# Patient Record
Sex: FEMALE | Race: WHITE | NOT HISPANIC OR LATINO | Employment: OTHER | URBAN - METROPOLITAN AREA
[De-identification: names, ages, dates, MRNs, and addresses within clinical notes are randomized per-mention and may not be internally consistent; named-entity substitution may affect disease eponyms.]

---

## 2017-02-13 ENCOUNTER — ALLSCRIPTS OFFICE VISIT (OUTPATIENT)
Dept: OTHER | Facility: OTHER | Age: 82
End: 2017-02-13

## 2017-02-17 ENCOUNTER — GENERIC CONVERSION - ENCOUNTER (OUTPATIENT)
Dept: OTHER | Facility: OTHER | Age: 82
End: 2017-02-17

## 2017-02-22 ENCOUNTER — ALLSCRIPTS OFFICE VISIT (OUTPATIENT)
Dept: OTHER | Facility: OTHER | Age: 82
End: 2017-02-22

## 2017-03-13 ENCOUNTER — APPOINTMENT (OUTPATIENT)
Dept: PHYSICAL THERAPY | Facility: CLINIC | Age: 82
End: 2017-03-13
Payer: MEDICARE

## 2017-03-13 PROCEDURE — 97140 MANUAL THERAPY 1/> REGIONS: CPT

## 2017-03-13 PROCEDURE — G8991 OTHER PT/OT GOAL STATUS: HCPCS

## 2017-03-13 PROCEDURE — G8990 OTHER PT/OT CURRENT STATUS: HCPCS

## 2017-03-13 PROCEDURE — 97161 PT EVAL LOW COMPLEX 20 MIN: CPT

## 2017-03-16 ENCOUNTER — APPOINTMENT (OUTPATIENT)
Dept: PHYSICAL THERAPY | Facility: CLINIC | Age: 82
End: 2017-03-16
Payer: MEDICARE

## 2017-03-16 PROCEDURE — 97140 MANUAL THERAPY 1/> REGIONS: CPT

## 2017-03-16 PROCEDURE — 97110 THERAPEUTIC EXERCISES: CPT

## 2017-03-17 ENCOUNTER — APPOINTMENT (OUTPATIENT)
Dept: PHYSICAL THERAPY | Facility: CLINIC | Age: 82
End: 2017-03-17
Payer: MEDICARE

## 2017-03-17 PROCEDURE — 97140 MANUAL THERAPY 1/> REGIONS: CPT

## 2017-03-20 ENCOUNTER — APPOINTMENT (OUTPATIENT)
Dept: PHYSICAL THERAPY | Facility: CLINIC | Age: 82
End: 2017-03-20
Payer: MEDICARE

## 2017-03-21 ENCOUNTER — APPOINTMENT (OUTPATIENT)
Dept: PHYSICAL THERAPY | Facility: CLINIC | Age: 82
End: 2017-03-21
Payer: MEDICARE

## 2017-03-21 PROCEDURE — 97110 THERAPEUTIC EXERCISES: CPT

## 2017-03-21 PROCEDURE — 97140 MANUAL THERAPY 1/> REGIONS: CPT

## 2017-03-22 ENCOUNTER — APPOINTMENT (OUTPATIENT)
Dept: PHYSICAL THERAPY | Facility: CLINIC | Age: 82
End: 2017-03-22
Payer: MEDICARE

## 2017-03-22 PROCEDURE — 97110 THERAPEUTIC EXERCISES: CPT

## 2017-03-22 PROCEDURE — 97140 MANUAL THERAPY 1/> REGIONS: CPT

## 2017-03-24 ENCOUNTER — APPOINTMENT (OUTPATIENT)
Dept: PHYSICAL THERAPY | Facility: CLINIC | Age: 82
End: 2017-03-24
Payer: MEDICARE

## 2017-03-24 PROCEDURE — 97140 MANUAL THERAPY 1/> REGIONS: CPT

## 2017-03-27 ENCOUNTER — APPOINTMENT (OUTPATIENT)
Dept: PHYSICAL THERAPY | Facility: CLINIC | Age: 82
End: 2017-03-27
Payer: MEDICARE

## 2017-03-27 PROCEDURE — 97140 MANUAL THERAPY 1/> REGIONS: CPT

## 2017-03-29 ENCOUNTER — APPOINTMENT (OUTPATIENT)
Dept: PHYSICAL THERAPY | Facility: CLINIC | Age: 82
End: 2017-03-29
Payer: MEDICARE

## 2017-03-29 PROCEDURE — 97110 THERAPEUTIC EXERCISES: CPT

## 2017-03-29 PROCEDURE — 97140 MANUAL THERAPY 1/> REGIONS: CPT

## 2017-03-31 ENCOUNTER — APPOINTMENT (OUTPATIENT)
Dept: LAB | Facility: CLINIC | Age: 82
End: 2017-03-31
Payer: MEDICARE

## 2017-03-31 ENCOUNTER — GENERIC CONVERSION - ENCOUNTER (OUTPATIENT)
Dept: OTHER | Facility: OTHER | Age: 82
End: 2017-03-31

## 2017-03-31 ENCOUNTER — APPOINTMENT (OUTPATIENT)
Dept: PHYSICAL THERAPY | Facility: CLINIC | Age: 82
End: 2017-03-31
Payer: MEDICARE

## 2017-03-31 DIAGNOSIS — E78.5 HYPERLIPIDEMIA: ICD-10-CM

## 2017-03-31 DIAGNOSIS — I10 ESSENTIAL (PRIMARY) HYPERTENSION: ICD-10-CM

## 2017-03-31 DIAGNOSIS — E03.9 HYPOTHYROIDISM: ICD-10-CM

## 2017-03-31 DIAGNOSIS — R73.01 IMPAIRED FASTING GLUCOSE: ICD-10-CM

## 2017-03-31 DIAGNOSIS — F41.8 OTHER SPECIFIED ANXIETY DISORDERS: ICD-10-CM

## 2017-03-31 LAB
ALBUMIN SERPL BCP-MCNC: 3.5 G/DL (ref 3.5–5)
ALP SERPL-CCNC: 90 U/L (ref 46–116)
ALT SERPL W P-5'-P-CCNC: 24 U/L (ref 12–78)
ANION GAP SERPL CALCULATED.3IONS-SCNC: 6 MMOL/L (ref 4–13)
AST SERPL W P-5'-P-CCNC: 19 U/L (ref 5–45)
BILIRUB SERPL-MCNC: 0.42 MG/DL (ref 0.2–1)
BUN SERPL-MCNC: 18 MG/DL (ref 5–25)
CALCIUM ALBUM COR SERPL-MCNC: 10.6 MG/DL (ref 8.3–10.1)
CALCIUM SERPL-MCNC: 10.2 MG/DL (ref 8.3–10.1)
CHLORIDE SERPL-SCNC: 99 MMOL/L (ref 100–108)
CHOLEST SERPL-MCNC: 181 MG/DL (ref 50–200)
CO2 SERPL-SCNC: 31 MMOL/L (ref 21–32)
CREAT SERPL-MCNC: 0.81 MG/DL (ref 0.6–1.3)
EST. AVERAGE GLUCOSE BLD GHB EST-MCNC: 123 MG/DL
GFR SERPL CREATININE-BSD FRML MDRD: >60 ML/MIN/1.73SQ M
GLUCOSE P FAST SERPL-MCNC: 109 MG/DL (ref 65–99)
HBA1C MFR BLD: 5.9 % (ref 4.2–6.3)
HDLC SERPL-MCNC: 47 MG/DL (ref 40–60)
LDLC SERPL CALC-MCNC: 85 MG/DL (ref 0–100)
POTASSIUM SERPL-SCNC: 4.5 MMOL/L (ref 3.5–5.3)
PROT SERPL-MCNC: 7.4 G/DL (ref 6.4–8.2)
SODIUM SERPL-SCNC: 136 MMOL/L (ref 136–145)
T4 FREE SERPL-MCNC: 1 NG/DL (ref 0.76–1.46)
TRIGL SERPL-MCNC: 243 MG/DL
TSH SERPL DL<=0.05 MIU/L-ACNC: 4.41 UIU/ML (ref 0.36–3.74)

## 2017-03-31 PROCEDURE — 80061 LIPID PANEL: CPT

## 2017-03-31 PROCEDURE — 84443 ASSAY THYROID STIM HORMONE: CPT

## 2017-03-31 PROCEDURE — 97140 MANUAL THERAPY 1/> REGIONS: CPT

## 2017-03-31 PROCEDURE — 83036 HEMOGLOBIN GLYCOSYLATED A1C: CPT

## 2017-03-31 PROCEDURE — 80053 COMPREHEN METABOLIC PANEL: CPT

## 2017-03-31 PROCEDURE — 36415 COLL VENOUS BLD VENIPUNCTURE: CPT

## 2017-03-31 PROCEDURE — 84439 ASSAY OF FREE THYROXINE: CPT

## 2017-03-31 PROCEDURE — 97110 THERAPEUTIC EXERCISES: CPT

## 2017-04-03 ENCOUNTER — APPOINTMENT (OUTPATIENT)
Dept: PHYSICAL THERAPY | Facility: CLINIC | Age: 82
End: 2017-04-03
Payer: MEDICARE

## 2017-04-03 PROCEDURE — 97110 THERAPEUTIC EXERCISES: CPT

## 2017-04-03 PROCEDURE — 97140 MANUAL THERAPY 1/> REGIONS: CPT

## 2017-04-05 ENCOUNTER — APPOINTMENT (OUTPATIENT)
Dept: PHYSICAL THERAPY | Facility: CLINIC | Age: 82
End: 2017-04-05
Payer: MEDICARE

## 2017-04-05 PROCEDURE — 97140 MANUAL THERAPY 1/> REGIONS: CPT

## 2017-04-05 PROCEDURE — 97110 THERAPEUTIC EXERCISES: CPT

## 2017-04-07 ENCOUNTER — APPOINTMENT (OUTPATIENT)
Dept: PHYSICAL THERAPY | Facility: CLINIC | Age: 82
End: 2017-04-07
Payer: MEDICARE

## 2017-04-07 PROCEDURE — 97140 MANUAL THERAPY 1/> REGIONS: CPT

## 2017-04-07 PROCEDURE — 97110 THERAPEUTIC EXERCISES: CPT

## 2017-04-07 PROCEDURE — 97150 GROUP THERAPEUTIC PROCEDURES: CPT

## 2017-04-10 ENCOUNTER — APPOINTMENT (OUTPATIENT)
Dept: PHYSICAL THERAPY | Facility: CLINIC | Age: 82
End: 2017-04-10
Payer: MEDICARE

## 2017-04-11 ENCOUNTER — APPOINTMENT (OUTPATIENT)
Dept: PHYSICAL THERAPY | Facility: CLINIC | Age: 82
End: 2017-04-11
Payer: MEDICARE

## 2017-04-11 PROCEDURE — 97140 MANUAL THERAPY 1/> REGIONS: CPT

## 2017-04-11 PROCEDURE — 97110 THERAPEUTIC EXERCISES: CPT

## 2017-04-12 ENCOUNTER — APPOINTMENT (OUTPATIENT)
Dept: PHYSICAL THERAPY | Facility: CLINIC | Age: 82
End: 2017-04-12
Payer: MEDICARE

## 2017-04-12 PROCEDURE — 97140 MANUAL THERAPY 1/> REGIONS: CPT

## 2017-04-12 PROCEDURE — 97110 THERAPEUTIC EXERCISES: CPT

## 2017-04-14 ENCOUNTER — APPOINTMENT (OUTPATIENT)
Dept: PHYSICAL THERAPY | Facility: CLINIC | Age: 82
End: 2017-04-14
Payer: MEDICARE

## 2017-04-17 ENCOUNTER — APPOINTMENT (OUTPATIENT)
Dept: PHYSICAL THERAPY | Facility: CLINIC | Age: 82
End: 2017-04-17
Payer: MEDICARE

## 2017-04-17 PROCEDURE — 97140 MANUAL THERAPY 1/> REGIONS: CPT

## 2017-04-17 PROCEDURE — 97110 THERAPEUTIC EXERCISES: CPT

## 2017-04-17 PROCEDURE — G8991 OTHER PT/OT GOAL STATUS: HCPCS

## 2017-04-17 PROCEDURE — G8990 OTHER PT/OT CURRENT STATUS: HCPCS

## 2017-04-19 ENCOUNTER — APPOINTMENT (OUTPATIENT)
Dept: PHYSICAL THERAPY | Facility: CLINIC | Age: 82
End: 2017-04-19
Payer: MEDICARE

## 2017-04-19 PROCEDURE — 97110 THERAPEUTIC EXERCISES: CPT

## 2017-04-19 PROCEDURE — 97140 MANUAL THERAPY 1/> REGIONS: CPT

## 2017-04-21 ENCOUNTER — APPOINTMENT (OUTPATIENT)
Dept: PHYSICAL THERAPY | Facility: CLINIC | Age: 82
End: 2017-04-21
Payer: MEDICARE

## 2017-04-21 PROCEDURE — 97140 MANUAL THERAPY 1/> REGIONS: CPT

## 2017-04-21 PROCEDURE — 97110 THERAPEUTIC EXERCISES: CPT

## 2017-04-24 ENCOUNTER — APPOINTMENT (OUTPATIENT)
Dept: PHYSICAL THERAPY | Facility: CLINIC | Age: 82
End: 2017-04-24
Payer: MEDICARE

## 2017-04-24 PROCEDURE — 97110 THERAPEUTIC EXERCISES: CPT

## 2017-04-24 PROCEDURE — 97140 MANUAL THERAPY 1/> REGIONS: CPT

## 2017-04-26 ENCOUNTER — APPOINTMENT (OUTPATIENT)
Dept: PHYSICAL THERAPY | Facility: CLINIC | Age: 82
End: 2017-04-26
Payer: MEDICARE

## 2017-04-26 PROCEDURE — 97110 THERAPEUTIC EXERCISES: CPT

## 2017-04-26 PROCEDURE — 97140 MANUAL THERAPY 1/> REGIONS: CPT

## 2017-04-27 ENCOUNTER — ALLSCRIPTS OFFICE VISIT (OUTPATIENT)
Dept: OTHER | Facility: OTHER | Age: 82
End: 2017-04-27

## 2017-04-28 ENCOUNTER — APPOINTMENT (OUTPATIENT)
Dept: PHYSICAL THERAPY | Facility: CLINIC | Age: 82
End: 2017-04-28
Payer: MEDICARE

## 2017-04-28 PROCEDURE — 97140 MANUAL THERAPY 1/> REGIONS: CPT

## 2017-04-28 PROCEDURE — 97110 THERAPEUTIC EXERCISES: CPT

## 2017-05-01 ENCOUNTER — APPOINTMENT (OUTPATIENT)
Dept: PHYSICAL THERAPY | Facility: CLINIC | Age: 82
End: 2017-05-01
Payer: MEDICARE

## 2017-05-01 PROCEDURE — 97140 MANUAL THERAPY 1/> REGIONS: CPT

## 2017-05-01 PROCEDURE — 97110 THERAPEUTIC EXERCISES: CPT

## 2017-05-02 ENCOUNTER — GENERIC CONVERSION - ENCOUNTER (OUTPATIENT)
Dept: OTHER | Facility: OTHER | Age: 82
End: 2017-05-02

## 2017-05-03 ENCOUNTER — APPOINTMENT (OUTPATIENT)
Dept: PHYSICAL THERAPY | Facility: CLINIC | Age: 82
End: 2017-05-03
Payer: MEDICARE

## 2017-05-03 PROCEDURE — 97110 THERAPEUTIC EXERCISES: CPT

## 2017-05-03 PROCEDURE — 97140 MANUAL THERAPY 1/> REGIONS: CPT

## 2017-05-05 ENCOUNTER — APPOINTMENT (OUTPATIENT)
Dept: PHYSICAL THERAPY | Facility: CLINIC | Age: 82
End: 2017-05-05
Payer: MEDICARE

## 2017-05-05 PROCEDURE — 97110 THERAPEUTIC EXERCISES: CPT

## 2017-05-05 PROCEDURE — 97140 MANUAL THERAPY 1/> REGIONS: CPT

## 2017-05-08 ENCOUNTER — APPOINTMENT (OUTPATIENT)
Dept: PHYSICAL THERAPY | Facility: CLINIC | Age: 82
End: 2017-05-08
Payer: MEDICARE

## 2017-05-08 PROCEDURE — 97110 THERAPEUTIC EXERCISES: CPT

## 2017-05-08 PROCEDURE — 97140 MANUAL THERAPY 1/> REGIONS: CPT

## 2017-05-10 ENCOUNTER — APPOINTMENT (OUTPATIENT)
Dept: PHYSICAL THERAPY | Facility: CLINIC | Age: 82
End: 2017-05-10
Payer: MEDICARE

## 2017-05-10 ENCOUNTER — GENERIC CONVERSION - ENCOUNTER (OUTPATIENT)
Dept: OTHER | Facility: OTHER | Age: 82
End: 2017-05-10

## 2017-05-10 PROCEDURE — 97110 THERAPEUTIC EXERCISES: CPT

## 2017-05-10 PROCEDURE — 97140 MANUAL THERAPY 1/> REGIONS: CPT

## 2017-05-12 ENCOUNTER — APPOINTMENT (OUTPATIENT)
Dept: PHYSICAL THERAPY | Facility: CLINIC | Age: 82
End: 2017-05-12
Payer: MEDICARE

## 2017-05-12 PROCEDURE — 97110 THERAPEUTIC EXERCISES: CPT

## 2017-05-12 PROCEDURE — 97140 MANUAL THERAPY 1/> REGIONS: CPT

## 2017-05-15 ENCOUNTER — APPOINTMENT (OUTPATIENT)
Dept: PHYSICAL THERAPY | Facility: CLINIC | Age: 82
End: 2017-05-15
Payer: MEDICARE

## 2017-05-15 PROCEDURE — 97110 THERAPEUTIC EXERCISES: CPT

## 2017-05-15 PROCEDURE — 97140 MANUAL THERAPY 1/> REGIONS: CPT

## 2017-05-17 ENCOUNTER — APPOINTMENT (OUTPATIENT)
Dept: PHYSICAL THERAPY | Facility: CLINIC | Age: 82
End: 2017-05-17
Payer: MEDICARE

## 2017-05-17 PROCEDURE — G8991 OTHER PT/OT GOAL STATUS: HCPCS

## 2017-05-17 PROCEDURE — 97140 MANUAL THERAPY 1/> REGIONS: CPT

## 2017-05-17 PROCEDURE — 97110 THERAPEUTIC EXERCISES: CPT

## 2017-05-17 PROCEDURE — G8990 OTHER PT/OT CURRENT STATUS: HCPCS

## 2017-05-19 ENCOUNTER — APPOINTMENT (OUTPATIENT)
Dept: PHYSICAL THERAPY | Facility: CLINIC | Age: 82
End: 2017-05-19
Payer: MEDICARE

## 2017-05-19 PROCEDURE — 97110 THERAPEUTIC EXERCISES: CPT

## 2017-05-19 PROCEDURE — 97140 MANUAL THERAPY 1/> REGIONS: CPT

## 2017-05-22 ENCOUNTER — APPOINTMENT (OUTPATIENT)
Dept: PHYSICAL THERAPY | Facility: CLINIC | Age: 82
End: 2017-05-22
Payer: MEDICARE

## 2017-05-22 PROCEDURE — 97110 THERAPEUTIC EXERCISES: CPT

## 2017-05-22 PROCEDURE — 97140 MANUAL THERAPY 1/> REGIONS: CPT

## 2017-05-24 ENCOUNTER — APPOINTMENT (OUTPATIENT)
Dept: PHYSICAL THERAPY | Facility: CLINIC | Age: 82
End: 2017-05-24
Payer: MEDICARE

## 2017-05-24 PROCEDURE — 97140 MANUAL THERAPY 1/> REGIONS: CPT

## 2017-05-24 PROCEDURE — 97110 THERAPEUTIC EXERCISES: CPT

## 2017-05-26 ENCOUNTER — APPOINTMENT (OUTPATIENT)
Dept: PHYSICAL THERAPY | Facility: CLINIC | Age: 82
End: 2017-05-26
Payer: MEDICARE

## 2017-05-26 PROCEDURE — 97140 MANUAL THERAPY 1/> REGIONS: CPT

## 2017-05-26 PROCEDURE — 97110 THERAPEUTIC EXERCISES: CPT

## 2017-05-31 ENCOUNTER — APPOINTMENT (OUTPATIENT)
Dept: PHYSICAL THERAPY | Facility: CLINIC | Age: 82
End: 2017-05-31
Payer: MEDICARE

## 2017-05-31 PROCEDURE — 97140 MANUAL THERAPY 1/> REGIONS: CPT

## 2017-05-31 PROCEDURE — 97110 THERAPEUTIC EXERCISES: CPT

## 2017-06-02 ENCOUNTER — APPOINTMENT (OUTPATIENT)
Dept: PHYSICAL THERAPY | Facility: CLINIC | Age: 82
End: 2017-06-02
Payer: MEDICARE

## 2017-06-02 PROCEDURE — 97140 MANUAL THERAPY 1/> REGIONS: CPT

## 2017-06-02 PROCEDURE — 97110 THERAPEUTIC EXERCISES: CPT

## 2017-06-05 ENCOUNTER — APPOINTMENT (OUTPATIENT)
Dept: PHYSICAL THERAPY | Facility: CLINIC | Age: 82
End: 2017-06-05
Payer: MEDICARE

## 2017-06-05 PROCEDURE — 97110 THERAPEUTIC EXERCISES: CPT

## 2017-06-05 PROCEDURE — 97140 MANUAL THERAPY 1/> REGIONS: CPT

## 2017-06-07 ENCOUNTER — APPOINTMENT (OUTPATIENT)
Dept: PHYSICAL THERAPY | Facility: CLINIC | Age: 82
End: 2017-06-07
Payer: MEDICARE

## 2017-06-07 PROCEDURE — 97140 MANUAL THERAPY 1/> REGIONS: CPT

## 2017-06-07 PROCEDURE — 97110 THERAPEUTIC EXERCISES: CPT

## 2017-06-09 ENCOUNTER — APPOINTMENT (OUTPATIENT)
Dept: PHYSICAL THERAPY | Facility: CLINIC | Age: 82
End: 2017-06-09
Payer: MEDICARE

## 2017-06-09 PROCEDURE — 97140 MANUAL THERAPY 1/> REGIONS: CPT

## 2017-06-09 PROCEDURE — 97110 THERAPEUTIC EXERCISES: CPT

## 2017-06-12 ENCOUNTER — APPOINTMENT (OUTPATIENT)
Dept: PHYSICAL THERAPY | Facility: CLINIC | Age: 82
End: 2017-06-12
Payer: MEDICARE

## 2017-06-12 PROCEDURE — 97140 MANUAL THERAPY 1/> REGIONS: CPT

## 2017-06-12 PROCEDURE — 97110 THERAPEUTIC EXERCISES: CPT

## 2017-06-14 ENCOUNTER — APPOINTMENT (OUTPATIENT)
Dept: PHYSICAL THERAPY | Facility: CLINIC | Age: 82
End: 2017-06-14
Payer: MEDICARE

## 2017-06-14 PROCEDURE — 97140 MANUAL THERAPY 1/> REGIONS: CPT

## 2017-06-14 PROCEDURE — G8991 OTHER PT/OT GOAL STATUS: HCPCS

## 2017-06-14 PROCEDURE — 97110 THERAPEUTIC EXERCISES: CPT

## 2017-06-14 PROCEDURE — G8990 OTHER PT/OT CURRENT STATUS: HCPCS

## 2017-06-16 ENCOUNTER — APPOINTMENT (OUTPATIENT)
Dept: PHYSICAL THERAPY | Facility: CLINIC | Age: 82
End: 2017-06-16
Payer: MEDICARE

## 2017-06-19 ENCOUNTER — APPOINTMENT (OUTPATIENT)
Dept: PHYSICAL THERAPY | Facility: CLINIC | Age: 82
End: 2017-06-19
Payer: MEDICARE

## 2017-06-19 PROCEDURE — 97140 MANUAL THERAPY 1/> REGIONS: CPT

## 2017-06-19 PROCEDURE — 97110 THERAPEUTIC EXERCISES: CPT

## 2017-06-21 ENCOUNTER — APPOINTMENT (OUTPATIENT)
Dept: PHYSICAL THERAPY | Facility: CLINIC | Age: 82
End: 2017-06-21
Payer: MEDICARE

## 2017-06-21 PROCEDURE — 97140 MANUAL THERAPY 1/> REGIONS: CPT

## 2017-06-21 PROCEDURE — 97110 THERAPEUTIC EXERCISES: CPT

## 2017-06-23 ENCOUNTER — APPOINTMENT (OUTPATIENT)
Dept: PHYSICAL THERAPY | Facility: CLINIC | Age: 82
End: 2017-06-23
Payer: MEDICARE

## 2017-06-26 ENCOUNTER — APPOINTMENT (OUTPATIENT)
Dept: PHYSICAL THERAPY | Facility: CLINIC | Age: 82
End: 2017-06-26
Payer: MEDICARE

## 2017-06-26 PROCEDURE — 97140 MANUAL THERAPY 1/> REGIONS: CPT

## 2017-06-26 PROCEDURE — 97110 THERAPEUTIC EXERCISES: CPT

## 2017-06-28 ENCOUNTER — APPOINTMENT (OUTPATIENT)
Dept: PHYSICAL THERAPY | Facility: CLINIC | Age: 82
End: 2017-06-28
Payer: MEDICARE

## 2017-06-28 PROCEDURE — 97140 MANUAL THERAPY 1/> REGIONS: CPT

## 2017-06-28 PROCEDURE — 97110 THERAPEUTIC EXERCISES: CPT

## 2017-06-30 ENCOUNTER — APPOINTMENT (OUTPATIENT)
Dept: PHYSICAL THERAPY | Facility: CLINIC | Age: 82
End: 2017-06-30
Payer: MEDICARE

## 2017-07-03 ENCOUNTER — APPOINTMENT (OUTPATIENT)
Dept: PHYSICAL THERAPY | Facility: CLINIC | Age: 82
End: 2017-07-03
Payer: MEDICARE

## 2017-07-03 PROCEDURE — 97110 THERAPEUTIC EXERCISES: CPT

## 2017-07-03 PROCEDURE — 97140 MANUAL THERAPY 1/> REGIONS: CPT

## 2017-07-05 ENCOUNTER — APPOINTMENT (OUTPATIENT)
Dept: PHYSICAL THERAPY | Facility: CLINIC | Age: 82
End: 2017-07-05
Payer: MEDICARE

## 2017-07-05 PROCEDURE — 97110 THERAPEUTIC EXERCISES: CPT

## 2017-07-05 PROCEDURE — 97140 MANUAL THERAPY 1/> REGIONS: CPT

## 2017-09-12 ENCOUNTER — GENERIC CONVERSION - ENCOUNTER (OUTPATIENT)
Dept: OTHER | Facility: OTHER | Age: 82
End: 2017-09-12

## 2017-10-17 DIAGNOSIS — E83.52 HYPERCALCEMIA: ICD-10-CM

## 2017-10-17 DIAGNOSIS — R73.01 IMPAIRED FASTING GLUCOSE: ICD-10-CM

## 2017-10-17 DIAGNOSIS — M85.80 OTHER SPECIFIED DISORDERS OF BONE DENSITY AND STRUCTURE, UNSPECIFIED SITE (CODE): ICD-10-CM

## 2017-10-17 DIAGNOSIS — I10 ESSENTIAL (PRIMARY) HYPERTENSION: ICD-10-CM

## 2017-10-17 DIAGNOSIS — Z12.31 ENCOUNTER FOR SCREENING MAMMOGRAM FOR MALIGNANT NEOPLASM OF BREAST: ICD-10-CM

## 2017-10-17 DIAGNOSIS — E78.5 HYPERLIPIDEMIA: ICD-10-CM

## 2017-10-17 DIAGNOSIS — F41.8 OTHER SPECIFIED ANXIETY DISORDERS: ICD-10-CM

## 2017-10-17 DIAGNOSIS — E03.9 HYPOTHYROIDISM: ICD-10-CM

## 2017-10-25 ENCOUNTER — APPOINTMENT (OUTPATIENT)
Dept: LAB | Facility: CLINIC | Age: 82
End: 2017-10-25
Payer: MEDICARE

## 2017-10-25 ENCOUNTER — GENERIC CONVERSION - ENCOUNTER (OUTPATIENT)
Dept: OTHER | Facility: OTHER | Age: 82
End: 2017-10-25

## 2017-10-25 DIAGNOSIS — F41.8 OTHER SPECIFIED ANXIETY DISORDERS: ICD-10-CM

## 2017-10-25 DIAGNOSIS — I10 ESSENTIAL (PRIMARY) HYPERTENSION: ICD-10-CM

## 2017-10-25 DIAGNOSIS — E83.52 HYPERCALCEMIA: ICD-10-CM

## 2017-10-25 DIAGNOSIS — R73.01 IMPAIRED FASTING GLUCOSE: ICD-10-CM

## 2017-10-25 DIAGNOSIS — E78.5 HYPERLIPIDEMIA: ICD-10-CM

## 2017-10-25 DIAGNOSIS — E03.9 HYPOTHYROIDISM: ICD-10-CM

## 2017-10-25 LAB
ALBUMIN SERPL BCP-MCNC: 3.5 G/DL (ref 3.5–5)
ALP SERPL-CCNC: 69 U/L (ref 46–116)
ALT SERPL W P-5'-P-CCNC: 25 U/L (ref 12–78)
ANION GAP SERPL CALCULATED.3IONS-SCNC: 5 MMOL/L (ref 4–13)
AST SERPL W P-5'-P-CCNC: 24 U/L (ref 5–45)
BASOPHILS # BLD AUTO: 0.02 THOUSANDS/ΜL (ref 0–0.1)
BASOPHILS NFR BLD AUTO: 0 % (ref 0–1)
BILIRUB SERPL-MCNC: 0.43 MG/DL (ref 0.2–1)
BUN SERPL-MCNC: 15 MG/DL (ref 5–25)
CALCIUM SERPL-MCNC: 9.9 MG/DL (ref 8.3–10.1)
CHLORIDE SERPL-SCNC: 98 MMOL/L (ref 100–108)
CHOLEST SERPL-MCNC: 196 MG/DL (ref 50–200)
CO2 SERPL-SCNC: 30 MMOL/L (ref 21–32)
CREAT SERPL-MCNC: 0.73 MG/DL (ref 0.6–1.3)
EOSINOPHIL # BLD AUTO: 0.02 THOUSAND/ΜL (ref 0–0.61)
EOSINOPHIL NFR BLD AUTO: 0 % (ref 0–6)
ERYTHROCYTE [DISTWIDTH] IN BLOOD BY AUTOMATED COUNT: 14.5 % (ref 11.6–15.1)
EST. AVERAGE GLUCOSE BLD GHB EST-MCNC: 123 MG/DL
GFR SERPL CREATININE-BSD FRML MDRD: 76 ML/MIN/1.73SQ M
GLUCOSE P FAST SERPL-MCNC: 97 MG/DL (ref 65–99)
HBA1C MFR BLD: 5.9 % (ref 4.2–6.3)
HCT VFR BLD AUTO: 41.1 % (ref 34.8–46.1)
HDLC SERPL-MCNC: 51 MG/DL (ref 40–60)
HGB BLD-MCNC: 13.3 G/DL (ref 11.5–15.4)
LDLC SERPL CALC-MCNC: 103 MG/DL (ref 0–100)
LYMPHOCYTES # BLD AUTO: 2.19 THOUSANDS/ΜL (ref 0.6–4.47)
LYMPHOCYTES NFR BLD AUTO: 32 % (ref 14–44)
MCH RBC QN AUTO: 27.8 PG (ref 26.8–34.3)
MCHC RBC AUTO-ENTMCNC: 32.4 G/DL (ref 31.4–37.4)
MCV RBC AUTO: 86 FL (ref 82–98)
MONOCYTES # BLD AUTO: 0.72 THOUSAND/ΜL (ref 0.17–1.22)
MONOCYTES NFR BLD AUTO: 11 % (ref 4–12)
NEUTROPHILS # BLD AUTO: 3.89 THOUSANDS/ΜL (ref 1.85–7.62)
NEUTS SEG NFR BLD AUTO: 57 % (ref 43–75)
NRBC BLD AUTO-RTO: 0 /100 WBCS
PLATELET # BLD AUTO: 247 THOUSANDS/UL (ref 149–390)
PMV BLD AUTO: 10.1 FL (ref 8.9–12.7)
POTASSIUM SERPL-SCNC: 4.1 MMOL/L (ref 3.5–5.3)
PROT SERPL-MCNC: 7.2 G/DL (ref 6.4–8.2)
RBC # BLD AUTO: 4.79 MILLION/UL (ref 3.81–5.12)
SODIUM SERPL-SCNC: 133 MMOL/L (ref 136–145)
T4 FREE SERPL-MCNC: 0.85 NG/DL (ref 0.76–1.46)
TRIGL SERPL-MCNC: 212 MG/DL
TSH SERPL DL<=0.05 MIU/L-ACNC: 5.57 UIU/ML (ref 0.36–3.74)
WBC # BLD AUTO: 6.85 THOUSAND/UL (ref 4.31–10.16)

## 2017-10-25 PROCEDURE — 83036 HEMOGLOBIN GLYCOSYLATED A1C: CPT

## 2017-10-25 PROCEDURE — 80061 LIPID PANEL: CPT

## 2017-10-25 PROCEDURE — 80053 COMPREHEN METABOLIC PANEL: CPT

## 2017-10-25 PROCEDURE — 84439 ASSAY OF FREE THYROXINE: CPT

## 2017-10-25 PROCEDURE — 36415 COLL VENOUS BLD VENIPUNCTURE: CPT

## 2017-10-25 PROCEDURE — 85025 COMPLETE CBC W/AUTO DIFF WBC: CPT

## 2017-10-25 PROCEDURE — 84443 ASSAY THYROID STIM HORMONE: CPT

## 2017-11-02 ENCOUNTER — ALLSCRIPTS OFFICE VISIT (OUTPATIENT)
Dept: OTHER | Facility: OTHER | Age: 82
End: 2017-11-02

## 2017-11-03 NOTE — PROGRESS NOTES
Assessment  1  Medicare annual wellness visit, subsequent (V70 0) (Z00 00)   2  Hypertension (401 9) (I10)   3  Hyperlipidemia (272 4) (E78 5)   4  Hypothyroidism (244 9) (E03 9)   5  Impaired fasting glucose (790 21) (R73 01)   · Normalized   6  Depression with anxiety (300 4) (F41 8)   7  Osteopenia (733 90) (M85 80)   8  Encounter for screening mammogram for malignant neoplasm of breast (V76 12)   (Z12 31)   9  Need for immunization against influenza (V04 81) (Z23)    Plan  Depression with anxiety, Hyperlipidemia, Hypertension, Hypothyroidism, Impaired fasting  glucose, Osteopenia    · (1) COMPREHENSIVE METABOLIC PANEL; Status:Active; Requested for:22Apr2018;    · (1) HEMOGLOBIN A1C; Status:Active; Requested for:22Apr2018;    · (1) LIPID PANEL FASTING W DIRECT LDL REFLEX; Status:Active; Requested  for:22Apr2018;    · (1) TSH WITH FT4 REFLEX; Status:Active; Requested for:22Apr2018;   Encounter for screening mammogram for malignant neoplasm of breast    · * MAMMO SCREENING BILATERAL W CAD; Status:Active; Requested for:02Nov2017;   Hyperlipidemia    · Simvastatin 20 MG Oral Tablet; take 1 tablet by mouth once daily  Hypertension    · Furosemide 20 MG Oral Tablet   · Atenolol 100 MG Oral Tablet; TAKE ONE TABLET 2 TIMES DAILY   · Furosemide 40 MG Oral Tablet; take 1 tablet by mouth once daily   · Lisinopril 40 MG Oral Tablet; take 1 tablet by mouth once daily  Hypothyroidism    · Levothyroxine Sodium 25 MCG Oral Tablet; take 1 tablet by mouth once daily  Medicare annual wellness visit, subsequent    · *VB - Fall Risk Assessment  (Dx Z13 89 Screen for Neurologic Disorder);  Status:Complete;   Done: 98UHH0010 12:04PM  Need for immunization against influenza    · Fluzone High-Dose 0 5 ML Intramuscular Suspension Prefilled Syringe;  INJECT 0 5  ML Intramuscular;  To Be Done: [id number]   · Fluzone High-Dose 0 5 ML Intramuscular Suspension Prefilled Syringe  Osteopenia    · * DXA BONE DENSITY SPINE HIP AND PELVIS; Status:Canceled - Scheduling;   Urinary incontinence    · Imipramine HCl - 25 MG Oral Tablet  Unlinked    · Famotidine 40 MG Oral Tablet   · Vitamin D3 1000 UNIT Oral Tablet    Discussion/Summary    Reviewed lab in 10/[id number] 57, Free T4 0 85 ok196/212/51/103 low fat diet5 9 low carb diet  current meds  elevated in office today  Pt denies symptoms  DASH diet  Check BP at home 2/week  If BP always >150/90, call office  Pt understood  specialist as scheduled  11/2016 negative  Give script today  11/2016 osteopenia, on vitD  Walking everyday  I advised pt do not take extra calcium bc hx of hypercalcemia  flu shot today  pneumovax at pharmacy in 2013 per pt  Got prevnar 13 2017  zostavax in 2015  colonoscopy in 3/2016  No more colonoscopy per pt  in 6 months  Possible side effects of new medications were reviewed with the patient/guardian today  The treatment plan was reviewed with the patient/guardian  The patient/guardian understands and agrees with the treatment plan      Chief Complaint  Follow up      History of Present Illness  Pt is here by herself  elevated today  Pt denies symptoms  Pt states her relatives had diagnosed of Marfan's syndrome and she is worried about it  Did not check BP at home recently  Pt is on atenolol, lisinopril and lasix  She uses lasix 20mg daily now  urology for urinary incontinence  On tofranil which works  uses simvastatin 20mg qhs  Denies SE  levothyroxine 25mcg daily  Feels fine  hgA1C 5 9 stable  OTC pepcid and TUMs  xanax very rarely  Mood is OK  opthalmology Dr Toro Scale specialist Q 6months  Stable  oncologist Dr [facility] year for hx endometrial cancer s/p hysterectomy  Stable  podiatry Q 10 weeks  by herself  Does all ADL's  Still drive  Has 3 daughters and 1 daughter lives close to her  recent falls  Review of Systems    Constitutional: No fever, no chills, feels well, no tiredness, no recent weight gain or weight loss     ENT: no complaints of earache, no loss of hearing, no nose bleeds, no nasal discharge, no sore throat, no hoarseness  Cardiovascular: No complaints of slow heart rate, no fast heart rate, no chest pain, no palpitations, no leg claudication, no lower extremity edema  Respiratory: No complaints of shortness of breath, no wheezing, no cough, no SOB on exertion, no orthopnea, no PND  Gastrointestinal: No complaints of abdominal pain, no constipation, no nausea or vomiting, no diarrhea, no bloody stools  Musculoskeletal: No complaints of arthralgias, no myalgias, no joint swelling or stiffness, no limb pain or swelling  Preventive Quality 65 and Older: The patient is currently asymptomatic Symptoms Include: no recent fall       Active Problems  1  Abnormal breast exam (611 79) (N64 59)   2  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)   3  Constipation, unspecified constipation type (564 00) (K59 00)   4  Depression with anxiety (300 4) (F41 8)   5  Diverticulosis (562 10) (K57 90)   6  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)   7  Encounter for screening mammogram for malignant neoplasm of breast (V76 12)   (Z12 31)   8  Endometrial cancer (182 0) (C54 1)   9  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)   10  Esophagitis, reflux (530 11) (K21 0)   11  H/O malignant neoplasm of endometrium (V10 42) (Z85 42)   12  Hypercalcemia (275 42) (E83 52)   13  Hyperlipidemia (272 4) (E78 5)   14  Hypertension (401 9) (I10)   15  Hypothyroidism (244 9) (E03 9)   16  Impaired fasting glucose (790 21) (R73 01)   17  Insomnia (780 52) (G47 00)   18  Medicare annual wellness visit, initial (V70 0) (Z00 00)   19  Need for immunization against influenza (V04 81) (Z23)   20  Need for pneumococcal vaccine (V03 82) (Z23)   21  Need for vaccination with 13-polyvalent pneumococcal conjugate vaccine (V03 82) (Z23)   22  Osteopenia (733 90) (M85 80)   23  Postmenopausal atrophic vaginitis (627 3) (N95 2)   24   Preop examination (V72 84) (Z01 818)   25  Urinary incontinence (788 30) (R32)   26  Vertigo (780 4) (R42)    Past Medical History  1  History of Abdominal pain, RUQ (789 01) (R10 11)   2  History of Acute Chest Wall Trauma (959 11)   3  History of Acute Right Rotator Cuff Sprain (Capsule) (840 4)   4  History of Anxiety (300 00) (F41 9)   5  History of Encounter for screening mammogram for malignant neoplasm of breast   (V76 12) (Z12 31)   6  History of Endometrial hyperplasia (621 30) (N85 00)   7  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)   8  History of Functional diarrhea (564 5) (K59 1)   9  History of anemia (V12 3) (Z86 2)   10  History of chest pain (V13 89) (Z87 898)   11  History of edema (V13 89) (Z87 898)   12  History of folliculitis (M64 6) (D84 2)   13  History of hypercholesterolemia (V12 29) (Z86 39)   14  History of hyperlipidemia (V12 29) (Z86 39)   15  History of migraine (V12 49) (Z86 69)   16  History of osteoporosis (V13 59) (Z87 39)   17  History of stomatitis (V12 79) (Z87 19)   18  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)   19  History of Pain in joint of right shoulder region (719 41) (M25 511)   20  History of Pain in joint of right shoulder region (719 41) (M25 511)   21  History of Pure hypercholesterolemia (272 0) (E78 00)   22  History of Skin lesion (709 9) (L98 9)   23  History of Strain Of The Right Buttock Gluteus Medius (843 8)   24  History of Tendinitis of right rotator cuff (726 10) (M75 81)   25  History of Thyroid cyst (246 2) (E04 1)   26  History of UTI (urinary tract infection), bacterial (599 0,041 9) (N39 0,A49 9)   27  History of Visit for pre-operative examination (V72 84) (G21 773)    Surgical History  1  History of Colonoscopy (Fiberoptic)   2  History of Diagnostic Cystoscopy   3  History of Hysterectomy Robotic-Assisted   4  History of Knee Replacement   5  History of Knee Surgery   6  History of Salpingo-oophorectomy Bilateral   7   History of Shoulder Arthroplasty Total Shoulder Replacement    Family History  Brother    1  Family history of Lung Cancer (V16 1)   2  Family history of Prolapsing Mitral Valve Leaflet Syndrome   3  Family history of Prostate Cancer (V16 42)    Social History   · Denied: History of Alcohol   · Never A Smoker    Current Meds   1  Acetaminophen  MG Oral Tablet Extended Release; Therapy: (Recorded:06Oct2016) to Recorded   2  Align CAPS Recorded   3  Atenolol 100 MG Oral Tablet; TAKE ONE TABLET 2 TIMES DAILY; Therapy: 61KMD2921 to (Evaluate:22Apr2018)  Requested for: 24Oct2017; Last   Rx:24Oct2017 Ordered   4  Centrum Silver Oral Tablet; Therapy: (Recorded:06Oct2016) to Recorded   5  Famotidine 40 MG Oral Tablet Recorded   6  Furosemide 20 MG Oral Tablet; TAKE 1 TABLET DAILY; Therapy: 83HMK5106 to [phone])  Requested for: 27Apr2017; Last   Rx:27Apr2017 Ordered   7  Furosemide 40 MG Oral Tablet; take 1 tablet by mouth once daily; Therapy: 97KJP5294 to Isaiah Gallardo)  Requested for: 26Oct2017; Last   Rx:26Oct2017 Ordered   8  Imipramine HCl - 25 MG Oral Tablet; Therapy: ([id number]) to Recorded   9  Levothyroxine Sodium 25 MCG Oral Tablet; take 1 tablet by mouth once daily; Therapy: 47DFT2130 to (Evaluate:17Mar2018)  Requested for: 18Sep2017; Last   Rx:20Sbt6284 Ordered   10  Lisinopril 40 MG Oral Tablet; take 1 tablet by mouth once daily; Therapy: 15GHY4645 to (Evaluate:22Apr2018)  Requested for: 96NEW6365; Last    Rx:24Oct2017 Ordered   11  MiraLax POWD; USE AS DIRECTED Recorded   12  Simvastatin 20 MG Oral Tablet; take 1 tablet by mouth once daily; Therapy: 79EKH3100 to (Tian Neal)  Requested for: 9717 9481; Last    Rx:27Apr2017 Ordered   13  Simvastatin 80 MG Oral Tablet; TAKE 1/4 TABLET BY MOUTH DAILY; Therapy: 95Lyb8689 to (Aure Gonzalez)  Requested for: 28CFN7306; Last    Rx:05Jun2017 Ordered   14  Vitamin D3 1000 UNIT Oral Tablet;     Therapy: (Recorded:02Nov2017) to Recorded    Allergies  1  Amoxicillin TABS   2  Biaxin TABS   3  Norvasc TABS   4  Relafen TABS    Vitals  Vital Signs    Recorded: 44SWD6578 10:18AM Recorded: 66PWP7586 09:39AM   Temperature  97 8 F, Tympanic   Heart Rate  68, L Radial   Respiration  16   Systolic 061 209, LUE   Diastolic 90 92, LUE   Height  5 ft 3 in   Weight  137 lb 3 2 oz   BMI Calculated  24 3   BSA Calculated  1 65   Pain Scale  0     Physical Exam    Constitutional   General appearance: No acute distress, well appearing and well nourished  Pulmonary   Respiratory effort: No increased work of breathing or signs of respiratory distress  Auscultation of lungs: Clear to auscultation  Cardiovascular   Auscultation of heart: Normal rate and rhythm, normal S1 and S2, without murmurs  Examination of extremities for edema and/or varicosities: Normal     Carotid pulses: Normal     Abdomen   Abdomen: Non-tender, no masses  Liver and spleen: No hepatomegaly or splenomegaly  Lymphatic   Palpation of lymph nodes in neck: No lymphadenopathy  Musculoskeletal   Gait and station: Normal          Future Appointments    Date/Time Provider Specialty Site   05/03/2018 09:40 AM Fransisca Hargrove MD Family Medicine [address] [place] [address]   02/13/2018 02:30 PM Yamilet Ferrell [facility] Gynecological On[facility]     Signatures   Electronically signed by :  Patrica Agarwal MD; Nov 2 2017 12:05PM EST                       (Author)

## 2017-11-09 ENCOUNTER — GENERIC CONVERSION - ENCOUNTER (OUTPATIENT)
Dept: OTHER | Facility: OTHER | Age: 82
End: 2017-11-09

## 2017-12-26 ENCOUNTER — ALLSCRIPTS OFFICE VISIT (OUTPATIENT)
Dept: OTHER | Facility: OTHER | Age: 82
End: 2017-12-26

## 2017-12-27 NOTE — PROGRESS NOTES
Assessment   1  Bronchitis (490) (J40)   2  Hypertension (401 9) (I10)    Plan   Bronchitis    · Azithromycin 250 MG Oral Tablet; TAKE 2 TABLETS ON DAY 1 THEN TAKE 1    TABLET A DAY FOR 4 DAYS  Hyperlipidemia    · Simvastatin 20 MG Oral Tablet; take 1 tablet by mouth once daily  Hypertension    · Atenolol 100 MG Oral Tablet; TAKE ONE TABLET 2 TIMES DAILY   · Furosemide 40 MG Oral Tablet; take 1 tablet by mouth once daily   · Lisinopril 40 MG Oral Tablet; take 1 tablet by mouth once daily  Hypothyroidism    · Levothyroxine Sodium 25 MCG Oral Tablet; take 1 tablet by mouth once daily  Unlinked    · Famotidine 40 MG Oral Tablet    Discussion/Summary      A lot of fluids and rest   zpack  SE educated pt   continue OTC cough meds  other meds  office if symptoms no improving or worse  Possible side effects of new medications were reviewed with the patient/guardian today  The treatment plan was reviewed with the patient/guardian  The patient/guardian understands and agrees with the treatment plan      Chief Complaint   Patient presents with a productive cough and chest congestion  History of Present Illness   HPI: Pt is here with daughter  cough for 1 week  Cough with phlegm, no wheezing or SOB  cold 3 weeks ago with fever  Felt better but now got cough  OTC mucinex DM which helped some  fever, Chest pain, n/v/abd pain   smoking  hx of asthma or COPD  sick contact  flu shot this season  elevated in last office visit  Pt states she checks her BP at home regularly, always good 120/80  She is on lisinopril 40mg QD  Review of Systems        Constitutional: No fever, no chills, feels well, no tiredness, no recent weight gain or loss  ENT: as noted in HPI  Respiratory: as noted in HPI  Gastrointestinal: no complaints of abdominal pain, no constipation, no nausea or diarrhea, no vomiting, no bloody stools        Musculoskeletal: no complaints of arthralgia, no myalgia, no joint swelling or stiffness, no limb pain or swelling  Active Problems   1  Abnormal breast exam (611 79) (N64 59)   2  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)   3  Constipation, unspecified constipation type (564 00) (K59 00)   4  Depression with anxiety (300 4) (F41 8)   5  Diverticulosis (562 10) (K57 90)   6  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)   7  Encounter for screening mammogram for malignant neoplasm of breast (V76 12)     (Z12 31)   8  Endometrial cancer (182 0) (C54 1)   9  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)   10  Esophagitis, reflux (530 11) (K21 0)   11  H/O malignant neoplasm of endometrium (V10 42) (Z85 42)   12  Hypercalcemia (275 42) (E83 52)   13  Hyperlipidemia (272 4) (E78 5)   14  Hypertension (401 9) (I10)   15  Hypothyroidism (244 9) (E03 9)   16  Impaired fasting glucose (790 21) (R73 01)   17  Insomnia (780 52) (G47 00)   18  Medicare annual wellness visit, initial (V70 0) (Z00 00)   19  Medicare annual wellness visit, subsequent (V70 0) (Z00 00)   20  Need for immunization against influenza (V04 81) (Z23)   21  Need for pneumococcal vaccine (V03 82) (Z23)   22  Need for vaccination with 13-polyvalent pneumococcal conjugate vaccine (V03 82) (Z23)   23  Osteopenia (733 90) (M85 80)   24  Postmenopausal atrophic vaginitis (627 3) (N95 2)   25  Preop examination (V72 84) (Z01 818)   26  Urinary incontinence (788 30) (R32)   27  Vertigo (780 4) (R42)    Past Medical History   1  History of Abdominal pain, RUQ (789 01) (R10 11)   2  History of Acute Chest Wall Trauma (959 11)   3  History of Acute Right Rotator Cuff Sprain (Capsule) (840 4)   4  History of Anxiety (300 00) (F41 9)   5  History of Encounter for screening mammogram for malignant neoplasm of breast     (V76 12) (Z12 31)   6  History of Endometrial hyperplasia (621 30) (N85 00)   7  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)   8  History of Functional diarrhea (564 5) (K59 1)   9  History of anemia (V12 3) (Z86 2)   10  History of chest pain (V13 89) (Z87 898)   11  History of edema (V13 89) (Z87 898)   12  History of folliculitis (N47 0) (R20 4)   13  History of hypercholesterolemia (V12 29) (Z86 39)   14  History of hyperlipidemia (V12 29) (Z86 39)   15  History of migraine (V12 49) (Z86 69)   16  History of osteoporosis (V13 59) (Z87 39)   17  History of stomatitis (V12 79) (Z87 19)   18  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)   19  History of Pain in joint of right shoulder region (719 41) (M25 511)   20  History of Pain in joint of right shoulder region (719 41) (M25 511)   21  History of Pure hypercholesterolemia (272 0) (E78 00)   22  History of Skin lesion (709 9) (L98 9)   23  History of Strain Of The Right Buttock Gluteus Medius (843 8)   24  History of Tendinitis of right rotator cuff (726 10) (M75 81)   25  History of Thyroid cyst (246 2) (E04 1)   26  History of UTI (urinary tract infection), bacterial (599 0,041 9) (N39 0,A49 9)   27  History of Visit for pre-operative examination (V72 84) (X83 665)    Family History   Brother    1  Family history of Lung Cancer (V16 1)   2  Family history of Prolapsing Mitral Valve Leaflet Syndrome   3  Family history of Prostate Cancer (V16 42)    Social History    · Denied: History of Alcohol   · Never A Smoker    Surgical History   1  History of Colonoscopy (Fiberoptic)   2  History of Diagnostic Cystoscopy   3  History of Hysterectomy Robotic-Assisted   4  History of Knee Replacement   5  History of Knee Surgery   6  History of Salpingo-oophorectomy Bilateral   7  History of Shoulder Arthroplasty Total Shoulder Replacement    Current Meds    1  Acetaminophen  MG Oral Tablet Extended Release; Therapy: (Recorded:87Nia5953) to Recorded   2  Align CAPS Recorded   3  Atenolol 100 MG Oral Tablet; TAKE ONE TABLET 2 TIMES DAILY;      Therapy: 58GSR1390 to (Evaluate:22Apr2018)  Requested for: 68THL5400; Last Rx: 08OIG0931 Ordered   4  Centrum Silver Oral Tablet; Therapy: (Recorded:06Oct2016) to Recorded   5  Famotidine 40 MG Oral Tablet Recorded   6  Furosemide 40 MG Oral Tablet; take 1 tablet by mouth once daily; Therapy: 96HAU8539 to ([phone])  Requested for: 86GVJ2122; Last     Rx:26Oct2017 Ordered   7  Imipramine HCl - 25 MG Oral Tablet; Therapy: ([id number]) to Recorded   8  Levothyroxine Sodium 25 MCG Oral Tablet; take 1 tablet by mouth once daily; Therapy: 42CFX7292 to (Evaluate:17Mar2018)  Requested for: 34KYU3935; Last     Rx:77Agt2924 Ordered   9  Lisinopril 40 MG Oral Tablet; take 1 tablet by mouth once daily; Therapy: 18SGR1290 to (Evaluate:22Apr2018)  Requested for: 53VWV8964; Last     ND:03OFM2112 Ordered   10  MiraLax POWD; USE AS DIRECTED Recorded   11  Simvastatin 20 MG Oral Tablet; take 1 tablet by mouth once daily; Therapy: 45RZF9790 to (Ephriam Kvng)  Requested for: 91XYI9303; Last      Rx:02Nov2017 Ordered   12  Simvastatin 80 MG Oral Tablet; TAKE 1/4 OF A TABLET BY MOUTH ONCE DAILY; Therapy: 71Wzq2486 to (Evaluate:45Vaa5000)  Requested for: 33HTL5898; Last      Rx:87Keo0323 Ordered   13  Vitamin D3 1000 UNIT Oral Tablet; Therapy: (Recorded:02Nov2017) to Recorded    Allergies   1  Amoxicillin TABS   2  Biaxin TABS   3  Norvasc TABS   4  Relafen TABS    Vitals    Recorded: 67BPZ9218 02:28PM   Temperature 97 4 F, Tympanic   Heart Rate 63, L Radial   Pulse Quality Normal, L Radial   Respiration Quality Normal   Respiration 16   Systolic 901, LUE, Sitting   Diastolic 80, LUE, Sitting   Height 5 ft 3 in   Weight 135 lb 4 oz   BMI Calculated 23 96   BSA Calculated 1 64   O2 Saturation 97   Pain Scale 0     Physical Exam        Constitutional      General appearance: No acute distress, well appearing and well nourished  Eyes      Conjunctiva and lids: No swelling, erythema or discharge         Pupils and irises: Equal, round and reactive to light        Ears, Nose, Mouth, and Throat      Otoscopic examination: Tympanic membranes translucent with normal light reflex  Canals patent without erythema  Nasal mucosa, septum, and turbinates: Abnormal  -- swollen  Oropharynx: Normal with no erythema, edema, exudate or lesions  Pulmonary      Respiratory effort: No increased work of breathing or signs of respiratory distress  Auscultation of lungs: Clear to auscultation  Cardiovascular      Auscultation of heart: Normal rate and rhythm, normal S1 and S2, without murmurs  Examination of extremities for edema and/or varicosities: Normal        Carotid pulses: Normal        Abdomen      Abdomen: Non-tender, no masses  Liver and spleen: No hepatomegaly or splenomegaly  Lymphatic      Palpation of lymph nodes in neck: No lymphadenopathy  Musculoskeletal      Gait and station: Normal           Future Appointments      Date/Time Provider Specialty Site   05/03/2018 09:40 AM Patrick Cowden, MD Family [address]   02/13/2018 02:30 PM [facility] Gynecological On[facility]     Signatures    Electronically signed by :  Ceferino Shaw MD; Dec 26 2017  2:52PM EST                       (Author)

## 2018-01-09 NOTE — MISCELLANEOUS
History of Present Illness  TCM Communication [facility]: The patient is being contacted for follow-up after hospitalization  She was hospitalized at [facility]  The dates of hospitalization: 2 days, date of admission: 3/10/2016, date of discharge: 3/12/2016  Diagnosis: Rectal bleeding / RLQ abdominal pain  She was discharged to home  Medications reviewed and updated today  She did not schedule a follow up appointment  She refused a follow up appointment due to states will follow up in next office visit on 4/5/2016  Counseling was provided to the patient  Topics counseled included importance of compliance with treatment  Communication performed and completed by A  [facility] LPN      Active Problems   1  Abdominal pain, RUQ (789 01) (R10 11)  2  Abnormal breast exam (611 79) (N64 59)  3  Depression with anxiety (300 4) (F41 8)  4  Encounter for screening mammogram for malignant neoplasm of breast (V76 12)   (Z12 31)  5  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)  6  Folliculitis (785 3) (H18 1)  7  H/O malignant neoplasm of endometrium (V10 42) (Z85 42)  8  Hyperlipidemia (272 4) (E78 5)  9  Hypertension (401 9) (I10)  10  Hypothyroidism (244 9) (E03 9)  11  Impaired fasting glucose (790 21) (R73 01)  12  Insomnia (780 52) (G47 00)  13  Need for vaccination with 13-polyvalent pneumococcal conjugate vaccine (V03 82) (Z23)  14  Osteoarthritis (715 90) (M19 90)  15  Osteoporosis (733 00) (M81 0)  16  Postmenopausal atrophic vaginitis (627 3) (N95 2)  17  Urinary incontinence (788 30) (R32)  18  Vertigo (780 4) (R42)    Past Medical History   1  History of Acute Chest Wall Trauma (959 11)  2  History of Acute Right Rotator Cuff Sprain (Capsule) (840 4)  3  History of Anxiety (300 00) (F41 9)  4  History of Diverticulosis (562 10) (K57 90)  5  History of Encounter for screening mammogram for malignant neoplasm of breast   (V76 12) (Z12 31)  6   History of Endometrial hyperplasia (621 30) (N85 00)  7  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)  8  History of Functional diarrhea (564 5) (K59 1)  9  History of anemia (V12 3) (Z86 2)  10  History of chest pain (V13 89) (Z87 898)  11  History of edema (V13 89) (Z87 898)  12  History of hypercholesterolemia (V12 29) (Z86 39)  13  History of hyperlipidemia (V12 29) (Z86 39)  14  History of migraine (V12 49) (Z86 69)  15  History of stomatitis (V12 79) (Z87 19)  16  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)  17  History of Pain in joint of right shoulder region (719 41) (M25 511)  18  History of Pain in joint of right shoulder region (719 41) (M25 511)  19  History of Pure hypercholesterolemia (272 0) (E78 0)  20  History of Skin lesion (709 9) (L98 9)  21  History of Strain Of The Right Buttock Gluteus Medius (843 8)  22  History of Tendinitis of right rotator cuff (726 10) (M75 81)  23  History of Thyroid cyst (246 2) (E04 1)  24  History of Visit for pre-operative examination (V72 84) (G52 282)    Surgical History   1  History of Colonoscopy (Fiberoptic)  2  History of Diagnostic Cystoscopy  3  History of Hysterectomy Robotic-Assisted  4  History of Knee Replacement  5  History of Knee Surgery  6  History of Salpingo-oophorectomy Bilateral  7  History of Shoulder Arthroplasty Total Shoulder Replacement    Family History   1  Family history of Lung Cancer (V16 1)  2  Family history of Prolapsing Mitral Valve Leaflet Syndrome  3  Family history of Prostate Cancer (V16 42)    Social History    · Denied: History of Alcohol   · Never A Smoker    Current Meds  1  ALPRAZolam 0 25 MG Oral Tablet; TAKE 1 TABLET Daily PRN anxiety; Therapy: 80FHZ3814 to (Evaluate:16Hlt4575)  Requested for: 58HYU4403; Last   Rx:11Mar2015 Ordered  2  Atenolol 100 MG Oral Tablet; TAKE 1 TABLET 2 TIMES A DAY; Therapy: 42RVI3011 to (Evaluate:09Pro6840)  Requested for: 90WHX0549; Last   Rx:17Nov2015 Ordered  3  Centrum Silver Oral Tablet;    Therapy: (Recorded:02Mar2016) to Recorded  4  EQL Fish Oil CAPS; Therapy: (Recorded:02Mar2016) to Recorded  5  Furosemide 40 MG Oral Tablet; take 1 tablet by mouth once daily; Therapy: 04SZU3696 to (ConnectToHome)  Requested for: 87VSN9817; Last   Rx:05Nov2015; Status: ACTIVE - Renewal Denied Ordered  6  Imipramine HCl - 10 MG Oral Tablet; TAKE 1 TABLET AT BEDTIME; Therapy: 52RHX9887 to (Evaluate:11Oct2014); Last Rx:75Xkk2387 Ordered  7  Levothyroxine Sodium 25 MCG Oral Tablet; take 1 tablet by mouth once daily; Therapy: 74PNQ2107 to (ConnectToHome)  Requested for: 03OZA9800; Last   Rx:97Pwk4522 Ordered  8  Lisinopril 40 MG Oral Tablet; take 1 tablet by mouth once daily; Therapy: 02XAJ8574 to (ConnectToHome)  Requested for: 88CWX8031; Last   Rx:05Nov2015 Ordered  9  Meclizine HCl - 12 5 MG Oral Tablet; as needed for Vertigo; Therapy: (Recorded:02Mar2016) to Recorded  10  Mupirocin 2 % External Ointment; APPLY A SMALL AMOUNT 3 TIMES A DAY AS    DIRECTED; Therapy: 73ZKP1740 to (Evaluate:12Mar2016)  Requested for: 70JXQ7521; Last    Rx:02Mar2016 Ordered  11  Simvastatin 80 MG Oral Tablet; take 1/4 tablet by mouth once daily; Therapy: 98YUE3423 to (Evaluate:03Kym5630)  Requested for: 83MZT5563; Last    Rx:20Jan2015 Ordered  12  Sulfamethoxazole-Trimethoprim 800-160 MG Oral Tablet; take 1 tablet every twelve    hours; Therapy: 09ENT4556 to (ConnectToHome)  Requested for: 02YMR9025; Last    Rx:02Mar2016 Ordered  13  Vitamin D3 1000 UNIT Oral Tablet; Therapy: (Recorded:02Mar2016) to Recorded    Allergies   1  Amoxicillin TABS  2  Biaxin TABS  3   Norvasc TABS  4  Relafen TABS    Future Appointments    Date/Time Provider Specialty Site   04/05/2016 08:40 AM Annamarie Jacques   06/15/2016 11:40 AM Elliot Bocanegra MD Gastroenterology Adult [address]   02/13/2017 10:00 AM Eder Sawant, [facility] Gynecological Oncology [facility]CER [facility]ARE GYN ONCOLOGY     Signatures   Electronically signed by : ADINA Garcia ; Mar 15 2016  8:54PM EST                       (Author)    Electronically signed by : ADINA Garcia ; Mar 15 2016  8:54PM EST                       (Author)

## 2018-01-10 NOTE — RESULT NOTES
Message  Received mammogram result done on 11/7/2016  Normal       Signatures   Electronically signed by :  Rosalia Landaverde MD; Nov 9 2016  8:16AM EST                       (Author)

## 2018-01-10 NOTE — PROGRESS NOTES
Assessment    1  Medicare annual wellness visit, subsequent (V70 0) (Z00 00)   2  Hypertension (401 9) (I10)   3  Hyperlipidemia (272 4) (E78 5)   4  Hypothyroidism (244 9) (E03 9)   5  Impaired fasting glucose (790 21) (R73 01)   · Normalized   6  Depression with anxiety (300 4) (F41 8)   7  Osteopenia (733 90) (M85 80)   8  Encounter for screening mammogram for malignant neoplasm of breast (V76 12)   (Z12 31)   9  Need for immunization against influenza (V04 81) (Z23)    Plan  Depression with anxiety, Hyperlipidemia, Hypertension, Hypothyroidism, Impaired  fasting glucose, Osteopenia    · (1) COMPREHENSIVE METABOLIC PANEL; Status:Active; Requested for:22Apr2018;    · (1) HEMOGLOBIN A1C; Status:Active; Requested for:22Apr2018;    · (1) LIPID PANEL FASTING W DIRECT LDL REFLEX; Status:Active; Requested  for:22Apr2018;    · (1) TSH WITH FT4 REFLEX; Status:Active; Requested for:22Apr2018;   Encounter for screening mammogram for malignant neoplasm of breast    · * MAMMO SCREENING BILATERAL W CAD; Status:Active; Requested for:02Nov2017;   Hyperlipidemia    · Simvastatin 20 MG Oral Tablet; take 1 tablet by mouth once daily  Hypertension    · Furosemide 20 MG Oral Tablet   · Atenolol 100 MG Oral Tablet; TAKE ONE TABLET 2 TIMES DAILY   · Furosemide 40 MG Oral Tablet; take 1 tablet by mouth once daily   · Lisinopril 40 MG Oral Tablet; take 1 tablet by mouth once daily  Hypothyroidism    · Levothyroxine Sodium 25 MCG Oral Tablet; take 1 tablet by mouth once daily  Medicare annual wellness visit, subsequent    · *VB - Fall Risk Assessment  (Dx Z13 89 Screen for Neurologic Disorder);  Status:Complete;   Done: 57FNI4117 12:04PM  Need for immunization against influenza    · Fluzone High-Dose 0 5 ML Intramuscular Suspension Prefilled Syringe;  INJECT 0 5  ML Intramuscular;  To Be Done: 62YPV8409   · Fluzone High-Dose 0 5 ML Intramuscular Suspension Prefilled Syringe  Osteopenia    · * DXA BONE DENSITY SPINE HIP AND PELVIS; Status:Canceled - Scheduling;   Urinary incontinence    · Imipramine HCl - 25 MG Oral Tablet  Unlinked    · Famotidine 40 MG Oral Tablet   · Vitamin D3 1000 UNIT Oral Tablet    Discussion/Summary    MMSE 30/30 today  Will bring living will to next OV  Impression: Subsequent Annual Wellness Visit  Cardiovascular screening and counseling: the risks and benefits of screening were discussed and screening is current  Diabetes screening and counseling: the risks and benefits of screening were discussed and screening is current  Immunizations: the risks and benefits of influenza vaccination were discussed with the patient, influenza vaccination is recommended annually, the risks and benefits of pneumococcal vaccination were discussed with the patient and the lifetime pneumococcal vaccine has been completed  Advance Directive Planning: not complete  Patient Discussion: plan discussed with the patient  Chief Complaint  Annual wellness visit  History of Present Illness  Welcome to Medicare and Wellness Visits: The patient is being seen for the subsequent annual wellness visit  Medicare Screening and Risk Factors   Hospitalizations: she has been previously hospitalizied and Total shoulder replacement  Once per lifetime medicare screening tests: AAA screening US has not yet been done  Medicare Screening Tests Risk Questions   Abdominal aortic aneurysm risk assessment: none indicated  Osteoporosis risk assessment: , female gender and over 48years of age  Drug and Alcohol Use: The patient has never smoked cigarettes  The patient reports occasional alcohol use  She has never used illicit drugs     Diet and Physical Activity: Current diet includes well balanced meals, low fat food choices, low salt food choices, 3 servings of fruit per day, 3 servings of vegetables per day, 2 servings of meat per day, 2 servings of whole grains per day, 3 servings of dairy products per day, 0 cups of coffee per day, 3 cups of tea per day, 0 cans of regular soda per day and 0 cans of diet soda per day  She exercises infrequently and exercises 1 times per week  Exercise: Yoga 90 minutes per week  Mood Disorder and Cognitive Impairment Screening: PHQ-9 Depression Scale   Over the past 2 weeks, how often have you been bothered by the following problems? 3 ) Trouble falling asleep or sleeping too much? Several days  How difficult have these problems made it for you to do your work, take care of things at home, or get along with people? Somewhat difficult  Functional Ability/Level of Safety: She does not use a hearing aid  Advance Directives: Advance directives: living will, durable power of  for health care directives and advance directives  Co-Managers and Medical Equipment/Suppliers: See Patient Care Team      Patient Care Team    Care Team Member Role Specialty Office Number   Amy Nash MD  Urology [phone]   [facility] Specialist Physician Assistant [phone]   Hao Gerber MD Specialist Gastroenterology Adult [phone]     Review of Systems    Constitutional: negative  ENT: negative  Cardiovascular: negative  Respiratory: negative  Gastrointestinal: negative  Musculoskeletal: negative  Active Problems    1  Abnormal breast exam (611 79) (N64 59)   2  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)   3  Constipation, unspecified constipation type (564 00) (K59 00)   4  Depression with anxiety (300 4) (F41 8)   5  Diverticulosis (562 10) (K57 90)   6  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)   7  Encounter for screening mammogram for malignant neoplasm of breast (V76 12)   (Z12 31)   8  Endometrial cancer (182 0) (C54 1)   9  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)   10  Esophagitis, reflux (530 11) (K21 0)   11  H/O malignant neoplasm of endometrium (V10 42) (Z85 42)   12  Hypercalcemia (275 42) (E83 52)   13   Hyperlipidemia (272 4) (E78 5)   14  Hypertension (401 9) (I10)   15  Hypothyroidism (244 9) (E03 9)   16  Impaired fasting glucose (790 21) (R73 01)   17  Insomnia (780 52) (G47 00)   18  Medicare annual wellness visit, initial (V70 0) (Z00 00)   19  Need for immunization against influenza (V04 81) (Z23)   20  Need for pneumococcal vaccine (V03 82) (Z23)   21  Need for vaccination with 13-polyvalent pneumococcal conjugate vaccine (V03 82) (Z23)   22  Osteopenia (733 90) (M85 80)   23  Postmenopausal atrophic vaginitis (627 3) (N95 2)   24  Preop examination (V72 84) (Z01 818)   25  Urinary incontinence (788 30) (R32)   26   Vertigo (780 4) (R42)    Past Medical History    · History of Abdominal pain, RUQ (789 01) (R10 11)   · History of Acute Chest Wall Trauma (959 11)   · History of Acute Right Rotator Cuff Sprain (Capsule) (840 4)   · History of Anxiety (300 00) (F41 9)   · History of Encounter for screening mammogram for malignant neoplasm of breast  (V76 12) (Z12 31)   · History of Endometrial hyperplasia (621 30) (N85 00)   · 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)   · History of Functional diarrhea (564 5) (K59 1)   · History of anemia (V12 3) (Z86 2)   · History of chest pain (V13 89) (G84 981)   · History of edema (V13 89) (L14 897)   · History of folliculitis (W17 4) (D08 5)   · History of hypercholesterolemia (V12 29) (Z86 39)   · History of hyperlipidemia (V12 29) (Z86 39)   · History of migraine (V12 49) (Z86 69)   · History of osteoporosis (V13 59) (Z87 39)   · History of stomatitis (V12 79) (Z87 19)   · 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)   · History of Pain in joint of right shoulder region (719 41) (M25 511)   · History of Pain in joint of right shoulder region (719 41) (M25 511)   · History of Pure hypercholesterolemia (272 0) (E78 00)   · History of Skin lesion (709 9) (L98 9)   · History of Strain Of The Right Buttock Gluteus Medius (843 8)   · History of Tendinitis of right rotator cuff (726 10) (M75 81)   · History of Thyroid cyst (246 2) (E04 1)   · History of UTI (urinary tract infection), bacterial (599 0,041 9) (N39 0,A49  9)   · History of Visit for pre-operative examination (V72 84) (I08 664)    Surgical History    · History of Colonoscopy (Fiberoptic)   · History of Diagnostic Cystoscopy   · History of Hysterectomy Robotic-Assisted   · History of Knee Replacement   · History of Knee Surgery   · History of Salpingo-oophorectomy Bilateral   · History of Shoulder Arthroplasty Total Shoulder Replacement    Family History  Brother    · Family history of Lung Cancer (V16 1)   · Family history of Prolapsing Mitral Valve Leaflet Syndrome   · Family history of Prostate Cancer (V16 42)    Social History    · Denied: History of Alcohol   · Never A Smoker    Current Meds   1  Acetaminophen  MG Oral Tablet Extended Release; Therapy: (Recorded:98Abd2896) to Recorded   2  Align CAPS Recorded   3  Atenolol 100 MG Oral Tablet; TAKE ONE TABLET 2 TIMES DAILY; Therapy: 14DWA2550 to (Evaluate:22Apr2018)  Requested for: 24Oct2017; Last   Rx:88Kdn6275 Ordered   4  Centrum Silver Oral Tablet; Therapy: (Recorded:28Lwt7742) to Recorded   5  Famotidine 40 MG Oral Tablet Recorded   6  Furosemide 20 MG Oral Tablet; TAKE 1 TABLET DAILY; Therapy: 46THH9591 to [phone]  Requested for: 27Apr2017; Last   Rx:10Fwo2415 Ordered   7  Furosemide 40 MG Oral Tablet; take 1 tablet by mouth once daily; Therapy: 29IZH5264 to 0731 40 44 23)  Requested for: 26Oct2017; Last   Rx:72Vmj4572 Ordered   8  Imipramine HCl - 25 MG Oral Tablet; Therapy: (691 52 410) to Recorded   9  Levothyroxine Sodium 25 MCG Oral Tablet; take 1 tablet by mouth once daily; Therapy: 28QJP0285 to (Evaluate:17Mar2018)  Requested for: 84Qhj0812; Last   Rx:56Rbh3701 Ordered   10  Lisinopril 40 MG Oral Tablet; take 1 tablet by mouth once daily;     Therapy: 30EBL2937 to (Evaluate:22Apr2018)  Requested for: 40DCL5522; Last    OW:80TDU6415 Ordered   11  MiraLax POWD; USE AS DIRECTED Recorded   12  Simvastatin 20 MG Oral Tablet; take 1 tablet by mouth once daily; Therapy: 21HTL8677 to (Ru Antony)  Requested for: 9717 9434; Last    Rx:2017 Ordered   13  Simvastatin 80 MG Oral Tablet; TAKE 1/4 TABLET BY MOUTH DAILY; Therapy: 80Xcg2114 to (Allyson Castro)  Requested for: 59JUD5001; Last    Rx:2017 Ordered   14  Vitamin D3 1000 UNIT Oral Tablet; Therapy: (Recorded:2017) to Recorded    Allergies    1  Amoxicillin TABS   2  Biaxin TABS   3  Norvasc TABS   4  Relafen TABS    Immunizations   ** Printed in Appendix #1 below  Vitals  Signs    Systolic: 950  Diastolic: 90   Temperature: 97 8 F, Tympanic  Heart Rate: 68, L Radial  Respiration: 16  Systolic: 563, LUE  Diastolic: 92, LUE  Height: 5 ft 3 in  Weight: 137 lb 3 2 oz  BMI Calculated: 24 3  BSA Calculated: 1 65  Pain Scale: 0    Results/Data  *VB - Fall Risk Assessment  (Dx Z13 89 Screen for Neurologic Disorder) 35OPC9574 12:04PM Kayla Cantu     Test Name Result Flag Reference   Falls Risk      No falls in the past year       Future Appointments    Date/Time Provider Specialty Site   2018 09:40 AM Kayla Cantu MD Family Medicine [address] [place] [address]   2018 02:30 PM Neetu Jade [facility] Gynecological Oncology CANCER CARE GYN ONCOLOGY     Signatures   Electronically signed by :  Hung Segundo MD; 2017 12:07PM EST                       (Author)    Appendix #1     Patient: Candelario Crowe ; : 1934; MRN: [id number]      1 2 3 4 5    Influenza  03-Nov-2011 03-Oct-2012 18-Sep-2014 18-Sep-2015 06-Oct-2016    PCV  2017        Zoster  2015

## 2018-01-11 NOTE — RESULT NOTES
Message   WBC normal   hemoglobin normal   platelet normal   C8E 5 7    Pt should follow low carb diet  total cholesterol 202 slightly elevated   triglyceride 212 elevated   HDL 48    ok   Pt should follow low fat diet  TSH 4 92   free T4 0 94 OK   electrolytes showed sodium 135 slightly decrease, will monitor   kidney function normal   liver enzymes normal        Verified Results  (1) CBC/PLT/DIFF 15Apr2016 09:42AM [facility]     Test Name Result Flag Reference   WBC COUNT 6 91 Thousand/uL  4 31-10 16   RBC COUNT 4 65 Million/uL  3 81-5 12   HEMOGLOBIN 13 3 g/dL  11 5-15 4   HEMATOCRIT 40 8 %  34 8-46  1   MCV 88 fL  82-98   MCH 28 6 pg  26 8-34 3   MCHC 32 6 g/dL  31 4-37 4   RDW 14 7 %  11 6-15 1   MPV 10 4 fL  8 9-12 7   PLATELET COUNT 045 Thousands/uL  149-390   nRBC AUTOMATED 0 /100 WBCs     NEUTROPHILS RELATIVE PERCENT 56 %  43-75   LYMPHOCYTES RELATIVE PERCENT 34 %  14-44   MONOCYTES RELATIVE PERCENT 9 %  4-12   EOSINOPHILS RELATIVE PERCENT 0 %  0-6   BASOPHILS RELATIVE PERCENT 1 %  0-1   NEUTROPHILS ABSOLUTE COUNT 3 85 Thousands/µL  1 85-7 62   LYMPHOCYTES ABSOLUTE COUNT 2 35 Thousands/µL  0 60-4 47   MONOCYTES ABSOLUTE COUNT 0 63 Thousand/µL  0 17-1 22   EOSINOPHILS ABSOLUTE COUNT 0 02 Thousand/µL  0 00-0 61   BASOPHILS ABSOLUTE COUNT 0 05 Thousands/µL  0 00-0 10     (1) HEMOGLOBIN A1C 15Apr2016 09:42AM [facility]   5 7-6 4% impaired fasting glucose  >=6 5% diagnosis of diabetes    Falsely low levels are seen in conditions linked to short RBC life span-  hemolytic anemia, and splenomegaly  Falsely elevated levels are seen in situations where there is an increased production of RBC- receipt of erythropoietin or blood transfusions  Adopted from ADA-Clinical Practice Recommendations     Test Name Result Flag Reference   HEMOGLOBIN A1C 5 7 % H 4 0-5 6   EST  AVG   GLUCOSE 117 mg/dl       (1) LIPID PANEL FASTING W DIRECT LDL REFLEX 15Apr2016 09:42AM [facility]   Triglyceride:         Normal <150 mg/dl       Borderline High    150-199 mg/dl       High               200-499 mg/dl       Very High          >499 mg/dl  Cholesterol:         Desirable        <200 mg/dl      Borderline High  200-239 mg/dl      High             >239 mg/dl  HDL Cholesterol:        High    >59 mg/dL      Low     <41 mg/dL  LDL Cholesterol:        Optimal          <100 mg/dl         Near Optimal     100-129 mg/dl        Above Optimal          Borderline High   130-159 mg/dl          High              160-189 mg/dl          Very High        >189 mg/dl  LDL CALCULATED:    This screening LDL is a calculated result  It does not have the accuracy of the Direct Measured LDL in the monitoring of patients with hyperlipidemia and/or statin therapy  Direct Measure LDL (TCK235) must be ordered separately in these patients  Test Name Result Flag Reference   CHOLESTEROL 202 mg/dL H    E511   LDL CHOLESTEROL CALCULATED 112 mg/dL H 0-100   TRIGLYCERIDES 212 mg/dL H <=150   E511   HDL,DIRECT 48 mg/dL  40-60   E550     (1) TSH WITH FT4 REFLEX 57Xje2863 09:42AM [facility]     Test Name Result Flag Reference   TSH 4 920 uIU/mL H 0 358-3 740   E511   T4,FREE 0 94 ng/dL  0 76-1 46     (1) COMPREHENSIVE METABOLIC PANEL 58OHI6019 93:[facility] Kidney Disease Education Program recommendations are as follows:  GFR calculation is accurate only with a steady state creatinine  Chronic Kidney disease less than 60 ml/min/1 73 sq  meters  Kidney failure less than 15 ml/min/1 73 sq  meters  Test Name Result Flag Reference   GLUCOSE,RANDM 94 mg/dL     If the patient is fasting, the ADA then defines impaired fasting glucose as > 100 mg/dL and diabetes as > or equal to 123 mg/dL     SODIUM 135 mmol/L L 136-145   E512   POTASSIUM 4 6 mmol/L  3 5-5 3   CHLORIDE 99 mmol/L L 100-108   E512   CARBON DIOXIDE 30 mmol/L  21-32   ANION GAP (CALC) 6 mmol/L  4-13   BLOOD UREA NITROGEN 17 mg/dL  5-25   CREATININE 0 78 mg/dL 0  60-1 30   Standardized to IDMS reference method   CALCIUM 9 8 mg/dL  8 3-10 1   BILI, TOTAL 0 48 mg/dL  0 20-1 00   ALK PHOSPHATAS 63 U/L     ALT (SGPT) 24 U/L  12-78   AST(SGOT) 28 U/L  5-45   ALBUMIN 3 7 g/dL  3 5-5 0   TOTAL PROTEIN 7 1 g/dL  6 4-8 2   eGFR Non-African American      >60 0 ml/min/1 73sq m

## 2018-01-12 VITALS
HEIGHT: 64 IN | DIASTOLIC BLOOD PRESSURE: 78 MMHG | RESPIRATION RATE: 16 BRPM | BODY MASS INDEX: 23.52 KG/M2 | HEART RATE: 76 BPM | TEMPERATURE: 97.6 F | SYSTOLIC BLOOD PRESSURE: 120 MMHG | WEIGHT: 137.8 LBS

## 2018-01-12 NOTE — RESULT NOTES
Message   Please let the patient know her repeat UA was normal        Thank you       Verified Results  (1) URINALYSIS w URINE C/S REFLEX (will reflex a microscopy if leukocytes, occult blood, or nitrites are not within normal limits) [id number] 10:49AM [facility]     Test Name Result Flag Reference   COLOR Yellow     CLARITY Clear     PH UA 6 0  4 5-8 0   LEUKOCYTE ESTERASE UA Negative  Negative   NITRITE UA Negative  Negative   PROTEIN UA Trace mg/dl A Negative   GLUCOSE UA Negative mg/dl  Negative   KETONES UA Negative mg/dl  Negative   UROBILINOGEN UA 0 2 E U /dl  0 2, 1 0 E U /dl   BILIRUBIN UA Negative  Negative   BLOOD UA Trace A Negative   SPECIFIC GRAVITY UA 1 015  1 003-1 030     (1) URINALYSIS w URINE C/S REFLEX (will reflex a microscopy if leukocytes, occult blood, or nitrites are not within normal limits) 91EVK5228 10:49AM [facility]     Test Name Result Flag Reference   BACTERIA Occasional /hpf  None Seen, Occasional   EPITHELIAL CELLS None Seen /hpf  None Seen, Occasional   RBC UA None Seen /hpf  None Seen   WBC UA None Seen /hpf  None Seen     (1) URINALYSIS w URINE C/S REFLEX (will reflex a microscopy if leukocytes, occult blood, or nitrites are not within normal limits) [id number] 10:49AM [facility]     Test Name Result Flag Reference   CLINICAL REPORT (Report)     Test:        Urine culture  Specimen Type:   Urine  Specimen Date:   7/15/2016 10:49 AM  Result Date:    7/16/2016 2:50 PM  Result Status:   Final result  Resulting Lab:   [address]            Tel: [phone]      CULTURE                                       ------------------                                   No Growth <1000 cfu/mL

## 2018-01-13 VITALS
BODY MASS INDEX: 23.92 KG/M2 | DIASTOLIC BLOOD PRESSURE: 90 MMHG | TEMPERATURE: 97.6 F | RESPIRATION RATE: 14 BRPM | SYSTOLIC BLOOD PRESSURE: 158 MMHG | HEIGHT: 64 IN | HEART RATE: 65 BPM | WEIGHT: 140.13 LBS

## 2018-01-13 NOTE — PROGRESS NOTES
History of Present Illness  Care Coordination Encounter Information:   Type of Encounter: Telephonic   Contact: Initial Contact   Last Office Visit: 3/2/16   Spoke to Patient   Outreached patient to see how she is doing post hospitalization on 3/12/16 for gastrointestinal bleed  She said she is doing good  Was having some nausea so she stopped her Protonix 40mg twice a day    She restarted her medication today when she started to have heartburn again  She wants to keep taking it once a day right now and see how she feels  No other medication changes  She was not discharged on any special diet  He has a follow up 4/18/16 with gastroenterology  Her primary care follow up is 4/5/16  Active Problems    1  Abdominal pain, RUQ (789 01) (R10 11)   2  Abnormal breast exam (611 79) (N64 59)   3  Depression with anxiety (300 4) (F41 8)   4  Encounter for screening mammogram for malignant neoplasm of breast (V76 12)   (Z12 31)   5  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)   6  Folliculitis (829 2) (S34 1)   7  H/O malignant neoplasm of endometrium (V10 42) (Z85 42)   8  Hyperlipidemia (272 4) (E78 5)   9  Hypertension (401 9) (I10)   10  Hypothyroidism (244 9) (E03 9)   11  Impaired fasting glucose (790 21) (R73 01)   12  Insomnia (780 52) (G47 00)   13  Need for vaccination with 13-polyvalent pneumococcal conjugate vaccine (V03 82) (Z23)   14  Osteoarthritis (715 90) (M19 90)   15  Osteoporosis (733 00) (M81 0)   16  Postmenopausal atrophic vaginitis (627 3) (N95 2)   17  Urinary incontinence (788 30) (R32)   18  Vertigo (780 4) (R42)    Past Medical History    1  History of Acute Chest Wall Trauma (959 11)   2  History of Acute Right Rotator Cuff Sprain (Capsule) (840 4)   3  History of Anxiety (300 00) (F41 9)   4  History of Diverticulosis (562 10) (K57 90)   5  History of Encounter for screening mammogram for malignant neoplasm of breast   (V76 12) (Z12 31)   6   History of Endometrial hyperplasia (621 30) (N85 00)   7  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)   8  History of Functional diarrhea (564 5) (K59 1)   9  History of anemia (V12 3) (Z86 2)   10  History of chest pain (V13 89) (Z87 898)   11  History of edema (V13 89) (Z87 898)   12  History of hypercholesterolemia (V12 29) (Z86 39)   13  History of hyperlipidemia (V12 29) (Z86 39)   14  History of migraine (V12 49) (Z86 69)   15  History of stomatitis (V12 79) (Z87 19)   16  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)   17  History of Pain in joint of right shoulder region (719 41) (M25 511)   18  History of Pain in joint of right shoulder region (719 41) (M25 511)   19  History of Pure hypercholesterolemia (272 0) (E78 0)   20  History of Skin lesion (709 9) (L98 9)   21  History of Strain Of The Right Buttock Gluteus Medius (843 8)   22  History of Tendinitis of right rotator cuff (726 10) (M75 81)   23  History of Thyroid cyst (246 2) (E04 1)   24  History of Visit for pre-operative examination (V72 84) (L57 009)    Surgical History    1  History of Colonoscopy (Fiberoptic)   2  History of Diagnostic Cystoscopy   3  History of Hysterectomy Robotic-Assisted   4  History of Knee Replacement   5  History of Knee Surgery   6  History of Salpingo-oophorectomy Bilateral   7  History of Shoulder Arthroplasty Total Shoulder Replacement    Family History    1  Family history of Lung Cancer (V16 1)   2  Family history of Prolapsing Mitral Valve Leaflet Syndrome   3  Family history of Prostate Cancer (V16 42)    Social History    · Denied: History of Alcohol   · Never A Smoker    Current Meds    1  Mupirocin 2 % External Ointment; APPLY A SMALL AMOUNT 3 TIMES A DAY AS   DIRECTED; Therapy: 97XVK1451 to (Evaluate:12Mar2016)  Requested for: 73AEN4441; Last   Rx:02Mar2016 Ordered   2  Sulfamethoxazole-Trimethoprim 800-160 MG Oral Tablet; take 1 tablet every twelve   hours;    Therapy: 15CZG1812 to (Mattie Shore) Requested for: 51INS3705; Last   Rx:02Mar2016 Ordered    3  Simvastatin 80 MG Oral Tablet; take 1/4 tablet by mouth once daily; Therapy: 46LJD6037 to (Evaluate:44Beu9308)  Requested for: 13QWB3783; Last   Rx:20Jan2015 Ordered    4  Atenolol 100 MG Oral Tablet; TAKE 1 TABLET 2 TIMES A DAY; Therapy: 01VOL8920 to (Evaluate:58Zob7447)  Requested for: 49JPZ5336; Last   Rx:17Nov2015 Ordered   5  Lisinopril 40 MG Oral Tablet; take 1 tablet by mouth once daily; Therapy: 62VZP4892 to (Kinsey Like)  Requested for: 79XLB8489; Last   Rx:05Nov2015 Ordered    6  Levothyroxine Sodium 25 MCG Oral Tablet; take 1 tablet by mouth once daily; Therapy: 44HSS1049 to (Rica Boone)  Requested for: 09WFX6162; Last   Rx:24Mar2016 Ordered    7  ALPRAZolam 0 25 MG Oral Tablet; TAKE 1 TABLET Daily PRN anxiety; Therapy: 12URP7446 to (Evaluate:00Cez2008)  Requested for: 97MUP0944; Last   Rx:11Mar2015 Ordered    8  Furosemide 40 MG Oral Tablet; take 1 tablet by mouth once daily; Therapy: 75MJH4281 to (Kinsey Like)  Requested for: 31VPG0718; Last   Rx:05Nov2015; Status: ACTIVE - Renewal Denied Ordered    9  Imipramine HCl - 10 MG Oral Tablet (Tofranil); TAKE 1 TABLET AT BEDTIME; Therapy: 67SJB3035 to (Evaluate:11Oct2014); Last Rx:72Tyn5457 Ordered    10  Meclizine HCl - 12 5 MG Oral Tablet; as needed for Vertigo; Therapy: (Recorded:02Mar2016) to Recorded    11  Centrum Silver Oral Tablet; Therapy: (Recorded:02Mar2016) to Recorded   12  EQL Fish Oil CAPS; Therapy: (Recorded:02Mar2016) to Recorded   13  Vitamin D3 1000 UNIT Oral Tablet; Therapy: (Recorded:02Mar2016) to Recorded    Allergies    1  Amoxicillin TABS   2  Biaxin TABS   3  Norvasc TABS   4  Relafen TABS    End of Encounter Meds    1  Mupirocin 2 % External Ointment; APPLY A SMALL AMOUNT 3 TIMES A DAY AS   DIRECTED; Therapy: 64MWO5274 to (Evaluate:12Mar2016)  Requested for: 93PYW1568; Last   Rx:02Mar2016 Ordered   2  Sulfamethoxazole-Trimethoprim 800-160 MG Oral Tablet; take 1 tablet every twelve   hours; Therapy: 55AJW9011 to ([facility])  Requested for: 58SDL0466; Last   Rx:02Mar2016 Ordered    3  Simvastatin 80 MG Oral Tablet; take 1/4 tablet by mouth once daily; Therapy: 83JOM1044 to (Evaluate:14Uqf6324)  Requested for: 54QKM3887; Last   Rx:20Jan2015 Ordered    4  Atenolol 100 MG Oral Tablet; TAKE 1 TABLET 2 TIMES A DAY; Therapy: 62OWC8517 to (Evaluate:81Zmq4031)  Requested for: 50BTI3701; Last   Rx:17Nov2015 Ordered   5  Lisinopril 40 MG Oral Tablet; take 1 tablet by mouth once daily; Therapy: 06PCP0062 to (Echo Cabral)  Requested for: 51WJM3607; Last   Rx:05Nov2015 Ordered    6  Levothyroxine Sodium 25 MCG Oral Tablet; take 1 tablet by mouth once daily; Therapy: 77ZVK1121 to (Consuelo Jeff)  Requested for: 44VVS2207; Last   Rx:24Mar2016 Ordered    7  ALPRAZolam 0 25 MG Oral Tablet; TAKE 1 TABLET Daily PRN anxiety; Therapy: 55WDB3554 to (Evaluate:03Ava1628)  Requested for: 26GZN1238; Last   Rx:11Mar2015 Ordered    8  Furosemide 40 MG Oral Tablet; take 1 tablet by mouth once daily; Therapy: 68HVQ3205 to (Echo Cabral)  Requested for: 97EQJ8695; Last   Rx:05Nov2015; Status: ACTIVE - Renewal Denied Ordered    9  Imipramine HCl - 10 MG Oral Tablet (Tofranil); TAKE 1 TABLET AT BEDTIME; Therapy: 68JAN1089 to (Evaluate:11Oct2014); Last Rx:11Sep2014 Ordered    10  Meclizine HCl - 12 5 MG Oral Tablet; as needed for Vertigo; Therapy: (Recorded:02Mar2016) to Recorded    11  Centrum Silver Oral Tablet; Therapy: (Recorded:02Mar2016) to Recorded   12  EQL Fish Oil CAPS; Therapy: (Recorded:02Mar2016) to Recorded   13  Vitamin D3 1000 UNIT Oral Tablet;     Therapy: (Recorded:02Mar2016) to Recorded    Future Appointments    Date/Time Provider Specialty Site   04/05/2016 08:40 AM Patrick Cowden, [address]   04/18/2016 03:00 PM Annamarie Villa, [facility] Gastroenterology Adult [facility] GASTROENTEROLOGY SPECIAL   02/13/2017 10:00 AM Ariane Nathan, [address] [facility] ONCOLOGY     Patient Care Team    Care Team Member Role Specialty Office Number   Matthew Durant MD  Urology [phone]     Signatures   Electronically signed by : Nancy Jacobs RN; Mar 28 2016  2:24PM EST                       (Author)

## 2018-01-13 NOTE — RESULT NOTES
Message  Dexa result done on 11/7/2016 showed   Osteopenia which means low bone density  Recommend calcium 1200mg daily, vitD 1000IU daily and walk everyday  Signatures   Electronically signed by :  Dia Cruz MD; Nov 9 2016  8:19AM EST                       (Author)

## 2018-01-13 NOTE — RESULT NOTES
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  Verified Results  * US ABDOMEN COMPLETE [id number] 08:51AM Ada Downs Order Number: [id number]     Test Name Result Flag Reference   US ABDOMEN COMPLETE (Report)     ABDOMEN ULTRASOUND, COMPLETE     INDICATION: Right-sided pain radiating to the back for the last few weeks  COMPARISON: CT scan 10/28/2013  TECHNIQUE:  Real-time ultrasound of the abdomen was performed with a curvilinear transducer with both volumetric sweeps and still imaging techniques  FINDINGS:     PANCREAS: Visualized portions of the pancreas are within normal limits  AORTA AND IVC: Visualized portions are normal for patient age  LIVER:   Size: Within normal range  The liver measures 16 cm in the midclavicular line  Contour: Surface contour is smooth  Parenchyma: Echogenicity and echotexture are within normal limits  Multiple scattered small simple appearing cysts again identified, measuring up to 1 1 x 1 0 x 1 0 cm in the right hepatic lobe  The main portal vein is patent and hepatopetal       BILIARY:   The gallbladder is normal in caliber  No wall thickening or pericholecystic fluid  There are tiny foci of ring down artifact from the gallbladder wall which, suggesting adenomyomatosis  No stones or sludge identified  Sonographic Keenan Gold sign is negative  No intrahepatic biliary dilatation  CBD measures 3 mm  No choledocholithiasis  KIDNEY:    Right kidney measures 9 6 cm  Tiny simple appearing cyst in the lower pole measures 0 6 x 0 6 x 1 0 cm  Left kidney measures 9 6 cm  There is nonshadowing hyperechoic focus in the upper pole measuring 1 7 x 1 2 x 0 9 cm suggestive of a small angiomyolipoma  SPLEEN:    Measures 10 cm  Tiny cyst measuring 0 6 x 0 6 x 0 5 cm noted  ASCITES: None  IMPRESSION:     1  Multiple small hepatic cysts  2  No evidence cholelithiasis or cholecystitis  Tiny foci of ringdown artifact in the gallbladder wall, suggesting adenomyomatosis  3  Small left renal angiomyolipoma         Workstation performed: [id number]     Signed by:   Lilliana Leyva MD   3/7/16

## 2018-01-13 NOTE — RESULT NOTES
Message   DW pt on 10/6/2016 OV  Verified Results  (1) CBC/PLT/DIFF 07Nkz2607 09:45AM Reina Walker   TW Order Number: [id number]     Test Name Result Flag Reference   WBC COUNT 6 23 Thousand/uL  4 31-10 16   RBC COUNT 4 43 Million/uL  3 81-5 12   HEMOGLOBIN 12 4 g/dL  11 5-15 4   HEMATOCRIT 38 4 %  34 8-46  1   MCV 87 fL  82-98   MCH 28 0 pg  26 8-34 3   MCHC 32 3 g/dL  31 4-37 4   RDW 14 4 %  11 6-15 1   MPV 10 1 fL  8 9-12 7   PLATELET COUNT 680 Thousands/uL  149-390   nRBC AUTOMATED 0 /100 WBCs     NEUTROPHILS RELATIVE PERCENT 53 %  43-75   LYMPHOCYTES RELATIVE PERCENT 37 %  14-44   MONOCYTES RELATIVE PERCENT 9 %  4-12   EOSINOPHILS RELATIVE PERCENT 1 %  0-6   BASOPHILS RELATIVE PERCENT 0 %  0-1   NEUTROPHILS ABSOLUTE COUNT 3 27 Thousands/?L  1 85-7 62   LYMPHOCYTES ABSOLUTE COUNT 2 33 Thousands/?L  0 60-4 47   MONOCYTES ABSOLUTE COUNT 0 57 Thousand/?L  0 17-1 22   EOSINOPHILS ABSOLUTE COUNT 0 03 Thousand/?L  0 00-0 61   BASOPHILS ABSOLUTE COUNT 0 02 Thousands/?L  0 00-0 10   - Patient Instructions: This bloodwork is non-fasting  Please drink two glasses of water morning of bloodwork  - Patient Instructions: This bloodwork is non-fasting  Please drink two glasses of water morning of bloodwork  (1) COMPREHENSIVE METABOLIC PANEL 89CRR1739 31:32ZL Reina Walker    Order Number: [id number]     Test Name Result Flag Reference   GLUCOSE,RANDM 91 mg/dL     If the patient is fasting, the ADA then defines impaired fasting glucose as > 100 mg/dL and diabetes as > or equal to 123 mg/dL     SODIUM 134 mmol/L L 136-145   POTASSIUM 4 0 mmol/L  3 5-5 3   CHLORIDE 98 mmol/L L 100-108   CARBON DIOXIDE 27 mmol/L  21-32   ANION GAP (CALC) 9 mmol/L  4-13   BLOOD UREA NITROGEN 17 mg/dL  5-25   CREATININE 0 77 mg/dL  0 60-1 30   Standardized to IDMS reference method   CALCIUM 9 3 mg/dL  8 3-10 1   BILI, TOTAL 0 43 mg/dL  0 20-1 00   ALK PHOSPHATAS 63 U/L     ALT (SGPT) 26 U/L  12-78   AST(SGOT) 20 U/L  5-45   ALBUMIN 3 5 g/dL  3 5-5 0   TOTAL PROTEIN 7 1 g/dL  6 4-8 2   eGFR Non-African American      >60 0 ml/min/1 73sq m   - Patient Instructions: This is a fasting blood test  Water, black tea or black coffee only after 9:00pm the night before test Drink 2 glasses of water the morning of test   National Kidney Disease Education Program recommendations are as follows:  GFR calculation is accurate only with a steady state creatinine  Chronic Kidney disease less than 60 ml/min/1 73 sq  meters  Kidney failure less than 15 ml/min/1 73 sq  meters  (1) HEMOGLOBIN A1C 29Sep2016 09:45AM Pauline Backers   TW Order Number: [id number]     Test Name Result Flag Reference   HEMOGLOBIN A1C 5 7 %  4 2-6 3   EST  AVG  GLUCOSE 117 mg/dl       (1) LIPID PANEL FASTING W DIRECT LDL REFLEX 29Sep2016 09:45AM Pauline Backers   TW Order Number: [id number]     Test Name Result Flag Reference   CHOLESTEROL 181 mg/dL     LDL CHOLESTEROL CALCULATED 90 mg/dL  0-100   - Patient Instructions: This is a fasting blood test  Water, black tea or black coffee only after 9:00pm the night before test   Drink 2 glasses of water the morning of test     - Patient Instructions:  This is a fasting blood test  Water, black tea or black coffee only after 9:00pm the night before test Drink 2 glasses of water the morning of test   Triglyceride:         Normal              <150 mg/dl       Borderline High    150-199 mg/dl       High               200-499 mg/dl       Very High          >499 mg/dl  Cholesterol:         Desirable        <200 mg/dl      Borderline High  200-239 mg/dl      High             >239 mg/dl  HDL Cholesterol:        High    >59 mg/dL      Low     <41 mg/dL  LDL Cholesterol:        Optimal          <100 mg/dl        Near Optimal     100-129 mg/dl        Above Optimal          Borderline High   130-159 mg/dl          High              160-189 mg/dl          Very High        >189 mg/dl  LDL CALCULATED:    This screening LDL is a calculated result  It does not have the accuracy of the Direct Measured LDL in the monitoring of patients with hyperlipidemia and/or statin therapy  Direct Measure LDL (LCD817) must be ordered separately in these patients  TRIGLYCERIDES 214 mg/dL H <=150   Specimen collection should occur prior to N-Acetylcysteine or Metamizole administration due to the potential for falsely depressed results  HDL,DIRECT 48 mg/dL  40-60   Specimen collection should occur prior to Metamizole administration due to the potential for falsely depressed results  (1) TSH WITH FT4 REFLEX [id number] 09:45AM Ivis Light   TW Order Number: [id number]     Test Name Result Flag Reference   TSH 4 260 uIU/mL H 0 358-3 740   - Patient Instructions: This is a fasting blood test  Water, black tea or black coffee only after 9:00pm the night before test Drink 2 glasses of water the morning of test   Patients undergoing fluorescein dye angiography may retain small amounts of fluorescein in the body for 48-72 hours post procedure  Samples containing fluorescein can produce falsely depressed TSH values  If the patient had this procedure,a specimen should be resubmitted post fluorescein clearance  The recommended reference ranges for TSH during pregnancy are as follows:  First trimester 0 1 to 2 5 uIU/mL  Second trimester  0 2 to 3 0 uIU/mL  Third trimester 0 3 to 3 0 uIU/m   T4,FREE 0 98 ng/dL  0 76-1 46   - Patient Instructions:  This is a fasting blood test  Water, black tea or black coffee only after 9:00pm the night before test Drink 2 glasses of water the morning of test      (1) VITAMIN D 25-HYDROXY 55Pln3636 09:45AM Ivis Light   TW Order Number: [id number]     Test Name Result Flag Reference   VIT D 25-HYDROX 33 8 ng/mL  30 0-100 0   This assay is a certified procedure of the CDC Vitamin D Standardization Certification Program (VDSCP)     Deficiency <20ng/ml   Insufficiency 20-30ng/ml Sufficient  ng/ml     *Patients undergoing fluorescein dye angiography may retain small amounts of fluorescein in the body for 48-72 hours post procedure  Samples containing fluorescein can produce falsely elevated Vitamin D values  If the patient had this procedure, a specimen should be resubmitted post fluorescein clearance  This assay is a certified procedure of the CDC Vitamin D Standardization Certification Program (VDSCP)     Deficiency <20ng/ml   Insufficiency 20-30ng/ml   Sufficient  ng/ml     *Patients undergoing fluorescein dye angiography may retain small amounts of fluorescein in the body for 48-72 hours post procedure  Samples containing fluorescein can produce falsely elevated Vitamin D values  If the patient had this procedure, a specimen should be resubmitted post fluorescein clearance

## 2018-01-14 VITALS
SYSTOLIC BLOOD PRESSURE: 150 MMHG | HEIGHT: 63 IN | BODY MASS INDEX: 24.31 KG/M2 | WEIGHT: 137.2 LBS | HEART RATE: 68 BPM | RESPIRATION RATE: 16 BRPM | DIASTOLIC BLOOD PRESSURE: 90 MMHG | TEMPERATURE: 97.8 F

## 2018-01-14 VITALS
TEMPERATURE: 97.7 F | RESPIRATION RATE: 16 BRPM | HEART RATE: 68 BPM | WEIGHT: 138.13 LBS | SYSTOLIC BLOOD PRESSURE: 132 MMHG | HEIGHT: 63 IN | DIASTOLIC BLOOD PRESSURE: 80 MMHG | BODY MASS INDEX: 24.47 KG/M2

## 2018-01-15 NOTE — RESULT NOTES
Message   DW pt on 4/6/2017 OV  Verified Results  (1) COMPREHENSIVE METABOLIC PANEL [id number] 53:34HV Lia Hamper   TW Order Number: [id number]     Test Name Result Flag Reference   SODIUM 136 mmol/L  136-145   POTASSIUM 4 5 mmol/L  3 5-5 3   CHLORIDE 99 mmol/L L 100-108   CARBON DIOXIDE 31 mmol/L  21-32   ANION GAP (CALC) 6 mmol/L  4-13   BLOOD UREA NITROGEN 18 mg/dL  5-25   CREATININE 0 81 mg/dL  0 60-1 30   Standardized to IDMS reference method   CALCIUM 10 2 mg/dL H 8 3-10 1   BILI, TOTAL 0 42 mg/dL  0 20-1 00   ALK PHOSPHATAS 90 U/L     ALT (SGPT) 24 U/L  12-78   AST(SGOT) 19 U/L  5-45   ALBUMIN 3 5 g/dL  3 5-5 0   TOTAL PROTEIN 7 4 g/dL  6 4-8 2   eGFR Non-African American      >60 0 ml/min/1 73sq m   - Patient Instructions: This is a fasting blood test  Water, black tea or black coffee only after 9:00pm the night before test Drink 2 glasses of water the morning of test   National Kidney Disease Education Program recommendations are as follows:  GFR calculation is accurate only with a steady state creatinine  Chronic Kidney disease less than 60 ml/min/1 73 sq  meters  Kidney failure less than 15 ml/min/1 73 sq  meters  CORRECTED CALCIUM 10 6 mg/dL H 8 3-10 1   GLUCOSE FASTING 109 mg/dL H 65-99     (1) HEMOGLOBIN A1C [id number] 09:09AM Lia Allocab   TW Order Number: [id number]     Test Name Result Flag Reference   HEMOGLOBIN A1C 5 9 %  4 2-6 3   EST  AVG  GLUCOSE 123 mg/dl       (1) LIPID PANEL FASTING W DIRECT LDL REFLEX [id number] 09:09AM Lia Hamper   TW Order Number: [id number]     Test Name Result Flag Reference   CHOLESTEROL 181 mg/dL     LDL CHOLESTEROL CALCULATED 85 mg/dL  0-100   - Patient Instructions: This is a fasting blood test  Water, black tea or black coffee only after 9:00pm the night before test   Drink 2 glasses of water the morning of test     - Patient Instructions:  This is a fasting blood test  Water, black tea or black coffee only after 9:00pm the night before test Drink 2 glasses of water the morning of test   Triglyceride:         Normal              <150 mg/dl       Borderline High    150-199 mg/dl       High               200-499 mg/dl       Very High          >499 mg/dl  Cholesterol:         Desirable        <200 mg/dl      Borderline High  200-239 mg/dl      High             >239 mg/dl  HDL Cholesterol:        High    >59 mg/dL      Low     <41 mg/dL  LDL Cholesterol:        Optimal          <100 mg/dl        Near Optimal     100-129 mg/dl        Above Optimal          Borderline High   130-159 mg/dl          High              160-189 mg/dl          Very High        >189 mg/dl  LDL CALCULATED:    This screening LDL is a calculated result  It does not have the accuracy of the Direct Measured LDL in the monitoring of patients with hyperlipidemia and/or statin therapy  Direct Measure LDL (AMQ808) must be ordered separately in these patients  TRIGLYCERIDES 243 mg/dL H <=150   Specimen collection should occur prior to N-Acetylcysteine or Metamizole administration due to the potential for falsely depressed results  HDL,DIRECT 47 mg/dL  40-60   Specimen collection should occur prior to Metamizole administration due to the potential for falsely depressed results  (1) TSH WITH FT4 REFLEX [id number] 09:09AM [facility] Order Number: [id number]     Test Name Result Flag Reference   TSH 4 410 uIU/mL H 0 358-3 740   - Patient Instructions: This is a fasting blood test  Water, black tea or black coffee only after 9:00pm the night before test Drink 2 glasses of water the morning of test   Patients undergoing fluorescein dye angiography may retain small amounts of fluorescein in the body for 48-72 hours post procedure  Samples containing fluorescein can produce falsely depressed TSH values  If the patient had this procedure,a specimen should be resubmitted post fluorescein clearance            The recommended reference ranges for TSH during pregnancy are as follows:  First trimester 0 1 to 2 5 uIU/mL  Second trimester  0 2 to 3 0 uIU/mL  Third trimester 0 3 to 3 0 uIU/m   T4,FREE 1 00 ng/dL  0 76-1 46   - Patient Instructions:  This is a fasting blood test  Water, black tea or black coffee only after 9:00pm the night before test Drink 2 glasses of water the morning of test

## 2018-01-16 NOTE — RESULT NOTES
Verified Results  (1) CBC/PLT/DIFF 25Oct2017 09:26AM Reina Walker   TW Order Number: [id number]     Test Name Result Flag Reference   WBC COUNT 6 85 Thousand/uL  4 31-10 16   RBC COUNT 4 79 Million/uL  3 81-5 12   HEMOGLOBIN 13 3 g/dL  11 5-15 4   HEMATOCRIT 41 1 %  34 8-46  1   MCV 86 fL  82-98   MCH 27 8 pg  26 8-34 3   MCHC 32 4 g/dL  31 4-37 4   RDW 14 5 %  11 6-15 1   MPV 10 1 fL  8 9-12 7   PLATELET COUNT 163 Thousands/uL  149-390   nRBC AUTOMATED 0 /100 WBCs     NEUTROPHILS RELATIVE PERCENT 57 %  43-75   LYMPHOCYTES RELATIVE PERCENT 32 %  14-44   MONOCYTES RELATIVE PERCENT 11 %  4-12   EOSINOPHILS RELATIVE PERCENT 0 %  0-6   BASOPHILS RELATIVE PERCENT 0 %  0-1   NEUTROPHILS ABSOLUTE COUNT 3 89 Thousands/? ??L  1 85-7 62   LYMPHOCYTES ABSOLUTE COUNT 2 19 Thousands/? ??L  0 60-4 47   MONOCYTES ABSOLUTE COUNT 0 72 Thousand/? ??L  0 17-1 22   EOSINOPHILS ABSOLUTE COUNT 0 02 Thousand/? ??L  0 00-0 61   BASOPHILS ABSOLUTE COUNT 0 02 Thousands/? ??L  0 00-0 10     (1) COMPREHENSIVE METABOLIC PANEL [id number] 89:38XT Reina Walker    Order Number: [id number]     Test Name Result Flag Reference   SODIUM 133 mmol/L L 136-145   POTASSIUM 4 1 mmol/L  3 5-5 3   CHLORIDE 98 mmol/L L 100-108   CARBON DIOXIDE 30 mmol/L  21-32   ANION GAP (CALC) 5 mmol/L  4-13   BLOOD UREA NITROGEN 15 mg/dL  5-25   CREATININE 0 73 mg/dL  0 60-1 30   Standardized to IDMS reference method   CALCIUM 9 9 mg/dL  8 3-10 1   BILI, TOTAL 0 43 mg/dL  0 20-1 00   ALK PHOSPHATAS 69 U/L     ALT (SGPT) 25 U/L  12-78   Specimen collection should occur prior to Sulfasalazine and/or Sulfapyridine administration due to the potential for falsely depressed results  AST(SGOT) 24 U/L  5-45   Specimen collection should occur prior to Sulfasalazine administration due to the potential for falsely depressed results     ALBUMIN 3 5 g/dL  3 5-5 0   TOTAL PROTEIN 7 2 g/dL  6 4-8 2   eGFR 76 ml/min/1 73sq m     [employer] recommendations are as follows:  GFR calculation is accurate only with a steady state creatinine  Chronic Kidney disease less than 60 ml/min/1 73 sq  meters  Kidney failure less than 15 ml/min/1 73 sq  meters  GLUCOSE FASTING 97 mg/dL  65-99   Specimen collection should occur prior to Sulfasalazine administration due to the potential for falsely depressed results  Specimen collection should occur prior to Sulfapyridine administration due to the potential for falsely elevated results  (1) HEMOGLOBIN A1C 25Oct2017 09:26AM [employer] Order Number: [id number]     Test Name Result Flag Reference   HEMOGLOBIN A1C 5 9 %  4 2-6 3   EST  AVG  GLUCOSE 123 mg/dl       (1) LIPID PANEL FASTING W DIRECT LDL REFLEX 25Oct2017 09:26AM [employer] Order Number: [id number]     Test Name Result Flag Reference   CHOLESTEROL 196 mg/dL     LDL CHOLESTEROL CALCULATED 103 mg/dL H 0-100   Triglyceride:        Normal <150 mg/dl   Borderline High 150-199 mg/dl   High 200-499 mg/dl   Very High >499 mg/dl      Cholesterol:       Desirable <200 mg/dl    Borderline High 200-239 mg/dl    High >239 mg/dl      HDL Cholesterol:       High>59 mg/dL    Low <41 mg/dL      HDL Cholesterol:       High>59 mg/dL    Low <41 mg/dL      This screening LDL is a calculated result  It does not have the accuracy of the Direct Measured LDL in the monitoring of patients with hyperlipidemia and/or statin therapy  Direct Measure LDL (XTT282) must be ordered separately in these patients  TRIGLYCERIDES 212 mg/dL H <=150   Specimen collection should occur prior to N-Acetylcysteine or Metamizole administration due to the potential for falsely depressed results  HDL,DIRECT 51 mg/dL  40-60   Specimen collection should occur prior to Metamizole administration due to the potential for falsley depressed results       (1) TSH WITH FT4 REFLEX 25Oct2017 09:26AM [employer] Order Number: [id number]     Test Name Result Flag Reference   TSH 5 570 uIU/mL H 0 358-3 740   Patients undergoing fluorescein dye angiography may retain small amounts of fluorescein in the body for 48-72 hours post procedure  Samples containing fluorescein can produce falsely depressed TSH values  If the patient had this procedure,a specimen should be resubmitted post fluorescein clearance  The recommended reference ranges for TSH during pregnancy are as follows:  First trimester 0 1 to 2 5 uIU/mL  Second trimester  0 2 to 3 0 uIU/mL  Third trimester 0 3 to 3 0 uIU/m   T4,FREE 0 85 ng/dL  0 76-1 46   Specimen collection should occur prior to Sulfasalazine administration due to the potential for falsely elevated results

## 2018-01-16 NOTE — RESULT NOTES
Verified Results  (1) URINE CULTURE [id number] 01:14PM Dixon Silva Order Number: [id number]     Test Name Result Flag Reference   CLINICAL REPORT (Report)     Test:        Urine culture  Specimen Type:   Urine  Specimen Date:   7/1/2016 1:14 PM  Result Date:    7/3/2016 11:24 AM  Result Status:   Final result  Resulting Lab:   [place] [id number]            Tel: [phone]      CULTURE                                       ------------------                                   >100,000 cfu/ml Escherichia coli      SUSCEPTIBILITY                                   ------------------                                                       Escherichia coli  METHOD                 MUSA  -------------------------------------  -------------------------  AMPICILLIN ($$)             <=8 00 ug/ml Susceptible  AZTREONAM ($$$)             <=8 ug/ml   Susceptible  CEFAZOLIN ($)              <=8 00 ug/ml Susceptible  CIPROFLOXACIN ($)            <=1 00 ug/ml Susceptible  GENTAMICIN ($$)             <=4 ug/ml   Susceptible  LEVOFLOXACIN ($)            <=2 00 ug/ml Susceptible  NITROFURANTOIN             <=32 ug/ml  Susceptible  PIPERACILLIN + TAZOBACTAM ($$$)     <=16 ug/ml  Susceptible  TETRACYCLINE              <=4 ug/ml   Susceptible  TOBRAMYCIN ($)             <=4 ug/ml   Susceptible  TRIMETHOPRIM + SULFAMETHOXAZOLE ($$$)  <=2/38 ug/ml Susceptible

## 2018-01-23 VITALS
OXYGEN SATURATION: 97 % | RESPIRATION RATE: 16 BRPM | DIASTOLIC BLOOD PRESSURE: 80 MMHG | HEART RATE: 63 BPM | HEIGHT: 63 IN | WEIGHT: 135.25 LBS | BODY MASS INDEX: 23.96 KG/M2 | SYSTOLIC BLOOD PRESSURE: 124 MMHG | TEMPERATURE: 97.4 F

## 2018-02-12 PROBLEM — C54.1 ENDOMETRIAL CANCER (HCC): Status: ACTIVE | Noted: 2018-02-12

## 2018-02-19 ENCOUNTER — OFFICE VISIT (OUTPATIENT)
Dept: GYNECOLOGIC ONCOLOGY | Facility: CLINIC | Age: 83
End: 2018-02-19
Payer: MEDICARE

## 2018-02-19 VITALS
HEART RATE: 68 BPM | DIASTOLIC BLOOD PRESSURE: 70 MMHG | HEIGHT: 63 IN | TEMPERATURE: 98.1 F | BODY MASS INDEX: 23.99 KG/M2 | SYSTOLIC BLOOD PRESSURE: 130 MMHG | WEIGHT: 135.38 LBS | RESPIRATION RATE: 16 BRPM

## 2018-02-19 DIAGNOSIS — C54.1 ENDOMETRIAL CANCER (HCC): Primary | ICD-10-CM

## 2018-02-19 PROCEDURE — 99214 OFFICE O/P EST MOD 30 MIN: CPT | Performed by: PHYSICIAN ASSISTANT

## 2018-02-19 RX ORDER — FAMOTIDINE 10 MG
10 TABLET ORAL
COMMUNITY
End: 2020-09-01 | Stop reason: SDUPTHER

## 2018-02-19 NOTE — PROGRESS NOTES
Assessment/Plan:    Problem List Items Addressed This Visit        Genitourinary    Endometrial cancer ([facility] ) - Primary     Clinically without evidence of disease recurrence  Return to the office in 1 year for continued surveillance  CHIEF COMPLAINT:   Endometrial cancer surveillance    Problem:  Endometrial cancer ([facility] )    Staging form: Corpus Uteri - Carcinoma, AJCC 8th Edition    - Clinical: FIGO Stage IA - Signed by Can Larsen PA-C on 2/12/2018      Previous therapy:     Endometrial cancer ([facility] )    3/22/2011 Initial Diagnosis     Endometrial cancer ([facility] )         3/22/2011 Surgery     Robotic-assisted total laparoscopic hysterectomy and bilateral salpingo-oophorectomy as well as cystoscopy   A  43% myometrial invasion, no lymphovascular space invasion  Patient ID: Rich Mascorro is a 80 y o  female  has no new complaints today  No vaginal bleeding, abdominal/pelvic pain  Normal bowel and bladder function  No interval change in medical history since last visit  Quality of life is good  The following portions of the patient's history were reviewed and updated as appropriate: allergies, current medications, past medical history and problem list     Review of Systems   Constitutional: Negative  HENT: Negative  Eyes: Negative  Respiratory: Negative  Cardiovascular: Negative  Gastrointestinal: Negative  Genitourinary: Negative  Musculoskeletal: Negative  Skin: Negative  Neurological: Negative  Psychiatric/Behavioral: Negative  Current Outpatient Prescriptions   Medication Sig Dispense Refill    acetaminophen (TYLENOL) 650 mg CR tablet Take 650 mg by mouth as needed for mild pain   atenolol (TENORMIN) 100 mg tablet Take 100 mg by mouth 2 (two) times a day   famotidine (PEPCID) 10 mg tablet Take 10 mg by mouth daily      furosemide (LASIX) 40 mg tablet Take 40 mg by mouth daily        imipramine (TOFRANIL) 25 mg tablet Take 25 mg by mouth daily   levothyroxine 25 mcg tablet Take 25 mcg by mouth daily   lisinopril (ZESTRIL) 40 mg tablet Take 40 mg by mouth daily   Probiotic Product (PROBIOTIC ADVANCED PO) Take 1 capsule by mouth daily      simvastatin (ZOCOR) 20 mg tablet Take 20 mg by mouth daily at bedtime   ALPRAZolam (XANAX) 0 25 mg tablet Take 0 25 mg by mouth as needed for anxiety  No current facility-administered medications for this visit  Objective:    Blood pressure 130/70, pulse 68, temperature 98 1 °F (36 7 °C), temperature source Oral, resp  rate 16, height 5' 3" (1 6 m), weight 61 4 kg (135 lb 6 oz)  Physical Exam   Constitutional: She is oriented to person, place, and time  She appears well-developed and well-nourished  HENT:   Head: Normocephalic and atraumatic  Neck: Normal range of motion  Neck supple  No thyromegaly present  Pulmonary/Chest: Effort normal    Abdominal: Soft  She exhibits no distension and no mass  There is no rebound  Genitourinary:   Genitourinary Comments: The external female genitalia is normal  The bartholin's, uretheral and skenes glands are normal  The urethral meatus is normal, with minor prolapse present  Speculum exam reveals a grossly normal vagina  No masses, lesions or bleeding  Manual exam notes a surgical absent cervix, uterus and adnexal structures  No masses or fullness  Musculoskeletal: Normal range of motion  She exhibits no edema  Lymphadenopathy:     She has no cervical adenopathy  Neurological: She is alert and oriented to person, place, and time  Skin: Skin is warm and dry  No rash noted  Psychiatric: She has a normal mood and affect  Her behavior is normal    Vitals reviewed

## 2018-02-19 NOTE — ASSESSMENT & PLAN NOTE
Clinically without evidence of disease recurrence  Return to the office in 1 year for continued surveillance

## 2018-03-19 DIAGNOSIS — E03.9 HYPOTHYROIDISM, UNSPECIFIED TYPE: Primary | ICD-10-CM

## 2018-03-20 RX ORDER — LEVOTHYROXINE SODIUM 0.03 MG/1
TABLET ORAL
Qty: 90 TABLET | Refills: 3 | Status: SHIPPED | OUTPATIENT
Start: 2018-03-20 | End: 2018-11-06 | Stop reason: SDUPTHER

## 2018-04-22 DIAGNOSIS — E78.5 HYPERLIPIDEMIA: ICD-10-CM

## 2018-04-22 DIAGNOSIS — E03.9 HYPOTHYROIDISM: ICD-10-CM

## 2018-04-22 DIAGNOSIS — R73.01 IMPAIRED FASTING GLUCOSE: ICD-10-CM

## 2018-04-22 DIAGNOSIS — I10 ESSENTIAL (PRIMARY) HYPERTENSION: ICD-10-CM

## 2018-04-22 DIAGNOSIS — M85.80 OTHER SPECIFIED DISORDERS OF BONE DENSITY AND STRUCTURE, UNSPECIFIED SITE (CODE): ICD-10-CM

## 2018-04-22 DIAGNOSIS — F41.8 OTHER SPECIFIED ANXIETY DISORDERS: ICD-10-CM

## 2018-04-26 ENCOUNTER — TRANSCRIBE ORDERS (OUTPATIENT)
Dept: LAB | Facility: CLINIC | Age: 83
End: 2018-04-26

## 2018-04-26 ENCOUNTER — APPOINTMENT (OUTPATIENT)
Dept: LAB | Facility: CLINIC | Age: 83
End: 2018-04-26
Payer: MEDICARE

## 2018-04-26 DIAGNOSIS — R73.01 IMPAIRED FASTING GLUCOSE: ICD-10-CM

## 2018-04-26 DIAGNOSIS — I10 ESSENTIAL (PRIMARY) HYPERTENSION: ICD-10-CM

## 2018-04-26 DIAGNOSIS — E03.9 HYPOTHYROIDISM: ICD-10-CM

## 2018-04-26 DIAGNOSIS — M85.80 OTHER SPECIFIED DISORDERS OF BONE DENSITY AND STRUCTURE, UNSPECIFIED SITE (CODE): ICD-10-CM

## 2018-04-26 DIAGNOSIS — E78.5 HYPERLIPIDEMIA: ICD-10-CM

## 2018-04-26 DIAGNOSIS — F41.8 OTHER SPECIFIED ANXIETY DISORDERS: ICD-10-CM

## 2018-04-26 LAB
ALBUMIN SERPL BCP-MCNC: 3.4 G/DL (ref 3.5–5)
ALP SERPL-CCNC: 59 U/L (ref 46–116)
ALT SERPL W P-5'-P-CCNC: 23 U/L (ref 12–78)
ANION GAP SERPL CALCULATED.3IONS-SCNC: 3 MMOL/L (ref 4–13)
AST SERPL W P-5'-P-CCNC: 20 U/L (ref 5–45)
BILIRUB SERPL-MCNC: 0.36 MG/DL (ref 0.2–1)
BUN SERPL-MCNC: 14 MG/DL (ref 5–25)
CALCIUM SERPL-MCNC: 9.6 MG/DL (ref 8.3–10.1)
CHLORIDE SERPL-SCNC: 101 MMOL/L (ref 100–108)
CHOLEST SERPL-MCNC: 177 MG/DL (ref 50–200)
CO2 SERPL-SCNC: 31 MMOL/L (ref 21–32)
CREAT SERPL-MCNC: 0.72 MG/DL (ref 0.6–1.3)
EST. AVERAGE GLUCOSE BLD GHB EST-MCNC: 114 MG/DL
GFR SERPL CREATININE-BSD FRML MDRD: 78 ML/MIN/1.73SQ M
GLUCOSE P FAST SERPL-MCNC: 94 MG/DL (ref 65–99)
HBA1C MFR BLD: 5.6 % (ref 4.2–6.3)
HDLC SERPL-MCNC: 48 MG/DL (ref 40–60)
LDLC SERPL CALC-MCNC: 91 MG/DL (ref 0–100)
POTASSIUM SERPL-SCNC: 4.3 MMOL/L (ref 3.5–5.3)
PROT SERPL-MCNC: 6.8 G/DL (ref 6.4–8.2)
SODIUM SERPL-SCNC: 135 MMOL/L (ref 136–145)
T4 FREE SERPL-MCNC: 0.86 NG/DL (ref 0.76–1.46)
TRIGL SERPL-MCNC: 190 MG/DL
TSH SERPL DL<=0.05 MIU/L-ACNC: 4.06 UIU/ML (ref 0.36–3.74)

## 2018-04-26 PROCEDURE — 36415 COLL VENOUS BLD VENIPUNCTURE: CPT

## 2018-04-26 PROCEDURE — 80053 COMPREHEN METABOLIC PANEL: CPT

## 2018-04-26 PROCEDURE — 80061 LIPID PANEL: CPT

## 2018-04-26 PROCEDURE — 84443 ASSAY THYROID STIM HORMONE: CPT

## 2018-04-26 PROCEDURE — 83036 HEMOGLOBIN GLYCOSYLATED A1C: CPT

## 2018-04-26 PROCEDURE — 84439 ASSAY OF FREE THYROXINE: CPT

## 2018-04-27 DIAGNOSIS — I10 ESSENTIAL HYPERTENSION: Primary | ICD-10-CM

## 2018-04-27 RX ORDER — LISINOPRIL 40 MG/1
TABLET ORAL
Qty: 90 TABLET | Refills: 3 | Status: SHIPPED | OUTPATIENT
Start: 2018-04-27 | End: 2018-11-06 | Stop reason: SDUPTHER

## 2018-04-27 RX ORDER — FUROSEMIDE 40 MG/1
TABLET ORAL
Qty: 90 TABLET | Refills: 3 | Status: SHIPPED | OUTPATIENT
Start: 2018-04-27 | End: 2018-11-06 | Stop reason: ALTCHOICE

## 2018-04-27 RX ORDER — ATENOLOL 100 MG/1
TABLET ORAL
Qty: 180 TABLET | Refills: 3 | Status: SHIPPED | OUTPATIENT
Start: 2018-04-27 | End: 2018-11-06 | Stop reason: SDUPTHER

## 2018-05-02 RX ORDER — POLYETHYLENE GLYCOL 3350 17 G/17G
POWDER, FOR SOLUTION ORAL AS NEEDED
COMMUNITY

## 2018-05-02 RX ORDER — METHOCARBAMOL 500 MG/1
500 TABLET, FILM COATED ORAL EVERY 8 HOURS
COMMUNITY
Start: 2017-03-09 | End: 2018-05-03 | Stop reason: CLARIF

## 2018-05-02 RX ORDER — OXYCODONE HYDROCHLORIDE 5 MG/1
5-10 TABLET ORAL EVERY 4 HOURS
COMMUNITY
Start: 2017-03-09 | End: 2018-05-03 | Stop reason: CLARIF

## 2018-05-02 RX ORDER — MELATONIN: COMMUNITY

## 2018-05-02 RX ORDER — ALUMINUM ZIRCONIUM OCTACHLOROHYDREX GLY 16 G/100G
GEL TOPICAL
COMMUNITY

## 2018-05-03 ENCOUNTER — OFFICE VISIT (OUTPATIENT)
Dept: FAMILY MEDICINE CLINIC | Facility: CLINIC | Age: 83
End: 2018-05-03
Payer: MEDICARE

## 2018-05-03 VITALS
RESPIRATION RATE: 16 BRPM | TEMPERATURE: 97.2 F | SYSTOLIC BLOOD PRESSURE: 124 MMHG | HEART RATE: 76 BPM | BODY MASS INDEX: 24.34 KG/M2 | WEIGHT: 137.4 LBS | DIASTOLIC BLOOD PRESSURE: 82 MMHG

## 2018-05-03 DIAGNOSIS — F41.8 DEPRESSION WITH ANXIETY: ICD-10-CM

## 2018-05-03 DIAGNOSIS — I10 ESSENTIAL HYPERTENSION: Primary | ICD-10-CM

## 2018-05-03 DIAGNOSIS — R73.01 IMPAIRED FASTING GLUCOSE: ICD-10-CM

## 2018-05-03 DIAGNOSIS — M85.80 OSTEOPENIA, UNSPECIFIED LOCATION: ICD-10-CM

## 2018-05-03 DIAGNOSIS — K59.00 CONSTIPATION, UNSPECIFIED CONSTIPATION TYPE: ICD-10-CM

## 2018-05-03 DIAGNOSIS — E78.5 HYPERLIPIDEMIA, UNSPECIFIED HYPERLIPIDEMIA TYPE: ICD-10-CM

## 2018-05-03 DIAGNOSIS — E03.9 HYPOTHYROIDISM, UNSPECIFIED TYPE: ICD-10-CM

## 2018-05-03 PROBLEM — M19.90 ARTHRITIS: Status: ACTIVE | Noted: 2018-05-03

## 2018-05-03 PROCEDURE — 99214 OFFICE O/P EST MOD 30 MIN: CPT | Performed by: FAMILY MEDICINE

## 2018-05-03 RX ORDER — ALPRAZOLAM 0.25 MG/1
0.25 TABLET ORAL AS NEEDED
Qty: 30 TABLET | Refills: 0 | Status: SHIPPED | OUTPATIENT
Start: 2018-05-03 | End: 2020-02-27

## 2018-05-03 RX ORDER — DOCUSATE SODIUM 100 MG/1
100 CAPSULE, LIQUID FILLED ORAL 2 TIMES DAILY
Qty: 10 CAPSULE | Refills: 0
Start: 2018-05-03 | End: 2022-06-28

## 2018-05-03 NOTE — PROGRESS NOTES
Chief Complaint   Patient presents with    Follow-up     6 month follow up for Depression with anxiety, Hyperlipidemia, Hypertension, Hypothyroidism, Impaired fasting glucose, and Osteopenia  Health Maintenance   Topic Date Due    DTaP,Tdap,and Td Vaccines (1 - Tdap) 07/05/1955    Urinary Incontinence Screening  07/05/1999    PNEUMOCOCCAL POLYSACCHARIDE VACCINE AGE 72 AND OVER  07/05/1999    GLAUCOMA SCREENING 67+ YR  07/05/2001    INFLUENZA VACCINE  09/01/2018    TSH LEVEL  10/26/2018    HEMOGLOBIN A1C  10/26/2018    Fall Risk  11/02/2018    Annual [facility] Visit (AWV)  11/02/2018    DXA SCAN  11/07/2018    Depression Screening PHQ-9  05/03/2019    COLONOSCOPY  03/11/2026     Assessment/Plan:    Reviewed lab in 4/2018  TSH 4 06, Free T4 normal  CMP ok except Na 135 slightly low  hgA1C 5 6 normal  Lipid 177/190/48/91, slightly elevated triglyceride  Stop lasix  Continue current other meds  FU specialist as scheduled  Mammogram 11/2017 negative  Dexa 11/2016 normal, on vitD  Walking everyday  I advised pt do not take extra calcium bc hx of hypercalcemia  Got pneumovax at pharmacy in 2013 per pt  Got prevnar 13 2017  Got zostavax in 2015  Had colonoscopy in 3/2016  No more colonoscopy per pt  RTO in 6 months  Diagnoses and all orders for this visit:    Essential hypertension  Comments:  Controlled ok  Continue atenolol, lisinopril  Orders:  -     CBC; Future  -     Comprehensive metabolic panel; Future    Hypothyroidism, unspecified type  Comments:  continue levothyroxine 25 mcg daily  Orders:  -     TSH, 3rd generation; Future    Impaired fasting glucose  Comments:  Low carb diet  Orders:  -     HEMOGLOBIN A1C W/ EAG ESTIMATION; Future    Hyperlipidemia, unspecified hyperlipidemia type  Comments:  Low fat diet  Continue simvastatin  Orders:  -     Lipid panel; Future    Depression with anxiety  Comments:  Mood is stable  Give xanax  SE educated pt  use as needed  Orders:  -     ALPRAZolam (XANAX) 0 25 mg tablet; Take 1 tablet (0 25 mg total) by mouth as needed for anxiety for up to 30 days  -     TSH, 3rd generation; Future    Constipation, unspecified constipation type  Comments:  continue colace and miralax as needed  Orders:  -     docusate sodium (COLACE) 100 mg capsule; Take 1 capsule (100 mg total) by mouth 2 (two) times a day  -     TSH, 3rd generation; Future    Osteopenia, unspecified location  Comments:  2016 Dexa normal  Fall precautions  Orders:  -     Vitamin D 25 hydroxy; Future          Subjective:      Patient ID: William Cherry is a 80 y o  female  HPI    Pt is here by herself  HTN---BP at home 140-150/80-90  Pt is on atenolol 100mg bid, lisinopril 40mg QD and lasix 20mg QD  Pt states she urinates frequently after lasix  FU urology for urinary incontinence  On tofranil 25mg QD which works  Hyperlipidemia---Pt uses simvastatin 20mg qhs  Denies SE  Hypothyroidism---on levothyroxine 25mcg daily  Feels fine  IFG---4/2018 hgA1C 5 6 stable    GERD---Use OTC pepcid and TUMs which helped some  AnxietyDepression---Mood is ok  She uses xanax very rarely, need refill  Constipation---She uses colace daily, miralax maybe 1/week which helped  Osteopenia---11/2016 Dexa showed normal  on vitD calcium and walk daily  FU opthalmology Dr Kiki Markham specialist Q 6months  Stable  FU oncologist Dr Cruz Lunch year for hx endometrial cancer s/p hysterectomy  Stable  FU podiatry Q 10 weeks  Arthritis---Knees, neck and shoulders for years  Exercise regularly  Took tylenol which helped  Live by herself  Does all ADL's  Still drive  Has 3 daughters and 1 daughter lives close to her  No recent falls           The following portions of the patient's history were reviewed and updated as appropriate: allergies, current medications, past family history, past medical history, past social history, past surgical history and problem list     Review of Systems   Constitutional: Negative for appetite change, chills and fever  HENT: Negative for congestion, ear pain, sinus pain and sore throat  Eyes: Negative for discharge and itching  Respiratory: Negative for apnea, cough, chest tightness, shortness of breath and wheezing  Cardiovascular: Negative for chest pain, palpitations and leg swelling  Gastrointestinal: Negative for abdominal pain, anal bleeding, constipation, diarrhea, nausea and vomiting  Endocrine: Negative for cold intolerance, heat intolerance and polyuria  Genitourinary: Negative for difficulty urinating and dysuria  Musculoskeletal: Negative for arthralgias, back pain and myalgias  Skin: Negative for rash  Neurological: Negative for dizziness and headaches  Psychiatric/Behavioral: Negative for agitation  Objective:      Vitals:    05/03/18 1025   BP: 124/82   Pulse: 76   Resp: 16   Temp: (!) 97 2 °F (36 2 °C)              Physical Exam   Constitutional: She appears well-developed  No distress  Eyes: Conjunctivae are normal  Pupils are equal, round, and reactive to light  Right eye exhibits no discharge  Left eye exhibits no discharge  Neck: Normal range of motion  No thyromegaly present  Cardiovascular: Normal rate, regular rhythm and normal heart sounds  Exam reveals no gallop and no friction rub  No murmur heard  Pulmonary/Chest: Effort normal and breath sounds normal  No respiratory distress  She has no wheezes  She has no rales  She exhibits no tenderness  Abdominal: Soft  Bowel sounds are normal    Musculoskeletal: Normal range of motion  Lymphadenopathy:     She has no cervical adenopathy  Neurological: She is alert  Psychiatric: She has a normal mood and affect

## 2018-05-22 DIAGNOSIS — E78.5 HYPERLIPIDEMIA, UNSPECIFIED HYPERLIPIDEMIA TYPE: Primary | ICD-10-CM

## 2018-05-22 RX ORDER — SIMVASTATIN 20 MG
TABLET ORAL
Qty: 90 TABLET | Refills: 3 | Status: SHIPPED | OUTPATIENT
Start: 2018-05-22 | End: 2018-11-06 | Stop reason: SDUPTHER

## 2018-11-05 ENCOUNTER — TRANSCRIBE ORDERS (OUTPATIENT)
Dept: ADMINISTRATIVE | Facility: HOSPITAL | Age: 83
End: 2018-11-05

## 2018-11-05 ENCOUNTER — APPOINTMENT (OUTPATIENT)
Dept: LAB | Facility: CLINIC | Age: 83
End: 2018-11-05
Payer: MEDICARE

## 2018-11-05 DIAGNOSIS — E03.9 HYPOTHYROIDISM, UNSPECIFIED TYPE: ICD-10-CM

## 2018-11-05 DIAGNOSIS — F41.8 DEPRESSION WITH ANXIETY: ICD-10-CM

## 2018-11-05 DIAGNOSIS — R73.01 IMPAIRED FASTING GLUCOSE: ICD-10-CM

## 2018-11-05 DIAGNOSIS — K59.00 CONSTIPATION, UNSPECIFIED CONSTIPATION TYPE: ICD-10-CM

## 2018-11-05 DIAGNOSIS — E78.5 HYPERLIPIDEMIA, UNSPECIFIED HYPERLIPIDEMIA TYPE: ICD-10-CM

## 2018-11-05 DIAGNOSIS — I10 ESSENTIAL HYPERTENSION: ICD-10-CM

## 2018-11-05 DIAGNOSIS — M85.80 OSTEOPENIA, UNSPECIFIED LOCATION: ICD-10-CM

## 2018-11-05 LAB
25(OH)D3 SERPL-MCNC: 30.5 NG/ML (ref 30–100)
ALBUMIN SERPL BCP-MCNC: 3.6 G/DL (ref 3.5–5)
ALP SERPL-CCNC: 60 U/L (ref 46–116)
ALT SERPL W P-5'-P-CCNC: 26 U/L (ref 12–78)
ANION GAP SERPL CALCULATED.3IONS-SCNC: 3 MMOL/L (ref 4–13)
AST SERPL W P-5'-P-CCNC: 21 U/L (ref 5–45)
BILIRUB SERPL-MCNC: 0.49 MG/DL (ref 0.2–1)
BUN SERPL-MCNC: 15 MG/DL (ref 5–25)
CALCIUM SERPL-MCNC: 10 MG/DL (ref 8.3–10.1)
CHLORIDE SERPL-SCNC: 98 MMOL/L (ref 100–108)
CHOLEST SERPL-MCNC: 178 MG/DL (ref 50–200)
CO2 SERPL-SCNC: 31 MMOL/L (ref 21–32)
CREAT SERPL-MCNC: 0.74 MG/DL (ref 0.6–1.3)
ERYTHROCYTE [DISTWIDTH] IN BLOOD BY AUTOMATED COUNT: 14.3 % (ref 11.6–15.1)
EST. AVERAGE GLUCOSE BLD GHB EST-MCNC: 120 MG/DL
GFR SERPL CREATININE-BSD FRML MDRD: 75 ML/MIN/1.73SQ M
GLUCOSE P FAST SERPL-MCNC: 93 MG/DL (ref 65–99)
HBA1C MFR BLD: 5.8 % (ref 4.2–6.3)
HCT VFR BLD AUTO: 44 % (ref 34.8–46.1)
HDLC SERPL-MCNC: 50 MG/DL (ref 40–60)
HGB BLD-MCNC: 14 G/DL (ref 11.5–15.4)
LDLC SERPL CALC-MCNC: 95 MG/DL (ref 0–100)
MCH RBC QN AUTO: 27.9 PG (ref 26.8–34.3)
MCHC RBC AUTO-ENTMCNC: 31.8 G/DL (ref 31.4–37.4)
MCV RBC AUTO: 88 FL (ref 82–98)
NONHDLC SERPL-MCNC: 128 MG/DL
PLATELET # BLD AUTO: 276 THOUSANDS/UL (ref 149–390)
PMV BLD AUTO: 10.1 FL (ref 8.9–12.7)
POTASSIUM SERPL-SCNC: 4.6 MMOL/L (ref 3.5–5.3)
PROT SERPL-MCNC: 7.4 G/DL (ref 6.4–8.2)
RBC # BLD AUTO: 5.01 MILLION/UL (ref 3.81–5.12)
SODIUM SERPL-SCNC: 132 MMOL/L (ref 136–145)
TRIGL SERPL-MCNC: 164 MG/DL
TSH SERPL DL<=0.05 MIU/L-ACNC: 5.68 UIU/ML (ref 0.36–3.74)
WBC # BLD AUTO: 7.27 THOUSAND/UL (ref 4.31–10.16)

## 2018-11-05 PROCEDURE — 80053 COMPREHEN METABOLIC PANEL: CPT

## 2018-11-05 PROCEDURE — 36415 COLL VENOUS BLD VENIPUNCTURE: CPT

## 2018-11-05 PROCEDURE — 80061 LIPID PANEL: CPT

## 2018-11-05 PROCEDURE — 83036 HEMOGLOBIN GLYCOSYLATED A1C: CPT

## 2018-11-05 PROCEDURE — 85027 COMPLETE CBC AUTOMATED: CPT

## 2018-11-05 PROCEDURE — 82306 VITAMIN D 25 HYDROXY: CPT

## 2018-11-05 PROCEDURE — 84443 ASSAY THYROID STIM HORMONE: CPT

## 2018-11-05 RX ORDER — OXYCODONE HYDROCHLORIDE 5 MG/1
5-10 TABLET ORAL EVERY 4 HOURS PRN
COMMUNITY
Start: 2017-03-09 | End: 2018-11-06 | Stop reason: ALTCHOICE

## 2018-11-06 ENCOUNTER — OFFICE VISIT (OUTPATIENT)
Dept: FAMILY MEDICINE CLINIC | Facility: CLINIC | Age: 83
End: 2018-11-06
Payer: MEDICARE

## 2018-11-06 ENCOUNTER — TELEPHONE (OUTPATIENT)
Dept: FAMILY MEDICINE CLINIC | Facility: CLINIC | Age: 83
End: 2018-11-06

## 2018-11-06 VITALS
BODY MASS INDEX: 24.24 KG/M2 | WEIGHT: 136.8 LBS | DIASTOLIC BLOOD PRESSURE: 74 MMHG | HEART RATE: 68 BPM | HEIGHT: 63 IN | RESPIRATION RATE: 16 BRPM | TEMPERATURE: 98.3 F | SYSTOLIC BLOOD PRESSURE: 142 MMHG

## 2018-11-06 DIAGNOSIS — R73.01 IMPAIRED FASTING GLUCOSE: ICD-10-CM

## 2018-11-06 DIAGNOSIS — L72.9 SCALP CYST: ICD-10-CM

## 2018-11-06 DIAGNOSIS — Z12.39 SCREENING FOR BREAST CANCER: ICD-10-CM

## 2018-11-06 DIAGNOSIS — Z00.00 MEDICARE ANNUAL WELLNESS VISIT, SUBSEQUENT: ICD-10-CM

## 2018-11-06 DIAGNOSIS — Z13.820 SCREENING FOR OSTEOPOROSIS: ICD-10-CM

## 2018-11-06 DIAGNOSIS — I10 ESSENTIAL HYPERTENSION: Primary | ICD-10-CM

## 2018-11-06 DIAGNOSIS — E78.5 HYPERLIPIDEMIA, UNSPECIFIED HYPERLIPIDEMIA TYPE: ICD-10-CM

## 2018-11-06 DIAGNOSIS — E03.9 HYPOTHYROIDISM, UNSPECIFIED TYPE: ICD-10-CM

## 2018-11-06 PROCEDURE — 99214 OFFICE O/P EST MOD 30 MIN: CPT | Performed by: FAMILY MEDICINE

## 2018-11-06 PROCEDURE — G0439 PPPS, SUBSEQ VISIT: HCPCS | Performed by: FAMILY MEDICINE

## 2018-11-06 RX ORDER — ATENOLOL 100 MG/1
100 TABLET ORAL 2 TIMES DAILY
Qty: 180 TABLET | Refills: 1 | Status: SHIPPED | OUTPATIENT
Start: 2018-11-06 | End: 2019-01-29 | Stop reason: CLARIF

## 2018-11-06 RX ORDER — SIMVASTATIN 20 MG
20 TABLET ORAL
Qty: 90 TABLET | Refills: 1 | Status: SHIPPED | OUTPATIENT
Start: 2018-11-06 | End: 2019-05-15 | Stop reason: SDUPTHER

## 2018-11-06 RX ORDER — LISINOPRIL 40 MG/1
40 TABLET ORAL DAILY
Qty: 90 TABLET | Refills: 1 | Status: SHIPPED | OUTPATIENT
Start: 2018-11-06 | End: 2019-04-24 | Stop reason: SDUPTHER

## 2018-11-06 RX ORDER — BRIMONIDINE TARTRATE 0.1 %
DROPS OPHTHALMIC (EYE)
Refills: 0 | COMMUNITY
Start: 2018-10-25

## 2018-11-06 RX ORDER — MUPIROCIN CALCIUM 20 MG/G
CREAM TOPICAL 3 TIMES DAILY
Qty: 15 G | Refills: 0 | Status: SHIPPED | OUTPATIENT
Start: 2018-11-06 | End: 2019-01-29 | Stop reason: ALTCHOICE

## 2018-11-06 RX ORDER — LEVOTHYROXINE SODIUM 0.03 MG/1
25 TABLET ORAL DAILY
Qty: 90 TABLET | Refills: 1 | Status: SHIPPED | OUTPATIENT
Start: 2018-11-06 | End: 2020-04-23

## 2018-11-06 NOTE — PROGRESS NOTES
Chief Complaint   Patient presents with   [facility] [place] Wellness Visit     Annual wellness visit   Follow-up     6 month follow up  Assessment and Plan:    Problem List Items Addressed This Visit        Endocrine    Hypothyroidism    Relevant Medications    atenolol (TENORMIN) 100 mg tablet    levothyroxine 25 mcg tablet    Other Relevant Orders    TSH, 3rd generation with Free T4 reflex    Impaired fasting glucose    Relevant Orders    Hemoglobin A1C       Cardiovascular and Mediastinum    Hypertension - Primary    Relevant Medications    atenolol (TENORMIN) 100 mg tablet    lisinopril (ZESTRIL) 40 mg tablet    Other Relevant Orders    Comprehensive metabolic panel       Other    Hyperlipidemia    Relevant Medications    simvastatin (ZOCOR) 20 mg tablet    Other Relevant Orders    Lipid panel      Other Visit Diagnoses     Scalp cyst        Relevant Medications    mupirocin (BACTROBAN) 2 % cream    Medicare annual wellness visit, subsequent        Screening for breast cancer        Relevant Orders    Mammo screening bilateral w cad    Screening for osteoporosis        Relevant Orders    DXA bone density spine hip and pelvis        MMSE 30/30 today  Health Maintenance Due   Topic Date Due    DTaP,Tdap,and Td Vaccines (1 - Tdap) 07/05/1955    Urinary Incontinence Screening  07/05/1999    Pneumococcal PPSV23/PCV13 65+ Years / High and Highest Risk (2 of 2 - PPSV23) 06/22/2017    Medicare Annual Wellness Visit (AWV)  11/02/2018    DXA SCAN  11/07/2018         HPI:  Ashok Wright is a 80 y o  female here for her Subsequent Wellness Visit      Patient Active Problem List   Diagnosis    Hypertension    Hypothyroidism    Endometrial cancer ([address] )    Abnormal breast exam    Arthritis    Constipation    Depression with anxiety    Diverticulosis    Duodenal diverticulum    Esophagitis, reflux    Hyperlipidemia    Impaired fasting glucose    Insomnia    Osteopenia    Atrophic vaginitis    Urinary incontinence    Vertigo     Past Medical History:   Diagnosis Date    Anemia     post-op, 07/07/09    Anxiety     last assessed: 01/15/14    C  difficile diarrhea     Chest wall trauma     Acute, last assessed: 10/24/12    Depression     Disease of thyroid gland     hypothyroid    Diverticula of colon     Endometrial cancer [facility]     Endometrial hyperplasia     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[facility] )     stage IA, Grade I endometriod adenocarcinoma with 43% myometrial invasion and no LVSI, last assessed: 78/30/85    Folliculitis     last assessed: 03/02/16    Hypercholesterolemia     Hyperlipidemia     Hypertension     Incontinence in female     Migraine     Nontoxic single thyroid nodule     last assessed: 02/16/13    Osteoporosis     last assessed: 02/22/17    Pure hypercholesterolemia     Rotator cuff (capsule) sprain     Acute, right    Skin lesion     last assessed: 04/26/13    Stomatitis     Strain of right buttock     gluteus medius    Tendinitis of right rotator cuff     last assessed: 08/12/12    Thyroid cyst     last assessed: 08/15/13     Past Surgical History:   Procedure Laterality Date    APPENDECTOMY      COLONOSCOPY      fiberoptic, 1998, 2001, 2006    CYSTOSCOPY      Diagnostic    EGD AND COLONOSCOPY N/A 3/11/2016    Procedure: EGD ;  Surgeon: Christina Moe MD;  Location: [facility];   Service:     HYSTERECTOMY  03/22/2011    Robotic-assisted, total laparoscopic approch    JOINT REPLACEMENT      KNEE SURGERY      OOPHORECTOMY Bilateral 03/22/2011    Salpingo    REPLACEMENT TOTAL KNEE      TONSILECTOMY AND ADNOIDECTOMY      TOTAL SHOULDER ARTHROPLASTY Right      Family History   Problem Relation Age of Onset    Cancer Father     Lung cancer Brother     Prostate cancer Brother     Other Brother         Prolapsing mitral valve leaflet syndrome     History   Smoking Status    Never Smoker   Smokeless Tobacco    Never Used     History   Alcohol Use No History   Drug Use No       Current Outpatient Prescriptions   Medication Sig Dispense Refill    acetaminophen (TYLENOL) 650 mg CR tablet Take 650 mg by mouth as needed for mild pain   ALPHAGAN P 0 1 % instill 1 drop into both eyes twice a day  0    ALPRAZolam (XANAX) 0 25 mg tablet Take 1 tablet (0 25 mg total) by mouth as needed for anxiety for up to 30 days 30 tablet 0    atenolol (TENORMIN) 100 mg tablet Take 1 tablet (100 mg total) by mouth 2 (two) times a day for 90 days 180 tablet 1    bifidobacterium infantis (ALIGN) capsule Take by mouth      cholecalciferol (VITAMIN D3) 1,000 units tablet Take by mouth      docusate sodium (COLACE) 100 mg capsule Take 1 capsule (100 mg total) by mouth 2 (two) times a day 10 capsule 0    famotidine (PEPCID) 10 mg tablet Take 10 mg by mouth daily      imipramine (TOFRANIL) 25 mg tablet Take 25 mg by mouth daily   levothyroxine 25 mcg tablet Take 1 tablet (25 mcg total) by mouth daily 90 tablet 1    lisinopril (ZESTRIL) 40 mg tablet Take 1 tablet (40 mg total) by mouth daily 90 tablet 1    Multiple Vitamins-Minerals (CENTRUM SILVER PO) Take by mouth      polyethylene glycol (MIRALAX) powder Take by mouth Twice daily      simvastatin (ZOCOR) 20 mg tablet Take 1 tablet (20 mg total) by mouth daily at bedtime 90 tablet 1    aspirin 81 MG tablet Take 81 mg by mouth      Calcium Carb-Cholecalciferol (CALCIUM CARBONATE-VITAMIN D3 PO) Take 1 tablet by mouth      mupirocin (BACTROBAN) 2 % cream Apply topically 3 (three) times a day 15 g 0    Probiotic Product (PROBIOTIC ADVANCED PO) Take 1 capsule by mouth daily       No current facility-administered medications for this visit        Allergies   Allergen Reactions    Amlodipine Itching     Reaction Date: 19Jul2011;     Amoxil [Amoxicillin] Hives    Biaxin [Clarithromycin] GI Intolerance     Reaction Date: 19Jul2011;   Pt gets nauseated    Esomeprazole GI Intolerance    Lansoprazole GI Intolerance    Norvasc [Amlodipine Besylate]     Pantoprazole GI Intolerance     Can tolerate IV- Not oral    Relafen [Nabumetone] GI Intolerance and Other (See Comments)     Immunization History   Administered Date(s) Administered    H1N1, All Formulations 02/08/2010    Influenza 10/03/2018    Influenza Split High Dose Preservative Free IM 09/18/2014, 09/18/2015, 10/06/2016, 11/02/2017    Influenza TIV (IM) 11/03/2011, 10/03/2012    Pneumococcal Conjugate 13-Valent 04/27/2017    Zoster 06/17/2015       Patient Care Team:  Mendoza Schaffer MD as PCP - OZZIE Irby MD    Medicare Screening Tests and Risk Assessments:  Mary Saha is here for her Subsequent Wellness visit  Last Medicare Wellness visit information reviewed, patient interviewed, no change since last AWV  Health Risk Assessment:  Patient rates overall health as good  Patient feels that their physical health rating is Same  Eyesight was rated as Slightly worse  Hearing was rated as Slightly worse  Patient feels that their emotional and mental health rating is Same  Pain experienced by patient in the last 7 days has been Some  Patient's pain rating has been 3/10  Emotional/Mental Health:  Patient has been feeling nervous/anxious  PHQ-9 Depression Screening:    Frequency of the following problems over the past two weeks:      1  Little interest or pleasure in doing things: 0 - not at all      2  Feeling down, depressed, or hopeless: 0 - not at all  PHQ-2 Score: 0          Broken Bones/Falls: Fall Risk Assessment:    In the past year, patient has experienced: No history of falling in past year          Bladder/Bowel:  Patient has leaked urine accidently in the last six months  Patient reports no loss of bowel control  Immunizations:  Patient has had a flu vaccination within the last year  Patient has received a pneumonia shot  Patient has received a shingles shot  Patient has not received tetanus/diphtheria shot       Home Safety:  Patient does not have trouble with stairs inside or outside of their home  Patient currently reports that there are working smoke alarms, no working carbon monoxide detectors  Preventative Screenings:   Breast cancer screening performed, colon cancer screen completed, cholesterol screen completed, glaucoma eye exam completed,     Nutrition:  Current diet: Regular, No Added Salt and Low Saturated Fat with servings of the following:    Medications:  Patient is currently taking over-the-counter supplements  Patient is able to manage medications  Lifestyle Choices:  Patient reports no tobacco use  Patient has not smoked or used tobacco in the past   Patient reports alcohol use  Alcohol use per week: 9-10 per year  Patient drives a vehicle  Patient wears seat belt  Current level of exercise of physical activity described by patient as: Normal daily activity, yoga/strength 1x/week, garden in summer  Activities of Daily Living:  Can get out of bed by his or her self, able to dress self, able to make own meals, able to do own shopping, able to bathe self, can do own laundry/housekeeping, can manage own money, pay bills and track expenses    Previous Hospitalizations:  No hospitalization or ED visit in past 12 months        Advanced Directives:  Patient has decided on a power of   Patient has spoken to designated power of   Patient has completed advanced directive          Preventative Screening/Counseling:      Cardiovascular:      General: Risks and Benefits Discussed and Screening Current          Diabetes:      General: Risks and Benefits Discussed and Screening Current          Colorectal Cancer:      General: Screening Not Indicated          Breast Cancer:      General: Risks and Benefits Discussed          Osteoporosis:      General: Risks and Benefits Discussed      Due for studies: DXA Axial          Advanced Directives:   Patient has living will for healthcare, patient has an advanced directive  End of life assessment reviewed with patient  Provider agrees with end of life decisions        Immunizations:      Influenza: Risks & Benefits Discussed and Influenza UTD This Year      Pneumococcal: Risks & Benefits Discussed and Lifetime Vaccine Completed

## 2018-11-06 NOTE — TELEPHONE ENCOUNTER
Patient stated on her after visit summary she is to take Lisinopril 40 mg at night  Wanted to know if this is correct   Can be reached at [phone]

## 2018-11-06 NOTE — PROGRESS NOTES
Chief Complaint   Patient presents with   [facility] [place] Wellness Visit     Annual wellness visit   Follow-up     6 month follow up  Health Maintenance   Topic Date Due    DTaP,Tdap,and Td Vaccines (1 - Tdap) 07/05/1955    Urinary Incontinence Screening  07/05/1999    Pneumococcal PPSV23/PCV13 65+ Years / High and Highest Risk (2 of 2 - PPSV23) 06/22/2017    Medicare Annual Wellness Visit (AWV)  11/02/2018    DXA SCAN  11/07/2018    Fall Risk  05/03/2019    Depression Screening PHQ  05/03/2019    HEMOGLOBIN A1C  05/05/2019    CRC Screening: Colonoscopy  03/11/2026    INFLUENZA VACCINE  Completed     Assessment/Plan:  Reviewed lab in 11/2018  VitD 30 5 ok  CBC ok  CMP ok, na 132 low  HgA1C 5 8 stable  Lipid 178/164/50/95 ok  TSH 5 68 slightly elevated    HTN---Not controlled in the morning  Try lisinopril 40mg qhs  Continue atenolol 100mg bid  DASH diet  Check BP at home 2/day and call office with BP log in 1 week  Hyperlipidemia---controlled  Low fat diet  Continue simvastatin  Hypothyroidism---stable  Continue levothyroxine  IFG---stable  Low carb diet  Scalp cyst---give antibiotics cream      Got flu shot this season  Mammogram 11/2017 negative  Give new script today  Dexa 11/2016 normal, on vitD  Walking everyday  I advised pt do not take extra calcium bc hx of hypercalcemia  Give script today  Got pneumovax at pharmacy in 2013 per pt  Got prevnar 13 2017  Had colonoscopy in 3/2016  No more colonoscopy per pt  RTO in 6 months  POA---daughter Iveth Baptiste, Shuas Maximiliano Brian  Living will---DNR per pt now  Diagnoses and all orders for this visit:    Essential hypertension  -     atenolol (TENORMIN) 100 mg tablet; Take 1 tablet (100 mg total) by mouth 2 (two) times a day for 90 days  -     lisinopril (ZESTRIL) 40 mg tablet; Take 1 tablet (40 mg total) by mouth daily  -     Comprehensive metabolic panel;  Future    Hyperlipidemia, unspecified hyperlipidemia type  - simvastatin (ZOCOR) 20 mg tablet; Take 1 tablet (20 mg total) by mouth daily at bedtime  -     Lipid panel; Future    Hypothyroidism, unspecified type  -     levothyroxine 25 mcg tablet; Take 1 tablet (25 mcg total) by mouth daily  -     TSH, 3rd generation with Free T4 reflex; Future    Impaired fasting glucose  -     Hemoglobin A1C; Future    Scalp cyst  -     mupirocin (BACTROBAN) 2 % cream; Apply topically 3 (three) times a day    Medicare annual wellness visit, subsequent    Screening for breast cancer  -     Mammo screening bilateral w cad; Future    Screening for osteoporosis  -     DXA bone density spine hip and pelvis; Future    Other orders  -     ALPHAGAN P 0 1 %; instill 1 drop into both eyes twice a day          Subjective:      Patient ID: Carlyle Blackwood is a 80 y o  female  HPI    Pt is here by herself  HTN---BP at home 150-170/80-90 in the morning, 120-130/80 in the afternnon  Pt is on atenolol 100mg bid, lisinopril 40mg QD in the morning  Denies headache, vision change, Sob or CP  Hyperlipidemia---Pt uses simvastatin 20mg qhs  Denies SE  Hypothyroidism---on levothyroxine 25mcg daily  She cannot tolerate it empty stomach, so she takes it 2 hours after lunch  Feels fine  IFG---11/2018 hgA1C 5 8 stable    C/o lump on scalp for 4 month  Only tender if touches it  Denies fever, SOB, CP, n/v/abd pain       FU urology for urinary incontinence  On tofranil 25mg QD which works        GERD---Use OTC pepcid and TUMs which helped some  AnxietyDepression---Mood is ok  She uses xanax very rarely       Constipation---She uses colace 100mg bid, miralax maybe 1/week which helped  Osteopenia---11/2016 Dexa showed normal  on vitD calcium and walk daily       FU opthalmology Dr Kartik Zabala specialist Q 6months  She is on eye drops  FU oncologist Dr Mary Terrell year for hx endometrial cancer s/p hysterectomy  Stable  FU podiatry Q 10 weeks      Arthritis---Knees, neck and shoulders for years  Exercise regularly  Took tylenol which helped       Live by herself  Does all ADL's  Still drive  Has 3 daughters and 1 daughter lives close to her  No recent falls           The following portions of the patient's history were reviewed and updated as appropriate: allergies, current medications, past family history, past medical history, past social history, past surgical history and problem list     Review of Systems   Constitutional: Negative for appetite change, chills and fever  HENT: Negative for congestion, ear pain, sinus pain and sore throat  Eyes: Negative for discharge and itching  Respiratory: Negative for apnea, cough, chest tightness, shortness of breath and wheezing  Cardiovascular: Negative for chest pain, palpitations and leg swelling  Gastrointestinal: Negative for abdominal pain, anal bleeding, constipation, diarrhea, nausea and vomiting  Endocrine: Negative for cold intolerance, heat intolerance and polyuria  Genitourinary: Negative for difficulty urinating and dysuria  Musculoskeletal: Negative for arthralgias, back pain and myalgias  Skin: Negative for rash  Neurological: Negative for dizziness and headaches  Psychiatric/Behavioral: Negative for agitation  Objective:      /74 (BP Location: Left arm, Patient Position: Sitting, Cuff Size: Standard)   Pulse 68   Temp 98 3 °F (36 8 °C) (Tympanic)   Resp 16   Ht 5' 2 5" (1 588 m)   Wt 62 1 kg (136 lb 12 8 oz)   BMI 24 62 kg/m²          Physical Exam   Constitutional: She appears well-developed  No distress  HENT:   Head: Normocephalic  Right Ear: External ear normal    Left Ear: External ear normal    Nose: Nose normal    Mouth/Throat: Oropharynx is clear and moist    Eyes: Pupils are equal, round, and reactive to light  Conjunctivae are normal  Right eye exhibits no discharge  Left eye exhibits no discharge  Neck: Normal range of motion  No thyromegaly present     Cardiovascular: Normal rate, regular rhythm and normal heart sounds  Exam reveals no gallop and no friction rub  No murmur heard  Pulmonary/Chest: Effort normal and breath sounds normal  No respiratory distress  She has no wheezes  She has no rales  She exhibits no tenderness  Abdominal: Soft  Bowel sounds are normal    Musculoskeletal: Normal range of motion  Lymphadenopathy:     She has no cervical adenopathy  Neurological: She is alert  Psychiatric: She has a normal mood and affect

## 2018-11-07 NOTE — TELEPHONE ENCOUNTER
Spoke with patient states she reviewed after care summary on medication list stated medication change was for Zocor/Simvastation and not for Lisinopril just wanted to call as an FYI since Lisinopril was the one changed

## 2018-11-15 ENCOUNTER — TELEPHONE (OUTPATIENT)
Dept: FAMILY MEDICINE CLINIC | Facility: CLINIC | Age: 83
End: 2018-11-15

## 2018-11-15 DIAGNOSIS — I10 ESSENTIAL HYPERTENSION: Primary | ICD-10-CM

## 2018-11-15 RX ORDER — HYDROCHLOROTHIAZIDE 12.5 MG/1
12.5 TABLET ORAL DAILY
Qty: 30 TABLET | Refills: 5 | Status: SHIPPED | OUTPATIENT
Start: 2018-11-15 | End: 2018-11-26 | Stop reason: SDUPTHER

## 2018-11-15 NOTE — TELEPHONE ENCOUNTER
Wanted you to know her BP #'s as you requested    have placed svetlana with #'s on   your desk 11-15-18 @ 12:18pm

## 2018-11-15 NOTE — TELEPHONE ENCOUNTER
Got BP log for last week  BP range 132-181/, usually >150/90  I called pt  Pt states she feels ok, denies headache, Sob, CP etc    I will send HCTZ 12 5mg QD to her pharmacy  Advised pt to call BP log in 1 week  Call office with any concerns  Pt needs to be seen in 1 month  Pt agreed

## 2018-11-26 ENCOUNTER — TELEPHONE (OUTPATIENT)
Dept: FAMILY MEDICINE CLINIC | Facility: CLINIC | Age: 83
End: 2018-11-26

## 2018-11-26 DIAGNOSIS — I10 ESSENTIAL HYPERTENSION: ICD-10-CM

## 2018-11-26 RX ORDER — HYDROCHLOROTHIAZIDE 25 MG/1
25 TABLET ORAL DAILY
Qty: 30 TABLET | Refills: 0 | Status: SHIPPED | OUTPATIENT
Start: 2018-11-26 | End: 2018-12-12 | Stop reason: SINTOL

## 2018-11-26 NOTE — TELEPHONE ENCOUNTER
Dr Tru Parsons: These are her readings before and after taking Hydrodiuril 12 5 mg tablets    Nov 17 8:30 am 164/104 10 pm 157/84  Nov 18 8:30 am 175/102 10:30 pm 169/101  Nov 19 8:30 am 166/93 10:15 pm 142/83  Nov 20 10 am 133/83 10:30 pm 133/78  Nov 21 8 am 170/102 10:30 154/92  Nov 22 10 am 165/103 no evening time  Nov 23 10 am 166/99 10 pm 134/76  Nov 24 9 am 162/98 10 pm 141/80  Nov 25 9 am 183/105 10 pm 148/82  Nov 26 9 am 168/95    Nov 13 9am 74/107 9:30 pm 133/85  Nov 14 9 am 132/74 9:30 pm 138/79  Nov 15 8:30 am 165/95 10 pm 172/95   Nov 16 9 am 179/96 10 pm 144/82    These are her readings before and after taking Generic Hydrodiuril 12 5 mg tablets  The numbers after Nov 17 is when she started her medicine  Wanted to let you know her readings

## 2018-11-27 NOTE — TELEPHONE ENCOUNTER
I called pt back  Pt states she feels fine  Denies headache, vision change, SOB or chest pain  Pt states she feels itching, but does not know if it is from HCTZ  Denies rash  I ask pt to take HCTZ 25mg QD now  Continue atenolol 100mg bid and lisinopril 40mg qhs  RTO as scheduled with BP machine  Go to ER if severe headache, Sob or chest pain  Pt understood

## 2018-12-06 ENCOUNTER — OFFICE VISIT (OUTPATIENT)
Dept: FAMILY MEDICINE CLINIC | Facility: CLINIC | Age: 83
End: 2018-12-06
Payer: MEDICARE

## 2018-12-06 VITALS
BODY MASS INDEX: 24.2 KG/M2 | SYSTOLIC BLOOD PRESSURE: 168 MMHG | HEIGHT: 63 IN | WEIGHT: 136.6 LBS | RESPIRATION RATE: 16 BRPM | TEMPERATURE: 97.4 F | HEART RATE: 68 BPM | DIASTOLIC BLOOD PRESSURE: 94 MMHG

## 2018-12-06 DIAGNOSIS — I10 ESSENTIAL HYPERTENSION: Primary | ICD-10-CM

## 2018-12-06 DIAGNOSIS — F41.8 DEPRESSION WITH ANXIETY: ICD-10-CM

## 2018-12-06 PROCEDURE — 93000 ELECTROCARDIOGRAM COMPLETE: CPT | Performed by: FAMILY MEDICINE

## 2018-12-06 PROCEDURE — 99214 OFFICE O/P EST MOD 30 MIN: CPT | Performed by: FAMILY MEDICINE

## 2018-12-06 NOTE — PROGRESS NOTES
Chief Complaint   Patient presents with    Follow-up     Follow up for BP check  Health Maintenance   Topic Date Due    DTaP,Tdap,and Td Vaccines (1 - Tdap) 07/05/1955    Pneumococcal PPSV23/PCV13 65+ Years / High and Highest Risk (2 of 2 - PPSV23) 06/22/2017    DXA SCAN  11/07/2018    HEMOGLOBIN A1C  05/05/2019    Fall Risk  11/06/2019    Depression Screening PHQ  11/06/2019    Urinary Incontinence Screening  11/06/2019    Medicare Annual Wellness Visit (AWV)  11/06/2019    CRC Screening: Colonoscopy  03/11/2026    INFLUENZA VACCINE  Completed     Assessment/Plan:  HTN---EKG showed sinus rhythm, no acute ST-T change  BP not controlled  Better if takes xanax  May related to anxiety  DASH diet  Take atenolol 100mg bid, HCTZ 25mg am, lisinopril 20mg am and lisinopril 20mg qhs  Check BP at home 2/day and call office with BP log  Depression/anxiety----Pt is on imipramine 25mg QD  She uses xanax rarely  Refused therapist now  Refused new meds now  Consider SSRI if depression/anxiety no improving  Diagnoses and all orders for this visit:    Essential hypertension  -     POCT ECG  -     Basic metabolic panel; Future    Depression with anxiety          Subjective:      Patient ID: Evaristo Cameron is a 80 y o  female  HPI    Pt is here by herself  HTN---BP at home 150-170/ in the morning, 120-130/80 in the afternnon  Pt is on atenolol 100mg bid, HCTZ 25mg QD and lisinopril 40mg qhs  Denies headache, vision change, Sob or CP  Depression/anxiety---Pt states a lot of stress in her life recently  Son-in-law is an alcoholic and was in rehab  Something was wrong with her house, needs to get repaired  Perico slides 700 pints recently etc  She worries about things  Denies suicidal ideation  She is on imipramine 25mg QD for urine incontinence per urology  She uses xanax very rarely  She tried xanax on Saturday and her BP was 110/70           The following portions of the patient's history were reviewed and updated as appropriate: allergies, current medications, past family history, past medical history, past social history, past surgical history and problem list     Review of Systems   Constitutional: Negative for appetite change, chills and fever  HENT: Negative for congestion, ear pain, sinus pain and sore throat  Eyes: Negative for discharge and itching  Respiratory: Negative for apnea, cough, chest tightness, shortness of breath and wheezing  Cardiovascular: Negative for chest pain, palpitations and leg swelling  Gastrointestinal: Negative for abdominal pain, anal bleeding, constipation, diarrhea, nausea and vomiting  Endocrine: Negative for cold intolerance, heat intolerance and polyuria  Genitourinary: Negative for difficulty urinating and dysuria  Musculoskeletal: Negative for arthralgias, back pain and myalgias  Skin: Negative for rash  Neurological: Negative for dizziness and headaches  Psychiatric/Behavioral: Negative for agitation  Objective:      /94 (BP Location: Left arm, Patient Position: Sitting, Cuff Size: Standard)   Pulse 68   Temp (!) 97 4 °F (36 3 °C) (Tympanic)   Resp 16   Ht 5' 2 5" (1 588 m)   Wt 62 kg (136 lb 9 6 oz)   BMI 24 59 kg/m²          Physical Exam   Constitutional: She appears well-developed  No distress  HENT:   Head: Normocephalic  Right Ear: External ear normal    Left Ear: External ear normal    Nose: Nose normal    Mouth/Throat: Oropharynx is clear and moist    Eyes: Pupils are equal, round, and reactive to light  Conjunctivae are normal  Right eye exhibits no discharge  Left eye exhibits no discharge  Neck: Normal range of motion  No thyromegaly present  Cardiovascular: Normal rate, regular rhythm and normal heart sounds  Exam reveals no gallop and no friction rub  No murmur heard  Pulmonary/Chest: Effort normal and breath sounds normal  No respiratory distress  She has no wheezes   She has no rales  She exhibits no tenderness  Abdominal: Soft  Bowel sounds are normal    Musculoskeletal: Normal range of motion  Lymphadenopathy:     She has no cervical adenopathy  Neurological: She is alert  Psychiatric: She has a normal mood and affect

## 2018-12-11 ENCOUNTER — APPOINTMENT (OUTPATIENT)
Dept: LAB | Facility: CLINIC | Age: 83
End: 2018-12-11
Payer: MEDICARE

## 2018-12-11 DIAGNOSIS — I10 ESSENTIAL HYPERTENSION: ICD-10-CM

## 2018-12-11 LAB
ALBUMIN SERPL BCP-MCNC: 3.7 G/DL (ref 3.5–5)
ANION GAP SERPL CALCULATED.3IONS-SCNC: 6 MMOL/L (ref 4–13)
BUN SERPL-MCNC: 17 MG/DL (ref 5–25)
CALCIUM ALBUM COR SERPL-MCNC: 10.7 MG/DL (ref 8.3–10.1)
CALCIUM SERPL-MCNC: 10.5 MG/DL (ref 8.3–10.1)
CALCIUM SERPL-MCNC: 10.5 MG/DL (ref 8.3–10.1)
CHLORIDE SERPL-SCNC: 90 MMOL/L (ref 100–108)
CO2 SERPL-SCNC: 30 MMOL/L (ref 21–32)
CREAT SERPL-MCNC: 0.75 MG/DL (ref 0.6–1.3)
GFR SERPL CREATININE-BSD FRML MDRD: 73 ML/MIN/1.73SQ M
GLUCOSE SERPL-MCNC: 96 MG/DL (ref 65–140)
POTASSIUM SERPL-SCNC: 3.9 MMOL/L (ref 3.5–5.3)
SODIUM SERPL-SCNC: 126 MMOL/L (ref 136–145)

## 2018-12-11 PROCEDURE — 82040 ASSAY OF SERUM ALBUMIN: CPT

## 2018-12-11 PROCEDURE — 80048 BASIC METABOLIC PNL TOTAL CA: CPT

## 2018-12-11 PROCEDURE — 36415 COLL VENOUS BLD VENIPUNCTURE: CPT

## 2018-12-12 ENCOUNTER — TELEPHONE (OUTPATIENT)
Dept: FAMILY MEDICINE CLINIC | Facility: CLINIC | Age: 83
End: 2018-12-12

## 2018-12-12 DIAGNOSIS — I10 HYPERTENSION, UNCONTROLLED: Primary | ICD-10-CM

## 2018-12-12 RX ORDER — HYDRALAZINE HYDROCHLORIDE 10 MG/1
10 TABLET, FILM COATED ORAL 3 TIMES DAILY
Qty: 90 TABLET | Refills: 0 | Status: SHIPPED | OUTPATIENT
Start: 2018-12-12 | End: 2018-12-24 | Stop reason: SDUPTHER

## 2018-12-12 NOTE — TELEPHONE ENCOUNTER
I called pt  Recent lab showed Na 126 low, calcium 10 7 elevated, BP still not controlled  I ask pt to stop HCTZ because it may cause hyponatremia and hypercalcemia  I give pt hydralazine 10mg tid  Refer to cardiology  Pt agreed

## 2018-12-22 ENCOUNTER — TELEPHONE (OUTPATIENT)
Dept: FAMILY MEDICINE CLINIC | Facility: CLINIC | Age: 83
End: 2018-12-22

## 2018-12-22 DIAGNOSIS — I10 HYPERTENSION, UNCONTROLLED: ICD-10-CM

## 2018-12-22 NOTE — TELEPHONE ENCOUNTER
Ethel's BP #'s                                                  AM                                    PM     12/10/18                   162/94                                  146/77     12/11/18                   153/96     12/12/18                   152/91                                  123/73     12/13/18                   178/99                                  139/92     12/14/18                   164/99                                  119/70     12/15/18                   169/96                                  169/95     12/16/18                   161/96                                  178/104     12/17/18                   166/103                                127/76     12/18/18                   172/101                                162/99     12/19/18                   178/100                                     12/20/18                   160/107                                159/92     12/21/18                   161/95                                  171/99     12/22/18                   150/88                   Took Xanax  12/13, 12/14, 12/15  AM            Took Xanax   12/12                       PM

## 2018-12-24 RX ORDER — HYDRALAZINE HYDROCHLORIDE 25 MG/1
25 TABLET, FILM COATED ORAL 3 TIMES DAILY
Qty: 90 TABLET | Refills: 0 | Status: SHIPPED | OUTPATIENT
Start: 2018-12-24 | End: 2019-01-29 | Stop reason: SDUPTHER

## 2018-12-24 NOTE — TELEPHONE ENCOUNTER
I called pt back  BP not controlled  Pt denies headache, vision change, Sob or chest pain  I will increase hydralazine to 25mg tid  Pt states she still have a lot of 10mg, will do 20mg tid to finish her pills and then use 25mg tid  I agreed

## 2019-01-28 NOTE — PROGRESS NOTES
Cardiology Consultation     [facility]  [id number]  1934  HEART & VASCULAR [place]  [address] [facility] [place] [address] [facility] [address]      1  Benign essential hypertension  carvedilol (COREG) 25 mg tablet   2  Hypertension, uncontrolled  Ambulatory referral to Cardiology    hydrALAZINE (APRESOLINE) 50 mg tablet   3  Dyslipidemia         Discussion/Summary:  Ms Padilla Ayers is a pleasant 71-year-old female who presents to the office today for the evaluation of hypertension  Despite her multi-drug regimen her blood pressure remains uncontrolled  At this point I have elected to change her atenolol to carvedilol 25 mg twice daily  I will also increase her hydralazine to 50 mg three times daily  She will remain on lisinopril as prescribed  I have asked that she continue to monitor her blood pressure on a regular basis  She will report those readings to the office in a few weeks for re-evaluation  Her most recent lipids were reviewed  They are acceptable on current therapy with the exception of a slightly high triglyceride level for which therapeutic lifestyle modifications were recommended  Otherwise she is asymptomatic and no testing is advised  I will see her back in the office in a few months for re-evaluation of her blood pressure  History of Present Illness:  Ms Padilla Ayers is a pleasant 71-year-old female who presents to the office today for the evaluation of hypertension  She states she was diagnosed with high blood pressure in her 50s  Initially she was placed on atenolol  The dose of this was gradually increased  More recently she has been attempted on numerous drugs  In the past amlodipine was added which gave her hives  In November hydrochlorothiazide was added to her medication regimen which caused hyponatremia  Therefore this was discontinued  Then in December hydralazine 10 mg three times daily was added    The dose was recently increased to 25 mg t i d  She also takes lisinopril twice daily  She monitors her blood pressure at home daily  She utilizes an arm cuff which has been checked for accuracy in the past   She notes consistently elevated blood pressure readings  Otherwise she leads a sedentary lifestyle as she does not participate in any formal activity  However she ascend steps on a regular basis  She can do so without any chest pain or shortness of breath  She denies any signs or symptoms of congestive heart failure including increasing lower extremity edema, paroxysmal nocturnal dyspnea, orthopnea, acute weight gain or increasing abdominal girth  She denies syncope or presyncope  She denies palpitations or symptoms of claudication  She denies any signs or symptoms of ANANTH      Patient Active Problem List   Diagnosis    Benign essential hypertension    Hypothyroidism    Endometrial cancer ([facility] )    Abnormal breast exam    Arthritis    Constipation    Depression with anxiety    Diverticulosis    Duodenal diverticulum    Esophagitis, reflux    Dyslipidemia    Impaired fasting glucose    Insomnia    Osteopenia    Atrophic vaginitis    Urinary incontinence    Vertigo     Past Medical History:   Diagnosis Date    Anemia     post-op, 07/07/09    Anxiety     last assessed: 01/15/14    C  difficile diarrhea     Chest wall trauma     Acute, last assessed: 10/24/12    Depression     Disease of thyroid gland     hypothyroid    Diverticula of colon     [facility] cancer ([facility] )     Endometrial hyperplasia     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HCC)     stage IA, Grade I endometriod adenocarcinoma with 43% myometrial invasion and no LVSI, last assessed: 09/51/71    Folliculitis     last assessed: 03/02/16    Hypercholesterolemia     Hyperlipidemia     Hypertension     Incontinence in female     Migraine     Nontoxic single thyroid nodule     last assessed: 02/16/13    Osteoporosis last assessed: 02/22/17    Pure hypercholesterolemia     Rotator cuff (capsule) sprain     Acute, right    Skin lesion     last assessed: 04/26/13    Stomatitis     Strain of right buttock     gluteus medius    Tendinitis of right rotator cuff     last assessed: 08/12/12    Thyroid cyst     last assessed: 08/15/13     Social History     Social History    Marital status:      Spouse name: N/A    Number of children: N/A    Years of education: N/A     Occupational History    Not on file  Social History Main Topics    Smoking status: Never Smoker    Smokeless tobacco: Never Used    Alcohol use No    Drug use: No    Sexual activity: Not on file     Other Topics Concern    Not on file     Social History Narrative    No narrative on file      Family History   Problem Relation Age of Onset    Cancer Father     Lung cancer Brother     Prostate cancer Brother     Other Brother         Prolapsing mitral valve leaflet syndrome     Past Surgical History:   Procedure Laterality Date    APPENDECTOMY      COLONOSCOPY      fiberoptic, 1998, 2001, 2006    CYSTOSCOPY      Diagnostic    EGD AND COLONOSCOPY N/A 3/11/2016    Procedure: EGD ;  Surgeon: Kristopher Hercules MD;  Location: [facility];   Service:     HYSTERECTOMY  03/22/2011    Robotic-assisted, total laparoscopic approch    JOINT REPLACEMENT      KNEE SURGERY      OOPHORECTOMY Bilateral 03/22/2011    Salpingo    REPLACEMENT TOTAL KNEE      TONSILECTOMY AND ADNOIDECTOMY      TOTAL SHOULDER ARTHROPLASTY Right        Current Outpatient Prescriptions:     acetaminophen (TYLENOL) 650 mg CR tablet, Take 650 mg by mouth as needed for mild pain , Disp: , Rfl:     ALPHAGAN P 0 1 %, instill 1 drop into both eyes twice a day, Disp: , Rfl: 0    ALPRAZolam (XANAX) 0 25 mg tablet, Take 1 tablet (0 25 mg total) by mouth as needed for anxiety for up to 30 days, Disp: 30 tablet, Rfl: 0    bifidobacterium infantis (ALIGN) capsule, Take by mouth, Disp: , Rfl:     cholecalciferol (VITAMIN D3) 1,000 units tablet, Take by mouth, Disp: , Rfl:     docusate sodium (COLACE) 100 mg capsule, Take 1 capsule (100 mg total) by mouth 2 (two) times a day, Disp: 10 capsule, Rfl: 0    famotidine (PEPCID) 10 mg tablet, Take 10 mg by mouth daily, Disp: , Rfl:     hydrALAZINE (APRESOLINE) 50 mg tablet, Take 1 tablet (50 mg total) by mouth 3 (three) times a day for 30 days, Disp: 90 tablet, Rfl: 11    imipramine (TOFRANIL) 25 mg tablet, Take 25 mg by mouth daily  , Disp: , Rfl:     levothyroxine 25 mcg tablet, Take 1 tablet (25 mcg total) by mouth daily, Disp: 90 tablet, Rfl: 1    lisinopril (ZESTRIL) 40 mg tablet, Take 1 tablet (40 mg total) by mouth daily, Disp: 90 tablet, Rfl: 1    Multiple Vitamins-Minerals (CENTRUM SILVER PO), Take by mouth, Disp: , Rfl:     polyethylene glycol (MIRALAX) powder, Take by mouth as needed  , Disp: , Rfl:     Probiotic Product (PROBIOTIC ADVANCED PO), Take 1 capsule by mouth daily, Disp: , Rfl:     simvastatin (ZOCOR) 20 mg tablet, Take 1 tablet (20 mg total) by mouth daily at bedtime, Disp: 90 tablet, Rfl: 1    carvedilol (COREG) 25 mg tablet, Take 1 tablet (25 mg total) by mouth 2 (two) times a day with meals, Disp: 60 tablet, Rfl: 11  Allergies   Allergen Reactions    Amlodipine Itching     Reaction Date: 19Jul2011;     Amoxil [Amoxicillin] Hives    Biaxin [Clarithromycin] GI Intolerance     Reaction Date: 19Jul2011;   Pt gets nauseated    Esomeprazole GI Intolerance    Hydrochlorothiazide      Hyponatremia      Lansoprazole GI Intolerance    Norvasc [Amlodipine Besylate]     Pantoprazole GI Intolerance     Can tolerate IV- Not oral    Relafen [Nabumetone] GI Intolerance and Other (See Comments)         Review of Systems:  Review of Systems   Respiratory: Negative for chest tightness and shortness of breath  Cardiovascular: Negative for chest pain, palpitations and leg swelling     All other systems reviewed and are negative          Vitals:    01/29/19 0929   BP: (!) 180/88   BP Location: Right arm   Patient Position: Sitting   Cuff Size: Standard   Pulse: 64   Weight: 62 6 kg (138 lb)   Height: 5' 2 4" (1 585 m)     Vitals:    01/29/19 0929   Weight: 62 6 kg (138 lb)     Height: 5' 2 4" (158 5 cm)     Physical Exam:  General appearance:  Appears stated age, alert, well appearing and in no distress  HEENT:  PERRLA, EOMI, no scleral icterus, no conjunctival pallor  NECK:  Supple, No elevated JVP, no thyromegaly, no carotid bruits  HEART:  Regular rate and rhythm, normal S1/S2, no S3/S4, no murmur or rub  LUNGS:  Clear to auscultation bilaterally  ABDOMEN:  Soft, non-tender, positive bowel sounds, no rebound or guarding, no organomegaly   EXTREMITIES:  No edema  VASCULAR:  Normal pedal pulses   SKIN: No lesions or rashes on exposed skin  NEURO:  CN II-XII intact, no focal deficits

## 2019-01-29 ENCOUNTER — OFFICE VISIT (OUTPATIENT)
Dept: CARDIOLOGY CLINIC | Facility: CLINIC | Age: 84
End: 2019-01-29
Payer: MEDICARE

## 2019-01-29 VITALS
SYSTOLIC BLOOD PRESSURE: 180 MMHG | HEART RATE: 64 BPM | DIASTOLIC BLOOD PRESSURE: 88 MMHG | WEIGHT: 138 LBS | HEIGHT: 62 IN | BODY MASS INDEX: 25.4 KG/M2

## 2019-01-29 DIAGNOSIS — I10 BENIGN ESSENTIAL HYPERTENSION: Primary | ICD-10-CM

## 2019-01-29 DIAGNOSIS — I10 HYPERTENSION, UNCONTROLLED: ICD-10-CM

## 2019-01-29 DIAGNOSIS — E78.5 DYSLIPIDEMIA: ICD-10-CM

## 2019-01-29 PROCEDURE — 99204 OFFICE O/P NEW MOD 45 MIN: CPT | Performed by: INTERNAL MEDICINE

## 2019-01-29 RX ORDER — CARVEDILOL 25 MG/1
25 TABLET ORAL 2 TIMES DAILY WITH MEALS
Qty: 60 TABLET | Refills: 11 | Status: SHIPPED | OUTPATIENT
Start: 2019-01-29 | End: 2019-03-04 | Stop reason: SDUPTHER

## 2019-01-29 RX ORDER — HYDRALAZINE HYDROCHLORIDE 50 MG/1
50 TABLET, FILM COATED ORAL 3 TIMES DAILY
Qty: 90 TABLET | Refills: 11 | Status: SHIPPED | OUTPATIENT
Start: 2019-01-29 | End: 2019-04-29 | Stop reason: ALTCHOICE

## 2019-01-30 ENCOUNTER — TELEPHONE (OUTPATIENT)
Dept: CARDIOLOGY CLINIC | Facility: CLINIC | Age: 84
End: 2019-01-30

## 2019-01-30 NOTE — TELEPHONE ENCOUNTER
Daughter Devika Parekh called reporting her mother is experiencing orthostatis  Majo Lole called Terrybolivar Iglesia stating she is feeling dizzy/lightheaded after taking am medications:  Lying-110/70  Sitting-100/? Standing-90/? Difficult to hear  HR 60's  Terrybolivar Iglesia thinking change in medications yesterday was too much?

## 2019-02-22 ENCOUNTER — OFFICE VISIT (OUTPATIENT)
Dept: GYNECOLOGIC ONCOLOGY | Facility: CLINIC | Age: 84
End: 2019-02-22
Payer: MEDICARE

## 2019-02-22 VITALS
RESPIRATION RATE: 16 BRPM | BODY MASS INDEX: 24.88 KG/M2 | SYSTOLIC BLOOD PRESSURE: 136 MMHG | HEIGHT: 62 IN | WEIGHT: 135.2 LBS | HEART RATE: 77 BPM | DIASTOLIC BLOOD PRESSURE: 78 MMHG

## 2019-02-22 DIAGNOSIS — Z85.42 ENCOUNTER FOR FOLLOW-UP SURVEILLANCE OF ENDOMETRIAL CANCER: Primary | ICD-10-CM

## 2019-02-22 DIAGNOSIS — Z08 ENCOUNTER FOR FOLLOW-UP SURVEILLANCE OF ENDOMETRIAL CANCER: Primary | ICD-10-CM

## 2019-02-22 DIAGNOSIS — Z85.42 HISTORY OF ENDOMETRIAL CANCER: ICD-10-CM

## 2019-02-22 PROCEDURE — 99212 OFFICE O/P EST SF 10 MIN: CPT | Performed by: PHYSICIAN ASSISTANT

## 2019-02-22 NOTE — PROGRESS NOTES
Assessment/Plan:    Problem List Items Addressed This Visit        Genitourinary    Encounter for follow-up surveillance of endometrial cancer - Primary       Other    History of endometrial cancer     Stage IA, grade 1 s/p surgical resection in March 2011  She is clinically without evidence of disease recurrence  Return to the office in 1 year for continued surveillance  CHIEF COMPLAINT:   Endometrial cancer surveillance    Problem:  Cancer Staging  Endometrial cancer [facility])  Staging form: Corpus Uteri - Carcinoma, AJCC 8th Edition  - Clinical: FIGO Stage IA - Signed by Renuka Felipe PA-C on 2/12/2018        Previous therapy:     Endometrial [facility] ([facility] )    3/22/2011 Initial Diagnosis     Endometrial cancer ([facility] )         3/22/2011 Surgery     Robotic-assisted total laparoscopic hysterectomy and bilateral salpingo-oophorectomy as well as cystoscopy   A  43% myometrial invasion, no lymphovascular space invasion  Patient ID: Pantera Cosby is a 80 y o  female  who has no new complaints today  No vaginal bleeding, abdominal/pelvic pain  Normal bowel and bladder function  No interval change in medical history since last visit  Quality of life is good  The following portions of the patient's history were reviewed and updated as appropriate: allergies, current medications, past medical history and problem list     Review of Systems   Constitutional: Negative  HENT: Negative  Eyes: Negative  Respiratory: Negative  Cardiovascular: Negative  Gastrointestinal: Negative  Genitourinary: Negative  Musculoskeletal: Negative  Skin: Negative  Neurological: Negative  Psychiatric/Behavioral: Negative  Current Outpatient Medications   Medication Sig Dispense Refill    acetaminophen (TYLENOL) 650 mg CR tablet Take 650 mg by mouth as needed for mild pain        ALPHAGAN P 0 1 % instill 1 drop into both eyes twice a day  0    bifidobacterium infantis (ALIGN) capsule Take by mouth      carvedilol (COREG) 25 mg tablet Take 1 tablet (25 mg total) by mouth 2 (two) times a day with meals (Patient taking differently: Take 25 mg by mouth 2 (two) times a day with meals ) 60 tablet 11    cholecalciferol (VITAMIN D3) 1,000 units tablet Take by mouth      docusate sodium (COLACE) 100 mg capsule Take 1 capsule (100 mg total) by mouth 2 (two) times a day 10 capsule 0    famotidine (PEPCID) 10 mg tablet Take 10 mg by mouth daily      imipramine (TOFRANIL) 25 mg tablet Take 25 mg by mouth daily   levothyroxine 25 mcg tablet Take 1 tablet (25 mcg total) by mouth daily 90 tablet 1    lisinopril (ZESTRIL) 40 mg tablet Take 1 tablet (40 mg total) by mouth daily 90 tablet 1    Multiple Vitamins-Minerals (CENTRUM SILVER PO) Take by mouth      polyethylene glycol (MIRALAX) powder Take by mouth as needed        Probiotic Product (PROBIOTIC ADVANCED PO) Take 1 capsule by mouth daily      simvastatin (ZOCOR) 20 mg tablet Take 1 tablet (20 mg total) by mouth daily at bedtime 90 tablet 1    ALPRAZolam (XANAX) 0 25 mg tablet Take 1 tablet (0 25 mg total) by mouth as needed for anxiety for up to 30 days 30 tablet 0    hydrALAZINE (APRESOLINE) 50 mg tablet Take 1 tablet (50 mg total) by mouth 3 (three) times a day for 30 days (Patient not taking: Reported on 2/22/2019) 90 tablet 11     No current facility-administered medications for this visit  Objective:    Blood pressure 136/78, pulse 77, resp  rate 16, height 5' 2 4" (1 585 m), weight 61 3 kg (135 lb 3 2 oz)  Body mass index is 24 41 kg/m²  Body surface area is 1 63 meters squared  Physical Exam   Constitutional: She is oriented to person, place, and time  She appears well-developed and well-nourished  HENT:   Head: Normocephalic and atraumatic  Neck: Normal range of motion  Neck supple  No thyromegaly present  Pulmonary/Chest: Effort normal    Abdominal: Soft  She exhibits no distension and no mass   There is no rebound  Genitourinary:   Genitourinary Comments: The external female genitalia is normal  The bartholin's, uretheral and skenes glands are normal  The urethral meatus is normal  There is minor prolapse present with increased vascularity  Anus without fissure or lesion  Speculum exam reveals a grossly normal vagina  No masses, lesions, discharge or bleeding  No significant cystocele or rectocele noted  Bimanual exam notes a surgical absent cervix, uterus and adnexal structures  No masses or fullness  Bladder is without fullness, mass or tenderness  Musculoskeletal: Normal range of motion  She exhibits no edema  Lymphadenopathy:     She has no cervical adenopathy  Neurological: She is alert and oriented to person, place, and time  Skin: Skin is warm and dry  No rash noted  Psychiatric: She has a normal mood and affect  Her behavior is normal    Vitals reviewed

## 2019-02-22 NOTE — ASSESSMENT & PLAN NOTE
Stage IA, grade 1 s/p surgical resection in March 2011  She is clinically without evidence of disease recurrence  Return to the office in 1 year for continued surveillance

## 2019-03-04 DIAGNOSIS — I10 BENIGN ESSENTIAL HYPERTENSION: ICD-10-CM

## 2019-03-04 RX ORDER — CARVEDILOL 12.5 MG/1
12.5 TABLET ORAL 2 TIMES DAILY WITH MEALS
Qty: 60 TABLET | Refills: 11 | Status: SHIPPED | OUTPATIENT
Start: 2019-03-04 | End: 2020-02-23

## 2019-03-27 DIAGNOSIS — E03.9 HYPOTHYROIDISM, UNSPECIFIED TYPE: ICD-10-CM

## 2019-03-27 RX ORDER — LEVOTHYROXINE SODIUM 0.03 MG/1
TABLET ORAL
Qty: 90 TABLET | Refills: 3 | Status: SHIPPED | OUTPATIENT
Start: 2019-03-27 | End: 2019-04-30 | Stop reason: SDUPTHER

## 2019-04-24 DIAGNOSIS — I10 ESSENTIAL HYPERTENSION: ICD-10-CM

## 2019-04-24 RX ORDER — LISINOPRIL 40 MG/1
40 TABLET ORAL DAILY
Qty: 90 TABLET | Refills: 1 | Status: SHIPPED | OUTPATIENT
Start: 2019-04-24 | End: 2019-04-29 | Stop reason: SDUPTHER

## 2019-04-29 ENCOUNTER — OFFICE VISIT (OUTPATIENT)
Dept: FAMILY MEDICINE CLINIC | Facility: CLINIC | Age: 84
End: 2019-04-29
Payer: MEDICARE

## 2019-04-29 VITALS
BODY MASS INDEX: 23.88 KG/M2 | TEMPERATURE: 99.2 F | RESPIRATION RATE: 16 BRPM | HEIGHT: 63 IN | DIASTOLIC BLOOD PRESSURE: 78 MMHG | OXYGEN SATURATION: 98 % | WEIGHT: 134.8 LBS | HEART RATE: 88 BPM | SYSTOLIC BLOOD PRESSURE: 140 MMHG

## 2019-04-29 DIAGNOSIS — R42 VERTIGO: Primary | ICD-10-CM

## 2019-04-29 DIAGNOSIS — I10 ESSENTIAL HYPERTENSION: ICD-10-CM

## 2019-04-29 PROBLEM — C54.1 ENDOMETRIAL CANCER (HCC): Status: ACTIVE | Noted: 2019-04-29

## 2019-04-29 PROCEDURE — 99214 OFFICE O/P EST MOD 30 MIN: CPT | Performed by: FAMILY MEDICINE

## 2019-04-29 RX ORDER — MECLIZINE HYDROCHLORIDE 25 MG/1
25 TABLET ORAL EVERY 8 HOURS PRN
Qty: 30 TABLET | Refills: 0 | Status: SHIPPED | OUTPATIENT
Start: 2019-04-29 | End: 2020-02-27

## 2019-04-29 RX ORDER — LISINOPRIL 20 MG/1
20 TABLET ORAL DAILY
Qty: 90 TABLET | Refills: 0
Start: 2019-04-29 | End: 2019-08-02 | Stop reason: SDUPTHER

## 2019-04-30 ENCOUNTER — OFFICE VISIT (OUTPATIENT)
Dept: CARDIOLOGY CLINIC | Facility: CLINIC | Age: 84
End: 2019-04-30
Payer: MEDICARE

## 2019-04-30 VITALS
HEART RATE: 89 BPM | WEIGHT: 134 LBS | DIASTOLIC BLOOD PRESSURE: 80 MMHG | BODY MASS INDEX: 24.66 KG/M2 | HEIGHT: 62 IN | SYSTOLIC BLOOD PRESSURE: 140 MMHG

## 2019-04-30 DIAGNOSIS — E78.5 DYSLIPIDEMIA: ICD-10-CM

## 2019-04-30 DIAGNOSIS — I10 BENIGN ESSENTIAL HYPERTENSION: Primary | ICD-10-CM

## 2019-04-30 PROCEDURE — 99214 OFFICE O/P EST MOD 30 MIN: CPT | Performed by: INTERNAL MEDICINE

## 2019-05-13 ENCOUNTER — APPOINTMENT (OUTPATIENT)
Dept: LAB | Facility: CLINIC | Age: 84
End: 2019-05-13
Payer: MEDICARE

## 2019-05-13 DIAGNOSIS — E03.9 HYPOTHYROIDISM, UNSPECIFIED TYPE: ICD-10-CM

## 2019-05-13 DIAGNOSIS — I10 ESSENTIAL HYPERTENSION: ICD-10-CM

## 2019-05-13 DIAGNOSIS — E78.5 HYPERLIPIDEMIA, UNSPECIFIED HYPERLIPIDEMIA TYPE: ICD-10-CM

## 2019-05-13 DIAGNOSIS — R73.01 IMPAIRED FASTING GLUCOSE: ICD-10-CM

## 2019-05-13 LAB
ALBUMIN SERPL BCP-MCNC: 3.4 G/DL (ref 3.5–5)
ALP SERPL-CCNC: 55 U/L (ref 46–116)
ALT SERPL W P-5'-P-CCNC: 25 U/L (ref 12–78)
ANION GAP SERPL CALCULATED.3IONS-SCNC: 3 MMOL/L (ref 4–13)
AST SERPL W P-5'-P-CCNC: 18 U/L (ref 5–45)
BILIRUB SERPL-MCNC: 0.37 MG/DL (ref 0.2–1)
BUN SERPL-MCNC: 14 MG/DL (ref 5–25)
CALCIUM SERPL-MCNC: 9.3 MG/DL (ref 8.3–10.1)
CHLORIDE SERPL-SCNC: 100 MMOL/L (ref 100–108)
CHOLEST SERPL-MCNC: 154 MG/DL (ref 50–200)
CO2 SERPL-SCNC: 29 MMOL/L (ref 21–32)
CREAT SERPL-MCNC: 0.76 MG/DL (ref 0.6–1.3)
EST. AVERAGE GLUCOSE BLD GHB EST-MCNC: 117 MG/DL
GFR SERPL CREATININE-BSD FRML MDRD: 72 ML/MIN/1.73SQ M
GLUCOSE SERPL-MCNC: 97 MG/DL (ref 65–140)
HBA1C MFR BLD: 5.7 % (ref 4.2–6.3)
HDLC SERPL-MCNC: 54 MG/DL (ref 40–60)
LDLC SERPL CALC-MCNC: 80 MG/DL (ref 0–100)
NONHDLC SERPL-MCNC: 100 MG/DL
POTASSIUM SERPL-SCNC: 4.2 MMOL/L (ref 3.5–5.3)
PROT SERPL-MCNC: 6.7 G/DL (ref 6.4–8.2)
SODIUM SERPL-SCNC: 132 MMOL/L (ref 136–145)
TRIGL SERPL-MCNC: 100 MG/DL
TSH SERPL DL<=0.05 MIU/L-ACNC: 3.64 UIU/ML (ref 0.36–3.74)

## 2019-05-13 PROCEDURE — 83036 HEMOGLOBIN GLYCOSYLATED A1C: CPT

## 2019-05-13 PROCEDURE — 36415 COLL VENOUS BLD VENIPUNCTURE: CPT

## 2019-05-13 PROCEDURE — 80061 LIPID PANEL: CPT

## 2019-05-13 PROCEDURE — 80053 COMPREHEN METABOLIC PANEL: CPT

## 2019-05-13 PROCEDURE — 84443 ASSAY THYROID STIM HORMONE: CPT

## 2019-05-15 DIAGNOSIS — E78.5 HYPERLIPIDEMIA, UNSPECIFIED HYPERLIPIDEMIA TYPE: ICD-10-CM

## 2019-05-15 RX ORDER — SIMVASTATIN 20 MG
TABLET ORAL
Qty: 90 TABLET | Refills: 3 | Status: SHIPPED | OUTPATIENT
Start: 2019-05-15 | End: 2020-03-11

## 2019-05-16 ENCOUNTER — OFFICE VISIT (OUTPATIENT)
Dept: FAMILY MEDICINE CLINIC | Facility: CLINIC | Age: 84
End: 2019-05-16
Payer: MEDICARE

## 2019-05-16 VITALS
WEIGHT: 135.2 LBS | TEMPERATURE: 97.9 F | BODY MASS INDEX: 24.88 KG/M2 | HEART RATE: 82 BPM | DIASTOLIC BLOOD PRESSURE: 84 MMHG | RESPIRATION RATE: 16 BRPM | HEIGHT: 62 IN | OXYGEN SATURATION: 95 % | SYSTOLIC BLOOD PRESSURE: 128 MMHG

## 2019-05-16 DIAGNOSIS — R73.01 IMPAIRED FASTING GLUCOSE: ICD-10-CM

## 2019-05-16 DIAGNOSIS — E78.5 DYSLIPIDEMIA: ICD-10-CM

## 2019-05-16 DIAGNOSIS — I10 BENIGN ESSENTIAL HYPERTENSION: Primary | ICD-10-CM

## 2019-05-16 DIAGNOSIS — E03.9 HYPOTHYROIDISM, UNSPECIFIED TYPE: ICD-10-CM

## 2019-05-16 PROCEDURE — 99214 OFFICE O/P EST MOD 30 MIN: CPT | Performed by: FAMILY MEDICINE

## 2019-06-25 DIAGNOSIS — Z13.820 SCREENING FOR OSTEOPOROSIS: ICD-10-CM

## 2019-06-26 DIAGNOSIS — M81.0 OSTEOPOROSIS, UNSPECIFIED OSTEOPOROSIS TYPE, UNSPECIFIED PATHOLOGICAL FRACTURE PRESENCE: Primary | ICD-10-CM

## 2019-06-26 RX ORDER — ALENDRONATE SODIUM 70 MG/1
70 TABLET ORAL
Qty: 4 TABLET | Refills: 5 | Status: SHIPPED | OUTPATIENT
Start: 2019-06-26 | End: 2019-12-05 | Stop reason: SINTOL

## 2019-08-02 DIAGNOSIS — I10 ESSENTIAL HYPERTENSION: ICD-10-CM

## 2019-08-02 RX ORDER — LISINOPRIL 20 MG/1
20 TABLET ORAL DAILY
Qty: 90 TABLET | Refills: 3 | Status: SHIPPED | OUTPATIENT
Start: 2019-08-02 | End: 2020-04-29

## 2019-11-01 ENCOUNTER — TELEPHONE (OUTPATIENT)
Dept: FAMILY MEDICINE CLINIC | Facility: CLINIC | Age: 84
End: 2019-11-01

## 2019-11-01 DIAGNOSIS — Z12.39 SCREENING FOR BREAST CANCER: Primary | ICD-10-CM

## 2019-11-04 ENCOUNTER — EVALUATION (OUTPATIENT)
Dept: PHYSICAL THERAPY | Facility: CLINIC | Age: 84
End: 2019-11-04
Payer: MEDICARE

## 2019-11-04 DIAGNOSIS — R26.89 BALANCE DISORDER: Primary | ICD-10-CM

## 2019-11-04 PROCEDURE — 97161 PT EVAL LOW COMPLEX 20 MIN: CPT | Performed by: PHYSICAL THERAPIST

## 2019-11-04 NOTE — PROGRESS NOTES
PT Evaluation     Today's date: 2019  Patient name: María Elena Anderson  : 1934  MRN: [id number]  Referring provider: Mayra Huynh DPM  Dx: No diagnosis found  Assessment  Assessment details: Pt is a 80 y o  female who presents to outpatient PT with pain, decreased rom, decreased strength, poor balance and decreased functional mobility  She will benefit from skilled PT to address these deficits in order to achieve her goals and maximize her functional mobility  Goals  Short Term Goals: Independent performance of initial hep  Decrease pain 2 points on VAS      Long Term Goals: Independent performance of comprehensive hep  Performance of IADL's is returned to max level of function  Performance in recreational activities is improved to max level of function  Reduce risk of falls    Plan  Planned therapy interventions: abdominal trunk stabilization, IADL retraining, ADL retraining, balance, stretching, therapeutic activities, therapeutic exercise, transfer training, home exercise program and gait training  Frequency: 2x week  Duration in weeks: 6        Subjective Evaluation    History of Present Illness  Mechanism of injury: Pt reports that for the past 6 months she feels "less steady when walking"  Reports that she feels most unstable on an uneven surface  Reports that she is using a SPC when she knows she will have to walk on uneven surfaces  Denies any fall in the past year but that there have been "close calls"  Denies numbness or tingling  Denies dizziness or lightheadedness  Reports that she attends a yoga class on a regular basis  History of R TKA      Patient Goals  Patient goals for therapy: increased motion, improved balance, increased strength, independence with ADLs/IADLs and return to sport/leisure activities          Objective   Ambulates with inconsistent gait pattern  LE strength in 4+/5 t/o    Balance:  FT EO: 30 sec  FT EC: 10 sec  Tandem stance: unable  SLS: unable  FT EO foam: 24 seconds  FT EC on foam 12 sec  TU sec             Precautions: fall risk      Manual                                                                                   Exercise Diary              bike             Balance beam             biodex catch             FT airex reaching             Sit to stand             sls             Sidestepping balance beam             Hr/tr                                                                                                                                                                             Modalities

## 2019-11-07 ENCOUNTER — OFFICE VISIT (OUTPATIENT)
Dept: PHYSICAL THERAPY | Facility: CLINIC | Age: 84
End: 2019-11-07
Payer: MEDICARE

## 2019-11-07 DIAGNOSIS — R26.89 BALANCE DISORDER: Primary | ICD-10-CM

## 2019-11-07 PROCEDURE — 97112 NEUROMUSCULAR REEDUCATION: CPT

## 2019-11-07 NOTE — PROGRESS NOTES
Daily Note     Today's date: 2019  Patient name: Caroline Aquino  : 1934  MRN: [id number]  Referring provider: Jessica Burks DPM  Dx:   Encounter Diagnosis     ICD-10-CM    1  Balance disorder R26 89                   Subjective: Pt reports she is compliant with HEP, reps as hortencia  Objective: See treatment diary below      Assessment: Initiated exercise progrm as below  Noted knee pain end reps sit to stand  Performed SLS with FTT and performed cone reach ex using B hands  Pt reports she has been performing HR/TR 30 reps at home  Will monitor  Patient would benefit from continued PT      Plan: Continue per plan of care        Precautions: fall risk      Manual                                                                                  Exercise Diary             bike  7'           Balance beam  tandem  2laps           biodex catch             FT airex reaching  Cone   x20           Sit to stand  x10           sls  30"x2  ea           Sidestepping balance beam  2laps           Hr/tr  x30ea                                                                                                                                                                           Modalities

## 2019-11-11 ENCOUNTER — OFFICE VISIT (OUTPATIENT)
Dept: PHYSICAL THERAPY | Facility: CLINIC | Age: 84
End: 2019-11-11
Payer: MEDICARE

## 2019-11-11 DIAGNOSIS — R26.89 BALANCE DISORDER: Primary | ICD-10-CM

## 2019-11-11 PROCEDURE — 97112 NEUROMUSCULAR REEDUCATION: CPT

## 2019-11-11 PROCEDURE — 97110 THERAPEUTIC EXERCISES: CPT

## 2019-11-11 NOTE — PROGRESS NOTES
Daily Note     Today's date: 2019  Patient name: Yudelka Bernabe  : 1934  MRN: [id number]  Referring provider: Melody Abrams DPM  Dx:   Encounter Diagnosis     ICD-10-CM    1  Balance disorder R26 89                 Subjective: "Since I started this, my knees are not happy "  Patient reports bilateral knees are sore from performing squats  Patient reports compliance with the HEP  Objective: See treatment diary below  Assessment: Single leg balance is most challenging  Patient is able to perform sidestepping on the foam balance beam briefly without upper extremity support  Plan: Continue treatment as per PT plan of care       Precautions: fall risk    Manual                                                                                 Exercise Diary            bike  7' 7'          Balance beam  tandem  2laps 6 laps          biodex catch             FT airex reaching  Cone   x20 cone  1x20          Sit to stand  x10 feet on airex  2x10          sls  30"x2  ea 30"x2 ea          Sidestepping balance beam  2 laps 6 laps          Hr/tr  x30ea 1x30 ea          stairs   2 flights          bosu step ups   1x10                                                                                                                                                Modalities

## 2019-11-14 ENCOUNTER — OFFICE VISIT (OUTPATIENT)
Dept: PHYSICAL THERAPY | Facility: CLINIC | Age: 84
End: 2019-11-14
Payer: MEDICARE

## 2019-11-14 DIAGNOSIS — R26.89 BALANCE DISORDER: Primary | ICD-10-CM

## 2019-11-14 PROCEDURE — 97112 NEUROMUSCULAR REEDUCATION: CPT | Performed by: PHYSICAL THERAPIST

## 2019-11-14 PROCEDURE — 97110 THERAPEUTIC EXERCISES: CPT | Performed by: PHYSICAL THERAPIST

## 2019-11-14 NOTE — PROGRESS NOTES
Daily Note     Today's date: 2019  Patient name: Delia Goins  : 1934  MRN: [id number]  Referring provider: Sherry Reyes DPM  Dx:   Encounter Diagnosis     ICD-10-CM    1  Balance disorder R26 89                 Subjective: Pt reports that she is feeling "pretty good today", min knee pain  Reports compliance with her balance therex at home but reports frustration that "it's not getting easier"  Discussed the concept that the balance exercises should continue to be difficult for her in order to improve her balance      Objective: See treatment diary below  Assessment: Improved tolerance to balance therex today  But continues to require railing assistance with most therex  Plan: Continue treatment as per PT plan of care       Precautions: fall risk    Manual                                                                                Exercise Diary           bike  7' 7' 8'         Balance beam  tandem  2laps 6 laps 6 laps         biodex catch             FT airex reaching  Cone   x20 cone  1x20 30         Sit to stand  x10 feet on airex  2x10 20         sls  30"x2  ea 30"x2 ea 30"x2         Sidestepping balance beam  2 laps 6 laps 8 laps         Hr/tr  x30ea 1x30 ea 30         stairs   2 flights 2 flights         bosu step ups   1x10 15ea                                                                                                                                               Modalities

## 2019-11-18 ENCOUNTER — OFFICE VISIT (OUTPATIENT)
Dept: PHYSICAL THERAPY | Facility: CLINIC | Age: 84
End: 2019-11-18
Payer: MEDICARE

## 2019-11-18 DIAGNOSIS — R26.89 BALANCE DISORDER: Primary | ICD-10-CM

## 2019-11-18 PROCEDURE — 97112 NEUROMUSCULAR REEDUCATION: CPT | Performed by: PHYSICAL THERAPY ASSISTANT

## 2019-11-18 PROCEDURE — 97110 THERAPEUTIC EXERCISES: CPT | Performed by: PHYSICAL THERAPY ASSISTANT

## 2019-11-21 ENCOUNTER — OFFICE VISIT (OUTPATIENT)
Dept: PHYSICAL THERAPY | Facility: CLINIC | Age: 84
End: 2019-11-21
Payer: MEDICARE

## 2019-11-21 DIAGNOSIS — R26.89 BALANCE DISORDER: Primary | ICD-10-CM

## 2019-11-21 PROCEDURE — 97112 NEUROMUSCULAR REEDUCATION: CPT

## 2019-11-21 PROCEDURE — 97110 THERAPEUTIC EXERCISES: CPT

## 2019-11-21 NOTE — PROGRESS NOTES
Daily Note     Today's date: 2019  Patient name: Louise Mcfarlane  : 1934  MRN: [id number]  Referring provider: Leo Jovel DPM  Dx:   Encounter Diagnosis     ICD-10-CM    1  Balance disorder R26 89                 Subjective: Patient reports she is taking new medication for hypertension - 2 hours after taking it, reports she feels more tired - "Everything is more of an effort for about an hour "    Objective: See treatment diary below    Assessment: Good balance noted with addition of forward and lateral hurdles  Improved single leg balance noted on the left lower extremity  Plan: Continue treatment as per PT plan of care       Precautions: fall risk    Manual                                                                              Exercise Diary         bike  7' 7' 8' 8' 8'       Balance beam  tandem  2laps 6 laps 6 laps 6 laps 6 laps       biodex catch             FT airex reaching  Cone   x20 cone  1x20 30 FTEC foam  30"x2 NP       Sit to stand  x10 feet on airex  2x10 20 20x  10x feet on airex feet on airex  2x10       sls  30"x2  ea 30"x2 ea 30"x2 30"x2 30"x2       Sidestepping balance beam  2 laps 6 laps 8 laps 8 laps 8 laps       Hr/tr  x30ea 1x30 ea 30 x30 1x30 ea       stairs   2 flights 2 flights 2 flights 2 flights       bosu step ups   1x10 15ea x15 ea 20 ea       hurdles f/l      4 laps                                                                                                                                Modalities

## 2019-11-25 ENCOUNTER — APPOINTMENT (OUTPATIENT)
Dept: LAB | Facility: CLINIC | Age: 84
End: 2019-11-25
Payer: MEDICARE

## 2019-11-25 DIAGNOSIS — I10 BENIGN ESSENTIAL HYPERTENSION: ICD-10-CM

## 2019-11-25 DIAGNOSIS — E78.5 DYSLIPIDEMIA: ICD-10-CM

## 2019-11-25 DIAGNOSIS — E03.9 HYPOTHYROIDISM, UNSPECIFIED TYPE: ICD-10-CM

## 2019-11-25 DIAGNOSIS — R73.01 IMPAIRED FASTING GLUCOSE: ICD-10-CM

## 2019-11-25 LAB
ALBUMIN SERPL BCP-MCNC: 4.1 G/DL (ref 3.5–5)
ALP SERPL-CCNC: 63 U/L (ref 46–116)
ALT SERPL W P-5'-P-CCNC: 22 U/L (ref 12–78)
ANION GAP SERPL CALCULATED.3IONS-SCNC: 7 MMOL/L (ref 4–13)
AST SERPL W P-5'-P-CCNC: 17 U/L (ref 5–45)
BILIRUB SERPL-MCNC: 0.41 MG/DL (ref 0.2–1)
BUN SERPL-MCNC: 12 MG/DL (ref 5–25)
CALCIUM SERPL-MCNC: 10 MG/DL (ref 8.3–10.1)
CHLORIDE SERPL-SCNC: 103 MMOL/L (ref 100–108)
CHOLEST SERPL-MCNC: 178 MG/DL (ref 50–200)
CO2 SERPL-SCNC: 27 MMOL/L (ref 21–32)
CREAT SERPL-MCNC: 0.74 MG/DL (ref 0.6–1.3)
ERYTHROCYTE [DISTWIDTH] IN BLOOD BY AUTOMATED COUNT: 14.6 % (ref 11.6–15.1)
EST. AVERAGE GLUCOSE BLD GHB EST-MCNC: 120 MG/DL
GFR SERPL CREATININE-BSD FRML MDRD: 74 ML/MIN/1.73SQ M
GLUCOSE P FAST SERPL-MCNC: 93 MG/DL (ref 65–99)
HBA1C MFR BLD: 5.8 % (ref 4.2–6.3)
HCT VFR BLD AUTO: 42.6 % (ref 34.8–46.1)
HDLC SERPL-MCNC: 51 MG/DL
HGB BLD-MCNC: 13.4 G/DL (ref 11.5–15.4)
LDLC SERPL CALC-MCNC: 100 MG/DL (ref 0–100)
MCH RBC QN AUTO: 28.1 PG (ref 26.8–34.3)
MCHC RBC AUTO-ENTMCNC: 31.5 G/DL (ref 31.4–37.4)
MCV RBC AUTO: 89 FL (ref 82–98)
NONHDLC SERPL-MCNC: 127 MG/DL
PLATELET # BLD AUTO: 272 THOUSANDS/UL (ref 149–390)
PMV BLD AUTO: 10.4 FL (ref 8.9–12.7)
POTASSIUM SERPL-SCNC: 4.3 MMOL/L (ref 3.5–5.3)
PROT SERPL-MCNC: 7 G/DL (ref 6.4–8.2)
RBC # BLD AUTO: 4.77 MILLION/UL (ref 3.81–5.12)
SODIUM SERPL-SCNC: 137 MMOL/L (ref 136–145)
T4 FREE SERPL-MCNC: 0.99 NG/DL (ref 0.76–1.46)
TRIGL SERPL-MCNC: 135 MG/DL
TSH SERPL DL<=0.05 MIU/L-ACNC: 4.42 UIU/ML (ref 0.36–3.74)
WBC # BLD AUTO: 7.24 THOUSAND/UL (ref 4.31–10.16)

## 2019-11-25 PROCEDURE — 84439 ASSAY OF FREE THYROXINE: CPT

## 2019-11-25 PROCEDURE — 83036 HEMOGLOBIN GLYCOSYLATED A1C: CPT

## 2019-11-25 PROCEDURE — 80053 COMPREHEN METABOLIC PANEL: CPT

## 2019-11-25 PROCEDURE — 84443 ASSAY THYROID STIM HORMONE: CPT

## 2019-11-25 PROCEDURE — 85027 COMPLETE CBC AUTOMATED: CPT

## 2019-11-25 PROCEDURE — 36415 COLL VENOUS BLD VENIPUNCTURE: CPT

## 2019-11-25 PROCEDURE — 80061 LIPID PANEL: CPT

## 2019-11-26 ENCOUNTER — OFFICE VISIT (OUTPATIENT)
Dept: PHYSICAL THERAPY | Facility: CLINIC | Age: 84
End: 2019-11-26
Payer: MEDICARE

## 2019-11-26 DIAGNOSIS — R26.89 BALANCE DISORDER: Primary | ICD-10-CM

## 2019-11-26 PROCEDURE — 97110 THERAPEUTIC EXERCISES: CPT

## 2019-11-26 PROCEDURE — 97112 NEUROMUSCULAR REEDUCATION: CPT

## 2019-11-26 NOTE — PROGRESS NOTES
Daily Note     Today's date: 2019  Patient name: Elio Mcdowell  : 1934  MRN: [id number]  Referring provider: Maria Del Carmen Mccarty DPM  Dx:   Encounter Diagnosis     ICD-10-CM    1  Balance disorder R26 89                 Subjective: PT states she feels tired today but other than that not doing to bad  Objective: See treatment diary below    Assessment: Pt required frequent rest breaks today as she was tired  Pt was able to do deonte drills with reciprocal gait pattern with stand by assistance for balance checks  Pt demonstrates good sit to stand form as she requires less assistance each week  Pt would continue to benefit form PT  Plan: Continue treatment as per PT plan of care       Precautions: fall risk    Manual                                                                              Exercise Diary        bike  7' 7' 8' 8' 8' 8'       Balance beam  tandem  2laps 6 laps 6 laps 6 laps 6 laps 6 laps       biodex catch             FT airex reaching  Cone   x20 cone  1x20 30 FTEC foam  30"x2 NP D/c      Sit to stand  x10 feet on airex  2x10 20 20x  10x feet on airex feet on airex  2x10 feet on airex  2x10      sls  30"x2  ea 30"x2 ea 30"x2 30"x2 30"x2 30" x 2       Sidestepping balance beam  2 laps 6 laps 8 laps 8 laps 8 laps 8 laps       Hr/tr  x30ea 1x30 ea 30 x30 1x30 ea 30x ea       stairs   2 flights 2 flights 2 flights 2 flights 2 flights       bosu step ups   1x10 15ea x15 ea 20 ea 20 x 3"       hurdles f/l      4 laps 2 laps f/w  2 laps reciprical                                                                                                                                Modalities

## 2019-11-29 ENCOUNTER — OFFICE VISIT (OUTPATIENT)
Dept: PHYSICAL THERAPY | Facility: CLINIC | Age: 84
End: 2019-11-29
Payer: MEDICARE

## 2019-11-29 DIAGNOSIS — R26.89 BALANCE DISORDER: Primary | ICD-10-CM

## 2019-11-29 PROCEDURE — 97110 THERAPEUTIC EXERCISES: CPT

## 2019-11-29 PROCEDURE — 97112 NEUROMUSCULAR REEDUCATION: CPT

## 2019-11-29 NOTE — PROGRESS NOTES
Daily Note     Today's date: 2019  Patient name: Reji Davis  : 1934  MRN: [id number]  Referring provider: Phuong Verma DPM  Dx:   Encounter Diagnosis     ICD-10-CM    1  Balance disorder R26 89                 Subjective: Pt reports she is feeling tired  Pt notes overall improvement, "somedays are better then others "  Objective: See treatment diary below    Assessment: Performed exercise program w/o complaint, although pt required rest breaks today  LOB x 1 during reciprocal gait pattern over hurdles  Will monitor  Plan: Continue treatment as per PT plan of care       Precautions: fall risk    Manual                                                                             Exercise Diary       bike  7' 7' 8' 8' 8' 8'  8'     Balance beam  tandem  2laps 6 laps 6 laps 6 laps 6 laps 6 laps  6 laps     biodex catch             FT airex reaching  Cone   x20 cone  1x20 30 FTEC foam  30"x2 NP D/c ----     Sit to stand  x10 feet on airex  2x10 20 20x  10x feet on airex feet on airex  2x10 feet on airex  2x10 Feet on  airex  2x10     sls  30"x2  ea 30"x2 ea 30"x2 30"x2 30"x2 30" x 2  30"x2  ea     Sidestepping balance beam  2 laps 6 laps 8 laps 8 laps 8 laps 8 laps  8 laps     Hr/tr  x30ea 1x30 ea 30 x30 1x30 ea 30x ea  30x ea     stairs   2 flights 2 flights 2 flights 2 flights 2 flights  2  flights     bosu step ups   1x10 15ea x15 ea 20 ea 20 x 3"  20 x 3"     hurdles f/l      4 laps 2 laps f/w  2 laps reciprical                                                                                                                                Modalities

## 2019-12-02 ENCOUNTER — OFFICE VISIT (OUTPATIENT)
Dept: PHYSICAL THERAPY | Facility: CLINIC | Age: 84
End: 2019-12-02
Payer: MEDICARE

## 2019-12-02 DIAGNOSIS — R26.89 BALANCE DISORDER: Primary | ICD-10-CM

## 2019-12-02 PROCEDURE — 97530 THERAPEUTIC ACTIVITIES: CPT

## 2019-12-02 PROCEDURE — 97112 NEUROMUSCULAR REEDUCATION: CPT

## 2019-12-02 PROCEDURE — 97110 THERAPEUTIC EXERCISES: CPT

## 2019-12-02 NOTE — PROGRESS NOTES
Daily Note     Today's date: 2019  Patient name: Caroline Aquino  : 1934  MRN: [id number]  Referring provider: Jessica Burks DPM  Dx:   Encounter Diagnosis     ICD-10-CM    1  Balance disorder R26 89                 Subjective: Patient complains her right knee is feeling slightly more sore today "probably because of the weather "    Objective: See treatment diary below  Assessment: Single leg balance is slowly improving  Additional squats on the leg press machine are tolerated well without complaints  Plan: Continue treatment as per PT plan of care       Precautions: fall risk    Manual                                                                           Exercise Diary      bike  7' 7' 8' 8' 8' 8'  8' 8'    Balance beam  tandem  2laps 6 laps 6 laps 6 laps 6 laps 6 laps  6 laps 8 laps    biodex catch             FT airex reaching  Cone   x20 cone  1x20 30 FTEC foam  30"x2 NP D/c ----     Sit to stand  x10 feet on airex  2x10 20 20x  10x feet on airex feet on airex  2x10 feet on airex  2x10 Feet on  airex  2x10 feet on airex  2x10    sls  30"x2  ea 30"x2 ea 30"x2 30"x2 30"x2 30" x 2  30"x2  ea 30"x3 ea    Sidestepping balance beam  2 laps 6 laps 8 laps 8 laps 8 laps 8 laps  8 laps 8 laps    Hr/tr  x30ea 1x30 ea 30 x30 1x30 ea 30x ea  30x ea 30 ea    stairs   2 flights 2 flights 2 flights 2 flights 2 flights  2  flights NP    bosu step ups   1x10 15ea x15 ea 20 ea 20 x 3"  20 x 3" 15 ea    hurdles f/l      4 laps 2 laps f/w  2 laps reciprical   4 laps  forwardsideways    Squats leg press         20#  1x15                                                                                                                Modalities

## 2019-12-04 ENCOUNTER — OFFICE VISIT (OUTPATIENT)
Dept: PHYSICAL THERAPY | Facility: CLINIC | Age: 84
End: 2019-12-04
Payer: MEDICARE

## 2019-12-04 DIAGNOSIS — R26.89 BALANCE DISORDER: Primary | ICD-10-CM

## 2019-12-04 PROCEDURE — 97112 NEUROMUSCULAR REEDUCATION: CPT

## 2019-12-04 PROCEDURE — 97110 THERAPEUTIC EXERCISES: CPT

## 2019-12-04 PROCEDURE — 97530 THERAPEUTIC ACTIVITIES: CPT

## 2019-12-04 NOTE — PROGRESS NOTES
Daily Note     Today's date: 2019  Patient name: Jean Pierre Morris  : 1934  MRN: [id number]  Referring provider: Stefany Demarco DPM  Dx:   Encounter Diagnosis     ICD-10-CM    1  Balance disorder R26 89                 Subjective: Patient notes that she is doing well today with no new complaints  Objective: See treatment diary below  Assessment: Pt continues to show improvements with balance training today, especially SLS  She required min Ax1 at times to maintain balance throughout today's session  Overall pt was fatigued to end but overall feeling well  Plan: Continue treatment as per PT plan of care       Precautions: fall risk    Manual                                                                          Exercise Diary     bike  7' 7' 8' 8' 8' 8'  8' 8' 8'   Balance beam  tandem  2laps 6 laps 6 laps 6 laps 6 laps 6 laps  6 laps 8 laps 8 laps   biodex catch             FT airex reaching  Cone   x20 cone  1x20 30 FTEC foam  30"x2 NP D/c ----     Sit to stand  x10 feet on airex  2x10 20 20x  10x feet on airex feet on airex  2x10 feet on airex  2x10 Feet on  airex  2x10 feet on airex  2x10 Feet on airex 2x10   sls  30"x2  ea 30"x2 ea 30"x2 30"x2 30"x2 30" x 2  30"x2  ea 30"x3 ea 30"x3 ea   Sidestepping balance beam  2 laps 6 laps 8 laps 8 laps 8 laps 8 laps  8 laps 8 laps 8 laps   Hr/tr  x30ea 1x30 ea 30 x30 1x30 ea 30x ea  30x ea 30 ea 30 ea   stairs   2 flights 2 flights 2 flights 2 flights 2 flights  2  flights NP NP   bosu step ups   1x10 15ea x15 ea 20 ea 20 x 3"  20 x 3" 15 ea 15 ea   hurdles f/l      4 laps 2 laps f/w  2 laps reciprical   4 laps  forwardsideways 4 laps fwd/ side   Squats leg press         20#  1x15 20# 1x15                                                                                                               Modalities

## 2019-12-05 ENCOUNTER — OFFICE VISIT (OUTPATIENT)
Dept: FAMILY MEDICINE CLINIC | Facility: CLINIC | Age: 84
End: 2019-12-05
Payer: MEDICARE

## 2019-12-05 ENCOUNTER — TELEPHONE (OUTPATIENT)
Dept: FAMILY MEDICINE CLINIC | Facility: CLINIC | Age: 84
End: 2019-12-05

## 2019-12-05 VITALS
RESPIRATION RATE: 12 BRPM | SYSTOLIC BLOOD PRESSURE: 145 MMHG | TEMPERATURE: 97.8 F | HEART RATE: 84 BPM | HEIGHT: 63 IN | WEIGHT: 136.2 LBS | OXYGEN SATURATION: 98 % | DIASTOLIC BLOOD PRESSURE: 88 MMHG | BODY MASS INDEX: 24.13 KG/M2

## 2019-12-05 DIAGNOSIS — R32 URINARY INCONTINENCE, UNSPECIFIED TYPE: ICD-10-CM

## 2019-12-05 DIAGNOSIS — E03.9 HYPOTHYROIDISM, UNSPECIFIED TYPE: ICD-10-CM

## 2019-12-05 DIAGNOSIS — I10 BENIGN ESSENTIAL HYPERTENSION: Primary | ICD-10-CM

## 2019-12-05 DIAGNOSIS — Z00.00 MEDICARE ANNUAL WELLNESS VISIT, SUBSEQUENT: ICD-10-CM

## 2019-12-05 DIAGNOSIS — M81.0 AGE-RELATED OSTEOPOROSIS WITHOUT CURRENT PATHOLOGICAL FRACTURE: ICD-10-CM

## 2019-12-05 DIAGNOSIS — F41.8 DEPRESSION WITH ANXIETY: ICD-10-CM

## 2019-12-05 DIAGNOSIS — R73.01 IMPAIRED FASTING GLUCOSE: ICD-10-CM

## 2019-12-05 DIAGNOSIS — E78.5 DYSLIPIDEMIA: ICD-10-CM

## 2019-12-05 PROBLEM — Z23 NEED FOR IMMUNIZATION AGAINST INFLUENZA: Status: ACTIVE | Noted: 2019-12-05

## 2019-12-05 PROBLEM — Z12.31 ENCOUNTER FOR SCREENING MAMMOGRAM FOR MALIGNANT NEOPLASM OF BREAST: Status: ACTIVE | Noted: 2019-12-05

## 2019-12-05 PROBLEM — M19.012 ARTHRITIS OF LEFT SHOULDER REGION: Status: ACTIVE | Noted: 2019-12-05

## 2019-12-05 PROCEDURE — G0439 PPPS, SUBSEQ VISIT: HCPCS | Performed by: FAMILY MEDICINE

## 2019-12-05 PROCEDURE — 99214 OFFICE O/P EST MOD 30 MIN: CPT | Performed by: FAMILY MEDICINE

## 2019-12-05 RX ORDER — SODIUM FLUORIDE 1.1 G/100G
GEL, DENTIFRICE ORAL
Refills: 0 | COMMUNITY
Start: 2019-09-16

## 2019-12-05 RX ORDER — MIRABEGRON 25 MG/1
TABLET, FILM COATED, EXTENDED RELEASE ORAL
Refills: 0 | COMMUNITY
Start: 2019-11-14 | End: 2019-12-05 | Stop reason: ALTCHOICE

## 2019-12-05 NOTE — ASSESSMENT & PLAN NOTE
Pt states myrbetriq did not help her symptoms and it made her BP elevated  Advised pt to stop it and follow urology

## 2019-12-05 NOTE — PROGRESS NOTES
Chief Complaint   Patient presents with   [facility] Wellness Visit     Subsequent   Follow-up     6 months and review labs  Health Maintenance   Topic Date Due    DTaP,Tdap,and Td Vaccines (1 - Tdap) 07/05/1945    Pneumococcal Vaccine: 65+ Years (2 of 2 - PPSV23) 04/27/2018    Urinary Incontinence Screening  11/06/2019    Medicare Annual Wellness Visit (AWV)  11/06/2019    PT PLAN OF CARE  12/04/2019    Fall Risk  12/05/2020    BMI: Adult  12/05/2020    DXA SCAN  06/24/2021    CRC Screening: Colonoscopy  03/11/2026    Influenza Vaccine  Completed    Pneumococcal Vaccine: Pediatrics (0 to 5 Years) and At-Risk Patients (6 to 59 Years)  Aged Out    HIB Vaccine  Aged Out    Hepatitis B Vaccine  Aged Out    IPV Vaccine  Aged Out    Hepatitis A Vaccine  Aged Out    Meningococcal ACWY Vaccine  Aged Out    HPV Vaccine  Aged Out      Assessment and Plan:     Problem List Items Addressed This Visit        Endocrine    Hypothyroidism     11/2019 TSH 4 42, free T4 0 99 ok  Continue levothyroxine 25mcg daily  Relevant Orders    TSH, 3rd generation with Free T4 reflex    Impaired fasting glucose     11/2019 hgA1C 5 8 slightly elevated  Low carb diet  Relevant Orders    Hemoglobin A1C With EAG       Cardiovascular and Mediastinum    Benign essential hypertension - Primary     BP elevated recently at home  DASH diet  Continue carvedilol 12 5mg bid, lisinopril 20mg QD  Relevant Orders    Comprehensive metabolic panel       Musculoskeletal and Integument    Age-related osteoporosis without current pathological fracture       Other    Depression with anxiety     Grandson passed away in 8/2019 from MVA  Feels sad, especially holidays  Use xanax as needed  Dyslipidemia     11/2019 Lipid 178/135/51/100 ok  Low fat diet  Continue simvastatin 20mg qhs            Relevant Orders    Lipid panel    Urinary incontinence     Pt states myrbetriq did not help her symptoms and it made her BP elevated  Advised pt to stop it and follow urology  Relevant Orders    Ambulatory referral to Urology      Other Visit Diagnoses     Medicare annual wellness visit, subsequent               Preventive health issues were discussed with patient, and age appropriate screening tests were ordered as noted in patient's After Visit Summary  Personalized health advice and appropriate referrals for health education or preventive services given if needed, as noted in patient's After Visit Summary       History of Present Illness:     Patient presents for Medicare Annual Wellness visit    Patient Care Team:  Edenilson Dacosta MD as PCP - OZZIE Pretty MD Benito Slocumb, MD as Endoscopist  Jasmyne Maravilla MD (Optometry)  Laura Tee DPM (Podiatry)  Jessica Ko DO (Cardiology)  Jennifer Johnson MD (Gastroenterology)     Problem List:     Patient Active Problem List   Diagnosis    Benign essential hypertension    Hypothyroidism    History of endometrial cancer    Abnormal breast exam    Arthritis    Constipation    Depression with anxiety    Diverticulosis    Duodenal diverticulum    Esophagitis, reflux    Dyslipidemia    Impaired fasting glucose    Insomnia    Age-related osteoporosis without current pathological fracture    Atrophic vaginitis    Urinary incontinence    Vertigo    Encounter for follow-up surveillance of endometrial cancer    Endometrial cancer ([facility] [address] )    Arthritis of left shoulder region    Encounter for screening mammogram for malignant neoplasm of breast    Hyperlipidemia    Need for immunization against influenza      Past Medical and Surgical History:     Past Medical History:   Diagnosis Date    Anemia     post-op, 07/07/09    Anxiety     last assessed: 01/15/14    C  difficile diarrhea     Chest wall trauma     Acute, last assessed: 10/24/12    Depression     Disease of thyroid gland     hypothyroid    Diverticula of colon     Endometrial cancer [facility])     Endometrial hyperplasia     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[facility] [address] )     stage IA, Grade I endometriod adenocarcinoma with 43% myometrial invasion and no LVSI, last assessed: 81/55/83    Folliculitis     last assessed: 03/02/16    Hypercholesterolemia     Hyperlipidemia     Hypertension     Incontinence in female     Migraine     Nontoxic single thyroid nodule     last assessed: 02/16/13    Osteoporosis     last assessed: 02/22/17    Pure hypercholesterolemia     Rotator cuff (capsule) sprain     Acute, right    Skin lesion     last assessed: 04/26/13    Stomatitis     Strain of right buttock     gluteus medius    Tendinitis of right rotator cuff     last assessed: 08/12/12    Thyroid cyst     last assessed: 08/15/13     Past Surgical History:   Procedure Laterality Date    APPENDECTOMY      COLONOSCOPY      fiberoptic, 1998, 2001, 2006    CYSTOSCOPY      Diagnostic    EGD AND COLONOSCOPY N/A 3/11/2016    Procedure: EGD ;  Surgeon: Rancho Sow MD;  Location: [facility]; Service:     HYSTERECTOMY  03/22/2011    Robotic-assisted, total laparoscopic approch    JOINT REPLACEMENT      KNEE SURGERY      OOPHORECTOMY Bilateral 03/22/2011    Salpingo    REPLACEMENT TOTAL KNEE      TONSILECTOMY AND ADNOIDECTOMY      TOTAL SHOULDER ARTHROPLASTY Right       Family History:     Family History   Problem Relation Age of Onset    Cancer Father     Lung cancer Brother     Prostate cancer Brother     Other Brother         Prolapsing mitral valve leaflet syndrome      Social History:     Social History     Socioeconomic History    Marital status:       Spouse name: None    Number of children: None    Years of education: None    Highest education level: None   Occupational History    None   Social Needs    Financial resource strain: None    Food insecurity:     Worry: None     Inability: None    Transportation needs:     Medical: None     Non-medical: None   Tobacco Use  Smoking status: Never Smoker    Smokeless tobacco: Never Used   Substance and Sexual Activity    Alcohol use: No    Drug use: No    Sexual activity: None   Lifestyle    Physical activity:     Days per week: None     Minutes per session: None    Stress: None   Relationships    Social connections:     Talks on phone: None     Gets together: None     Attends [employer] service: None     Active member of club or organization: None     Attends meetings of clubs or organizations: None     Relationship status: None    Intimate partner violence:     Fear of current or ex partner: None     Emotionally abused: None     Physically abused: None     Forced sexual activity: None   Other Topics Concern    None   Social History Narrative    None       Medications and Allergies:     Current Outpatient Medications   Medication Sig Dispense Refill    acetaminophen (TYLENOL) 650 mg CR tablet Take 650 mg by mouth as needed for mild pain        ALPHAGAN P 0 1 % instill 1 drop into both eyes twice a day  0    ALPRAZolam (XANAX) 0 25 mg tablet Take 1 tablet (0 25 mg total) by mouth as needed for anxiety for up to 30 days 30 tablet 0    bifidobacterium infantis (ALIGN) capsule Take by mouth      carvedilol (COREG) 12 5 mg tablet Take 1 tablet (12 5 mg total) by mouth 2 (two) times a day with meals 60 tablet 11    cholecalciferol (VITAMIN D3) 1,000 units tablet Take by mouth      docusate sodium (COLACE) 100 mg capsule Take 1 capsule (100 mg total) by mouth 2 (two) times a day 10 capsule 0    famotidine (PEPCID) 10 mg tablet Take 10 mg by mouth daily at bedtime as needed       levothyroxine 25 mcg tablet Take 1 tablet (25 mcg total) by mouth daily 90 tablet 1    lisinopril (ZESTRIL) 20 mg tablet Take 1 tablet (20 mg total) by mouth daily 90 tablet 3    Multiple Vitamins-Minerals (CENTRUM SILVER PO) Take by mouth      polyethylene glycol (MIRALAX) powder Take by mouth as needed        simvastatin (ZOCOR) 20 mg tablet take 1 tablet by mouth once daily 90 tablet 3    DENTA 5000 PLUS 1 1 % CREA BRUSH UP TO TWICE A DAY  0    meclizine (ANTIVERT) 25 mg tablet Take 1 tablet (25 mg total) by mouth every 8 (eight) hours as needed for dizziness (Patient not taking: Reported on 5/16/2019) 30 tablet 0    Probiotic Product (PROBIOTIC ADVANCED PO) Take 1 capsule by mouth daily       No current facility-administered medications for this visit  Allergies   Allergen Reactions    Amlodipine Itching     Reaction Date: 19Jul2011;     Amoxil [Amoxicillin] Hives    Biaxin [Clarithromycin] GI Intolerance     Reaction Date: 19Jul2011;   Pt gets nauseated    Esomeprazole GI Intolerance    Fosamax [Alendronate] GI Intolerance    Hydrochlorothiazide      Hyponatremia      Lansoprazole GI Intolerance    Norvasc [Amlodipine Besylate]     Pantoprazole GI Intolerance     Can tolerate IV- Not oral    Relafen [Nabumetone] GI Intolerance and Other (See Comments)      Immunizations:     Immunization History   Administered Date(s) Administered    H1N1, All Formulations 02/08/2010    INFLUENZA 10/03/2018    Influenza Split High Dose Preservative Free IM 09/18/2014, 09/18/2015, 10/06/2016, 11/02/2017    Influenza TIV (IM) 11/03/2011, 10/03/2012    Pneumococcal Conjugate 13-Valent 04/27/2017    Zoster 06/17/2015    influenza, trivalent, adjuvanted 10/11/2019      Health Maintenance:         Topic Date Due    DXA SCAN  06/24/2021    CRC Screening: Colonoscopy  03/11/2026         Topic Date Due    DTaP,Tdap,and Td Vaccines (1 - Tdap) 07/05/1945    Pneumococcal Vaccine: 65+ Years (2 of 2 - PPSV23) 04/27/2018      Medicare Health Risk Assessment:     /88 (BP Location: Left arm, Patient Position: Sitting, Cuff Size: Adult)   Pulse 84   Temp 97 8 °F (36 6 °C) (Tympanic)   Resp 12   Ht 5' 2 5" (1 588 m)   Wt 61 8 kg (136 lb 3 2 oz)   SpO2 98%   BMI 24 51 kg/m²      Aspen Morrison is here for her Subsequent Wellness visit       Health Risk Assessment:   Patient rates overall health as good  Patient feels that their physical health rating is slightly better  Eyesight was rated as slightly worse  Hearing was rated as slightly worse  Patient feels that their emotional and mental health rating is same  Pain experienced in the last 7 days has been some  Patient's pain rating has been 3/10  Depression Screening:   PHQ-2 Score: 2      Fall Risk Screening: In the past year, patient has experienced: no history of falling in past year      Urinary Incontinence Screening:   Patient has leaked urine accidently in the last six months  Home Safety:  Patient does not have trouble with stairs inside or outside of their home  Patient has working smoke alarms and has no working carbon monoxide detector  Home safety hazards include: none  Nutrition:   Current diet is Low Cholesterol, Low Saturated Fat and No Added Salt  Medications:   Patient is currently taking over-the-counter supplements  OTC medications include: see medication list  Patient is able to manage medications  Activities of Daily Living (ADLs)/Instrumental Activities of Daily Living (IADLs):   Walk and transfer into and out of bed and chair?: Yes  Dress and groom yourself?: Yes    Bathe or shower yourself?: Yes    Feed yourself? Yes  Do your laundry/housekeeping?: Yes  Manage your money, pay your bills and track your expenses?: Yes  Make your own meals?: Yes    Do your own shopping?: Yes    Previous Hospitalizations:   Any hospitalizations or ED visits within the last 12 months?: No      Advance Care Planning:   Living will: Yes    Durable POA for healthcare:  Yes    Advanced directive: Yes    End of Life Decisions reviewed with patient: Yes    Provider agrees with end of life decisions: Yes      PREVENTIVE SCREENINGS      Cardiovascular Screening:    General: History Lipid Disorder and Screening Current      Diabetes Screening:     General: Screening Current      Colorectal Cancer Screening:     General: Screening Not Indicated      Breast Cancer Screening:     General: Screening Not Indicated      Cervical Cancer Screening:    General: Screening Not Indicated      Osteoporosis Screening:    General: History Osteoporosis and Screening Current      Dodie Glez MD

## 2019-12-05 NOTE — PROGRESS NOTES
Assessment/Plan:    Benign essential hypertension  BP elevated recently at home  DASH diet  Continue carvedilol 12 5mg bid, lisinopril 20mg QD  Depression with anxiety  Grandson passed away in 8/2019 from MVA  Feels sad, especially holidays  Use xanax as needed  Dyslipidemia  11/2019 Lipid 178/135/51/100 ok  Low fat diet  Continue simvastatin 20mg qhs  Hypothyroidism  11/2019 TSH 4 42, free T4 0 99 ok  Continue levothyroxine 25mcg daily  Impaired fasting glucose  11/2019 hgA1C 5 8 slightly elevated  Low carb diet  Urinary incontinence  Pt states myrbetriq did not help her symptoms and it made her BP elevated  Advised pt to stop it and follow urology  Reviewed lab in 11/2019  TSH and free T4 ok  Lipid ok  hgA1C 5 8 slightly elevated  CBC ok  CMP ok     Got flu shot yearly     Got pneumovax at pharmacy in 2013 per pt  Got prevnar 13 2017    Will check insurance for coverage of Tdap and shingrix  Mammogram 11/2018 negative    Dexa 7/2019 showed osteoporosis  Pt tried fosamax but cannot tolerated it  Will contact insurance for coverage of prolia  SE educated pt  Had colonoscopy in 3/2016  No more colonoscopy per pt  RTO in 6 months       POA---[facility] Priscilla Torrez[facility]  Living will---DNR per pt now               Diagnoses and all orders for this visit:    Benign essential hypertension  -     Comprehensive metabolic panel; Future    Urinary incontinence, unspecified type  -     Ambulatory referral to Urology; Future    Age-related osteoporosis without current pathological fracture    Hypothyroidism, unspecified type  -     TSH, 3rd generation with Free T4 reflex; Future    Impaired fasting glucose  -     Hemoglobin A1C With EAG; Future    Dyslipidemia  -     Lipid panel;  Future    Depression with anxiety    Medicare annual wellness visit, subsequent    Other orders  -     Discontinue: MYRBETRIQ 25 MG TB24  -     DENTA 5000 PLUS 1 1 % CREA; BRUSH UP TO TWICE A DAY          Subjective:      Patient ID: Yazmin Solis is a 80 y o  female  HPI    Pt is here by herself  HTN---BP at home 140-160/80-90  Higher than before  Pt states a lot of stress recently  Grandson passed away in 8/2019 from MVA  It is hard for her especially holidays  Pt is on lisinopril 20mg qhs and carvedilol 12 5mg bid  Denies headache, vision change, Sob or CP      Urinary incontinence---FU   [facility]  Tried imipramine but did not work  She is on mybretriq 25mg qhs now  Pt states mybretriq increased her BP usually  Osteoporosis----She tried fosamax but she cannot tolerate/GI upset  Interested in prolia  She will talk to her daughter also       Hyperlipidemia---Pt uses simvastatin 20mg qhs  Sometimes muscle pain       Hypothyroidism---on levothyroxine 25mcg daily  She cannot tolerate it empty stomach, so she takes it 2 hours after lunch  Feels fine       IFG---Follows low carb diet        GERD---Use OTC pepcid and TUMs which helped some       AnxietyDepression---Stress in life  She uses xanax very rarely       FU opthalmology Dr Gideon Ingram specialist Zo Snow is on eye drops    FU oncologist Dr Deidre Moody year for hx endometrial cancer s/p hysterectomy  Stable  FU podiatry Q 10 weeks       Live by herself with 1 cat  Does all ADL's  Still drive  Has 3 daughters and 1 daughter lives close to her  No recent falls, but sometimes feels unsteady  Follows PT now  Denies depression  The following portions of the patient's history were reviewed and updated as appropriate: allergies, current medications, past family history, past medical history, past social history, past surgical history and problem list     Review of Systems   Constitutional: Negative for appetite change, chills and fever  HENT: Negative for congestion, ear pain, sinus pain and sore throat  Eyes: Negative for discharge and itching     Respiratory: Negative for apnea, cough, chest tightness, shortness of breath and wheezing  Cardiovascular: Negative for chest pain, palpitations and leg swelling  Gastrointestinal: Negative for abdominal pain, anal bleeding, constipation, diarrhea, nausea and vomiting  Endocrine: Negative for cold intolerance, heat intolerance and polyuria  Genitourinary: Negative for difficulty urinating and dysuria  Musculoskeletal: Negative for arthralgias, back pain and myalgias  Skin: Negative for rash  Neurological: Negative for dizziness and headaches  Psychiatric/Behavioral: Negative for agitation  Objective:      /88 (BP Location: Left arm, Patient Position: Sitting, Cuff Size: Adult)   Pulse 84   Temp 97 8 °F (36 6 °C) (Tympanic)   Resp 12   Ht 5' 2 5" (1 588 m)   Wt 61 8 kg (136 lb 3 2 oz)   SpO2 98%   BMI 24 51 kg/m²          Physical Exam   Constitutional: She appears well-developed  No distress  HENT:   Head: Normocephalic  Right Ear: External ear normal    Left Ear: External ear normal    Nose: Nose normal    Mouth/Throat: Oropharynx is clear and moist    Eyes: Pupils are equal, round, and reactive to light  Conjunctivae are normal  Right eye exhibits no discharge  Left eye exhibits no discharge  Neck: Normal range of motion  No thyromegaly present  Cardiovascular: Normal rate, regular rhythm and normal heart sounds  Exam reveals no gallop and no friction rub  No murmur heard  Pulmonary/Chest: Effort normal and breath sounds normal  No respiratory distress  She has no wheezes  She has no rales  She exhibits no tenderness  Abdominal: Soft  Bowel sounds are normal  She exhibits no distension and no mass  There is no tenderness  There is no rebound and no guarding  Musculoskeletal: Normal range of motion  She exhibits no edema, tenderness or deformity  Lymphadenopathy:     She has no cervical adenopathy  Neurological: She is alert  Psychiatric: She has a normal mood and affect

## 2019-12-05 NOTE — PATIENT INSTRUCTIONS
Medicare Preventive Visit Patient Instructions  Thank you for completing your Welcome to Medicare Visit or Medicare Annual Wellness Visit today  Your next wellness visit will be due in one year (12/5/2020)  The screening/preventive services that you may require over the next 5-10 years are detailed below  Some tests may not apply to you based off risk factors and/or age  Screening tests ordered at today's visit but not completed yet may show as past due  Also, please note that scanned in results may not display below  Preventive Screenings:  Service Recommendations Previous Testing/Comments   Colorectal Cancer Screening  * Colonoscopy    * Fecal Occult Blood Test (FOBT)/Fecal Immunochemical Test (FIT)  * Fecal DNA/Cologuard Test  * Flexible Sigmoidoscopy Age: 54-65 years old   Colonoscopy: every 10 years (may be performed more frequently if at higher risk)  OR  FOBT/FIT: every 1 year  OR  Cologuard: every 3 years  OR  Sigmoidoscopy: every 5 years  Screening may be recommended earlier than age 48 if at higher risk for colorectal cancer  Also, an individualized decision between you and your healthcare provider will decide whether screening between the ages of 74-80 would be appropriate  Colonoscopy: 03/11/2016  FOBT/FIT: Not on file  Cologuard: Not on file  Sigmoidoscopy: Not on file    Screening Not Indicated     Breast Cancer Screening Age: 36 years old  Frequency: every 1-2 years  Not required if history of left and right mastectomy Mammogram: 11/09/2018    Screening Current   Cervical Cancer Screening Between the ages of 21-29, pap smear recommended once every 3 years  Between the ages of 33-67, can perform pap smear with HPV co-testing every 5 years     Recommendations may differ for women with a history of total hysterectomy, cervical cancer, or abnormal pap smears in past  Pap Smear: Not on file    Screening Not Indicated   Hepatitis C Screening Once for adults born between 1945 and 1965  More frequently in patients at high risk for Hepatitis C Hep C Antibody: Not on file       Diabetes Screening 1-2 times per year if you're at risk for diabetes or have pre-diabetes Fasting glucose: 93 mg/dL   A1C: 5 8 %    Screening Current   Cholesterol Screening Once every 5 years if you don't have a lipid disorder  May order more often based on risk factors  Lipid panel: 11/25/2019    Screening Not Indicated  History Lipid Disorder     Other Preventive Screenings Covered by Medicare:  1  Abdominal Aortic Aneurysm (AAA) Screening: covered once if your at risk  You're considered to be at risk if you have a family history of AAA  2  Lung Cancer Screening: covers low dose CT scan once per year if you meet all of the following conditions: (1) Age 50-69; (2) No signs or symptoms of lung cancer; (3) Current smoker or have quit smoking within the last 15 years; (4) You have a tobacco smoking history of at least 30 pack years (packs per day multiplied by number of years you smoked); (5) You get a written order from a healthcare provider  3  Glaucoma Screening: covered annually if you're considered high risk: (1) You have diabetes OR (2) Family history of glaucoma OR (3)  aged 48 and older OR (3)  American aged 72 and older  3  Osteoporosis Screening: covered every 2 years if you meet one of the following conditions: (1) You're estrogen deficient and at risk for osteoporosis based off medical history and other findings; (2) Have a vertebral abnormality; (3) On glucocorticoid therapy for more than 3 months; (4) Have primary hyperparathyroidism; (5) On osteoporosis medications and need to assess response to drug therapy  · Last bone density test (DXA Scan): 06/24/2019   5  HIV Screening: covered annually if you're between the age of 15-65  Also covered annually if you are younger than 13 and older than 72 with risk factors for HIV infection   For pregnant patients, it is covered up to 3 times per pregnancy  Immunizations:  Immunization Recommendations   Influenza Vaccine Annual influenza vaccination during flu season is recommended for all persons aged >= 6 months who do not have contraindications   Pneumococcal Vaccine (Prevnar and Pneumovax)  * Prevnar = PCV13  * Pneumovax = PPSV23   Adults 25-60 years old: 1-3 doses may be recommended based on certain risk factors  Adults 72 years old: Prevnar (PCV13) vaccine recommended followed by Pneumovax (PPSV23) vaccine  If already received PPSV23 since turning 65, then PCV13 recommended at least one year after PPSV23 dose  Hepatitis B Vaccine 3 dose series if at intermediate or high risk (ex: diabetes, end stage renal disease, liver disease)   Tetanus (Td) Vaccine - COST NOT COVERED BY MEDICARE PART B Following completion of primary series, a booster dose should be given every 10 years to maintain immunity against tetanus  Td may also be given as tetanus wound prophylaxis  Tdap Vaccine - COST NOT COVERED BY MEDICARE PART B Recommended at least once for all adults  For pregnant patients, recommended with each pregnancy  Shingles Vaccine (Shingrix) - COST NOT COVERED BY MEDICARE PART B  2 shot series recommended in those aged 48 and above     Health Maintenance Due:      Topic Date Due    DXA SCAN  06/24/2021    CRC Screening: Colonoscopy  03/11/2026     Immunizations Due:      Topic Date Due    DTaP,Tdap,and Td Vaccines (1 - Tdap) 07/05/1945    Pneumococcal Vaccine: 65+ Years (2 of 2 - PPSV23) 04/27/2018     Advance Directives   What are advance directives? Advance directives are legal documents that state your wishes and plans for medical care  These plans are made ahead of time in case you lose your ability to make decisions for yourself  Advance directives can apply to any medical decision, such as the treatments you want, and if you want to donate organs  What are the types of advance directives?   There are many types of advance directives, and each state has rules about how to use them  You may choose a combination of any of the following:  · Living will: This is a written record of the treatment you want  You can also choose which treatments you do not want, which to limit, and which to stop at a certain time  This includes surgery, medicine, IV fluid, and tube feedings  · Durable power of  for healthcare [facility]): This is a written record that states who you want to make healthcare choices for you when you are unable to make them for yourself  This person, called a proxy, is usually a family member or a friend  You may choose more than 1 proxy  · Do not resuscitate (DNR) order:  A DNR order is used in case your heart stops beating or you stop breathing  It is a request not to have certain forms of treatment, such as CPR  A DNR order may be included in other types of advance directives  · Medical directive: This covers the care that you want if you are in a coma, near death, or unable to make decisions for yourself  You can list the treatments you want for each condition  Treatment may include pain medicine, surgery, blood transfusions, dialysis, IV or tube feedings, and a ventilator (breathing machine)  · Values history: This document has questions about your views, beliefs, and how you feel and think about life  This information can help others choose the care that you would choose  Why are advance directives important? An advance directive helps you control your care  Although spoken wishes may be used, it is better to have your wishes written down  Spoken wishes can be misunderstood, or not followed  Treatments may be given even if you do not want them  An advance directive may make it easier for your family to make difficult choices about your care  Urinary Incontinence   Urinary incontinence (UI)  is when you lose control of your bladder  UI develops because your bladder cannot store or empty urine properly   The 3 most common types of UI are stress incontinence, urge incontinence, or both  Medicines:   · May be given to help strengthen your bladder control  Report any side effects of medication to your healthcare provider  Do pelvic muscle exercises often:  Your pelvic muscles help you stop urinating  Squeeze these muscles tight for 5 seconds, then relax for 5 seconds  Gradually work up to squeezing for 10 seconds  Do 3 sets of 15 repetitions a day, or as directed  This will help strengthen your pelvic muscles and improve bladder control  Train your bladder:  Go to the bathroom at set times, such as every 2 hours, even if you do not feel the urge to go  You can also try to hold your urine when you feel the urge to go  For example, hold your urine for 5 minutes when you feel the urge to go  As that becomes easier, hold your urine for 10 minutes  Self-care:   · Keep a UI record  Write down how often you leak urine and how much you leak  Make a note of what you were doing when you leaked urine  · Drink liquids as directed  You may need to limit the amount of liquid you drink to help control your urine leakage  Do not drink any liquid right before you go to bed  Limit or do not have drinks that contain caffeine or alcohol  · Prevent constipation  Eat a variety of high-fiber foods  Good examples are high-fiber cereals, beans, vegetables, and whole-grain breads  Walking is the best way to trigger your intestines to have a bowel movement  · Exercise regularly and maintain a healthy weight  Weight loss and exercise will decrease pressure on your bladder and help you control your leakage  · Use a catheter as directed  to help empty your bladder  A catheter is a tiny, plastic tube that is put into your bladder to drain your urine  · Go to behavior therapy as directed  Behavior therapy may be used to help you learn to control your urge to urinate         © Copyright [employer] 2018 Information is for End User's use only and may not be sold, redistributed or otherwise used for commercial purposes   All illustrations and images included in CareNotes® are the copyrighted property of A D A M , Inc  or [facility] Isi Vitale

## 2019-12-09 NOTE — TELEPHONE ENCOUNTER
Patient looked up Prolia, saw all the side effects and concerned with certain ones since she already has some diagnosis that are listed  Patient wants to think about it again before starting Prolia, will also contact dentist office for opinion

## 2019-12-10 ENCOUNTER — OFFICE VISIT (OUTPATIENT)
Dept: PHYSICAL THERAPY | Facility: CLINIC | Age: 84
End: 2019-12-10
Payer: MEDICARE

## 2019-12-10 DIAGNOSIS — R26.89 BALANCE DISORDER: Primary | ICD-10-CM

## 2019-12-10 PROCEDURE — 97110 THERAPEUTIC EXERCISES: CPT

## 2019-12-10 PROCEDURE — 97530 THERAPEUTIC ACTIVITIES: CPT

## 2019-12-10 PROCEDURE — 97112 NEUROMUSCULAR REEDUCATION: CPT

## 2019-12-10 NOTE — PROGRESS NOTES
Daily Note     Today's date: 12/10/2019  Patient name: Elio Mcdowell  : 1934  MRN: [id number]  Referring provider: Maria Del Carmen Mccarty DPM  Dx:   Encounter Diagnosis     ICD-10-CM    1  Balance disorder R26 89                 Subjective: Patient complains the right knee feels "crunchy" - patient states, "The weather does not help "  Patient reports compliance with the HEP  Objective: See treatment diary below  Assessment: Single leg balance remains challenging however this is slowly improving - especially on the left  Plan: Re-evaluate patient next visit for possible DC to HEP       Precautions: fall risk    Manual  12/10     11/21 11/26 11/29 12/2 12/4                                                                        Exercise Diary  12/10     11/21 11/26 11/29 12/2 12/4   bike 8'     8' 8'  8' 8' 8'   Balance beam  tandem 8 laps     6 laps 6 laps  6 laps 8 laps 8 laps   biodex catch             FT airex reaching      NP D/c ----     Sit to stand feet on airex  2x10     feet on airex  2x10 feet on airex  2x10 Feet on  airex  2x10 feet on airex  2x10 Feet on airex 2x10   sls 30"x3 ea     30"x2 30" x 2  30"x2  ea 30"x3 ea 30"x3 ea   Sidestepping balance beam 8 laps     8 laps 8 laps  8 laps 8 laps 8 laps   Hr/tr 30 ea     1x30 ea 30x ea  30x ea 30 ea 30 ea   stairs 1 flight     2 flights 2 flights  2  flights NP NP   bosu step ups 20 ea     20 ea 20 x 3"  20 x 3" 15 ea 15 ea   hurdles f/l 4 laps  frwrd/  lateral     4 laps 2 laps f/w  2 laps reciprical   4 laps  forwardsideways 4 laps fwd/ side   Squats leg press 20#  2x10        20#  1x15 20# 1x15                                                                                                               Modalities

## 2019-12-12 ENCOUNTER — EVALUATION (OUTPATIENT)
Dept: PHYSICAL THERAPY | Facility: CLINIC | Age: 84
End: 2019-12-12
Payer: MEDICARE

## 2019-12-12 DIAGNOSIS — R26.89 BALANCE DISORDER: Primary | ICD-10-CM

## 2019-12-12 PROCEDURE — 97530 THERAPEUTIC ACTIVITIES: CPT | Performed by: PHYSICAL THERAPIST

## 2019-12-12 PROCEDURE — 97110 THERAPEUTIC EXERCISES: CPT | Performed by: PHYSICAL THERAPIST

## 2019-12-12 NOTE — PROGRESS NOTES
PT Discharge    Today's date: 2019  Patient name: Elizabeth Boss  : 1934  MRN: [id number]  Referring provider: Michaeline Ahumada, DPM  Dx:   Encounter Diagnosis     ICD-10-CM    1  Balance disorder R26 89                   Assessment  Assessment details: Pt has achieved a majority of her goals set at the start of therapy, is independent with her hep, and will be Dc'd at this time  Thank you for this referral           Goals  Short Term Goals: Independent performance of initial hep-met    Long Term Goals: Independent performance of comprehensive hep-met  Performance of IADL's is returned to max level of function-met  Performance in recreational activities is improved to max level of function-met  Reduce risk of falls-met    Plan  Plan details: DC to hep  Planned therapy interventions: abdominal trunk stabilization, IADL retraining, ADL retraining, balance, stretching, therapeutic activities, therapeutic exercise, transfer training, home exercise program and gait training        Subjective Evaluation    History of Present Illness  Mechanism of injury: Pt reports that she is feeling "a little better" since starting PT   Reports partial compliance with her hep and is considering joining the in-clinic fitness program     Patient Goals  Patient goals for therapy: increased motion, improved balance, increased strength, independence with ADLs/IADLs and return to sport/leisure activities          Objective   Ambulates with inconsistent gait pattern  LE strength in 4+/5 t/o    Balance:  FT EO: 30 sec  FT EC: 10 sec  Tandem stance: unable  SLS: unable  FT EO foam: 24 seconds  FT EC on foam 12 sec  TU sec             Precautions: fall risk    Precautions: fall risk    Manual  12/10     11/21 11/26 11/29 12/2 12/4                                                                        Exercise Diary     bike 8'     8' 8'  8' 8' 8'   Balance beam  tandem 8 laps     6 laps 6 laps 6 laps 8 laps 8 laps   biodex catch             FT airex reaching      NP D/c ----     Sit to stand feet on airex  2x10     feet on airex  2x10 feet on airex  2x10 Feet on  airex  2x10 feet on airex  2x10 Feet on airex 2x10   sls 30"x3 ea     30"x2 30" x 2  30"x2  ea 30"x3 ea 30"x3 ea   Sidestepping balance beam 8 laps     8 laps 8 laps  8 laps 8 laps 8 laps   Hr/tr 30 ea     1x30 ea 30x ea  30x ea 30 ea 30 ea   stairs 1 flight     2 flights 2 flights  2  flights NP NP   bosu step ups 20 ea     20 ea 20 x 3"  20 x 3" 15 ea 15 ea   hurdles f/l 4 laps  frwrd/  lateral     4 laps 2 laps f/w  2 laps reciprical   4 laps  forwardsideways 4 laps fwd/ side   Squats leg press 20#  2x10        20#  1x15 20# 1x15                                                                                                               Modalities

## 2019-12-16 ENCOUNTER — CLINICAL SUPPORT (OUTPATIENT)
Dept: FAMILY MEDICINE CLINIC | Facility: CLINIC | Age: 84
End: 2019-12-16
Payer: MEDICARE

## 2019-12-16 DIAGNOSIS — M81.0 OSTEOPOROSIS, UNSPECIFIED OSTEOPOROSIS TYPE, UNSPECIFIED PATHOLOGICAL FRACTURE PRESENCE: Primary | ICD-10-CM

## 2019-12-16 PROCEDURE — 96372 THER/PROPH/DIAG INJ SC/IM: CPT

## 2019-12-30 ENCOUNTER — HOSPITAL ENCOUNTER (OUTPATIENT)
Dept: RADIOLOGY | Facility: HOSPITAL | Age: 84
Discharge: HOME | End: 2019-12-30
Payer: MEDICARE

## 2019-12-30 VITALS — WEIGHT: 130 LBS | HEIGHT: 63 IN | BODY MASS INDEX: 23.04 KG/M2

## 2019-12-30 DIAGNOSIS — Z12.39 SCREENING FOR BREAST CANCER: ICD-10-CM

## 2019-12-30 PROCEDURE — 77067 SCR MAMMO BI INCL CAD: CPT

## 2020-01-13 ENCOUNTER — OFFICE VISIT (OUTPATIENT)
Dept: CARDIOLOGY CLINIC | Facility: CLINIC | Age: 85
End: 2020-01-13
Payer: MEDICARE

## 2020-01-13 VITALS
DIASTOLIC BLOOD PRESSURE: 80 MMHG | HEART RATE: 77 BPM | BODY MASS INDEX: 25.21 KG/M2 | SYSTOLIC BLOOD PRESSURE: 146 MMHG | HEIGHT: 62 IN | WEIGHT: 137 LBS

## 2020-01-13 DIAGNOSIS — I10 BENIGN ESSENTIAL HYPERTENSION: Primary | ICD-10-CM

## 2020-01-13 DIAGNOSIS — E78.5 DYSLIPIDEMIA: ICD-10-CM

## 2020-01-13 PROCEDURE — 99214 OFFICE O/P EST MOD 30 MIN: CPT | Performed by: INTERNAL MEDICINE

## 2020-01-13 PROCEDURE — 93000 ELECTROCARDIOGRAM COMPLETE: CPT | Performed by: INTERNAL MEDICINE

## 2020-01-13 NOTE — PROGRESS NOTES
Cardiology Follow-up    David Dunham  [id number]  1934  HEART & VASCULAR [facility] [address] [address]      1  Benign essential hypertension  POCT ECG   2  Dyslipidemia         Discussion/Summary:  Ms Becky Wilkinson is a pleasant 22-year-old female who presents to the office today for routine follow-up  Her blood pressure is elevated in the office today  We discussed intensification of her antihypertensive medication regimen although she thinks the Myrbetriq is a contributor  Therefore she will contact her urologist to see if there is another drug that can be substituted  For now no changes were made to her medication regimen  She will notify the office after the Myrbetriq is discontinued of persistently elevated readings  Her most recent lipids were reviewed  They are acceptable on current therapy  Her triglycerides have improved since her last few assessments  Otherwise she is asymptomatic and no testing is advised  I will see her back in the office in six months for re-evaluation of her blood pressure  History of Present Illness:  Ms Becky Wilkinson is a pleasant 22-year-old female who presents to the office today for the re-evaluation of hypertension  Since her last visit with me she was placed on Myrbetriq due to urologic issues by her urologist   She feels that this has raised her blood pressure  Otherwise she leads a sedentary lifestyle but does participate in senior yoga once a week  She also ascends steps on a regular basis  She can do so without any chest pain or shortness of breath  She denies any signs or symptoms of congestive heart failure including increasing lower extremity edema, paroxysmal nocturnal dyspnea, orthopnea, acute weight gain or increasing abdominal girth  She denies syncope or presyncope  She denies palpitations or symptoms of claudication        Patient Active Problem List   Diagnosis    Benign essential hypertension    Hypothyroidism    History of endometrial cancer    Abnormal breast exam    Arthritis    Constipation    Depression with anxiety    Diverticulosis    Duodenal diverticulum    Esophagitis, reflux    Dyslipidemia    Impaired fasting glucose    Insomnia    Age-related osteoporosis without current pathological fracture    Atrophic vaginitis    Urinary incontinence    Vertigo    Encounter for follow-up surveillance of endometrial cancer    [facility]    Arthritis of left shoulder region    Encounter for screening mammogram for malignant neoplasm of breast    Hyperlipidemia    Need for immunization against influenza     Past Medical History:   Diagnosis Date    Anemia     post-op, 07/07/09    Anxiety     last assessed: 01/15/14    C  difficile diarrhea     Chest wall trauma     Acute, last assessed: 10/24/12    Depression     Disease of thyroid gland     hypothyroid    Diverticula of colon     [facility]     Endometrial hyperplasia     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[facility] )     stage IA, Grade I endometriod adenocarcinoma with 43% myometrial invasion and no LVSI, last assessed: 04/92/14    Folliculitis     last assessed: 03/02/16    Hypercholesterolemia     Hyperlipidemia     Hypertension     Incontinence in female     Migraine     Nontoxic single thyroid nodule     last assessed: 02/16/13    Osteoporosis     last assessed: 02/22/17    Pure hypercholesterolemia     Rotator cuff (capsule) sprain     Acute, right    Skin lesion     last assessed: 04/26/13    Stomatitis     Strain of right buttock     gluteus medius    Tendinitis of right rotator cuff     last assessed: 08/12/12    Thyroid cyst     last assessed: 08/15/13     Social History     Socioeconomic History    Marital status:       Spouse name: Not on file    Number of children: Not on file    Years of education: Not on file  Highest education level: Not on file   Occupational History    Not on file   Social Needs    Financial resource strain: Not on file    Food insecurity:     Worry: Not on file     Inability: Not on file    Transportation needs:     Medical: Not on file     Non-medical: Not on file   Tobacco Use    Smoking status: Never Smoker    Smokeless tobacco: Never Used   Substance and Sexual Activity    Alcohol use: No    Drug use: No    Sexual activity: Not on file   Lifestyle    Physical activity:     Days per week: Not on file     Minutes per session: Not on file    Stress: Not on file   Relationships    Social connections:     Talks on phone: Not on file     Gets together: Not on file     Attends Shinto service: Not on file     Active member of club or organization: Not on file     Attends meetings of clubs or organizations: Not on file     Relationship status: Not on file    Intimate partner violence:     Fear of current or ex partner: Not on file     Emotionally abused: Not on file     Physically abused: Not on file     Forced sexual activity: Not on file   Other Topics Concern    Not on file   Social History Narrative    Not on file      Family History   Problem Relation Age of Onset    Cancer Father     Other Brother         Prolapsing mitral valve leaflet syndrome    No Known Problems Mother     No Known Problems Daughter     No Known Problems Maternal Grandmother     No Known Problems Maternal Grandfather     Breast cancer Paternal Grandmother 79    No Known Problems Paternal Grandfather     No Known Problems Daughter     No Known Problems Daughter     Testicular cancer Brother     Lung cancer Brother     Prostate cancer Brother     No Known Problems Maternal Aunt     No Known Problems Paternal Aunt     No Known Problems Paternal Aunt      Past Surgical History:   Procedure Laterality Date    APPENDECTOMY      BREAST BIOPSY Right     BREAST BIOPSY Left     BREAST CYST ASPIRATION      COLONOSCOPY      fiberoptic, 1998, 2001, 2006    CYSTOSCOPY      Diagnostic    EGD AND COLONOSCOPY N/A 3/11/2016    Procedure: EGD ;  Surgeon: Anish Mcfarland MD;  Location: [facility];   Service:     HYSTERECTOMY  03/22/2011    Robotic-assisted, total laparoscopic approch    JOINT REPLACEMENT      KNEE SURGERY      OOPHORECTOMY Bilateral 03/22/2011    Salpingo    REPLACEMENT TOTAL KNEE      TONSILECTOMY AND ADNOIDECTOMY      TOTAL SHOULDER ARTHROPLASTY Right        Current Outpatient Medications:     ALPHAGAN P 0 1 %, instill 1 drop into both eyes twice a day, Disp: , Rfl: 0    ALPRAZolam (XANAX) 0 25 mg tablet, Take 1 tablet (0 25 mg total) by mouth as needed for anxiety for up to 30 days, Disp: 30 tablet, Rfl: 0    bifidobacterium infantis (ALIGN) capsule, Take by mouth, Disp: , Rfl:     carvedilol (COREG) 12 5 mg tablet, Take 1 tablet (12 5 mg total) by mouth 2 (two) times a day with meals, Disp: 60 tablet, Rfl: 11    cholecalciferol (VITAMIN D3) 1,000 units tablet, Take by mouth, Disp: , Rfl:     DENTA 5000 PLUS 1 1 % CREA, BRUSH UP TO TWICE A DAY, Disp: , Rfl: 0    docusate sodium (COLACE) 100 mg capsule, Take 1 capsule (100 mg total) by mouth 2 (two) times a day, Disp: 10 capsule, Rfl: 0    famotidine (PEPCID) 10 mg tablet, Take 10 mg by mouth daily at bedtime as needed , Disp: , Rfl:     levothyroxine 25 mcg tablet, Take 1 tablet (25 mcg total) by mouth daily, Disp: 90 tablet, Rfl: 1    lisinopril (ZESTRIL) 20 mg tablet, Take 1 tablet (20 mg total) by mouth daily, Disp: 90 tablet, Rfl: 3    Mirabegron (MYRBETRIQ PO), Take by mouth, Disp: , Rfl:     Multiple Vitamins-Minerals (CENTRUM SILVER PO), Take by mouth, Disp: , Rfl:     Probiotic Product (PROBIOTIC ADVANCED PO), Take 1 capsule by mouth daily, Disp: , Rfl:     simvastatin (ZOCOR) 20 mg tablet, take 1 tablet by mouth once daily, Disp: 90 tablet, Rfl: 3    acetaminophen (TYLENOL) 650 mg CR tablet, Take 650 mg by mouth as needed for mild pain , Disp: , Rfl:     meclizine (ANTIVERT) 25 mg tablet, Take 1 tablet (25 mg total) by mouth every 8 (eight) hours as needed for dizziness (Patient not taking: Reported on 5/16/2019), Disp: 30 tablet, Rfl: 0    polyethylene glycol (MIRALAX) powder, Take by mouth as needed  , Disp: , Rfl:     Current Facility-Administered Medications:     denosumab (PROLIA) subcutaneous injection 60 mg, 60 mg, Subcutaneous, Q6 Months, Robert Perez MD, 60 mg at 12/16/19 1613  Allergies   Allergen Reactions    Amlodipine Itching     Reaction Date: 19Jul2011;     Amoxil [Amoxicillin] Hives    Biaxin [Clarithromycin] GI Intolerance     Reaction Date: 19Jul2011;   Pt gets nauseated    Esomeprazole GI Intolerance    Fosamax [Alendronate] GI Intolerance    Hydrochlorothiazide      Hyponatremia      Lansoprazole GI Intolerance    Norvasc [Amlodipine Besylate]     Pantoprazole GI Intolerance     Can tolerate IV- Not oral    Relafen [Nabumetone] GI Intolerance and Other (See Comments)         Review of Systems:  Review of Systems   Respiratory: Negative for apnea, cough, chest tightness, shortness of breath and stridor  Genitourinary: Positive for frequency  All other systems reviewed and are negative          Vitals:    01/13/20 0946   BP: 146/80   BP Location: Right arm   Patient Position: Sitting   Cuff Size: Large   Pulse: 77   Weight: 62 1 kg (137 lb)   Height: 5' 2" (1 575 m)     Vitals:    01/13/20 0946   Weight: 62 1 kg (137 lb)     Physical Exam:  General:  Alert and cooperative, appears stated age  HEENT:  PERRLA, EOMI, no scleral icterus, no conjunctival pallor  Neck:  No lymphadenopathy, no thyromegaly, no carotid bruits, no elevated JVP  Heart:  Regular rate and rhythm, normal S1/S2, no S3/S4, no murmur  Lungs:  Clear to auscultation bilaterally   Abdomen:  Soft, non-tender, positive bowel sounds, no rebound or guarding,   no organomegaly   Extremities:  No clubbing, cyanosis or edema   Vascular:  2+ pedal pulses  Skin:  No rashes or lesions on exposed skin  Neurologic:  Cranial nerves II-XII grossly intact without focal deficits

## 2020-02-21 DIAGNOSIS — Z00.00 HEALTHCARE MAINTENANCE: Primary | ICD-10-CM

## 2020-02-21 NOTE — PROGRESS NOTES
I talked with pt today  Ophthalmology suggest ASA 81mg because of BRVO OS, HTN retinopathy OD  Pt states she already started and no problem so far  I advised pt to take ASA with food

## 2020-02-23 DIAGNOSIS — I10 BENIGN ESSENTIAL HYPERTENSION: ICD-10-CM

## 2020-02-23 RX ORDER — CARVEDILOL 12.5 MG/1
TABLET ORAL
Qty: 60 TABLET | Refills: 11 | Status: SHIPPED | OUTPATIENT
Start: 2020-02-23 | End: 2020-05-22 | Stop reason: SDUPTHER

## 2020-02-27 ENCOUNTER — OFFICE VISIT (OUTPATIENT)
Dept: FAMILY MEDICINE CLINIC | Facility: CLINIC | Age: 85
End: 2020-02-27
Payer: MEDICARE

## 2020-02-27 VITALS
HEART RATE: 88 BPM | WEIGHT: 138 LBS | RESPIRATION RATE: 16 BRPM | HEIGHT: 62 IN | DIASTOLIC BLOOD PRESSURE: 80 MMHG | SYSTOLIC BLOOD PRESSURE: 140 MMHG | BODY MASS INDEX: 25.4 KG/M2 | TEMPERATURE: 98.5 F

## 2020-02-27 DIAGNOSIS — R31.9 URINARY TRACT INFECTION WITH HEMATURIA, SITE UNSPECIFIED: ICD-10-CM

## 2020-02-27 DIAGNOSIS — R32 URINARY INCONTINENCE, UNSPECIFIED TYPE: ICD-10-CM

## 2020-02-27 DIAGNOSIS — N39.0 URINARY TRACT INFECTION WITH HEMATURIA, SITE UNSPECIFIED: ICD-10-CM

## 2020-02-27 DIAGNOSIS — R30.0 DYSURIA: Primary | ICD-10-CM

## 2020-02-27 LAB
BACTERIA UR QL AUTO: ABNORMAL /HPF
BILIRUB UR QL STRIP: NEGATIVE
CLARITY UR: ABNORMAL
COLOR UR: ABNORMAL
GLUCOSE UR STRIP-MCNC: NEGATIVE MG/DL
HGB UR QL STRIP.AUTO: ABNORMAL
KETONES UR STRIP-MCNC: NEGATIVE MG/DL
LEUKOCYTE ESTERASE UR QL STRIP: ABNORMAL
NITRITE UR QL STRIP: NEGATIVE
NON-SQ EPI CELLS URNS QL MICRO: ABNORMAL /HPF
PH UR STRIP.AUTO: 5.5 [PH]
PROT UR STRIP-MCNC: ABNORMAL MG/DL
RBC #/AREA URNS AUTO: ABNORMAL /HPF
SL AMB  POCT GLUCOSE, UA: ABNORMAL
SL AMB LEUKOCYTE ESTERASE,UA: ABNORMAL
SL AMB POCT BILIRUBIN,UA: ABNORMAL
SL AMB POCT BLOOD,UA: ABNORMAL
SL AMB POCT CLARITY,UA: ABNORMAL
SL AMB POCT COLOR,UA: ABNORMAL
SL AMB POCT KETONES,UA: ABNORMAL
SL AMB POCT NITRITE,UA: ABNORMAL
SL AMB POCT PH,UA: 6
SL AMB POCT SPECIFIC GRAVITY,UA: 1.03
SL AMB POCT URINE PROTEIN: ABNORMAL
SL AMB POCT UROBILINOGEN: 0.2
SP GR UR STRIP.AUTO: 1.02 (ref 1–1.03)
UROBILINOGEN UR QL STRIP.AUTO: 0.2 E.U./DL
WBC #/AREA URNS AUTO: ABNORMAL /HPF

## 2020-02-27 PROCEDURE — 81001 URINALYSIS AUTO W/SCOPE: CPT | Performed by: NURSE PRACTITIONER

## 2020-02-27 PROCEDURE — 1160F RVW MEDS BY RX/DR IN RCRD: CPT | Performed by: NURSE PRACTITIONER

## 2020-02-27 PROCEDURE — 81002 URINALYSIS NONAUTO W/O SCOPE: CPT | Performed by: NURSE PRACTITIONER

## 2020-02-27 PROCEDURE — 87086 URINE CULTURE/COLONY COUNT: CPT | Performed by: NURSE PRACTITIONER

## 2020-02-27 PROCEDURE — 3079F DIAST BP 80-89 MM HG: CPT | Performed by: NURSE PRACTITIONER

## 2020-02-27 PROCEDURE — 4040F PNEUMOC VAC/ADMIN/RCVD: CPT | Performed by: NURSE PRACTITIONER

## 2020-02-27 PROCEDURE — 1036F TOBACCO NON-USER: CPT | Performed by: NURSE PRACTITIONER

## 2020-02-27 PROCEDURE — 3077F SYST BP >= 140 MM HG: CPT | Performed by: NURSE PRACTITIONER

## 2020-02-27 PROCEDURE — 3008F BODY MASS INDEX DOCD: CPT | Performed by: NURSE PRACTITIONER

## 2020-02-27 PROCEDURE — 99213 OFFICE O/P EST LOW 20 MIN: CPT | Performed by: NURSE PRACTITIONER

## 2020-02-27 RX ORDER — CIPROFLOXACIN 500 MG/1
500 TABLET, FILM COATED ORAL EVERY 12 HOURS SCHEDULED
Qty: 14 TABLET | Refills: 0 | Status: SHIPPED | OUTPATIENT
Start: 2020-02-27 | End: 2020-03-05

## 2020-02-27 NOTE — PROGRESS NOTES
Assessment/Plan:    UTI with hematuria- Urine dip with 1+ leuks, 3+ blood  We will start Cipro 500 mg one tab BID x 7 days  Send urine out for UA with reflex to culture  Stay well hydrated  Proper feminine hygiene reviewed  Call office if symptoms worsen or persist     Urinary incontinence  Pt typically follows with Dr Raguel Opitz office but she is requesting a second opinion  Referred to Urology       Diagnoses and all orders for this visit:    Dysuria  -     POCT urine dip    Urinary tract infection with hematuria, site unspecified  -     UA w Reflex to Microscopic w Reflex to Culture - Clinic Collect  -     ciprofloxacin (CIPRO) 500 mg tablet; Take 1 tablet (500 mg total) by mouth every 12 (twelve) hours for 7 days    Urinary incontinence, unspecified type  -     Ambulatory referral to Urology; Future          Subjective:      Patient ID: Elio Mcdowell is a 80 y o  female  HPI    Pt presents by herself today for an acute visit   C/o dysuria, hematuria and urinary urgency for the past 24 hours  Had these same symptoms last week for 1-2 days but they seemed to have resolve at that time spontaneously  Denies cloudy or malodorous urine  No flank pain  No F/C, N/V/D    Most recent kidney function from 11/2019 with Bun 12, Cr 0 74, eGFR 74    Pt does have urinary incontinence  She was on Myrbetriq in the past but feels this elevated her BP  Dr Alicia Thomas has advised her to follow up with Urology  She does follow with Dr Raguel Opitz office but would like a second opinion       The following portions of the patient's history were reviewed and updated as appropriate: allergies, current medications, past family history, past medical history, past social history, past surgical history and problem list     Review of Systems   Constitutional: Negative for chills, fatigue and fever  Respiratory: Negative for cough, shortness of breath and wheezing      Cardiovascular: Negative for chest pain, palpitations and leg swelling  Gastrointestinal: Negative for abdominal pain, blood in stool, constipation, diarrhea and nausea  Genitourinary: Positive for dysuria, frequency, hematuria and urgency  Negative for decreased urine volume, difficulty urinating, dyspareunia, enuresis, flank pain, genital sores, menstrual problem, pelvic pain, vaginal bleeding, vaginal discharge and vaginal pain  +urinary incontinence    Musculoskeletal: Negative for arthralgias and myalgias  Skin: Negative for rash and wound  Neurological: Negative for dizziness, weakness, numbness and headaches  Psychiatric/Behavioral: Negative for sleep disturbance and suicidal ideas  Objective:      /80   Pulse 88   Temp 98 5 °F (36 9 °C) (Tympanic)   Resp 16   Ht 5' 2" (1 575 m)   Wt 62 6 kg (138 lb)   BMI 25 24 kg/m²          Physical Exam   Constitutional: She is oriented to person, place, and time  She appears well-developed and well-nourished  No distress  HENT:   Head: Normocephalic and atraumatic  Eyes: Pupils are equal, round, and reactive to light  Neck: Normal range of motion  Neck supple  Cardiovascular: Normal rate, regular rhythm and normal heart sounds  No murmur heard  Pulmonary/Chest: Effort normal and breath sounds normal  No respiratory distress  She has no wheezes  Abdominal: Soft  Bowel sounds are normal  She exhibits no distension  There is no tenderness  There is no rigidity, no rebound, no guarding, no CVA tenderness, no tenderness at McBurney's point and negative Gomez's sign  Musculoskeletal: Normal range of motion  Neurological: She is alert and oriented to person, place, and time  Skin: Skin is warm and dry  She is not diaphoretic  Psychiatric: She has a normal mood and affect

## 2020-02-27 NOTE — ASSESSMENT & PLAN NOTE
Pt typically follows with Dr Genna Paulino office but she is requesting a second opinion    Referred to Urology

## 2020-02-28 LAB — BACTERIA UR CULT: NORMAL

## 2020-02-29 NOTE — RESULT ENCOUNTER NOTE
I called patient and reviewed the results and instructions with her  She is fine scheduling the visit herself, she will be going to the [facility]

## 2020-03-11 DIAGNOSIS — E78.5 HYPERLIPIDEMIA, UNSPECIFIED HYPERLIPIDEMIA TYPE: ICD-10-CM

## 2020-03-11 RX ORDER — SIMVASTATIN 20 MG
TABLET ORAL
Qty: 90 TABLET | Refills: 3 | Status: SHIPPED | OUTPATIENT
Start: 2020-03-11 | End: 2020-06-04

## 2020-04-15 ENCOUNTER — TELEMEDICINE (OUTPATIENT)
Dept: UROLOGY | Facility: AMBULATORY SURGERY CENTER | Age: 85
End: 2020-04-15
Payer: MEDICARE

## 2020-04-15 DIAGNOSIS — R32 URINARY INCONTINENCE, UNSPECIFIED TYPE: Primary | ICD-10-CM

## 2020-04-15 PROCEDURE — 99205 OFFICE O/P NEW HI 60 MIN: CPT | Performed by: UROLOGY

## 2020-04-15 RX ORDER — OXYBUTYNIN CHLORIDE 10 MG/1
10 TABLET, EXTENDED RELEASE ORAL DAILY
Qty: 90 TABLET | Refills: 3 | Status: SHIPPED | OUTPATIENT
Start: 2020-04-15 | End: 2020-09-11 | Stop reason: SDUPTHER

## 2020-04-23 DIAGNOSIS — E03.9 HYPOTHYROIDISM, UNSPECIFIED TYPE: ICD-10-CM

## 2020-04-23 RX ORDER — LEVOTHYROXINE SODIUM 0.03 MG/1
TABLET ORAL
Qty: 90 TABLET | Refills: 1 | Status: SHIPPED | OUTPATIENT
Start: 2020-04-23 | End: 2020-10-26

## 2020-04-29 DIAGNOSIS — I10 ESSENTIAL HYPERTENSION: ICD-10-CM

## 2020-04-29 RX ORDER — LISINOPRIL 20 MG/1
20 TABLET ORAL DAILY
Qty: 90 TABLET | Refills: 3 | Status: SHIPPED | OUTPATIENT
Start: 2020-04-29 | End: 2020-05-01 | Stop reason: SDUPTHER

## 2020-05-01 ENCOUNTER — TELEPHONE (OUTPATIENT)
Dept: CARDIOLOGY CLINIC | Facility: CLINIC | Age: 85
End: 2020-05-01

## 2020-05-01 DIAGNOSIS — I10 BENIGN ESSENTIAL HYPERTENSION: ICD-10-CM

## 2020-05-01 DIAGNOSIS — I10 ESSENTIAL HYPERTENSION: ICD-10-CM

## 2020-05-01 RX ORDER — CARVEDILOL 12.5 MG/1
TABLET ORAL
Qty: 180 TABLET | Refills: 3 | Status: CANCELLED | OUTPATIENT
Start: 2020-05-01

## 2020-05-01 RX ORDER — LISINOPRIL 40 MG/1
40 TABLET ORAL DAILY
Qty: 90 TABLET | Refills: 3 | Status: SHIPPED | OUTPATIENT
Start: 2020-05-01 | End: 2021-06-21 | Stop reason: ALTCHOICE

## 2020-05-01 RX ORDER — CARVEDILOL 12.5 MG/1
12.5 TABLET ORAL 2 TIMES DAILY WITH MEALS
Qty: 180 TABLET | Refills: 3 | Status: SHIPPED | OUTPATIENT
Start: 2020-05-01 | End: 2020-05-15 | Stop reason: SDUPTHER

## 2020-05-15 ENCOUNTER — TELEPHONE (OUTPATIENT)
Dept: CARDIOLOGY CLINIC | Facility: CLINIC | Age: 85
End: 2020-05-15

## 2020-05-22 ENCOUNTER — TELEPHONE (OUTPATIENT)
Dept: CARDIOLOGY CLINIC | Facility: CLINIC | Age: 85
End: 2020-05-22

## 2020-05-22 DIAGNOSIS — I10 BENIGN ESSENTIAL HYPERTENSION: ICD-10-CM

## 2020-05-22 RX ORDER — CARVEDILOL 25 MG/1
25 TABLET ORAL 2 TIMES DAILY WITH MEALS
Qty: 180 TABLET | Refills: 3 | Status: SHIPPED | OUTPATIENT
Start: 2020-05-22 | End: 2020-09-11 | Stop reason: SDUPTHER

## 2020-06-04 DIAGNOSIS — E78.5 HYPERLIPIDEMIA, UNSPECIFIED HYPERLIPIDEMIA TYPE: ICD-10-CM

## 2020-06-04 RX ORDER — SIMVASTATIN 20 MG
TABLET ORAL
Qty: 90 TABLET | Refills: 3 | Status: SHIPPED | OUTPATIENT
Start: 2020-06-04 | End: 2021-05-28

## 2020-06-12 ENCOUNTER — APPOINTMENT (OUTPATIENT)
Dept: LAB | Facility: CLINIC | Age: 85
End: 2020-06-12
Payer: MEDICARE

## 2020-06-12 DIAGNOSIS — E03.9 HYPOTHYROIDISM, UNSPECIFIED TYPE: ICD-10-CM

## 2020-06-12 DIAGNOSIS — I10 BENIGN ESSENTIAL HYPERTENSION: ICD-10-CM

## 2020-06-12 DIAGNOSIS — R73.01 IMPAIRED FASTING GLUCOSE: ICD-10-CM

## 2020-06-12 DIAGNOSIS — E78.5 DYSLIPIDEMIA: ICD-10-CM

## 2020-06-12 LAB
ALBUMIN SERPL BCP-MCNC: 3.4 G/DL (ref 3.5–5)
ALP SERPL-CCNC: 44 U/L (ref 46–116)
ALT SERPL W P-5'-P-CCNC: 24 U/L (ref 12–78)
ANION GAP SERPL CALCULATED.3IONS-SCNC: 4 MMOL/L (ref 4–13)
AST SERPL W P-5'-P-CCNC: 17 U/L (ref 5–45)
BILIRUB SERPL-MCNC: 0.33 MG/DL (ref 0.2–1)
BUN SERPL-MCNC: 14 MG/DL (ref 5–25)
CALCIUM SERPL-MCNC: 9.5 MG/DL (ref 8.3–10.1)
CHLORIDE SERPL-SCNC: 103 MMOL/L (ref 100–108)
CHOLEST SERPL-MCNC: 144 MG/DL (ref 50–200)
CO2 SERPL-SCNC: 28 MMOL/L (ref 21–32)
CREAT SERPL-MCNC: 0.78 MG/DL (ref 0.6–1.3)
EST. AVERAGE GLUCOSE BLD GHB EST-MCNC: 117 MG/DL
GFR SERPL CREATININE-BSD FRML MDRD: 70 ML/MIN/1.73SQ M
GLUCOSE P FAST SERPL-MCNC: 101 MG/DL (ref 65–99)
HBA1C MFR BLD: 5.7 %
HDLC SERPL-MCNC: 51 MG/DL
LDLC SERPL CALC-MCNC: 78 MG/DL (ref 0–100)
NONHDLC SERPL-MCNC: 93 MG/DL
POTASSIUM SERPL-SCNC: 4.2 MMOL/L (ref 3.5–5.3)
PROT SERPL-MCNC: 6.6 G/DL (ref 6.4–8.2)
SODIUM SERPL-SCNC: 135 MMOL/L (ref 136–145)
TRIGL SERPL-MCNC: 75 MG/DL
TSH SERPL DL<=0.05 MIU/L-ACNC: 3.43 UIU/ML (ref 0.36–3.74)

## 2020-06-12 PROCEDURE — 84443 ASSAY THYROID STIM HORMONE: CPT

## 2020-06-12 PROCEDURE — 83036 HEMOGLOBIN GLYCOSYLATED A1C: CPT

## 2020-06-12 PROCEDURE — 36415 COLL VENOUS BLD VENIPUNCTURE: CPT

## 2020-06-12 PROCEDURE — 80061 LIPID PANEL: CPT

## 2020-06-12 PROCEDURE — 80053 COMPREHEN METABOLIC PANEL: CPT

## 2020-06-19 ENCOUNTER — OFFICE VISIT (OUTPATIENT)
Dept: FAMILY MEDICINE CLINIC | Facility: CLINIC | Age: 85
End: 2020-06-19
Payer: MEDICARE

## 2020-06-19 VITALS
HEIGHT: 62 IN | RESPIRATION RATE: 12 BRPM | TEMPERATURE: 98.2 F | SYSTOLIC BLOOD PRESSURE: 148 MMHG | DIASTOLIC BLOOD PRESSURE: 82 MMHG | WEIGHT: 134.2 LBS | BODY MASS INDEX: 24.69 KG/M2 | OXYGEN SATURATION: 98 % | HEART RATE: 76 BPM

## 2020-06-19 DIAGNOSIS — R73.01 IMPAIRED FASTING GLUCOSE: ICD-10-CM

## 2020-06-19 DIAGNOSIS — I10 BENIGN ESSENTIAL HYPERTENSION: ICD-10-CM

## 2020-06-19 DIAGNOSIS — E03.9 HYPOTHYROIDISM, UNSPECIFIED TYPE: Primary | ICD-10-CM

## 2020-06-19 DIAGNOSIS — M81.0 AGE-RELATED OSTEOPOROSIS WITHOUT CURRENT PATHOLOGICAL FRACTURE: ICD-10-CM

## 2020-06-19 DIAGNOSIS — R32 URINARY INCONTINENCE, UNSPECIFIED TYPE: ICD-10-CM

## 2020-06-19 DIAGNOSIS — E78.5 DYSLIPIDEMIA: ICD-10-CM

## 2020-06-19 DIAGNOSIS — F41.8 DEPRESSION WITH ANXIETY: ICD-10-CM

## 2020-06-19 PROBLEM — H34.8322 STABLE BRANCH RETINAL VEIN OCCLUSION OF LEFT EYE: Status: ACTIVE | Noted: 2020-03-17

## 2020-06-19 PROBLEM — Z98.49 STATUS POST CATARACT EXTRACTION AND INSERTION OF INTRAOCULAR LENS: Status: ACTIVE | Noted: 2020-03-17

## 2020-06-19 PROBLEM — Z96.1 STATUS POST CATARACT EXTRACTION AND INSERTION OF INTRAOCULAR LENS: Status: ACTIVE | Noted: 2020-03-17

## 2020-06-19 PROBLEM — H35.3131 NONEXUDATIVE AGE-RELATED MACULAR DEGENERATION, BILATERAL, EARLY DRY STAGE: Status: ACTIVE | Noted: 2020-03-17

## 2020-06-19 PROBLEM — H43.813 POSTERIOR VITREOUS DETACHMENT OF BOTH EYES: Status: ACTIVE | Noted: 2020-03-17

## 2020-06-19 PROCEDURE — 4040F PNEUMOC VAC/ADMIN/RCVD: CPT | Performed by: FAMILY MEDICINE

## 2020-06-19 PROCEDURE — 1036F TOBACCO NON-USER: CPT | Performed by: FAMILY MEDICINE

## 2020-06-19 PROCEDURE — 1160F RVW MEDS BY RX/DR IN RCRD: CPT | Performed by: FAMILY MEDICINE

## 2020-06-19 PROCEDURE — 3079F DIAST BP 80-89 MM HG: CPT | Performed by: FAMILY MEDICINE

## 2020-06-19 PROCEDURE — 3077F SYST BP >= 140 MM HG: CPT | Performed by: FAMILY MEDICINE

## 2020-06-19 PROCEDURE — 3008F BODY MASS INDEX DOCD: CPT | Performed by: FAMILY MEDICINE

## 2020-06-19 PROCEDURE — 99214 OFFICE O/P EST MOD 30 MIN: CPT | Performed by: FAMILY MEDICINE

## 2020-06-23 ENCOUNTER — TELEPHONE (OUTPATIENT)
Dept: GYNECOLOGIC ONCOLOGY | Facility: CLINIC | Age: 85
End: 2020-06-23

## 2020-07-14 ENCOUNTER — OFFICE VISIT (OUTPATIENT)
Dept: GYNECOLOGIC ONCOLOGY | Facility: CLINIC | Age: 85
End: 2020-07-14
Payer: MEDICARE

## 2020-07-14 VITALS
SYSTOLIC BLOOD PRESSURE: 150 MMHG | WEIGHT: 131 LBS | HEART RATE: 74 BPM | RESPIRATION RATE: 16 BRPM | HEIGHT: 62 IN | BODY MASS INDEX: 24.11 KG/M2 | TEMPERATURE: 97.9 F | DIASTOLIC BLOOD PRESSURE: 90 MMHG

## 2020-07-14 DIAGNOSIS — Z85.42 HISTORY OF ENDOMETRIAL CANCER: Primary | ICD-10-CM

## 2020-07-14 DIAGNOSIS — Z85.42 ENCOUNTER FOR FOLLOW-UP SURVEILLANCE OF ENDOMETRIAL CANCER: ICD-10-CM

## 2020-07-14 DIAGNOSIS — Z08 ENCOUNTER FOR FOLLOW-UP SURVEILLANCE OF ENDOMETRIAL CANCER: ICD-10-CM

## 2020-07-14 DIAGNOSIS — Z12.31 ENCOUNTER FOR SCREENING MAMMOGRAM FOR BREAST CANCER: ICD-10-CM

## 2020-07-14 PROCEDURE — 4040F PNEUMOC VAC/ADMIN/RCVD: CPT | Performed by: PHYSICIAN ASSISTANT

## 2020-07-14 PROCEDURE — 3077F SYST BP >= 140 MM HG: CPT | Performed by: PHYSICIAN ASSISTANT

## 2020-07-14 PROCEDURE — 3080F DIAST BP >= 90 MM HG: CPT | Performed by: PHYSICIAN ASSISTANT

## 2020-07-14 PROCEDURE — 1160F RVW MEDS BY RX/DR IN RCRD: CPT | Performed by: PHYSICIAN ASSISTANT

## 2020-07-14 PROCEDURE — 1036F TOBACCO NON-USER: CPT | Performed by: PHYSICIAN ASSISTANT

## 2020-07-14 PROCEDURE — 3008F BODY MASS INDEX DOCD: CPT | Performed by: PHYSICIAN ASSISTANT

## 2020-07-14 PROCEDURE — 99212 OFFICE O/P EST SF 10 MIN: CPT | Performed by: PHYSICIAN ASSISTANT

## 2020-07-14 NOTE — ASSESSMENT & PLAN NOTE
History of tage IA, grade 1 endometrial cancer s/p surgical resection in March 2011  She is clinically without evidence of disease recurrence  Return to the office in 1 year for continued surveillance

## 2020-07-14 NOTE — PROGRESS NOTES
Assessment/Plan:    Problem List Items Addressed This Visit        Other    History of endometrial cancer - Primary     History of tage IA, grade 1 endometrial cancer s/p surgical resection in March 2011  She is clinically without evidence of disease recurrence  Return to the office in 1 year for continued surveillance  Encounter for follow-up surveillance of endometrial cancer      Other Visit Diagnoses     Encounter for screening mammogram for breast cancer        Relevant Orders    Mammo screening bilateral w 3d & cad            CHIEF COMPLAINT:   Endometrial cancer surveillance    Problem:  Cancer Staging  History of endometrial cancer  Staging form: Corpus Uteri - Carcinoma, AJCC 8th Edition  - Clinical: FIGO Stage IA - Signed by Karen Rubio PA-C on 2/12/2018        Previous therapy:     History of endometrial cancer    3/22/2011 Initial Diagnosis     Endomet[facility] )      3/22/2011 Surgery     Robotic-assisted total laparoscopic hysterectomy and bilateral salpingo-oophorectomy as well as cystoscopy   A  43% myometrial invasion, no lymphovascular space invasion  Patient ID: Meri Elena is a 80 y o  female  who has no new complaints today  No vaginal bleeding, abdominal/pelvic pain  Normal bowel and bladder function  The patient notes she has recently started treatment for retina vein occulusion  Quality of life is good  The following portions of the patient's history were reviewed and updated as appropriate: allergies, current medications, past medical history, past surgical history and problem list     Review of Systems   Constitutional: Negative  HENT: Negative  Eyes: Negative  Respiratory: Negative  Cardiovascular: Negative  Gastrointestinal: Negative  Genitourinary: Negative  Musculoskeletal: Negative  Skin: Negative  Neurological: Negative  Psychiatric/Behavioral: Negative          Current Outpatient Medications   Medication Sig Dispense Refill    acetaminophen (TYLENOL) 650 mg CR tablet Take 650 mg by mouth as needed for mild pain   ALPHAGAN P 0 1 % instill 1 drop into both eyes twice a day  0    aspirin 81 MG tablet Take 1 tablet (81 mg total) by mouth daily      bifidobacterium infantis (ALIGN) capsule Take by mouth      carvedilol (COREG) 25 mg tablet Take 1 tablet (25 mg total) by mouth 2 (two) times a day with meals 180 tablet 3    cholecalciferol (VITAMIN D3) 1,000 units tablet Take by mouth      DENTA 5000 PLUS 1 1 % CREA BRUSH UP TO TWICE A DAY  0    docusate sodium (COLACE) 100 mg capsule Take 1 capsule (100 mg total) by mouth 2 (two) times a day 10 capsule 0    famotidine (PEPCID) 10 mg tablet Take 10 mg by mouth daily at bedtime as needed       levothyroxine 25 mcg tablet take 1 tablet by mouth once daily 90 tablet 1    lisinopril (ZESTRIL) 40 mg tablet Take 1 tablet (40 mg total) by mouth daily 90 tablet 3    Mirabegron ER (Myrbetriq) 50 MG TB24 Take 1 tablet (50 mg total) by mouth daily 30 tablet 11    Multiple Vitamins-Minerals (CENTRUM SILVER PO) Take by mouth      oxybutynin (DITROPAN-XL) 10 MG 24 hr tablet Take 1 tablet (10 mg total) by mouth daily 90 tablet 3    polyethylene glycol (MIRALAX) powder Take by mouth as needed        Probiotic Product (PROBIOTIC ADVANCED PO) Take 1 capsule by mouth daily      simvastatin (ZOCOR) 20 mg tablet take 1 tablet by mouth once daily 90 tablet 3     Current Facility-Administered Medications   Medication Dose Route Frequency Provider Last Rate Last Dose    denosumab (PROLIA) subcutaneous injection 60 mg  60 mg Subcutaneous Q6 Months Steff Hannah MD   60 mg at 12/16/19 1613           Objective:    Blood pressure 150/90, pulse 74, temperature 97 9 °F (36 6 °C), resp  rate 16, height 5' 2" (1 575 m), weight 59 4 kg (131 lb)  Body mass index is 23 96 kg/m²  Body surface area is 1 6 meters squared      Physical Exam   Constitutional: She is oriented to person, place, and time  She appears well-developed and well-nourished  HENT:   Head: Normocephalic and atraumatic  Neck: Normal range of motion  Neck supple  No thyromegaly present  Pulmonary/Chest: Effort normal    Abdominal: Soft  She exhibits no distension and no mass  There is no rebound  Genitourinary:   Genitourinary Comments: The external female genitalia is normal  The bartholin's, uretheral and skenes glands are normal  The urethral meatus is normal (midline with no lesions)  Anus without fissure or lesion  Speculum exam reveals a grossly normal vagina  No masses, lesions, discharge or bleeding  No significant cystocele or rectocele noted  Bimanual exam notes a surgical absent cervix, uterus and adnexal structures  No masses or fullness  Bladder is without fullness, mass or tenderness  Musculoskeletal: Normal range of motion  She exhibits no edema  Lymphadenopathy:     She has no cervical adenopathy  Neurological: She is alert and oriented to person, place, and time  Skin: Skin is warm and dry  No rash noted  Psychiatric: She has a normal mood and affect  Her behavior is normal    Vitals reviewed

## 2020-07-21 ENCOUNTER — TELEPHONE (OUTPATIENT)
Dept: UROLOGY | Facility: MEDICAL CENTER | Age: 85
End: 2020-07-21

## 2020-07-21 ENCOUNTER — OFFICE VISIT (OUTPATIENT)
Dept: UROLOGY | Facility: AMBULATORY SURGERY CENTER | Age: 85
End: 2020-07-21
Payer: MEDICARE

## 2020-07-21 VITALS
HEART RATE: 70 BPM | HEIGHT: 62 IN | WEIGHT: 131 LBS | SYSTOLIC BLOOD PRESSURE: 128 MMHG | BODY MASS INDEX: 24.11 KG/M2 | TEMPERATURE: 96.9 F | DIASTOLIC BLOOD PRESSURE: 64 MMHG

## 2020-07-21 DIAGNOSIS — R39.15 URINARY URGENCY: ICD-10-CM

## 2020-07-21 DIAGNOSIS — R32 URINARY INCONTINENCE, UNSPECIFIED TYPE: Primary | ICD-10-CM

## 2020-07-21 LAB
SL AMB  POCT GLUCOSE, UA: NORMAL
SL AMB LEUKOCYTE ESTERASE,UA: NORMAL
SL AMB POCT BILIRUBIN,UA: NORMAL
SL AMB POCT BLOOD,UA: NORMAL
SL AMB POCT CLARITY,UA: CLEAR
SL AMB POCT COLOR,UA: YELLOW
SL AMB POCT KETONES,UA: NORMAL
SL AMB POCT NITRITE,UA: NORMAL
SL AMB POCT PH,UA: 7
SL AMB POCT SPECIFIC GRAVITY,UA: 1.01
SL AMB POCT URINE PROTEIN: NORMAL
SL AMB POCT UROBILINOGEN: 0.2

## 2020-07-21 PROCEDURE — 81002 URINALYSIS NONAUTO W/O SCOPE: CPT | Performed by: NURSE PRACTITIONER

## 2020-07-21 PROCEDURE — 3074F SYST BP LT 130 MM HG: CPT | Performed by: NURSE PRACTITIONER

## 2020-07-21 PROCEDURE — 3078F DIAST BP <80 MM HG: CPT | Performed by: NURSE PRACTITIONER

## 2020-07-21 PROCEDURE — 99213 OFFICE O/P EST LOW 20 MIN: CPT | Performed by: NURSE PRACTITIONER

## 2020-07-21 PROCEDURE — 1036F TOBACCO NON-USER: CPT | Performed by: NURSE PRACTITIONER

## 2020-07-21 PROCEDURE — 1160F RVW MEDS BY RX/DR IN RCRD: CPT | Performed by: NURSE PRACTITIONER

## 2020-07-21 PROCEDURE — 3008F BODY MASS INDEX DOCD: CPT | Performed by: NURSE PRACTITIONER

## 2020-07-21 PROCEDURE — 4040F PNEUMOC VAC/ADMIN/RCVD: CPT | Performed by: NURSE PRACTITIONER

## 2020-07-21 RX ORDER — BRIMONIDINE TARTRATE 0.1 %
DROPS OPHTHALMIC (EYE)
COMMUNITY
Start: 2020-06-22 | End: 2020-07-21 | Stop reason: SDUPTHER

## 2020-07-21 RX ORDER — ALPRAZOLAM 0.25 MG/1
0.25 TABLET ORAL
COMMUNITY
End: 2021-12-28 | Stop reason: SDUPTHER

## 2020-07-21 NOTE — TELEPHONE ENCOUNTER
Call placed to patient, she was confused as to why AVS showed that our office d/c an eye medication  I advised that we did not  It appears that medication was on her list as a duplicate so one was removed by MA during rooming process  Patient verbalized understanding

## 2020-07-21 NOTE — TELEPHONE ENCOUNTER
Pt just seen by Alfonso Ch pt called regarding her copy of today's after visit summary which under Instructions it's list a medication change Alphagan P which is incorrect per pt,please review and have corrected

## 2020-07-21 NOTE — PROGRESS NOTES
7/21/2020    Sky Lo  1934  [id number]        Assessment  -Urinary urgency and urge incontinence    Discussion/Plan  Best Workman is a 80 y o  female being managed by Dr Eh Toledo  1  Urinary urgency and urge incontinence- patient is doing well since starting dual medication therapy with Myrbetriq and oxybutynin  She will continue current medication regimen  Her blood pressure in office today is 128/64  She will continue to follow under cardiology for management of hypertension  We will re-evaluate her urinary pattern in 4 months with PVR assessment  Patient instructed to call with any issues    -All questions answered, patients agree with plan     History of Present Illness  80 y o  female with a history of urinary urge and urge incontinence presents today for follow up  Patient initially seen in consultation via telemedicine visit  At that time, she was prescribed Myrbetriq 50 mg and oxybutynin 10 mg daily for worsening urinary urgency  She reports significant improvement since starting dual medication therapy  Patient is no longer requiring sanitary pads and has had no episodes of urinary incontinence  She does express concern regarding her blood pressure  This is being closely monitored by her PCP and cardiologist   She denies any episodes of gross hematuria or dysuria  Review of Systems  Review of Systems   Constitutional: Negative  HENT: Negative  Respiratory: Negative  Cardiovascular: Negative  Gastrointestinal: Negative  Genitourinary: Positive for urgency  Negative for decreased urine volume, difficulty urinating, dysuria, flank pain, frequency and hematuria  Musculoskeletal: Negative  Skin: Negative  Neurological: Negative  Psychiatric/Behavioral: Negative          Past Medical History  Past Medical History:   Diagnosis Date    Anemia     post-op, 07/07/09    Anxiety     last assessed: 01/15/14    C  difficile diarrhea     Chest wall trauma     Acute, last assessed: 10/24/12    Depression     Disease of thyroid gland     hypothyroid    Diverticula of colon     [facility]     Endometrial hyperplasia     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[facility]     stage IA, Grade I endometriod adenocarcinoma with 43% myometrial invasion and no LVSI, last assessed: 72/29/68    Folliculitis     last assessed: 03/02/16    Hypercholesterolemia     Hyperlipidemia     Hypertension     Incontinence in female     Migraine     Nontoxic single thyroid nodule     last assessed: 02/16/13    Osteoporosis     last assessed: 02/22/17    Pure hypercholesterolemia     Rotator cuff (capsule) sprain     Acute, right    Skin lesion     last assessed: 04/26/13    Stomatitis     Strain of right buttock     gluteus medius    Tendinitis of right rotator cuff     last assessed: 08/12/12    Thyroid cyst     last assessed: 08/15/13       Past Social History  Past Surgical History:   Procedure Laterality Date    APPENDECTOMY      BREAST BIOPSY Right     BREAST BIOPSY Left     BREAST CYST ASPIRATION      COLONOSCOPY      fiberoptic, 1998, 2001, 2006    CYSTOSCOPY      Diagnostic    EGD AND COLONOSCOPY N/A 3/11/2016    Procedure: EGD ;  Surgeon: Lauren Fraga MD;  [facility]: [facility];   Service:     HYSTERECTOMY  03/22/2011    Robotic-assisted, total laparoscopic approch    JOINT REPLACEMENT      KNEE SURGERY      OOPHORECTOMY Bilateral 03/22/2011    [facility]    REPLACEMENT TOTAL KNEE      TONSILECTOMY AND ADNOIDECTOMY      TOTAL SHOULDER ARTHROPLASTY Right        Past Family History  Family History   Problem Relation Age of Onset    Cancer Father     Other Brother         Prolapsing mitral valve leaflet syndrome    No Known Problems Mother     No Known Problems Daughter     No Known Problems Maternal Grandmother     No Known Problems Maternal Grandfather     Breast cancer Paternal Grandmother 79    No Known Problems Paternal Grandfather     No Known Problems Daughter     No Known Problems Daughter     Testicular cancer Brother     Lung cancer Brother     Prostate cancer Brother     No Known Problems Maternal Aunt     No Known Problems Paternal Aunt     No Known Problems Paternal Aunt        Past Social history  Social History     Socioeconomic History    Marital status:      Spouse name: Not on file    Number of children: Not on file    Years of education: Not on file    Highest education level: Not on file   Occupational History    Not on file   Social Needs    Financial resource strain: Not on file    Food insecurity:     Worry: Not on file     Inability: Not on file    Transportation needs:     Medical: Not on file     Non-medical: Not on file   Tobacco Use    Smoking status: Never Smoker    Smokeless tobacco: Never Used   Substance and Sexual Activity    Alcohol use: No    Drug use: No    Sexual activity: Not on file   Lifestyle    Physical activity:     Days per week: Not on file     Minutes per session: Not on file    Stress: Not on file   Relationships    Social connections:     Talks on phone: Not on file     Gets together: Not on file     Attends [employer] service: Not on file     Active member of club or organization: Not on file     Attends meetings of clubs or organizations: Not on file     Relationship status: Not on file    Intimate partner violence:     Fear of current or ex partner: Not on file     Emotionally abused: Not on file     Physically abused: Not on file     Forced sexual activity: Not on file   Other Topics Concern    Not on file   Social History Narrative    Not on file       Current Medications  Current Outpatient Medications   Medication Sig Dispense Refill    acetaminophen (TYLENOL) 650 mg CR tablet Take 650 mg by mouth as needed for mild pain        ALPHAGAN P 0 1 % instill 1 drop into both eyes twice a day  0    ALPRAZolam (XANAX) 0 25 mg tablet Take 0 25 mg by mouth daily at bedtime as needed for anxiety      aspirin 81 MG tablet Take 1 tablet (81 mg total) by mouth daily      bifidobacterium infantis (ALIGN) capsule Take by mouth      carvedilol (COREG) 25 mg tablet Take 1 tablet (25 mg total) by mouth 2 (two) times a day with meals 180 tablet 3    cholecalciferol (VITAMIN D3) 1,000 units tablet Take by mouth      DENTA 5000 PLUS 1 1 % CREA BRUSH UP TO TWICE A DAY  0    docusate sodium (COLACE) 100 mg capsule Take 1 capsule (100 mg total) by mouth 2 (two) times a day 10 capsule 0    famotidine (PEPCID) 10 mg tablet Take 10 mg by mouth daily at bedtime as needed       levothyroxine 25 mcg tablet take 1 tablet by mouth once daily 90 tablet 1    lisinopril (ZESTRIL) 40 mg tablet Take 1 tablet (40 mg total) by mouth daily 90 tablet 3    Mirabegron ER (Myrbetriq) 50 MG TB24 Take 1 tablet (50 mg total) by mouth daily 30 tablet 11    Multiple Vitamins-Minerals (CENTRUM SILVER PO) Take by mouth      oxybutynin (DITROPAN-XL) 10 MG 24 hr tablet Take 1 tablet (10 mg total) by mouth daily 90 tablet 3    polyethylene glycol (MIRALAX) powder Take by mouth as needed        Probiotic Product (PROBIOTIC ADVANCED PO) Take 1 capsule by mouth daily      simvastatin (ZOCOR) 20 mg tablet take 1 tablet by mouth once daily 90 tablet 3     Current Facility-Administered Medications   Medication Dose Route Frequency Provider Last Rate Last Dose    denosumab (PROLIA) subcutaneous injection 60 mg  60 mg Subcutaneous Q6 Months Simran Mccarthy MD   60 mg at 12/16/19 1613       Allergies  Allergies   Allergen Reactions    Amlodipine Itching     Reaction Date: 19Jul2011;     Amoxil [Amoxicillin] Hives    Biaxin [Clarithromycin] GI Intolerance     Reaction Date: 19Jul2011;   Pt gets nauseated    Esomeprazole GI Intolerance    Fosamax [Alendronate] GI Intolerance    Hydrochlorothiazide      Hyponatremia      Lansoprazole GI Intolerance    Norvasc [Amlodipine Besylate]     Pantoprazole GI Intolerance     Can tolerate IV- Not oral    Relafen [Nabumetone] GI Intolerance and Other (See Comments)       Past medical history, social history, family history, medications and allergies were reviewed  Vitals  Vitals:    07/21/20 0901   BP: 128/64   BP Location: Left arm   Patient Position: Sitting   Cuff Size: Adult   Pulse: 70   Temp: (!) 96 9 °F (36 1 °C)   TempSrc: Temporal   Weight: 59 4 kg (131 lb)   Height: 5' 2" (1 575 m)       Physical Exam  Physical Exam   Constitutional: She is oriented to person, place, and time  She appears well-developed and well-nourished  HENT:   Head: Normocephalic  Eyes: Pupils are equal, round, and reactive to light  Neck: Normal range of motion  Pulmonary/Chest: Effort normal    Abdominal: Soft  Normal appearance  There is no CVA tenderness  Musculoskeletal: Normal range of motion  Neurological: She is alert and oriented to person, place, and time  Skin: Skin is warm and dry  Psychiatric: She has a normal mood and affect   Her behavior is normal  Judgment and thought content normal        Results    I have personally reviewed all pertinent lab results and reviewed with patient  Lab Results   Component Value Date    GLUCOSE 99 09/17/2015    CALCIUM 9 5 06/12/2020     09/17/2015    K 4 2 06/12/2020    CO2 28 06/12/2020     06/12/2020    BUN 14 06/12/2020    CREATININE 0 78 06/12/2020     Lab Results   Component Value Date    WBC 7 24 11/25/2019    HGB 13 4 11/25/2019    HCT 42 6 11/25/2019    MCV 89 11/25/2019     11/25/2019     Recent Results (from the past 1 hour(s))   POCT urine dip    Collection Time: 07/21/20  9:06 AM   Result Value Ref Range    LEUKOCYTE ESTERASE,UA -     NITRITE,UA -     SL AMB POCT UROBILINOGEN 0 2     POCT URINE PROTEIN -      PH,UA 7 0     BLOOD,UA -     SPECIFIC GRAVITY,UA 1 010     KETONES,UA -     BILIRUBIN,UA -     GLUCOSE, UA -      COLOR,UA yellow     CLARITY,UA clear

## 2020-07-26 NOTE — PROGRESS NOTES
Cardiology Follow-up    Carlyle Blackwood  [id number]  1934  HEART [facility] VASCULAR [facility] [address] [address]      1  Benign essential hypertension     2  Dyslipidemia         Discussion/Summary:  Ms Nhung Krishnan is a pleasant 51-year-old female who presents to the office today for routine follow-up  Since her last visit she has been feeling well  Her blood pressure is well controlled in the office today  She has not been checking it at home as consistently but when she has she has had acceptable readings  No changes were made to her regimen  A low-salt diet was reinforced  Her most recent lipids were reviewed  They reveal acceptable numbers on her current statin regimen  Otherwise she is asymptomatic and no testing is advised  I will see her back in the office in six months for re-evaluation of her blood pressure  History of Present Illness:  Ms Nhung Krishnan is a pleasant 51-year-old female who presents to the office today for the re-evaluation of hypertension  Since her last visit she has been feeling well  She remains active in her garden  She also ascends steps on a regular basis  She can do so without any chest pain or shortness of breath  She denies any signs or symptoms of congestive heart failure including increasing lower extremity edema, paroxysmal nocturnal dyspnea, orthopnea, acute weight gain or increasing abdominal girth  She denies syncope or presyncope  She denies palpitations or symptoms of claudication  After her last visit her lisinopril dose was increased to 40 mg daily due to elevated blood pressure readings      Patient Active Problem List   Diagnosis    Benign essential hypertension    Hypothyroidism    History of endometrial cancer    Abnormal breast exam    Arthritis    Constipation    Depression with anxiety    Diverticulosis    Duodenal diverticulum    Esophagitis, reflux    Dyslipidemia    Impaired fasting glucose    Insomnia    Age-related osteoporosis without current pathological fracture    Atrophic vaginitis    Urinary incontinence    Vertigo    Encounter for follow-up surveillance of endometrial cancer    [facility]omet[facility] ([facility] [address] [facility]    Arthritis of left shoulder region    Encounter for screening mammogram for malignant neoplasm of breast    Need for immunization against influenza    Nonexudative age-related macular degeneration, bilateral, early dry stage    Posterior vitreous detachment of both eyes    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    Status post cataract extraction and insertion of intraocular lens     Past Medical History:   Diagnosis Date    Anemia     post-op, 07/07/09    Anxiety     last assessed: 01/15/14    C  difficile diarrhea     Chest wall trauma     Acute, last assessed: 10/24/12    Depression     Disease of thyroid gland     hypothyroid    Diverticula of colon     Endometrial cancer ([facility] [address] [facility]     Endometrial hyperplasia     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     stage IA, Grade I endometriod adenocarcinoma with 43% myometrial invasion and no LVSI, last assessed: 09/42/20    Folliculitis     last assessed: 03/02/16    Hypercholesterolemia     Hyperlipidemia     Hypertension     Incontinence in female     Migraine     Nontoxic single thyroid nodule     last assessed: 02/16/13    Osteoporosis     last assessed: 02/22/17    Pure hypercholesterolemia     Rotator cuff (capsule) sprain     Acute, right    Skin lesion     last assessed: 04/26/13    Stomatitis     Strain of right buttock     gluteus medius    Tendinitis of right rotator cuff     last assessed: 08/12/12    Thyroid cyst     last assessed: 08/15/13     Social History     Socioeconomic History    Marital status:       Spouse name: Not on file    Number of children: Not on file    Years of education: Not on file    Highest education level: Not on file   Occupational History    Not on file   Social Needs    Financial resource strain: Not on file    Food insecurity:     Worry: Not on file     Inability: Not on file    Transportation needs:     Medical: Not on file     Non-medical: Not on file   Tobacco Use    Smoking status: Never Smoker    Smokeless tobacco: Never Used   Substance and Sexual Activity    Alcohol use: No    Drug use: No    Sexual activity: Not on file   Lifestyle    Physical activity:     Days per week: Not on file     Minutes per session: Not on file    Stress: Not on file   Relationships    Social connections:     Talks on phone: Not on file     Gets together: Not on file     Attends [employer] service: Not on file     Active member of club or organization: Not on file     Attends meetings of clubs or organizations: Not on file     Relationship status: Not on file    Intimate partner violence:     Fear of current or ex partner: Not on file     Emotionally abused: Not on file     Physically abused: Not on file     Forced sexual activity: Not on file   Other Topics Concern    Not on file   Social History Narrative    Not on file      Family History   Problem Relation Age of Onset    Cancer Father     Other Brother         Prolapsing mitral valve leaflet syndrome    No Known Problems Mother     No Known Problems Daughter     No Known Problems Maternal Grandmother     No Known Problems Maternal Grandfather     Breast cancer Paternal Grandmother 79    No Known Problems Paternal Grandfather     No Known Problems Daughter     No Known Problems Daughter     Testicular cancer Brother     Lung cancer Brother     Prostate cancer Brother     No Known Problems Maternal Aunt     No Known Problems Paternal Aunt     No Known Problems Paternal Aunt      Past Surgical History:   Procedure Laterality Date    APPENDECTOMY      BREAST BIOPSY Right     BREAST BIOPSY Left     BREAST CYST ASPIRATION      COLONOSCOPY fiberoptic, 1998, 2001, 2006    CYSTOSCOPY      Diagnostic    EGD AND COLONOSCOPY N/A 3/11/2016    Procedure: EGD ;  Surgeon: Crissy Rodrigues MD;  [facility]: [facility];   Service:     HYSTERECTOMY  03/22/2011    Robotic-assisted, total laparoscopic approch    JOINT REPLACEMENT      KNEE SURGERY      OOPHORECTOMY Bilateral 03/22/2011    [facility]    REPLACEMENT TOTAL KNEE      TONSILECTOMY AND ADNOIDECTOMY      TOTAL SHOULDER ARTHROPLASTY Right        Current Outpatient Medications:     acetaminophen (TYLENOL) 650 mg CR tablet, Take 650 mg by mouth as needed for mild pain , Disp: , Rfl:     ALPHAGAN P 0 1 %, instill 1 drop into both eyes twice a day, Disp: , Rfl: 0    ALPRAZolam (XANAX) 0 25 mg tablet, Take 0 25 mg by mouth daily at bedtime as needed for anxiety, Disp: , Rfl:     aspirin 81 MG tablet, Take 1 tablet (81 mg total) by mouth daily, Disp: , Rfl:     bifidobacterium infantis (ALIGN) capsule, Take by mouth, Disp: , Rfl:     carvedilol (COREG) 25 mg tablet, Take 1 tablet (25 mg total) by mouth 2 (two) times a day with meals, Disp: 180 tablet, Rfl: 3    cholecalciferol (VITAMIN D3) 1,000 units tablet, Take by mouth, Disp: , Rfl:     DENTA 5000 PLUS 1 1 % CREA, BRUSH UP TO TWICE A DAY, Disp: , Rfl: 0    docusate sodium (COLACE) 100 mg capsule, Take 1 capsule (100 mg total) by mouth 2 (two) times a day, Disp: 10 capsule, Rfl: 0    famotidine (PEPCID) 10 mg tablet, Take 10 mg by mouth daily at bedtime as needed , Disp: , Rfl:     levothyroxine 25 mcg tablet, take 1 tablet by mouth once daily, Disp: 90 tablet, Rfl: 1    lisinopril (ZESTRIL) 40 mg tablet, Take 1 tablet (40 mg total) by mouth daily, Disp: 90 tablet, Rfl: 3    Mirabegron ER (Myrbetriq) 50 MG TB24, Take 1 tablet (50 mg total) by mouth daily, Disp: 30 tablet, Rfl: 11    Multiple Vitamins-Minerals (CENTRUM SILVER PO), Take by mouth, Disp: , Rfl:     oxybutynin (DITROPAN-XL) 10 MG 24 hr tablet, Take 1 tablet (10 mg total) by mouth daily, Disp: 90 tablet, Rfl: 3    polyethylene glycol (MIRALAX) powder, Take by mouth as needed  , Disp: , Rfl:     Probiotic Product (PROBIOTIC ADVANCED PO), Take 1 capsule by mouth daily, Disp: , Rfl:     simvastatin (ZOCOR) 20 mg tablet, take 1 tablet by mouth once daily, Disp: 90 tablet, Rfl: 3    Current Facility-Administered Medications:     denosumab (PROLIA) subcutaneous injection 60 mg, 60 mg, Subcutaneous, Q6 Months, Ly Reynolds MD, 60 mg at 12/16/19 1613  Allergies   Allergen Reactions    Amlodipine Itching     Reaction Date: 19Jul2011;     Amoxil [Amoxicillin] Hives    Biaxin [Clarithromycin] GI Intolerance     Reaction Date: 19Jul2011;   Pt gets nauseated    Esomeprazole GI Intolerance    Fosamax [Alendronate] GI Intolerance    Hydrochlorothiazide      Hyponatremia      Lansoprazole GI Intolerance    Norvasc [Amlodipine Besylate]     Pantoprazole GI Intolerance     Can tolerate IV- Not oral    Relafen [Nabumetone] GI Intolerance and Other (See Comments)         Review of Systems:  Review of Systems   Respiratory: Negative for apnea, cough, chest tightness, shortness of breath and stridor  Genitourinary: Positive for frequency  All other systems reviewed and are negative          Vitals:    07/27/20 0941   BP: 126/72   BP Location: Right arm   Patient Position: Sitting   Cuff Size: Standard   Pulse: 67   Temp: 98 4 °F (36 9 °C)   TempSrc: Temporal   Weight: 60 1 kg (132 lb 6 4 oz)   Height: 5' 2" (1 575 m)     Vitals:    07/27/20 0941   Weight: 60 1 kg (132 lb 6 4 oz)       Physical Exam:  General:  Alert and cooperative, appears stated age  HEENT:  PERRLA, EOMI, no scleral icterus, no conjunctival pallor  Neck:  No lymphadenopathy, no thyromegaly, no carotid bruits, no elevated JVP  Heart:  Regular rate and rhythm, normal S1/S2, no S3/S4, no murmur  Lungs:  Clear to auscultation bilaterally   Abdomen:  Soft, non-tender, positive bowel sounds, no rebound or guarding,   no organomegaly   Extremities:  No clubbing, cyanosis or edema   Vascular:  2+ pedal pulses  Skin:  No rashes or lesions on exposed skin  Neurologic:  Cranial nerves II-XII grossly intact without focal deficits

## 2020-07-27 ENCOUNTER — OFFICE VISIT (OUTPATIENT)
Dept: CARDIOLOGY CLINIC | Facility: CLINIC | Age: 85
End: 2020-07-27
Payer: MEDICARE

## 2020-07-27 VITALS
BODY MASS INDEX: 24.37 KG/M2 | DIASTOLIC BLOOD PRESSURE: 72 MMHG | SYSTOLIC BLOOD PRESSURE: 126 MMHG | TEMPERATURE: 98.4 F | WEIGHT: 132.4 LBS | HEART RATE: 67 BPM | HEIGHT: 62 IN

## 2020-07-27 DIAGNOSIS — I10 BENIGN ESSENTIAL HYPERTENSION: Primary | ICD-10-CM

## 2020-07-27 DIAGNOSIS — E78.5 DYSLIPIDEMIA: ICD-10-CM

## 2020-07-27 PROCEDURE — 99214 OFFICE O/P EST MOD 30 MIN: CPT | Performed by: INTERNAL MEDICINE

## 2020-07-27 PROCEDURE — 1160F RVW MEDS BY RX/DR IN RCRD: CPT | Performed by: INTERNAL MEDICINE

## 2020-07-27 PROCEDURE — 3078F DIAST BP <80 MM HG: CPT | Performed by: INTERNAL MEDICINE

## 2020-07-27 PROCEDURE — 1036F TOBACCO NON-USER: CPT | Performed by: INTERNAL MEDICINE

## 2020-07-27 PROCEDURE — 3008F BODY MASS INDEX DOCD: CPT | Performed by: INTERNAL MEDICINE

## 2020-07-27 PROCEDURE — 3074F SYST BP LT 130 MM HG: CPT | Performed by: INTERNAL MEDICINE

## 2020-07-27 PROCEDURE — 4040F PNEUMOC VAC/ADMIN/RCVD: CPT | Performed by: INTERNAL MEDICINE

## 2020-07-29 ENCOUNTER — TELEPHONE (OUTPATIENT)
Dept: CARDIOLOGY CLINIC | Facility: CLINIC | Age: 85
End: 2020-07-29

## 2020-07-29 NOTE — TELEPHONE ENCOUNTER
FYI- pt just wanted to let you know she is getting injections in her Eye for RVO  Advised that she monitor Bp and let us know if it is elevated  BP today 102/65        Thank you

## 2020-09-01 ENCOUNTER — APPOINTMENT (OUTPATIENT)
Dept: LAB | Facility: CLINIC | Age: 85
End: 2020-09-01
Payer: MEDICARE

## 2020-09-01 ENCOUNTER — OFFICE VISIT (OUTPATIENT)
Dept: FAMILY MEDICINE CLINIC | Facility: CLINIC | Age: 85
End: 2020-09-01
Payer: MEDICARE

## 2020-09-01 VITALS
BODY MASS INDEX: 23.77 KG/M2 | SYSTOLIC BLOOD PRESSURE: 132 MMHG | WEIGHT: 129.2 LBS | DIASTOLIC BLOOD PRESSURE: 70 MMHG | HEIGHT: 62 IN | HEART RATE: 72 BPM | TEMPERATURE: 98.3 F | RESPIRATION RATE: 16 BRPM | OXYGEN SATURATION: 98 %

## 2020-09-01 DIAGNOSIS — R10.13 EPIGASTRIC PAIN: ICD-10-CM

## 2020-09-01 DIAGNOSIS — R55 NEAR SYNCOPE: ICD-10-CM

## 2020-09-01 DIAGNOSIS — R55 NEAR SYNCOPE: Primary | ICD-10-CM

## 2020-09-01 DIAGNOSIS — R07.9 CHEST PAIN, UNSPECIFIED TYPE: ICD-10-CM

## 2020-09-01 LAB
ALBUMIN SERPL BCP-MCNC: 3.2 G/DL (ref 3.5–5)
ALP SERPL-CCNC: 84 U/L (ref 46–116)
ALT SERPL W P-5'-P-CCNC: 36 U/L (ref 12–78)
ANION GAP SERPL CALCULATED.3IONS-SCNC: 8 MMOL/L (ref 4–13)
AST SERPL W P-5'-P-CCNC: 29 U/L (ref 5–45)
BASOPHILS # BLD AUTO: 0.07 THOUSANDS/ΜL (ref 0–0.1)
BASOPHILS NFR BLD AUTO: 0 % (ref 0–1)
BILIRUB SERPL-MCNC: 0.49 MG/DL (ref 0.2–1)
BUN SERPL-MCNC: 11 MG/DL (ref 5–25)
CALCIUM SERPL-MCNC: 10 MG/DL (ref 8.3–10.1)
CHLORIDE SERPL-SCNC: 87 MMOL/L (ref 100–108)
CO2 SERPL-SCNC: 30 MMOL/L (ref 21–32)
CREAT SERPL-MCNC: 0.77 MG/DL (ref 0.6–1.3)
EOSINOPHIL # BLD AUTO: 0.02 THOUSAND/ΜL (ref 0–0.61)
EOSINOPHIL NFR BLD AUTO: 0 % (ref 0–6)
ERYTHROCYTE [DISTWIDTH] IN BLOOD BY AUTOMATED COUNT: 14 % (ref 11.6–15.1)
GFR SERPL CREATININE-BSD FRML MDRD: 70 ML/MIN/1.73SQ M
GLUCOSE SERPL-MCNC: 113 MG/DL (ref 65–140)
HCT VFR BLD AUTO: 35.7 % (ref 34.8–46.1)
HGB BLD-MCNC: 11.7 G/DL (ref 11.5–15.4)
IMM GRANULOCYTES # BLD AUTO: 0.15 THOUSAND/UL (ref 0–0.2)
IMM GRANULOCYTES NFR BLD AUTO: 1 % (ref 0–2)
LIPASE SERPL-CCNC: 51 U/L (ref 73–393)
LYMPHOCYTES # BLD AUTO: 3.72 THOUSANDS/ΜL (ref 0.6–4.47)
LYMPHOCYTES NFR BLD AUTO: 18 % (ref 14–44)
MCH RBC QN AUTO: 27.9 PG (ref 26.8–34.3)
MCHC RBC AUTO-ENTMCNC: 32.8 G/DL (ref 31.4–37.4)
MCV RBC AUTO: 85 FL (ref 82–98)
MONOCYTES # BLD AUTO: 0.86 THOUSAND/ΜL (ref 0.17–1.22)
MONOCYTES NFR BLD AUTO: 4 % (ref 4–12)
NEUTROPHILS # BLD AUTO: 15.95 THOUSANDS/ΜL (ref 1.85–7.62)
NEUTS SEG NFR BLD AUTO: 77 % (ref 43–75)
NRBC BLD AUTO-RTO: 0 /100 WBCS
NT-PROBNP SERPL-MCNC: 1447 PG/ML
PLATELET # BLD AUTO: 545 THOUSANDS/UL (ref 149–390)
PMV BLD AUTO: 9.1 FL (ref 8.9–12.7)
POTASSIUM SERPL-SCNC: 3.6 MMOL/L (ref 3.5–5.3)
PROT SERPL-MCNC: 7.7 G/DL (ref 6.4–8.2)
RBC # BLD AUTO: 4.2 MILLION/UL (ref 3.81–5.12)
SODIUM SERPL-SCNC: 125 MMOL/L (ref 136–145)
TROPONIN I SERPL-MCNC: <0.02 NG/ML
WBC # BLD AUTO: 20.77 THOUSAND/UL (ref 4.31–10.16)

## 2020-09-01 PROCEDURE — 85025 COMPLETE CBC W/AUTO DIFF WBC: CPT

## 2020-09-01 PROCEDURE — 84484 ASSAY OF TROPONIN QUANT: CPT

## 2020-09-01 PROCEDURE — 80053 COMPREHEN METABOLIC PANEL: CPT

## 2020-09-01 PROCEDURE — 99214 OFFICE O/P EST MOD 30 MIN: CPT | Performed by: FAMILY MEDICINE

## 2020-09-01 PROCEDURE — 83690 ASSAY OF LIPASE: CPT

## 2020-09-01 PROCEDURE — 83880 ASSAY OF NATRIURETIC PEPTIDE: CPT

## 2020-09-01 PROCEDURE — 93000 ELECTROCARDIOGRAM COMPLETE: CPT | Performed by: FAMILY MEDICINE

## 2020-09-01 PROCEDURE — 36415 COLL VENOUS BLD VENIPUNCTURE: CPT

## 2020-09-01 RX ORDER — SUCRALFATE ORAL 1 G/10ML
1 SUSPENSION ORAL 4 TIMES DAILY
Qty: 420 ML | Refills: 0 | Status: SHIPPED | OUTPATIENT
Start: 2020-09-01 | End: 2020-09-11 | Stop reason: ALTCHOICE

## 2020-09-01 RX ORDER — FAMOTIDINE 10 MG
20 TABLET ORAL
Qty: 60 TABLET | Refills: 5
Start: 2020-09-01 | End: 2020-09-11 | Stop reason: SDUPTHER

## 2020-09-01 NOTE — PROGRESS NOTES
Chief Complaint   Patient presents with    Dizziness     Lightheaded for over 1 week  Assessment/Plan:    EKG today showed sinus rhythm, no acute ST-T change  Will check labs  Will do stress test    Increase famotidine to 20mg QD  Give carafate  Go to ER if severe chest pain or symptoms no improving  Diagnoses and all orders for this visit:    Near syncope  -     POCT ECG  -     CBC and differential; Future  -     Comprehensive metabolic panel; Future  -     NT-BNP PRO; Future    Chest pain, unspecified type  -     POCT ECG  -     CBC and differential; Future  -     Comprehensive metabolic panel; Future  -     NT-BNP PRO; Future  -     NM myocardial perfusion spect (stress and/or rest); Future  -     Troponin I; Future    Epigastric pain  -     Lipase; Future  -     sucralfate (CARAFATE) 1 g/10 mL suspension; Take 10 mL (1 g total) by mouth 4 (four) times a day          Subjective:      Patient ID: Nikunj Del Castillo is a 80 y o  female  HPI    Pt is here with her daughter  Her daughter is an ICU nurse  C/o epigastric pain for 3 weeks  Worse since last week  Last Friday she was with her family in [place], almost passed out  Drank water and lay down for 2 hours, felt better  Drove 6 hours back home  This week she feels weak and lost appetite  BP was 115/59 at home which is low for her  Epigastric pain radiating to left and right abdomen  This morning the pain radiating to her left side and left back  Come and go, 8/10, pain  [place] nausea this morning  No vomiting  Denies fever  Saw ophthalmology recently for retinal vein occlusion  Got injection       HTN---BP at home is low recently per pt  Pt is on lisinopril 40mg qhs and carvedilol 25mg bid  Denies headache, vision change, Sob or CP      GERD/hiatal hernia---Use OTC pepcid 10mg daily   Cannot tolerate PPI       Urinary incontinence---[facility] urology Dr Werner Acosta is on myrbetriq 50mg qhs and oxybutynin 10mg QD for several months  The following portions of the patient's history were reviewed and updated as appropriate: allergies, current medications, past family history, past medical history, past social history, past surgical history and problem list     Review of Systems   Constitutional: Negative for appetite change, chills and fever  HENT: Negative for congestion, ear pain, sinus pain and sore throat  Eyes: Negative for discharge and itching  Respiratory: Negative for apnea, cough, chest tightness, shortness of breath and wheezing  Cardiovascular: Positive for chest pain  Negative for palpitations and leg swelling  Gastrointestinal: Positive for abdominal pain and nausea  Negative for anal bleeding, constipation, diarrhea and vomiting  Endocrine: Negative for cold intolerance, heat intolerance and polyuria  Genitourinary: Negative for difficulty urinating and dysuria  Musculoskeletal: Negative for arthralgias, back pain and myalgias  Skin: Negative for rash  Neurological: Positive for weakness  Negative for dizziness and headaches  Psychiatric/Behavioral: Negative for agitation  Objective:      /70 (BP Location: Left arm, Patient Position: Sitting, Cuff Size: Adult)   Pulse 72   Temp 98 3 °F (36 8 °C) (Oral)   Resp 16   Ht 5' 2" (1 575 m)   Wt 58 6 kg (129 lb 3 2 oz)   SpO2 98%   BMI 23 63 kg/m²          Physical Exam  Constitutional:       General: She is not in acute distress  Appearance: She is well-developed  HENT:      Head: Normocephalic  Eyes:      General:         Right eye: No discharge  Left eye: No discharge  Conjunctiva/sclera: Conjunctivae normal    Neck:      Musculoskeletal: Normal range of motion and neck supple  No muscular tenderness  Thyroid: No thyromegaly  Cardiovascular:      Rate and Rhythm: Normal rate and regular rhythm  Heart sounds: Normal heart sounds  No murmur  No friction rub  No gallop      Pulmonary:      Effort: Pulmonary effort is normal  No respiratory distress  Breath sounds: Normal breath sounds  No wheezing or rales  Chest:      Chest wall: No tenderness  Abdominal:      General: Bowel sounds are normal  There is no distension  Palpations: Abdomen is soft  There is no mass  Tenderness: There is no abdominal tenderness  There is no guarding or rebound  Musculoskeletal: Normal range of motion  General: No swelling, tenderness or deformity  Lymphadenopathy:      Cervical: No cervical adenopathy  Neurological:      Mental Status: She is alert

## 2020-09-08 ENCOUNTER — TELEPHONE (OUTPATIENT)
Dept: FAMILY MEDICINE CLINIC | Facility: CLINIC | Age: 85
End: 2020-09-08

## 2020-09-08 ENCOUNTER — TELEPHONE (OUTPATIENT)
Dept: CARDIOLOGY CLINIC | Facility: CLINIC | Age: 85
End: 2020-09-08

## 2020-09-08 DIAGNOSIS — D72.829 LEUKOCYTOSIS, UNSPECIFIED TYPE: ICD-10-CM

## 2020-09-08 DIAGNOSIS — E87.1 HYPONATREMIA: Primary | ICD-10-CM

## 2020-09-08 NOTE — TELEPHONE ENCOUNTER
Daughter called, pt was discharged from the hospital ( hyponatremia)  They changed her medications around  She would like to know if you can review in Epic her hospitalization at ? Did give her appt with you in Oct and advised to call us with any cardic issues        Please advise

## 2020-09-08 NOTE — TELEPHONE ENCOUNTER
Patient discharged from [facility]   yesterday  Needs to have BMP  (sodium abnormal) for this Thursday please place order, will go to [facility]  Daughter also requesting order for CBC because had abnormal results  Has TCM 9/11/20 Dr Ashley Paz

## 2020-09-10 ENCOUNTER — APPOINTMENT (OUTPATIENT)
Dept: LAB | Facility: CLINIC | Age: 85
End: 2020-09-10
Payer: MEDICARE

## 2020-09-10 ENCOUNTER — TRANSCRIBE ORDERS (OUTPATIENT)
Dept: LAB | Facility: CLINIC | Age: 85
End: 2020-09-10

## 2020-09-10 DIAGNOSIS — D72.829 LEUKOCYTOSIS, UNSPECIFIED TYPE: ICD-10-CM

## 2020-09-10 DIAGNOSIS — E78.5 DYSLIPIDEMIA: ICD-10-CM

## 2020-09-10 DIAGNOSIS — G24.5 BENIGN ESSENTIAL BLEPHAROSPASM: ICD-10-CM

## 2020-09-10 DIAGNOSIS — I10 BENIGN ESSENTIAL HYPERTENSION: ICD-10-CM

## 2020-09-10 DIAGNOSIS — R73.01 IMPAIRED FASTING GLUCOSE: ICD-10-CM

## 2020-09-10 DIAGNOSIS — E87.1 HYPONATREMIA: ICD-10-CM

## 2020-09-10 DIAGNOSIS — E03.9 HYPOTHYROIDISM, UNSPECIFIED TYPE: Primary | ICD-10-CM

## 2020-09-10 DIAGNOSIS — E03.9 HYPOTHYROIDISM, UNSPECIFIED TYPE: ICD-10-CM

## 2020-09-10 LAB
ANION GAP SERPL CALCULATED.3IONS-SCNC: 6 MMOL/L (ref 4–13)
BASOPHILS # BLD AUTO: 0.07 THOUSANDS/ΜL (ref 0–0.1)
BASOPHILS NFR BLD AUTO: 1 % (ref 0–1)
BUN SERPL-MCNC: 18 MG/DL (ref 5–25)
CALCIUM SERPL-MCNC: 9.6 MG/DL (ref 8.3–10.1)
CHLORIDE SERPL-SCNC: 92 MMOL/L (ref 100–108)
CO2 SERPL-SCNC: 32 MMOL/L (ref 21–32)
CREAT SERPL-MCNC: 0.78 MG/DL (ref 0.6–1.3)
EOSINOPHIL # BLD AUTO: 0.03 THOUSAND/ΜL (ref 0–0.61)
EOSINOPHIL NFR BLD AUTO: 0 % (ref 0–6)
ERYTHROCYTE [DISTWIDTH] IN BLOOD BY AUTOMATED COUNT: 14.4 % (ref 11.6–15.1)
GFR SERPL CREATININE-BSD FRML MDRD: 69 ML/MIN/1.73SQ M
GLUCOSE P FAST SERPL-MCNC: 108 MG/DL (ref 65–99)
HCT VFR BLD AUTO: 33.1 % (ref 34.8–46.1)
HGB BLD-MCNC: 10.8 G/DL (ref 11.5–15.4)
IMM GRANULOCYTES # BLD AUTO: 0.05 THOUSAND/UL (ref 0–0.2)
IMM GRANULOCYTES NFR BLD AUTO: 0 % (ref 0–2)
LYMPHOCYTES # BLD AUTO: 5.3 THOUSANDS/ΜL (ref 0.6–4.47)
LYMPHOCYTES NFR BLD AUTO: 43 % (ref 14–44)
MCH RBC QN AUTO: 27.6 PG (ref 26.8–34.3)
MCHC RBC AUTO-ENTMCNC: 32.6 G/DL (ref 31.4–37.4)
MCV RBC AUTO: 84 FL (ref 82–98)
MONOCYTES # BLD AUTO: 1.02 THOUSAND/ΜL (ref 0.17–1.22)
MONOCYTES NFR BLD AUTO: 8 % (ref 4–12)
NEUTROPHILS # BLD AUTO: 5.93 THOUSANDS/ΜL (ref 1.85–7.62)
NEUTS SEG NFR BLD AUTO: 48 % (ref 43–75)
NRBC BLD AUTO-RTO: 0 /100 WBCS
PLATELET # BLD AUTO: 415 THOUSANDS/UL (ref 149–390)
PMV BLD AUTO: 9.5 FL (ref 8.9–12.7)
POTASSIUM SERPL-SCNC: 3.5 MMOL/L (ref 3.5–5.3)
RBC # BLD AUTO: 3.92 MILLION/UL (ref 3.81–5.12)
SODIUM SERPL-SCNC: 130 MMOL/L (ref 136–145)
WBC # BLD AUTO: 12.4 THOUSAND/UL (ref 4.31–10.16)

## 2020-09-10 PROCEDURE — 85025 COMPLETE CBC W/AUTO DIFF WBC: CPT

## 2020-09-10 PROCEDURE — 80048 BASIC METABOLIC PNL TOTAL CA: CPT

## 2020-09-10 PROCEDURE — 36415 COLL VENOUS BLD VENIPUNCTURE: CPT

## 2020-09-10 NOTE — TELEPHONE ENCOUNTER
I can see the records - I called her back and left a message to call back with any concerns       Jewell Ponce

## 2020-09-11 ENCOUNTER — OFFICE VISIT (OUTPATIENT)
Dept: FAMILY MEDICINE CLINIC | Facility: CLINIC | Age: 85
End: 2020-09-11
Payer: MEDICARE

## 2020-09-11 VITALS
DIASTOLIC BLOOD PRESSURE: 72 MMHG | RESPIRATION RATE: 20 BRPM | WEIGHT: 123 LBS | BODY MASS INDEX: 22.63 KG/M2 | TEMPERATURE: 98.1 F | SYSTOLIC BLOOD PRESSURE: 120 MMHG | HEIGHT: 62 IN | HEART RATE: 68 BPM

## 2020-09-11 DIAGNOSIS — E03.9 HYPOTHYROIDISM, UNSPECIFIED TYPE: ICD-10-CM

## 2020-09-11 DIAGNOSIS — R32 URINARY INCONTINENCE, UNSPECIFIED TYPE: ICD-10-CM

## 2020-09-11 DIAGNOSIS — R11.0 NAUSEA: ICD-10-CM

## 2020-09-11 DIAGNOSIS — F41.8 DEPRESSION WITH ANXIETY: ICD-10-CM

## 2020-09-11 DIAGNOSIS — D72.829 LEUKOCYTOSIS, UNSPECIFIED TYPE: ICD-10-CM

## 2020-09-11 DIAGNOSIS — K22.719 BARRETT'S ESOPHAGUS WITH DYSPLASIA: ICD-10-CM

## 2020-09-11 DIAGNOSIS — I10 BENIGN ESSENTIAL HYPERTENSION: ICD-10-CM

## 2020-09-11 DIAGNOSIS — Z09 HOSPITAL DISCHARGE FOLLOW-UP: Primary | ICD-10-CM

## 2020-09-11 DIAGNOSIS — E78.5 DYSLIPIDEMIA: ICD-10-CM

## 2020-09-11 DIAGNOSIS — R10.13 EPIGASTRIC PAIN: ICD-10-CM

## 2020-09-11 DIAGNOSIS — E87.1 HYPONATREMIA: ICD-10-CM

## 2020-09-11 PROCEDURE — 1111F DSCHRG MED/CURRENT MED MERGE: CPT | Performed by: FAMILY MEDICINE

## 2020-09-11 PROCEDURE — 99496 TRANSJ CARE MGMT HIGH F2F 7D: CPT | Performed by: FAMILY MEDICINE

## 2020-09-11 RX ORDER — CARVEDILOL 25 MG/1
12.5 TABLET ORAL 2 TIMES DAILY WITH MEALS
Qty: 30 TABLET | Refills: 0
Start: 2020-09-11 | End: 2020-12-10 | Stop reason: SDUPTHER

## 2020-09-11 RX ORDER — OXYBUTYNIN CHLORIDE 5 MG/1
5 TABLET, EXTENDED RELEASE ORAL
Qty: 30 TABLET | Refills: 0
Start: 2020-09-11 | End: 2020-11-24 | Stop reason: SDUPTHER

## 2020-09-11 RX ORDER — PROCHLORPERAZINE MALEATE 5 MG/1
5 TABLET ORAL EVERY 6 HOURS PRN
Qty: 30 TABLET | Refills: 0 | Status: SHIPPED | OUTPATIENT
Start: 2020-09-11 | End: 2021-12-28 | Stop reason: ALTCHOICE

## 2020-09-11 RX ORDER — FAMOTIDINE 20 MG/1
20 TABLET, FILM COATED ORAL 2 TIMES DAILY
Qty: 60 TABLET | Refills: 0
Start: 2020-09-11

## 2020-09-11 NOTE — PROGRESS NOTES
Chief Complaint   Patient presents with    Transition of Care Management     Health Maintenance   Topic Date Due    DTaP,Tdap,and Td Vaccines (1 - Tdap) 07/05/1955    Pneumococcal Vaccine: 65+ Years (2 of 2 - PPSV23) 04/27/2018    Influenza Vaccine  07/01/2020    Medicare Annual Wellness Visit (AWV)  12/05/2020    DXA SCAN  06/24/2021    Fall Risk  09/01/2021    BMI: Adult  09/11/2021    HIB Vaccine  Aged Out    Hepatitis B Vaccine  Aged Out    IPV Vaccine  Aged Out    Hepatitis A Vaccine  Aged Out    Meningococcal ACWY Vaccine  Aged Out    HPV Vaccine  Aged Out     Assessment/Plan:     Esophagitis, reflux  EGD done on 9/3/2020 at [facility]  Pt is on pepcid 20mg bid, prevacid 30mg bid  Give GI referral      Hyponatremia  Yesterday Na 130 low  Will recheck BMP next week  [facility] nephrology as scheduled  Leukocytosis  Yesterday WBC 12 4 stable  Will recheck next week  Urinary incontinence  Decreased oxybutynin to 5mg QD as suggested by GI  Benign essential hypertension  BP was low in hospital  Pt is on coreg 12 5mg bid  Hold lisinopril if BP <120/80  [facility] cardiology  Depression with anxiety  Mood is ok  Consider buspar prn in the future  Hypothyroidism  9/2/2020 TSH normal  Continue levothyroxine 25mcg daily  Dyslipidemia  Continue simvastatin 20mg qhs  Diagnoses and all orders for this visit:    Hospital discharge follow-up  Comments:  Reviewed hospital report  Epigastric pain  -     Ambulatory referral to Gastroenterology; Future  -     famotidine (PEPCID) 20 mg tablet; Take 1 tablet (20 mg total) by mouth 2 (two) times a day    Nausea  -     prochlorperazine (COMPAZINE) 5 mg tablet; Take 1 tablet (5 mg total) by mouth every 6 (six) hours as needed for nausea or vomiting    Lackey's esophagus with dysplasia    Hyponatremia  -     Basic metabolic panel;  Future    Leukocytosis, unspecified type  -     CBC and differential; Future    Urinary incontinence, unspecified type  - oxybutynin (DITROPAN-XL) 5 mg 24 hr tablet; Take 1 tablet (5 mg total) by mouth daily at bedtime    Benign essential hypertension  -     carvedilol (COREG) 25 mg tablet; Take 0 5 tablets (12 5 mg total) by mouth 2 (two) times a day with meals    Depression with anxiety    Hypothyroidism, unspecified type    Dyslipidemia    Other orders  -     lansoprazole (PREVACID) 3 mg/ml SUSP oral suspension; Take 30 mg by mouth 2 (two) times a day 2 teaspoon BID         Subjective:     Patient ID: Shelbie Dykes is a 80 y o  female  HPI    Pt is here with her daughter  Was in [facility] 9/1-9/7/2020 for hyponatremia, leukocytosis and epigastric pain  Nephrology consulted  Hyponatremia Improved  Leukocytosis improved  No source of infection found  GI consulted  EGD done on 9/3/2020 which showed 5cm hiatal hernia, Lackey's esophagus  GI recommend decrease the dose of oxybutynin to 5mg QD  Suggest Gastric emptying study outpatient  Got prevacid and pepcid  Discharged home  Still felt nausea and epigastric pain  No appetite  Pt lost 9 lbs  Would like to see [facility]  Pt states she got comazine in the hospital which helped her nausea  Would like refill  Yesterday labs showed Na 130 improved  K 3 5 normal  CBC showed WBC 12 4 improved, hg 10 8 stable, plt 415 improved  HTN---BP at home 115-140/70-80  Pt hold lisinopril 40mg qhs now if BP <120/80  Pt is on carvedilol 12 5 mg bid  Will see cardiology in 10/2020  Denies headache, vision change, Sob or CP      Urinary incontinence---[facility]  She was on oxybutynin 10mg QD and she felt it worked too well  In the hospital, GI suggest decreased oxybutynin to 5mg QD to help gastric emptying  AnxietyDepression---Stress in life  She uses xanax very rarely  Consider buspar in the future per GI in the hospital      Hypothyroidism---on levothyroxine 25mcg daily  She cannot tolerate it empty stomach, so she takes it 2 hours after lunch   9/2/2020 TSH Normal        Hyperlipidemia---Pt uses simvastatin 20mg qhs  Sometimes muscle pain            Review of Systems   Constitutional: Positive for appetite change and unexpected weight change  Negative for chills and fever  HENT: Negative for congestion, ear pain, sinus pain and sore throat  Eyes: Negative for discharge and itching  Respiratory: Negative for apnea, cough, chest tightness, shortness of breath and wheezing  Cardiovascular: Negative for chest pain, palpitations and leg swelling  Gastrointestinal: Positive for nausea  Negative for abdominal pain, anal bleeding, constipation, diarrhea and vomiting  Endocrine: Negative for cold intolerance, heat intolerance and polyuria  Genitourinary: Negative for difficulty urinating and dysuria  Musculoskeletal: Negative for arthralgias, back pain and myalgias  Skin: Negative for rash  Neurological: Negative for dizziness and headaches  Psychiatric/Behavioral: Negative for agitation  Objective:     Physical Exam  Constitutional:       General: She is not in acute distress  Appearance: She is well-developed  HENT:      Head: Normocephalic  Eyes:      General:         Right eye: No discharge  Left eye: No discharge  Conjunctiva/sclera: Conjunctivae normal    Neck:      Musculoskeletal: Normal range of motion  Thyroid: No thyromegaly  Cardiovascular:      Rate and Rhythm: Normal rate and regular rhythm  Heart sounds: Normal heart sounds  No murmur  No friction rub  No gallop  Pulmonary:      Effort: Pulmonary effort is normal  No respiratory distress  Breath sounds: Normal breath sounds  No wheezing or rales  Chest:      Chest wall: No tenderness  Abdominal:      General: Bowel sounds are normal  There is no distension  Palpations: Abdomen is soft  Tenderness: There is abdominal tenderness  There is no guarding  Musculoskeletal: Normal range of motion           General: No tenderness or deformity  Lymphadenopathy:      Cervical: No cervical adenopathy  Neurological:      Mental Status: She is alert  Vitals:    09/11/20 0951   BP: 120/72   Pulse: 68   Resp: 20   Temp: 98 1 °F (36 7 °C)   TempSrc: Oral   Weight: 55 8 kg (123 lb)   Height: 5' 2" (1 575 m)       Transitional Care Management Review:  Cuca Blanco is a 80 y o  female here for TCM follow up       During the TCM phone call patient stated:    TCM Call (since 8/11/2020)     None      TCM Call (since 8/11/2020)     None          Wellington Muniz MD

## 2020-09-16 ENCOUNTER — APPOINTMENT (OUTPATIENT)
Dept: LAB | Facility: CLINIC | Age: 85
End: 2020-09-16
Payer: MEDICARE

## 2020-09-16 DIAGNOSIS — D72.829 LEUKOCYTOSIS, UNSPECIFIED TYPE: ICD-10-CM

## 2020-09-16 DIAGNOSIS — E87.1 HYPONATREMIA: ICD-10-CM

## 2020-09-16 LAB
ANION GAP SERPL CALCULATED.3IONS-SCNC: 7 MMOL/L (ref 4–13)
BASOPHILS # BLD AUTO: 0.09 THOUSANDS/ΜL (ref 0–0.1)
BASOPHILS NFR BLD AUTO: 1 % (ref 0–1)
BUN SERPL-MCNC: 10 MG/DL (ref 5–25)
CALCIUM SERPL-MCNC: 9.8 MG/DL (ref 8.3–10.1)
CHLORIDE SERPL-SCNC: 98 MMOL/L (ref 100–108)
CO2 SERPL-SCNC: 30 MMOL/L (ref 21–32)
CREAT SERPL-MCNC: 0.66 MG/DL (ref 0.6–1.3)
EOSINOPHIL # BLD AUTO: 0.06 THOUSAND/ΜL (ref 0–0.61)
EOSINOPHIL NFR BLD AUTO: 1 % (ref 0–6)
ERYTHROCYTE [DISTWIDTH] IN BLOOD BY AUTOMATED COUNT: 15.8 % (ref 11.6–15.1)
GFR SERPL CREATININE-BSD FRML MDRD: 80 ML/MIN/1.73SQ M
GLUCOSE P FAST SERPL-MCNC: 104 MG/DL (ref 65–99)
HCT VFR BLD AUTO: 35.9 % (ref 34.8–46.1)
HGB BLD-MCNC: 11.4 G/DL (ref 11.5–15.4)
IMM GRANULOCYTES # BLD AUTO: 0.02 THOUSAND/UL (ref 0–0.2)
IMM GRANULOCYTES NFR BLD AUTO: 0 % (ref 0–2)
LYMPHOCYTES # BLD AUTO: 4.25 THOUSANDS/ΜL (ref 0.6–4.47)
LYMPHOCYTES NFR BLD AUTO: 38 % (ref 14–44)
MCH RBC QN AUTO: 28.1 PG (ref 26.8–34.3)
MCHC RBC AUTO-ENTMCNC: 31.8 G/DL (ref 31.4–37.4)
MCV RBC AUTO: 89 FL (ref 82–98)
MONOCYTES # BLD AUTO: 0.9 THOUSAND/ΜL (ref 0.17–1.22)
MONOCYTES NFR BLD AUTO: 8 % (ref 4–12)
NEUTROPHILS # BLD AUTO: 5.79 THOUSANDS/ΜL (ref 1.85–7.62)
NEUTS SEG NFR BLD AUTO: 52 % (ref 43–75)
NRBC BLD AUTO-RTO: 0 /100 WBCS
PLATELET # BLD AUTO: 329 THOUSANDS/UL (ref 149–390)
PMV BLD AUTO: 10.1 FL (ref 8.9–12.7)
POTASSIUM SERPL-SCNC: 3.6 MMOL/L (ref 3.5–5.3)
RBC # BLD AUTO: 4.05 MILLION/UL (ref 3.81–5.12)
SODIUM SERPL-SCNC: 135 MMOL/L (ref 136–145)
WBC # BLD AUTO: 11.11 THOUSAND/UL (ref 4.31–10.16)

## 2020-09-16 PROCEDURE — 36415 COLL VENOUS BLD VENIPUNCTURE: CPT

## 2020-09-16 PROCEDURE — 85025 COMPLETE CBC W/AUTO DIFF WBC: CPT

## 2020-09-16 PROCEDURE — 80048 BASIC METABOLIC PNL TOTAL CA: CPT

## 2020-10-21 ENCOUNTER — OFFICE VISIT (OUTPATIENT)
Dept: CARDIOLOGY CLINIC | Facility: CLINIC | Age: 85
End: 2020-10-21
Payer: MEDICARE

## 2020-10-21 VITALS
OXYGEN SATURATION: 99 % | BODY MASS INDEX: 21.44 KG/M2 | HEART RATE: 74 BPM | SYSTOLIC BLOOD PRESSURE: 112 MMHG | DIASTOLIC BLOOD PRESSURE: 60 MMHG | WEIGHT: 116.5 LBS | HEIGHT: 62 IN

## 2020-10-21 DIAGNOSIS — E78.5 DYSLIPIDEMIA: ICD-10-CM

## 2020-10-21 DIAGNOSIS — I10 BENIGN ESSENTIAL HYPERTENSION: Primary | ICD-10-CM

## 2020-10-21 PROBLEM — I31.39 PERICARDIAL EFFUSION: Status: ACTIVE | Noted: 2020-10-21

## 2020-10-21 PROBLEM — I31.3 PERICARDIAL EFFUSION: Status: ACTIVE | Noted: 2020-10-21

## 2020-10-21 PROCEDURE — 99214 OFFICE O/P EST MOD 30 MIN: CPT | Performed by: INTERNAL MEDICINE

## 2020-10-24 DIAGNOSIS — E03.9 HYPOTHYROIDISM, UNSPECIFIED TYPE: ICD-10-CM

## 2020-10-26 RX ORDER — LEVOTHYROXINE SODIUM 0.03 MG/1
TABLET ORAL
Qty: 90 TABLET | Refills: 1 | Status: SHIPPED | OUTPATIENT
Start: 2020-10-26 | End: 2021-04-15

## 2020-11-06 ENCOUNTER — TELEPHONE (OUTPATIENT)
Dept: FAMILY MEDICINE CLINIC | Facility: CLINIC | Age: 85
End: 2020-11-06

## 2020-11-06 DIAGNOSIS — E87.1 HYPONATREMIA: Primary | ICD-10-CM

## 2020-11-06 DIAGNOSIS — R10.13 EPIGASTRIC PAIN: ICD-10-CM

## 2020-11-06 PROCEDURE — NC001 PR NO CHARGE: Performed by: FAMILY MEDICINE

## 2020-11-10 LAB — EXT SARS-COV-2: NEGATIVE

## 2020-11-24 ENCOUNTER — OFFICE VISIT (OUTPATIENT)
Dept: UROLOGY | Facility: AMBULATORY SURGERY CENTER | Age: 85
End: 2020-11-24
Payer: MEDICARE

## 2020-11-24 VITALS
WEIGHT: 115.4 LBS | HEIGHT: 62 IN | SYSTOLIC BLOOD PRESSURE: 110 MMHG | BODY MASS INDEX: 21.23 KG/M2 | DIASTOLIC BLOOD PRESSURE: 64 MMHG | HEART RATE: 68 BPM

## 2020-11-24 DIAGNOSIS — R32 URINARY INCONTINENCE, UNSPECIFIED TYPE: ICD-10-CM

## 2020-11-24 DIAGNOSIS — R39.15 URINARY URGENCY: Primary | ICD-10-CM

## 2020-11-24 LAB — POST-VOID RESIDUAL VOLUME, ML POC: 0 ML

## 2020-11-24 PROCEDURE — 99213 OFFICE O/P EST LOW 20 MIN: CPT | Performed by: NURSE PRACTITIONER

## 2020-11-24 PROCEDURE — 51798 US URINE CAPACITY MEASURE: CPT | Performed by: NURSE PRACTITIONER

## 2020-11-24 RX ORDER — A/SINGAPORE/GP1908/2015 IVR-180 (AN A/MICHIGAN/45/2015 (H1N1)PDM09-LIKE VIRUS, A/HONG KONG/4801/2014, NYMC X-263B (H3N2) (AN A/HONG KONG/4801/2014-LIKE VIRUS), AND B/BRISBANE/60/2008, WILD TYPE (A B/BRISBANE/60/2008-LIKE VIRUS) 15; 15; 15 UG/.5ML; UG/.5ML; UG/.5ML
INJECTION, SUSPENSION INTRAMUSCULAR
COMMUNITY
Start: 2020-10-31 | End: 2022-03-10 | Stop reason: ALTCHOICE

## 2020-11-24 RX ORDER — OXYBUTYNIN CHLORIDE 5 MG/1
5 TABLET, EXTENDED RELEASE ORAL
Qty: 90 TABLET | Refills: 3
Start: 2020-11-24 | End: 2021-12-03 | Stop reason: SDUPTHER

## 2020-12-07 ENCOUNTER — HOSPITAL ENCOUNTER (OUTPATIENT)
Dept: NON INVASIVE DIAGNOSTICS | Facility: CLINIC | Age: 85
Discharge: HOME | End: 2020-12-07
Payer: MEDICARE

## 2020-12-07 DIAGNOSIS — I10 BENIGN ESSENTIAL HYPERTENSION: ICD-10-CM

## 2020-12-07 LAB — HBA1C MFR BLD HPLC: 5.8 %

## 2020-12-07 PROCEDURE — 93306 TTE W/DOPPLER COMPLETE: CPT

## 2020-12-07 PROCEDURE — 93306 TTE W/DOPPLER COMPLETE: CPT | Performed by: INTERNAL MEDICINE

## 2020-12-10 DIAGNOSIS — I10 BENIGN ESSENTIAL HYPERTENSION: ICD-10-CM

## 2020-12-10 RX ORDER — CARVEDILOL 6.25 MG/1
6.25 TABLET ORAL 2 TIMES DAILY WITH MEALS
Qty: 180 TABLET | Refills: 3 | Status: SHIPPED | OUTPATIENT
Start: 2020-12-10 | End: 2021-11-30 | Stop reason: SDUPTHER

## 2020-12-21 ENCOUNTER — OFFICE VISIT (OUTPATIENT)
Dept: FAMILY MEDICINE CLINIC | Facility: CLINIC | Age: 85
End: 2020-12-21
Payer: MEDICARE

## 2020-12-21 VITALS
HEIGHT: 62 IN | RESPIRATION RATE: 16 BRPM | HEART RATE: 80 BPM | SYSTOLIC BLOOD PRESSURE: 104 MMHG | WEIGHT: 114.6 LBS | BODY MASS INDEX: 21.09 KG/M2 | TEMPERATURE: 96.9 F | OXYGEN SATURATION: 90 % | DIASTOLIC BLOOD PRESSURE: 64 MMHG

## 2020-12-21 DIAGNOSIS — F41.8 DEPRESSION WITH ANXIETY: ICD-10-CM

## 2020-12-21 DIAGNOSIS — E87.1 HYPONATREMIA: ICD-10-CM

## 2020-12-21 DIAGNOSIS — E83.52 HYPERCALCEMIA: ICD-10-CM

## 2020-12-21 DIAGNOSIS — Z00.00 MEDICARE ANNUAL WELLNESS VISIT, SUBSEQUENT: ICD-10-CM

## 2020-12-21 DIAGNOSIS — E78.5 DYSLIPIDEMIA: ICD-10-CM

## 2020-12-21 DIAGNOSIS — K22.719 BARRETT'S ESOPHAGUS WITH DYSPLASIA: ICD-10-CM

## 2020-12-21 DIAGNOSIS — D47.2 MGUS (MONOCLONAL GAMMOPATHY OF UNKNOWN SIGNIFICANCE): ICD-10-CM

## 2020-12-21 DIAGNOSIS — I10 BENIGN ESSENTIAL HYPERTENSION: ICD-10-CM

## 2020-12-21 DIAGNOSIS — R32 URINARY INCONTINENCE, UNSPECIFIED TYPE: ICD-10-CM

## 2020-12-21 DIAGNOSIS — E03.9 HYPOTHYROIDISM, UNSPECIFIED TYPE: Primary | ICD-10-CM

## 2020-12-21 DIAGNOSIS — R73.01 IMPAIRED FASTING GLUCOSE: ICD-10-CM

## 2020-12-21 PROCEDURE — 1123F ACP DISCUSS/DSCN MKR DOCD: CPT | Performed by: FAMILY MEDICINE

## 2020-12-21 PROCEDURE — 99215 OFFICE O/P EST HI 40 MIN: CPT | Performed by: FAMILY MEDICINE

## 2020-12-21 PROCEDURE — G0439 PPPS, SUBSEQ VISIT: HCPCS | Performed by: FAMILY MEDICINE

## 2020-12-29 ENCOUNTER — TELEPHONE (OUTPATIENT)
Dept: CARDIOLOGY CLINIC | Facility: CLINIC | Age: 85
End: 2020-12-29

## 2021-01-06 ENCOUNTER — HOSPITAL ENCOUNTER (OUTPATIENT)
Dept: RADIOLOGY | Age: 86
Discharge: HOME | End: 2021-01-06
Payer: MEDICARE

## 2021-01-06 VITALS — WEIGHT: 110 LBS | HEIGHT: 62 IN | BODY MASS INDEX: 20.24 KG/M2

## 2021-01-06 DIAGNOSIS — Z12.31 ENCOUNTER FOR SCREENING MAMMOGRAM FOR BREAST CANCER: ICD-10-CM

## 2021-01-06 PROCEDURE — 77063 BREAST TOMOSYNTHESIS BI: CPT

## 2021-01-06 PROCEDURE — 77067 SCR MAMMO BI INCL CAD: CPT

## 2021-02-12 DIAGNOSIS — Z23 ENCOUNTER FOR IMMUNIZATION: ICD-10-CM

## 2021-03-21 NOTE — PROGRESS NOTES
Cardiology Follow-up    Reji Davis  [id number]  1934  [facility] [address] [address]      1  Benign essential hypertension     2  Dyslipidemia         Discussion/Summary:  Ms Anibal Anaya is a pleasant 49-year-old female who presents to the office today for routine follow-up  Her blood pressure was elevated upon my recheck that when she first arrived at the office she was normotensive  She has not been checking her blood pressure at home is regularly  I have asked her to do so and notify any persistently elevated readings to the office  No changes were made to her antihypertensive medication regimen  Her most recent lipids were reviewed  They reveal acceptable numbers on her current statin regimen  After her last visit she underwent an echocardiogram given a pericardial effusion noted on CT scan  This is basically unremarkable except for mild to moderate tricuspid regurgitation  No pericardial effusion was noted  I will see her back in the office in six months for re-evaluation of her blood pressure  History of Present Illness:  Ms Anibal Anaya is a pleasant 49-year-old female who presents to the office today for the re-evaluation of hypertension  She has been sedentary  With the activity she performs she denies any exertional chest pain or shortness of breath  She denies any signs or symptoms of congestive heart failure including increasing lower extremity edema, paroxysmal nocturnal dyspnea, orthopnea, acute weight gain or increasing abdominal girth  She denies lightheadedness, syncope or presyncope  She denies palpitations or symptoms of claudication  She has been diagnosed with gastroparesis  She altered her diet based on this diagnosis and feels better  Her weight is been stable      Patient Active Problem List   Diagnosis    Benign essential hypertension    Hyponatremia  Hypothyroidism    History of endometrial cancer    Abnormal breast exam    Arthritis    Constipation    Depression with anxiety    Diverticulosis    Duodenal diverticulum    Esophagitis, reflux    Dyslipidemia    Impaired fasting glucose    Insomnia    Age-related osteoporosis without current pathological fracture    Atrophic vaginitis    Urinary incontinence    Vertigo    Encounter for follow-up surveillance of endometrial cancer    Endometrial cancer ([facility] )    Arthritis of left shoulder region    Encounter for screening mammogram for malignant neoplasm of breast    Need for immunization against influenza    Nonexudative age-related macular degeneration, bilateral, early dry stage    Posterior vitreous detachment of both eyes    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    Status post cataract extraction and insertion of intraocular lens    Leukocytosis    Lackey's esophagus with dysplasia    Pericardial effusion    MGUS (monoclonal gammopathy of unknown significance)    Hypercalcemia     Past Medical History:   Diagnosis Date    Anemia     post-op, 07/07/09    Anxiety     last assessed: 01/15/14    C  difficile diarrhea     Chest wall trauma     Acute, last assessed: 10/24/12    Depression     Disease of thyroid gland     hypothyroid    Diverticula of colon     [facility]    Endometrial hyperplasia     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HCC)     stage IA, Grade I endometriod adenocarcinoma with 43% myometrial invasion and no LVSI, last assessed: 13/78/88    Folliculitis     last assessed: 03/02/16    Gastroparesis     Hypercholesterolemia     Hyperlipidemia     Hypertension     Incontinence in female     Migraine     Nontoxic single thyroid nodule     last assessed: 02/16/13    Osteoporosis     last assessed: 02/22/17    Pure hypercholesterolemia     Rotator cuff (capsule) sprain     Acute, right    Skin lesion     last assessed: 04/26/13    Stomatitis     Strain of right buttock     gluteus medius    Tendinitis of right rotator cuff     last assessed: 08/12/12    Thyroid cyst     last assessed: 08/15/13     Social History     Socioeconomic History    Marital status:       Spouse name: Not on file    Number of children: Not on file    Years of education: Not on file    Highest education level: Not on file   Occupational History    Not on file   Social Needs    Financial resource strain: Not on file    Food insecurity     Worry: Not on file     Inability: Not on file    Transportation needs     Medical: Not on file     Non-medical: Not on file   Tobacco Use    Smoking status: Never Smoker    Smokeless tobacco: Never Used   Substance and Sexual Activity    Alcohol use: No    Drug use: No    Sexual activity: Not on file   Lifestyle    Physical activity     Days per week: Not on file     Minutes per session: Not on file    Stress: Not on file   Relationships    Social connections     Talks on phone: Not on file     Gets together: Not on file     Attends Spiritism service: Not on file     Active member of club or organization: Not on file     Attends meetings of clubs or organizations: Not on file     Relationship status: Not on file    Intimate partner violence     Fear of current or ex partner: Not on file     Emotionally abused: Not on file     Physically abused: Not on file     Forced sexual activity: Not on file   Other Topics Concern    Not on file   Social History Narrative    Not on file      Family History   Problem Relation Age of Onset    Cancer Father         unknown type    Other Brother         Prolapsing mitral valve leaflet syndrome    No Known Problems Mother     No Known Problems Daughter     No Known Problems Maternal Grandmother     No Known Problems Maternal Grandfather     Breast cancer Paternal Grandmother 79    No Known Problems Paternal Grandfather     No Known Problems Daughter     No Known Problems Daughter     Testicular cancer Brother 27    Lung cancer Brother 61    Prostate cancer Brother 36        unsure of excat age   Emaline Folds No Known Problems Maternal Aunt     No Known Problems Paternal Aunt     No Known Problems Paternal Aunt      Past Surgical History:   Procedure Laterality Date    APPENDECTOMY      BREAST BIOPSY Right     BREAST BIOPSY Left     BREAST CYST ASPIRATION      COLONOSCOPY      fiberoptic, 1998, 2001, 2006    CYSTOSCOPY      Diagnostic    EGD AND COLONOSCOPY N/A 3/11/2016    Procedure: EGD ;  Surgeon: Willie Lewis MD;  Location: [facility];   Service:     HYSTERECTOMY  03/22/2011    Robotic-assisted, total laparoscopic approch    JOINT REPLACEMENT      KNEE SURGERY      OOPHORECTOMY Bilateral 03/22/2011    [place]    REPLACEMENT TOTAL KNEE      TONSILECTOMY AND ADNOIDECTOMY      TOTAL SHOULDER ARTHROPLASTY Right        Current Outpatient Medications:     acetaminophen (TYLENOL) 650 mg CR tablet, Take 650 mg by mouth as needed for mild pain , Disp: , Rfl:     aflibercept (Eylea) 2 MG/0 05ML SOLN, by Intravitreal route, Disp: , Rfl:     ALPHAGAN P 0 1 %, instill 1 drop into both eyes twice a day, Disp: , Rfl: 0    ALPRAZolam (XANAX) 0 25 mg tablet, Take 0 25 mg by mouth daily at bedtime as needed for anxiety, Disp: , Rfl:     aspirin 81 MG tablet, Take 1 tablet (81 mg total) by mouth daily (Patient taking differently: Take 81 mg by mouth 2 (two) times a day ), Disp: , Rfl:     bifidobacterium infantis (ALIGN) capsule, Take by mouth, Disp: , Rfl:     carvedilol (COREG) 6 25 mg tablet, Take 1 tablet (6 25 mg total) by mouth 2 (two) times a day with meals, Disp: 180 tablet, Rfl: 3    cholecalciferol (VITAMIN D3) 1,000 units tablet, Take by mouth, Disp: , Rfl:     DENTA 5000 PLUS 1 1 % CREA, BRUSH UP TO TWICE A DAY, Disp: , Rfl: 0    famotidine (PEPCID) 20 mg tablet, Take 1 tablet (20 mg total) by mouth 2 (two) times a day, Disp: 60 tablet, Rfl: 0    levothyroxine 25 mcg tablet, take 1 tablet by mouth once daily, Disp: 90 tablet, Rfl: 1    Multiple Vitamins-Minerals (CENTRUM SILVER PO), Take by mouth, Disp: , Rfl:     oxybutynin (DITROPAN-XL) 5 mg 24 hr tablet, Take 1 tablet (5 mg total) by mouth daily at bedtime, Disp: 90 tablet, Rfl: 3    polyethylene glycol (MIRALAX) powder, Take by mouth as needed  , Disp: , Rfl:     simvastatin (ZOCOR) 20 mg tablet, take 1 tablet by mouth once daily, Disp: 90 tablet, Rfl: 3    docusate sodium (COLACE) 100 mg capsule, Take 1 capsule (100 mg total) by mouth 2 (two) times a day (Patient not taking: Reported on 3/22/2021), Disp: 10 capsule, Rfl: 0    Fluad Quadrivalent 0 5 ML PRSY, inject 0 5 milliliters intramuscularly, Disp: , Rfl:     lisinopril (ZESTRIL) 40 mg tablet, Take 1 tablet (40 mg total) by mouth daily (Patient not taking: Reported on 3/22/2021), Disp: 90 tablet, Rfl: 3    prochlorperazine (COMPAZINE) 5 mg tablet, Take 1 tablet (5 mg total) by mouth every 6 (six) hours as needed for nausea or vomiting (Patient not taking: Reported on 3/22/2021), Disp: 30 tablet, Rfl: 0    ranibizumab (LUCENTIS) 0 3 mg/0 05mL, 0 3 mg once, Disp: , Rfl:     Current Facility-Administered Medications:     denosumab (PROLIA) subcutaneous injection 60 mg, 60 mg, Subcutaneous, Q6 Months, Gorge Ramirez MD, 60 mg at 12/16/19 1613  Allergies   Allergen Reactions    Amoxil [Amoxicillin] Hives    Biaxin [Clarithromycin] GI Intolerance     Reaction Date: 19Jul2011;   Pt gets nauseated    Esomeprazole GI Intolerance    Fosamax [Alendronate] GI Intolerance    Hydrochlorothiazide      Hyponatremia      Lansoprazole GI Intolerance    Norvasc [Amlodipine Besylate]     Pantoprazole GI Intolerance     Can tolerate IV- Not oral    Relafen [Nabumetone] GI Intolerance and Other (See Comments)         Review of Systems:  Review of Systems   Respiratory: Negative for apnea, cough, chest tightness, shortness of breath and stridor  Gastrointestinal: Positive for abdominal pain  Genitourinary: Negative for frequency  All other systems reviewed and are negative          Vitals:    03/22/21 0956   BP: 128/78   Pulse: 74   SpO2: 98%   Weight: 50 8 kg (112 lb)     Vitals:    03/22/21 0956   Weight: 50 8 kg (112 lb)       Physical Exam:  General:  Alert and cooperative, appears stated age  [de-identified]:  PERRLA, EOMI, no scleral icterus, no conjunctival pallor  Neck:  No lymphadenopathy, no thyromegaly, no carotid bruits, no elevated JVP  Heart:  Regular rate and rhythm, normal S1/S2, no S3/S4, no murmur  Lungs:  Clear to auscultation bilaterally   Abdomen:  Soft, non-tender, positive bowel sounds, no rebound or guarding,   no organomegaly   Extremities:  No clubbing, cyanosis or edema   Vascular:  2+ pedal pulses  Skin:  No rashes or lesions on exposed skin  Neurologic:  Cranial nerves II-XII grossly intact without focal deficits

## 2021-03-22 ENCOUNTER — OFFICE VISIT (OUTPATIENT)
Dept: CARDIOLOGY CLINIC | Facility: CLINIC | Age: 86
End: 2021-03-22
Payer: MEDICARE

## 2021-03-22 VITALS
WEIGHT: 112 LBS | SYSTOLIC BLOOD PRESSURE: 128 MMHG | OXYGEN SATURATION: 98 % | HEART RATE: 74 BPM | DIASTOLIC BLOOD PRESSURE: 78 MMHG | BODY MASS INDEX: 20.32 KG/M2

## 2021-03-22 DIAGNOSIS — I10 BENIGN ESSENTIAL HYPERTENSION: Primary | ICD-10-CM

## 2021-03-22 DIAGNOSIS — E78.5 DYSLIPIDEMIA: ICD-10-CM

## 2021-03-22 PROCEDURE — 99214 OFFICE O/P EST MOD 30 MIN: CPT | Performed by: INTERNAL MEDICINE

## 2021-03-22 RX ORDER — AFLIBERCEPT 40 MG/ML
INJECTION, SOLUTION INTRAVITREAL
COMMUNITY
End: 2022-03-31

## 2021-04-14 DIAGNOSIS — E03.9 HYPOTHYROIDISM, UNSPECIFIED TYPE: ICD-10-CM

## 2021-04-15 RX ORDER — LEVOTHYROXINE SODIUM 0.03 MG/1
TABLET ORAL
Qty: 90 TABLET | Refills: 1 | Status: SHIPPED | OUTPATIENT
Start: 2021-04-15 | End: 2021-11-15

## 2021-05-28 DIAGNOSIS — E78.5 HYPERLIPIDEMIA, UNSPECIFIED HYPERLIPIDEMIA TYPE: ICD-10-CM

## 2021-05-28 RX ORDER — SIMVASTATIN 20 MG
TABLET ORAL
Qty: 90 TABLET | Refills: 3 | Status: SHIPPED | OUTPATIENT
Start: 2021-05-28 | End: 2022-06-01

## 2021-06-01 LAB — HBA1C MFR BLD HPLC: 5.6 %

## 2021-06-21 ENCOUNTER — OFFICE VISIT (OUTPATIENT)
Dept: FAMILY MEDICINE CLINIC | Facility: CLINIC | Age: 86
End: 2021-06-21
Payer: MEDICARE

## 2021-06-21 VITALS
HEART RATE: 80 BPM | RESPIRATION RATE: 16 BRPM | BODY MASS INDEX: 20.09 KG/M2 | OXYGEN SATURATION: 97 % | TEMPERATURE: 97.3 F | DIASTOLIC BLOOD PRESSURE: 78 MMHG | WEIGHT: 109.2 LBS | SYSTOLIC BLOOD PRESSURE: 122 MMHG | HEIGHT: 62 IN

## 2021-06-21 DIAGNOSIS — R73.01 IMPAIRED FASTING GLUCOSE: ICD-10-CM

## 2021-06-21 DIAGNOSIS — E03.9 HYPOTHYROIDISM, UNSPECIFIED TYPE: ICD-10-CM

## 2021-06-21 DIAGNOSIS — I10 BENIGN ESSENTIAL HYPERTENSION: ICD-10-CM

## 2021-06-21 DIAGNOSIS — E78.5 DYSLIPIDEMIA: ICD-10-CM

## 2021-06-21 DIAGNOSIS — R19.7 DIARRHEA, UNSPECIFIED TYPE: Primary | ICD-10-CM

## 2021-06-21 DIAGNOSIS — R14.0 BLOATING: ICD-10-CM

## 2021-06-21 PROCEDURE — 99214 OFFICE O/P EST MOD 30 MIN: CPT | Performed by: FAMILY MEDICINE

## 2021-06-21 NOTE — PROGRESS NOTES
Chief Complaint   Patient presents with    Follow-up     6 months and review labs  Health Maintenance   Topic Date Due    DTaP,Tdap,and Td Vaccines (1 - Tdap) Never done    Pneumococcal Vaccine: 65+ Years (2 of 2 - PPSV23) 04/27/2018    DXA SCAN  06/24/2021    Fall Risk  12/21/2021    Medicare Annual Wellness Visit (AWV)  12/21/2021    BMI: Adult  06/21/2022    Influenza Vaccine  Completed    COVID-19 Vaccine  Completed    HIB Vaccine  Aged Out    Hepatitis B Vaccine  Aged Out    IPV Vaccine  Aged Out    Hepatitis A Vaccine  Aged Out    Meningococcal ACWY Vaccine  Aged Out    HPV Vaccine  Aged Out             Assessment/Plan:    Benign essential hypertension  Continue carvedilol per cardiology  Hypothyroidism  6/2021 TSH and free T4 ok  Continue levothyroxine 25 mcg daily  Impaired fasting glucose  6/2021 HgA1C 5 6 normal  Low carb diet  Dyslipidemia  6/2021 Lipid 166/93/48/99 ok  Low fat diet  Continue simvastatin 20mg qhs  Reviewed lab in 6/2021  CMP ok sodium 134 slightly low  CBC ok  HgA1C 5 6 normal  Lipid 166/93/48/99 ok  TSH 5 73, free T4 0 91 normal    Flu shot yearly    Got covid19 vaccine  Got pneumovax at pharmacy in 2013 per pt  Got prevnar 13 2017    Mammogram 1/2021 negative  Dexa 7/2019 showed osteoporosis  She did not get prolia for 1 year because of tooth/jaw problem  RTO in 6 months         Diagnoses and all orders for this visit:    Diarrhea, unspecified type  -     CBC and differential; Future  -     Basic metabolic panel; Future  -     XR abdomen obstruction series; Future    Bloating    Hypothyroidism, unspecified type  -     CBC; Future  -     Comprehensive metabolic panel; Future  -     Hemoglobin A1C; Future  -     TSH, 3rd generation with Free T4 reflex; Future    Impaired fasting glucose  -     CBC; Future  -     Comprehensive metabolic panel; Future  -     Hemoglobin A1C; Future  -     TSH, 3rd generation with Free T4 reflex;  Future    Benign essential hypertension  -     CBC; Future  -     Comprehensive metabolic panel; Future  -     Hemoglobin A1C; Future  -     TSH, 3rd generation with Free T4 reflex; Future    Dyslipidemia  -     CBC; Future  -     Comprehensive metabolic panel; Future  -     Hemoglobin A1C; Future  -     TSH, 3rd generation with Free T4 reflex; Future  -     Lipid panel; Future          Subjective:      Patient ID: Shelbie Dykes is a 80 y o  female  HPI    Pt is here with her daughter  C/o diarrhea and bloating since last week  Saw GI  Possible constipation with overflow diarrhea  Recommend miralax daily  Pt states she still has loose stool and bloating  Feels weak and tired  Denies fever  Denies SOB, CP, nausea or vomiting  [facility]---follows GI  She tried nexium and prevacid but felt nausea  She is on pepcid bid which helped  Pt states she has to be very careful about her diet, otherwise, she will have stomach pain       Hyponatremia---FU nephrology  6/1/2021 Na 134 stable       MGUS---FU hem/onc  Observe now  Next appt will be 8/2021       HTN---BP at home 110-120/70-80  She is on carvedilol to 6 25 mg bid  She does not take lisinopril now  [facility] cardiology regularly  Denies headache, vision change, Sob or CP       Urinary incontinence---FU urology Dr Karen Humphrey helped but increased her BP  Oxybutynin 10mg QD too strong for her  She is on oxybutynin 5mg QD now  She needs to get up at night 4 times      AnxietyDepression---Stable  She uses xanax very rarely  Took off buspar per nephrology       Hypothyroidism---on levothyroxine 25mcg daily  She cannot tolerate it empty stomach, so she takes it 2 hours after lunch           Hyperlipidemia---Pt uses simvastatin 20mg qhs  Sometimes muscle pain      Macular degeneration/vein occlusion----Got shot monthly and she is on baby ASA  Lives by herself  Does all ADL's  [place] in [place]  Lost balance while picking weeds  No injury  She uses cane     Daughter lives close to her  The following portions of the patient's history were reviewed and updated as appropriate: allergies, current medications, past family history, past medical history, past social history, past surgical history and problem list     Review of Systems   Constitutional: Negative for appetite change, chills and fever  HENT: Negative for congestion, ear pain, sinus pain and sore throat  Eyes: Negative for discharge and itching  Respiratory: Negative for apnea, cough, chest tightness, shortness of breath and wheezing  Cardiovascular: Negative for chest pain, palpitations and leg swelling  Gastrointestinal: Positive for abdominal pain, constipation and diarrhea  Negative for anal bleeding, nausea and vomiting  Endocrine: Negative for cold intolerance, heat intolerance and polyuria  Genitourinary: Negative for difficulty urinating and dysuria  Musculoskeletal: Negative for arthralgias, back pain and myalgias  Skin: Negative for rash  Neurological: Negative for dizziness and headaches  Psychiatric/Behavioral: Negative for agitation  Objective:      /78 (BP Location: Left arm, Patient Position: Sitting, Cuff Size: Adult)   Pulse 80   Temp (!) 97 3 °F (36 3 °C) (Temporal)   Resp 16   Ht 5' 2 25" (1 581 m)   Wt 49 5 kg (109 lb 3 2 oz)   SpO2 97%   BMI 19 81 kg/m²          Physical Exam  Constitutional:       General: She is not in acute distress  Appearance: She is well-developed  HENT:      Head: Normocephalic  Eyes:      General:         Right eye: No discharge  Left eye: No discharge  Conjunctiva/sclera: Conjunctivae normal    Neck:      Thyroid: No thyromegaly  Cardiovascular:      Rate and Rhythm: Normal rate and regular rhythm  Heart sounds: Normal heart sounds  No murmur heard  No friction rub  No gallop  Pulmonary:      Effort: Pulmonary effort is normal  No respiratory distress  Breath sounds: Normal breath sounds   No wheezing or rales  Chest:      Chest wall: No tenderness  Abdominal:      General: There is no distension  Palpations: Abdomen is soft  There is no mass  Tenderness: There is no abdominal tenderness  There is no guarding or rebound  Comments: Bowel sound hyperactive   Musculoskeletal:         General: No tenderness or deformity  Cervical back: Normal range of motion and neck supple  No tenderness  Right lower leg: No edema  Left lower leg: No edema  Comments: Use cane   Lymphadenopathy:      Cervical: No cervical adenopathy  Neurological:      Mental Status: She is alert

## 2021-07-20 ENCOUNTER — OFFICE VISIT (OUTPATIENT)
Dept: GYNECOLOGIC ONCOLOGY | Facility: CLINIC | Age: 86
End: 2021-07-20
Payer: MEDICARE

## 2021-07-20 VITALS
HEIGHT: 62 IN | RESPIRATION RATE: 16 BRPM | HEART RATE: 61 BPM | DIASTOLIC BLOOD PRESSURE: 82 MMHG | TEMPERATURE: 97.4 F | SYSTOLIC BLOOD PRESSURE: 110 MMHG | BODY MASS INDEX: 19.51 KG/M2 | WEIGHT: 106 LBS

## 2021-07-20 DIAGNOSIS — Z85.42 ENCOUNTER FOR FOLLOW-UP SURVEILLANCE OF ENDOMETRIAL CANCER: ICD-10-CM

## 2021-07-20 DIAGNOSIS — Z78.9 NEED FOR FOLLOW-UP BY SOCIAL WORKER: ICD-10-CM

## 2021-07-20 DIAGNOSIS — Z85.42 HISTORY OF ENDOMETRIAL CANCER: Primary | ICD-10-CM

## 2021-07-20 DIAGNOSIS — Z08 ENCOUNTER FOR FOLLOW-UP SURVEILLANCE OF ENDOMETRIAL CANCER: ICD-10-CM

## 2021-07-20 PROCEDURE — 99212 OFFICE O/P EST SF 10 MIN: CPT | Performed by: PHYSICIAN ASSISTANT

## 2021-07-20 NOTE — ASSESSMENT & PLAN NOTE
History of stage IA, grade 1 endometrial cancer s/p surgical resection in March 2011   She is clinically without evidence of disease recurrence       Return to the office in 1 year for continued surveillance

## 2021-07-20 NOTE — PROGRESS NOTES
Assessment/Plan:    Problem List Items Addressed This Visit        Other    History of endometrial cancer - Primary     History of stage IA, grade 1 endometrial cancer s/p surgical resection in March 2011  She is clinically without evidence of disease recurrence       Return to the office in 1 year for continued surveillance           Encounter for follow-up surveillance of endometrial cancer      Other Visit Diagnoses     Need for follow-up by         Relevant Orders    Ambulatory referral to cancer care counselor program            CHIEF COMPLAINT:   Endometrial cancer surveillance    Problem:  Cancer Staging  History of endometrial cancer  Staging form: Corpus Uteri - Carcinoma, AJCC 8th Edition  - Clinical: FIGO Stage IA - Signed by Ilya Ritter PA-C on 2/12/2018        Previous therapy:  Oncology History   History of endometrial cancer   3/22/2011 Initial Diagnosis    Endometrial cancer ([facility] [address] )     3/22/2011 Surgery    Robotic-assisted total laparoscopic hysterectomy and bilateral salpingo-oophorectomy as well as cystoscopy   A  43% myometrial invasion, no lymphovascular space invasion  Patient ID: Maria Dolores Romeo is a 80 y o  female  who presents to the office for endometrial cancer surveillance  The patient notes in the interim she was diagnosed with gastroparesis and has been following closely with GI  She has lost approximately 30 lbs over the last year  She also notes recently being diagnosed with Lyme Disease  She has been started on doxycycline  She has intermittent constipation vs  diarrhea  No vaginal bleeding  She notes occasional abdominal discomfort based on bowel habits  Normal bladder function  The following portions of the patient's history were reviewed and updated as appropriate: allergies, current medications, past medical history, past surgical history and problem list     Review of Systems   Constitutional: Positive for unexpected weight change   Negative for fatigue and fever  HENT: Negative  Eyes: Negative  Respiratory: Negative  Cardiovascular: Negative  Gastrointestinal: Positive for abdominal pain (occasional), constipation and diarrhea  Negative for vomiting  Genitourinary: Negative  Musculoskeletal: Negative  Skin: Negative  Neurological: Negative  Psychiatric/Behavioral: Negative  Current Outpatient Medications   Medication Sig Dispense Refill    acetaminophen (TYLENOL) 650 mg CR tablet Take 650 mg by mouth as needed for mild pain        aflibercept (Eylea) 2 MG/0 05ML SOLN by Intravitreal route      ALPHAGAN P 0 1 % instill 1 drop into both eyes twice a day  0    ALPRAZolam (XANAX) 0 25 mg tablet Take 0 25 mg by mouth daily at bedtime as needed for anxiety      aspirin 81 MG tablet Take 1 tablet (81 mg total) by mouth daily (Patient taking differently: Take 81 mg by mouth 2 (two) times a day )      bifidobacterium infantis (ALIGN) capsule Take by mouth      carvedilol (COREG) 6 25 mg tablet Take 1 tablet (6 25 mg total) by mouth 2 (two) times a day with meals 180 tablet 3    cholecalciferol (VITAMIN D3) 1,000 units tablet Take by mouth      DENTA 5000 PLUS 1 1 % CREA BRUSH UP TO TWICE A DAY  0    famotidine (PEPCID) 20 mg tablet Take 1 tablet (20 mg total) by mouth 2 (two) times a day 60 tablet 0    Fluad Quadrivalent 0 5 ML PRSY inject 0 5 milliliters intramuscularly      levothyroxine 25 mcg tablet take 1 tablet by mouth once daily 90 tablet 1    Multiple Vitamins-Minerals (CENTRUM SILVER PO) Take by mouth      oxybutynin (DITROPAN-XL) 5 mg 24 hr tablet Take 1 tablet (5 mg total) by mouth daily at bedtime 90 tablet 3    polyethylene glycol (MIRALAX) powder Take by mouth as needed        ranibizumab (LUCENTIS) 0 3 mg/0 05mL 0 3 mg once      simvastatin (ZOCOR) 20 mg tablet take 1 tablet by mouth once daily 90 tablet 3    docusate sodium (COLACE) 100 mg capsule Take 1 capsule (100 mg total) by mouth 2 (two) times a day (Patient not taking: Reported on 3/22/2021) 10 capsule 0    prochlorperazine (COMPAZINE) 5 mg tablet Take 1 tablet (5 mg total) by mouth every 6 (six) hours as needed for nausea or vomiting (Patient not taking: Reported on 3/22/2021) 30 tablet 0     No current facility-administered medications for this visit  Objective:    Blood pressure 110/82, pulse 61, temperature (!) 97 4 °F (36 3 °C), resp  rate 16, height 5' 2 25" (1 581 m), weight 48 1 kg (106 lb), not currently breastfeeding  Body mass index is 19 23 kg/m²  Body surface area is 1 46 meters squared  Physical Exam  Vitals reviewed  Exam conducted with a chaperone present  Constitutional:       General: She is not in acute distress  Appearance: Normal appearance  She is not ill-appearing  HENT:      Head: Normocephalic and atraumatic  Mouth/Throat:      Mouth: Mucous membranes are moist    Eyes:      General:         Right eye: No discharge  Left eye: No discharge  Conjunctiva/sclera: Conjunctivae normal    Pulmonary:      Effort: Pulmonary effort is normal    Abdominal:      Palpations: Abdomen is soft  There is no mass  Tenderness: There is no abdominal tenderness  Hernia: No hernia is present  Genitourinary:     Comments: The external female genitalia is normal  The bartholin's, uretheral and skenes glands are normal  The urethral meatus is normal (midline with no lesions)  Anus without fissure or lesion  Speculum exam reveals a grossly normal vagina  No masses, lesions,discharge or bleeding  No significant cystocele or rectocele noted  Bimanual exam notes a surgical absent cervix, uterus and adnexal structures  No masses or fullness  Bladder is without fullness, mass or tenderness  Musculoskeletal:      Right lower leg: No edema  Left lower leg: No edema  Skin:     General: Skin is warm and dry  Coloration: Skin is not jaundiced  Findings: No rash     Neurological: General: No focal deficit present  Mental Status: She is alert and oriented to person, place, and time  Cranial Nerves: No cranial nerve deficit  Sensory: No sensory deficit  Motor: No weakness  Gait: Gait normal    Psychiatric:         Mood and Affect: Mood normal          Behavior: Behavior normal          Thought Content:  Thought content normal          Judgment: Judgment normal

## 2021-07-22 ENCOUNTER — PATIENT OUTREACH (OUTPATIENT)
Dept: HEMATOLOGY ONCOLOGY | Facility: CLINIC | Age: 86
End: 2021-07-22

## 2021-07-22 DIAGNOSIS — C54.1 ENDOMETRIAL CANCER (HCC): Primary | ICD-10-CM

## 2021-07-26 ENCOUNTER — PATIENT OUTREACH (OUTPATIENT)
Dept: HEMATOLOGY ONCOLOGY | Facility: CLINIC | Age: 86
End: 2021-07-26

## 2021-07-26 NOTE — PROGRESS NOTES
MSW received a Distress Thermometer from Gyn Onc, where the pt self scored a 2 to indicate her level of stress  The pt indicated transportation, nervousness and worry as her psychosocial stressors  MSW made outreach to the pt this day and received her voicemail  Message was left, along with a contact number encouraging the pt to return the call if needed  MSW provided information about [employer] on [address] as well

## 2021-08-04 ENCOUNTER — TELEPHONE (OUTPATIENT)
Dept: FAMILY MEDICINE CLINIC | Facility: CLINIC | Age: 86
End: 2021-08-04

## 2021-08-04 NOTE — TELEPHONE ENCOUNTER
Pt states that she has lyme disease and she took 2 weeks of Doxycycline mono 100 mg her hematologist wants to verify if that amount is the appropriate for her or if she needs more medication  She has been off the medication for one week already please advise

## 2021-09-22 ENCOUNTER — TELEPHONE (OUTPATIENT)
Dept: UROLOGY | Facility: AMBULATORY SURGERY CENTER | Age: 86
End: 2021-09-22

## 2021-09-22 DIAGNOSIS — R32 URINARY INCONTINENCE, UNSPECIFIED TYPE: ICD-10-CM

## 2021-09-22 NOTE — TELEPHONE ENCOUNTER
Patient calling to ask for refill on Oxybutynin 5 mg XL  She has no more pills left  Needs refill sent to [facility]  Also has questions regarding some pills of Oxybutynin 10 mg and wants to know if she can take this until she gets her refill

## 2021-09-28 NOTE — MISCELLANEOUS
Message  returned pts call about prilosec causing gas  Will give her samples of nexium to see if she tolerated better  She will pick them up on 4/29      Signatures   Electronically signed by : Nel Bledsoe, [facility];  Apr 27 2016  6:13PM EST                       (Author) Patient Education        Trichomoniasis: Care Instructions  Overview  Trichomoniasis is a sexually transmitted infection (STI) caused by a parasite. It's often called trich (say \"trick\"). You can get it by having sex with an infected partner. Trich may cause vaginal itching and a smelly discharge. But in many cases, there are no symptoms. Trich needs to be treated so that you don't spread it to others. Both you and your sex partner(s) should be treated at the same time so you don't infect each other again. If you're pregnant, your doctor will talk with you about treatment. Trich may cause problems with pregnancy. You could pass it to your baby during a vaginal delivery. But this is rare. Follow-up care is a key part of your treatment and safety. Be sure to make and go to all appointments, and call your doctor if you are having problems. It's also a good idea to know your test results and keep a list of the medicines you take. How can you care for yourself at home? · Take your antibiotics as directed. Do not stop taking them just because you feel better. You need to take the full course of antibiotics. · Do not have sex while you are being treated. If your doctor gave you a single dose of antibiotics, do not have sex for one week after being treated and until your partner also has been treated. · Tell your sex partner (or partners) that they will also need to be tested and treated. · Use a cold water compress or cool baths to relieve itching. How can you prevent it? It's easier to prevent an STI than it is to treat one:  · Limit your sex partners. The safest sex is with one partner who has sex only with you. · Talk with your partner or partners about STIs before you have sex. Find out if they are at risk for an STI. Remember that it's possible to have an STI and not know it. · Wait to have sex with new partners until you've each been tested.   · Don't have sex if you have symptoms of an infection or if you are being treated for an STI. · Use a condom (a male or female condom) every time you have sex. Condoms are the only form of birth control that also helps prevent STIs. · If you're pregnant, be extra careful. Some STIs can be passed to your baby during delivery. Vaccines are available for some STIs, such as HPV. Ask your doctor for more information. When should you call for help? Call your doctor now or seek immediate medical care if:    · You have unusual vaginal bleeding.     · You have a fever.     · You have new discharge from the vagina or penis.     · You have pelvic pain. Watch closely for changes in your health, and be sure to contact your doctor if:    · You do not get better as expected.     · You have any new symptoms or your symptoms get worse. Where can you learn more? Go to [email or web address] and sign in to your [employer] account. Enter B308 in the [employer] box to learn more about \"Trichomoniasis: Care Instructions. \"     If you do not have an account, please click on the \"Sign Up Now\" link. Current as of: February 11, 2021               Content Version: 13.0  © 9624-9773 Healthwise, Incorporated. Care instructions adapted under license by [facility] ([facility]). If you have questions about a medical condition or this instruction, always ask your healthcare professional. [address] any warranty or liability for your use of this information.

## 2021-11-30 DIAGNOSIS — I10 BENIGN ESSENTIAL HYPERTENSION: ICD-10-CM

## 2021-11-30 RX ORDER — CARVEDILOL 6.25 MG/1
TABLET ORAL
Qty: 180 TABLET | Refills: 3 | Status: SHIPPED | OUTPATIENT
Start: 2021-11-30 | End: 2022-03-31 | Stop reason: DRUGHIGH

## 2021-12-03 RX ORDER — OXYBUTYNIN CHLORIDE 5 MG/1
5 TABLET, EXTENDED RELEASE ORAL
Qty: 90 TABLET | Refills: 3
Start: 2021-12-03 | End: 2021-12-08 | Stop reason: SDUPTHER

## 2021-12-03 NOTE — TELEPHONE ENCOUNTER
Patient called to reschedule follow up appointment  Patient was scheduled on 12/10/21 and rescheduled by our office on 12/28/21 but we scheduled the patient at the same time she sees her PCP  Patient is rescheduled for 01/04/22 in the [facility] office  Patient would like to know if she can get medication until she is seen  Patient advised that she is out of medication  Please advise

## 2021-12-08 ENCOUNTER — NURSE TRIAGE (OUTPATIENT)
Dept: OTHER | Facility: OTHER | Age: 86
End: 2021-12-08

## 2021-12-08 DIAGNOSIS — R32 URINARY INCONTINENCE, UNSPECIFIED TYPE: ICD-10-CM

## 2021-12-08 RX ORDER — OXYBUTYNIN CHLORIDE 5 MG/1
5 TABLET, EXTENDED RELEASE ORAL
Qty: 90 TABLET | Refills: 3 | Status: SHIPPED | OUTPATIENT
Start: 2021-12-08 | End: 2021-12-09 | Stop reason: SDUPTHER

## 2021-12-09 RX ORDER — OXYBUTYNIN CHLORIDE 5 MG/1
5 TABLET, EXTENDED RELEASE ORAL
Qty: 90 TABLET | Refills: 3 | Status: SHIPPED | OUTPATIENT
Start: 2021-12-09

## 2021-12-28 ENCOUNTER — OFFICE VISIT (OUTPATIENT)
Dept: FAMILY MEDICINE CLINIC | Facility: CLINIC | Age: 86
End: 2021-12-28
Payer: MEDICARE

## 2021-12-28 VITALS
BODY MASS INDEX: 19.74 KG/M2 | OXYGEN SATURATION: 97 % | SYSTOLIC BLOOD PRESSURE: 122 MMHG | RESPIRATION RATE: 16 BRPM | HEART RATE: 68 BPM | WEIGHT: 111.4 LBS | DIASTOLIC BLOOD PRESSURE: 84 MMHG | HEIGHT: 63 IN | TEMPERATURE: 98.4 F

## 2021-12-28 DIAGNOSIS — E78.5 DYSLIPIDEMIA: ICD-10-CM

## 2021-12-28 DIAGNOSIS — R42 VERTIGO: Primary | ICD-10-CM

## 2021-12-28 DIAGNOSIS — R73.01 IMPAIRED FASTING GLUCOSE: ICD-10-CM

## 2021-12-28 DIAGNOSIS — E03.9 HYPOTHYROIDISM, UNSPECIFIED TYPE: ICD-10-CM

## 2021-12-28 DIAGNOSIS — Z00.00 MEDICARE ANNUAL WELLNESS VISIT, SUBSEQUENT: ICD-10-CM

## 2021-12-28 DIAGNOSIS — Z12.31 ENCOUNTER FOR SCREENING MAMMOGRAM FOR BREAST CANCER: ICD-10-CM

## 2021-12-28 DIAGNOSIS — F41.8 DEPRESSION WITH ANXIETY: ICD-10-CM

## 2021-12-28 DIAGNOSIS — H91.93 BILATERAL HEARING LOSS, UNSPECIFIED HEARING LOSS TYPE: ICD-10-CM

## 2021-12-28 DIAGNOSIS — I10 BENIGN ESSENTIAL HYPERTENSION: ICD-10-CM

## 2021-12-28 PROBLEM — A69.20 LYME DISEASE: Status: ACTIVE | Noted: 2021-08-03

## 2021-12-28 PROCEDURE — 99214 OFFICE O/P EST MOD 30 MIN: CPT | Performed by: FAMILY MEDICINE

## 2021-12-28 PROCEDURE — G0439 PPPS, SUBSEQ VISIT: HCPCS | Performed by: FAMILY MEDICINE

## 2021-12-28 RX ORDER — ALPRAZOLAM 0.25 MG/1
0.25 TABLET ORAL
Qty: 20 TABLET | Refills: 0 | Status: SHIPPED | OUTPATIENT
Start: 2021-12-28

## 2021-12-28 RX ORDER — MECLIZINE HYDROCHLORIDE 25 MG/1
25 TABLET ORAL EVERY 8 HOURS PRN
Qty: 30 TABLET | Refills: 0 | Status: SHIPPED | OUTPATIENT
Start: 2021-12-28 | End: 2022-01-06

## 2022-01-04 ENCOUNTER — OFFICE VISIT (OUTPATIENT)
Dept: UROLOGY | Facility: HOSPITAL | Age: 87
End: 2022-01-04
Payer: MEDICARE

## 2022-01-04 VITALS
WEIGHT: 110 LBS | BODY MASS INDEX: 19.49 KG/M2 | DIASTOLIC BLOOD PRESSURE: 62 MMHG | HEIGHT: 63 IN | SYSTOLIC BLOOD PRESSURE: 110 MMHG

## 2022-01-04 DIAGNOSIS — R39.15 URINARY URGENCY: Primary | ICD-10-CM

## 2022-01-04 DIAGNOSIS — N32.81 OAB (OVERACTIVE BLADDER): ICD-10-CM

## 2022-01-04 LAB
POST-VOID RESIDUAL VOLUME, ML POC: 89 ML
SL AMB  POCT GLUCOSE, UA: ABNORMAL
SL AMB LEUKOCYTE ESTERASE,UA: POSITIVE
SL AMB POCT BILIRUBIN,UA: ABNORMAL
SL AMB POCT BLOOD,UA: ABNORMAL
SL AMB POCT CLARITY,UA: CLEAR
SL AMB POCT COLOR,UA: YELLOW
SL AMB POCT KETONES,UA: ABNORMAL
SL AMB POCT NITRITE,UA: ABNORMAL
SL AMB POCT PH,UA: 7
SL AMB POCT SPECIFIC GRAVITY,UA: 1.01
SL AMB POCT URINE PROTEIN: ABNORMAL
SL AMB POCT UROBILINOGEN: 0.2

## 2022-01-04 PROCEDURE — 87086 URINE CULTURE/COLONY COUNT: CPT | Performed by: NURSE PRACTITIONER

## 2022-01-04 PROCEDURE — 81002 URINALYSIS NONAUTO W/O SCOPE: CPT | Performed by: NURSE PRACTITIONER

## 2022-01-04 PROCEDURE — 99213 OFFICE O/P EST LOW 20 MIN: CPT | Performed by: NURSE PRACTITIONER

## 2022-01-04 PROCEDURE — 51798 US URINE CAPACITY MEASURE: CPT | Performed by: NURSE PRACTITIONER

## 2022-01-04 PROCEDURE — 81001 URINALYSIS AUTO W/SCOPE: CPT | Performed by: NURSE PRACTITIONER

## 2022-01-04 NOTE — PROGRESS NOTES
01/04/22    Matthew Roman   1934   [id number]     Assessment  1 OAB    Discussion/Plan  1 OAB   PVR 89   Urine dip in office: positive leukocytes, negative blood, cloudy yellow    Sent for culture   Avoid constipation - continue bowel regimen per GI    Continue oxybutynin 5 mg daily   Hydration with increased water, avoid bladder irritants and reduce tea intake   Pelvic floor physical therapy referral discussed - patient wishes to consider     Await urine culture  Patient will continue current regimen and call if she wishes to pursue pelvic floor PT  Brochure provided  All questions answered at this time  Subjective  HPI   Hunter Naranjo is an 80 y o  female with a history of OAB presents today for follow up  She is accompanied by her daughter  Patient was hospitalized in the past for hyponatremia  At that time, she was also noted to have gastroparesis and Myrbetriq was discontinued and oxybutynin dosage decreased  She reports daytime urgency and nocturia  She is following a 1500 cc fluid restriction which is in place per Nephrology  Her daughter reports she does not consume close to 1500 cc fluids  She is taking miralax and senna daily for constipation  She has history of gastroparesis  She experiences occasional urge incontinence  She wears a pad for protection approximately 1-2 pads daily  She consumes 3 glasses of tea daily     PMH: hypothyroid, arthritis, atrophic vaginitis, endometrial cancer, depression, anxiety, constipation, Lyme (7/2021)    Review of Systems - History obtained from chart review and the patient  General ROS: negative  Psychological ROS: negative  Ophthalmic ROS: negative  Endocrine ROS: negative  Breast ROS: negative  Respiratory ROS: negative  Cardiovascular ROS: negative  Gastrointestinal ROS: no abdominal pain, change in bowel habits, or black or bloody stools  Genito-Urinary ROS: positive for - incontinence, nocturia and urinary frequency/urgency  Musculoskeletal ROS: negative  Neurological ROS: no TIA or stroke symptoms  Dermatological ROS: negative       Objective  Physical Exam  Vitals and nursing note reviewed  Constitutional:       General: She is not in acute distress  Appearance: Normal appearance  She is not ill-appearing, toxic-appearing or diaphoretic  HENT:      Head: Normocephalic and atraumatic  Pulmonary:      Effort: Pulmonary effort is normal  No respiratory distress  Abdominal:      Tenderness: There is no right CVA tenderness or left CVA tenderness  Musculoskeletal:         General: Normal range of motion  Cervical back: Normal range of motion  Skin:     General: Skin is warm and dry  Neurological:      General: No focal deficit present  Mental Status: She is alert and oriented to person, place, and time  Mental status is at baseline  Psychiatric:         Mood and Affect: Mood normal          Behavior: Behavior normal          Thought Content:  Thought content normal          Judgment: Judgment normal                [place] [facility]

## 2022-01-05 LAB
BACTERIA UR CULT: NORMAL
BACTERIA UR QL AUTO: ABNORMAL /HPF
BILIRUB UR QL STRIP: NEGATIVE
CLARITY UR: CLEAR
COLOR UR: YELLOW
GLUCOSE UR STRIP-MCNC: NEGATIVE MG/DL
HGB UR QL STRIP.AUTO: NEGATIVE
HYALINE CASTS #/AREA URNS LPF: ABNORMAL /LPF
KETONES UR STRIP-MCNC: NEGATIVE MG/DL
LEUKOCYTE ESTERASE UR QL STRIP: ABNORMAL
NITRITE UR QL STRIP: NEGATIVE
NON-SQ EPI CELLS URNS QL MICRO: ABNORMAL /HPF
PH UR STRIP.AUTO: 7 [PH]
PROT UR STRIP-MCNC: NEGATIVE MG/DL
RBC #/AREA URNS AUTO: ABNORMAL /HPF
SP GR UR STRIP.AUTO: 1.01 (ref 1–1.03)
UROBILINOGEN UR QL STRIP.AUTO: 0.2 E.U./DL
WBC #/AREA URNS AUTO: ABNORMAL /HPF

## 2022-01-06 DIAGNOSIS — R42 VERTIGO: ICD-10-CM

## 2022-01-06 RX ORDER — MECLIZINE HYDROCHLORIDE 25 MG/1
TABLET ORAL
Qty: 30 TABLET | Refills: 0 | Status: SHIPPED | OUTPATIENT
Start: 2022-01-06

## 2022-01-06 NOTE — TELEPHONE ENCOUNTER
MECLIZINE 25 MG TABLET  Requested medication(s) are due for refill today: No  Patient has already received a courtesy refill: No  Other reason request has been forwarded to provider: Should patient continue med?

## 2022-02-12 DIAGNOSIS — E03.9 HYPOTHYROIDISM, UNSPECIFIED TYPE: ICD-10-CM

## 2022-02-14 RX ORDER — LEVOTHYROXINE SODIUM 0.03 MG/1
25 TABLET ORAL DAILY
Qty: 90 TABLET | Refills: 0 | Status: SHIPPED | OUTPATIENT
Start: 2022-02-14 | End: 2022-05-12

## 2022-03-10 ENCOUNTER — OFFICE VISIT (OUTPATIENT)
Dept: FAMILY MEDICINE CLINIC | Facility: CLINIC | Age: 87
End: 2022-03-10
Payer: MEDICARE

## 2022-03-10 VITALS
RESPIRATION RATE: 16 BRPM | HEART RATE: 64 BPM | OXYGEN SATURATION: 99 % | WEIGHT: 114 LBS | DIASTOLIC BLOOD PRESSURE: 78 MMHG | HEIGHT: 63 IN | SYSTOLIC BLOOD PRESSURE: 140 MMHG | BODY MASS INDEX: 20.2 KG/M2 | TEMPERATURE: 97.8 F

## 2022-03-10 DIAGNOSIS — R32 URINARY INCONTINENCE, UNSPECIFIED TYPE: ICD-10-CM

## 2022-03-10 DIAGNOSIS — E78.5 DYSLIPIDEMIA: ICD-10-CM

## 2022-03-10 DIAGNOSIS — K22.719 BARRETT'S ESOPHAGUS WITH DYSPLASIA: ICD-10-CM

## 2022-03-10 DIAGNOSIS — H35.9 RETINA DISORDER, LEFT: ICD-10-CM

## 2022-03-10 DIAGNOSIS — E03.9 HYPOTHYROIDISM, UNSPECIFIED TYPE: ICD-10-CM

## 2022-03-10 DIAGNOSIS — Z01.818 PREOP EXAMINATION: Primary | ICD-10-CM

## 2022-03-10 DIAGNOSIS — I10 BENIGN ESSENTIAL HYPERTENSION: ICD-10-CM

## 2022-03-10 PROCEDURE — 99214 OFFICE O/P EST MOD 30 MIN: CPT | Performed by: FAMILY MEDICINE

## 2022-03-10 NOTE — PROGRESS NOTES
Chief Complaint   Patient presents with    Pre-op Clearance     Left eye Retina surgery scheduled 03/16/22 with Dr Naren Ge at [facility]  Health Maintenance   Topic Date Due    DTaP,Tdap,and Td Vaccines (1 - Tdap) Never done    Pneumococcal Vaccine: 65+ Years (2 of 2 - PPSV23) 04/27/2018    DXA SCAN  06/24/2021    COVID-19 Vaccine (3 - Booster for Moderna series) 07/08/2021    Influenza Vaccine (1) 09/01/2021    Breast Cancer Screening: Mammogram  01/06/2022    Fall Risk  12/28/2022    Medicare Annual Wellness Visit (AWV)  12/28/2022    BMI: Adult  03/10/2023    HIB Vaccine  Aged Out    Hepatitis B Vaccine  Aged Out    IPV Vaccine  Aged Out    Hepatitis A Vaccine  Aged Out    Meningococcal ACWY Vaccine  Aged Out    HPV Vaccine  Aged Out           Subjective:     Carlo Patel is a 80 y o  female who presents to the office today for a preoperative consultation at the request of surgeon Dr Cody Ochoa who plans on performing left retina surgery on 3/16/2022  This consultation is requested for the specific conditions prompting preoperative evaluation (i e  because of potential affect on operative risk): preop  Planned anesthesia: monitor  The patient has the following known anesthesia issues: no problem  Patients bleeding risk: no recent abnormal bleeding      AnxietyDepression---Stable  Took off buspar per nephrology  She uses xanax very rarely       Hypothyroidism---on levothyroxine 25mcg daily  She cannot tolerate it empty stomach, so she takes it 2 hours after lunch        Hyperlipidemia---Pt uses simvastatin 20mg qhs       HTN---BP at home 110-120/70-80  She is on carvedilol to 6 25 mg bid  FU cardiology regularly  Denies headache, vision change, Sob or CP       Urinary incontinence---FU urology Dr Stephania House  She is on oxybutynin 5mg QD which helped some  Still get up 4 times at night       Lackey's---follows GI  She is on pepcid 20mg bid which helped    Constipation---Follow GI   She is on miralax/senna every other day       Hyponatremia---FU nephrology  1/2022 Na 139 normal       MGUS---FU hem/onc  Observe now        The following portions of the patient's history were reviewed and updated as appropriate: allergies, current medications, past family history, past medical history, past social history, past surgical history and problem list     Review of Systems  Pertinent items are noted in HPI  Objective:     /78 (BP Location: Left arm, Patient Position: Sitting, Cuff Size: Adult)   Pulse 64   Temp 97 8 °F (36 6 °C) (Tympanic)   Resp 16   Ht 5' 2 5" (1 588 m)   Wt 51 7 kg (114 lb)   SpO2 99%   BMI 20 52 kg/m²     General Appearance:    Alert, cooperative, no distress, appears stated age   Head:    Normocephalic, without obvious abnormality, atraumatic   Eyes:    PERRL, conjunctiva/corneas clear, EOM's intact               Neck:   Supple, symmetrical, trachea midline, no adenopathy;     thyroid:  no enlargement/tenderness/nodules; no carotid    bruit or JVD       Lungs:     Clear to auscultation bilaterally, respirations unlabored        Heart:    Regular rate and rhythm, S1 and S2 normal, no murmur, rub   or gallop       Abdomen:     Soft, non-tender, bowel sounds active all four quadrants,     no masses, no organomegaly           Extremities:   Extremities normal, atraumatic, no cyanosis or edema                       Predictors of intubation difficulty:   Morbid obesity? no     Assessment:     80 y o  female with planned surgery as above  Known risk factors for perioperative complications: None    Difficulty with intubation is not anticipated  Cardiac Risk Estimation: Low      Plan:     1  Preoperative workup as follows none  2  Change in medication regimen before surgery: will ask ophthalmology if she should hold aspirin  3  Prophylaxis for cardiac events with perioperative beta-blockers: not indicated    4  Invasive hemodynamic monitoring perioperatively: not indicated    5  Deep vein thrombosis prophylaxis postoperatively:regimen to be chosen by surgical team

## 2022-03-10 NOTE — LETTER
March 10, 2022     Juan Wilcox MD  [address] [facility]    Patient: Sky Lo   YOB: 1934   Date of Visit: 3/10/2022       Dear Dr Stephany Peñaloza: Thank you for referring Phyllis Simmons to me for evaluation  Below are my notes for this consultation  If you have questions, please do not hesitate to call me  I look forward to following your patient along with you  Sincerely,        Bartolo Mercado MD        CC: No Recipients  Bartolo Mercado MD  3/10/2022 12:21 PM  Sign when Signing Visit  Chief Complaint   Patient presents with    Pre-op Clearance     Left eye Retina surgery scheduled 03/16/22 with Dr Ren Peñaloza at [facility] Maintenance   Topic Date Due    DTaP,Tdap,and Td Vaccines (1 - Tdap) Never done    Pneumococcal Vaccine: 65+ Years (2 of 2 - PPSV23) 04/27/2018    DXA SCAN  06/24/2021    COVID-19 Vaccine (3 - Booster for Moderna series) 07/08/2021    Influenza Vaccine (1) 09/01/2021    Breast Cancer Screening: Mammogram  01/06/2022    Fall Risk  12/28/2022    Medicare Annual Wellness Visit (AWV)  12/28/2022    BMI: Adult  03/10/2023    HIB Vaccine  Aged Out    Hepatitis B Vaccine  Aged Out    IPV Vaccine  Aged Out    Hepatitis A Vaccine  Aged Out    Meningococcal ACWY Vaccine  Aged Out    HPV Vaccine  Aged Out           Subjective:     Sky Lo is a 80 y o  female who presents to the office today for a preoperative consultation at the request of surgeon Dr Gelacio Arias who plans on performing left retina surgery on 3/16/2022  This consultation is requested for the specific conditions prompting preoperative evaluation (i e  because of potential affect on operative risk): preop  Planned anesthesia: monitor  The patient has the following known anesthesia issues: no problem  Patients bleeding risk: no recent abnormal bleeding      AnxietyDepression---Stable  Took off buspar per nephrology  She uses xanax very rarely     Hypothyroidism---on levothyroxine 25mcg daily  She cannot tolerate it empty stomach, so she takes it 2 hours after lunch        Hyperlipidemia---Pt uses simvastatin 20mg qhs       HTN---BP at home 110-120/70-80  She is on carvedilol to 6 25 mg bid  FU cardiology regularly  Denies headache, vision change, Sob or CP       Urinary incontinence---FU urology Dr Scarlett Jean-Baptiste  She is on oxybutynin 5mg QD which helped some  Still get up 4 times at night       Lackey's---follows GI  She is on pepcid 20mg bid which helped    Constipation---Follow GI  She is on miralax/senna every other day       Hyponatremia---FU nephrology  1/2022 Na 139 normal       MGUS---FU hem/onc  Observe now        The following portions of the patient's history were reviewed and updated as appropriate: allergies, current medications, past family history, past medical history, past social history, past surgical history and problem list     Review of Systems  Pertinent items are noted in HPI       Objective:     /78 (BP Location: Left arm, Patient Position: Sitting, Cuff Size: Adult)   Pulse 64   Temp 97 8 °F (36 6 °C) (Tympanic)   Resp 16   Ht 5' 2 5" (1 588 m)   Wt 51 7 kg (114 lb)   SpO2 99%   BMI 20 52 kg/m²     General Appearance:    Alert, cooperative, no distress, appears stated age   Head:    Normocephalic, without obvious abnormality, atraumatic   Eyes:    PERRL, conjunctiva/corneas clear, EOM's intact               Neck:   Supple, symmetrical, trachea midline, no adenopathy;     thyroid:  no enlargement/tenderness/nodules; no carotid    bruit or JVD       Lungs:     Clear to auscultation bilaterally, respirations unlabored        Heart:    Regular rate and rhythm, S1 and S2 normal, no murmur, rub   or gallop       Abdomen:     Soft, non-tender, bowel sounds active all four quadrants,     no masses, no organomegaly           Extremities:   Extremities normal, atraumatic, no cyanosis or edema                       Predictors of intubation difficulty:   Morbid obesity? no     Assessment:     80 y o  female with planned surgery as above  Known risk factors for perioperative complications: None    Difficulty with intubation is not anticipated  Cardiac Risk Estimation: Low      Plan:     1  Preoperative workup as follows none  2  Change in medication regimen before surgery: will ask ophthalmology if she should hold aspirin  3  Prophylaxis for cardiac events with perioperative beta-blockers: not indicated  4  Invasive hemodynamic monitoring perioperatively: not indicated    5  Deep vein thrombosis prophylaxis postoperatively:regimen to be chosen by surgical team

## 2022-03-30 NOTE — PROGRESS NOTES
Cardiology Follow Up    [facility]  1934  [id number]  [facility] [address]  [phone]    1  Primary hypertension  carvedilol (COREG) 12 5 mg tablet   2  Dyslipidemia     3  Hyponatremia         Interval History:   Ms Thierry Stein presents to our office with a one month history of increased BP  Ivan Libia is accompanied by her daughter  She underwent a left eye Vitrectomy this past February  According to Ivan Reza and her daughter she has become more anxious due to difficulty with eye sight  Over the past month her BP has been elevated  She has a list of home BP with her today  Home BP running 152/88- 181/102  Ivan Reza is not limiting sodium in her diet  She has a hx of hyponatremia, therefore is on a 1500 cc fluid restriction and salty diet  Ivan Reza denies CP, dyspnea with minimal exertion, lightheadedness or dizziness  She is concerned about elevated BP due to recent eye surgery  Medical History  Hypertension  Dyslipidemia 6 12 20 , TG 75, HDL 51, LDL 78   HgbA1C 5 7 on 11/29/21  Hyponatremia Followed by  Nephrology    Branch Retinal vein occlusion with macular edema of left eye      12/07/20 TTE:  Left ventricular cavity size  Small, LVEF 58%, grade 1 diastolic dysfunction, trace MR, mild-to-moderate TR          Allergies: HCTZ, Norvasc   Patient Active Problem List   Diagnosis    Benign essential hypertension    Hyponatremia    Hypothyroidism    History of endometrial cancer    Abnormal breast exam    Arthritis    Constipation    Depression with anxiety    Diverticulosis    Duodenal diverticulum    Esophagitis, reflux    Dyslipidemia    Impaired fasting glucose    Insomnia    Age-related osteoporosis without current pathological fracture    Atrophic vaginitis    Urinary incontinence    Vertigo    Encounter for follow-up surveillance of endometrial cancer  [facility]    Arthritis of left shoulder region    Encounter for screening mammogram for malignant neoplasm of breast    Need for immunization against influenza    Nonexudative age-related macular degeneration, bilateral, early dry stage    Posterior vitreous detachment of both eyes    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    Status post cataract extraction and insertion of intraocular lens    Leukocytosis    Lackey's esophagus with dysplasia    Pericardial effusion    MGUS (monoclonal gammopathy of unknown significance)    Hypercalcemia    Lyme disease     Past Medical History:   Diagnosis Date    Anemia     post-op, 07/07/09    Anxiety     last assessed: 01/15/14    C  difficile diarrhea     Chest wall trauma     Acute, last assessed: 10/24/12    Depression     Disease of thyroid gland     hypothyroid    Diverticula of colon     Endometrial cancer ([facility] [address] ) 03/2011    Endometrial hyperplasia     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HCC)     stage IA, Grade I endometriod adenocarcinoma with 43% myometrial invasion and no LVSI, last assessed: 56/62/26    Folliculitis     last assessed: 03/02/16    Gastroparesis     Hypercholesterolemia     Hyperlipidemia     Hypertension     Incontinence in female     Migraine     Nontoxic single thyroid nodule     last assessed: 02/16/13    Osteoporosis     last assessed: 02/22/17    Pure hypercholesterolemia     Rotator cuff (capsule) sprain     Acute, right    Skin lesion     last assessed: 04/26/13    Stomatitis     Strain of right buttock     gluteus medius    Tendinitis of right rotator cuff     last assessed: 08/12/12    Thyroid cyst     last assessed: 08/15/13     Social History     Socioeconomic History    Marital status:       Spouse name: Not on file    Number of children: Not on file    Years of education: Not on file    Highest education level: Not on file   Occupational History    Not on file Tobacco Use    Smoking status: Never Smoker    Smokeless tobacco: Never Used   Vaping Use    Vaping Use: Never used   Substance and Sexual Activity    Alcohol use: No    Drug use: No    Sexual activity: Not on file   Other Topics Concern    Not on file   Social History Narrative    Not on file     Social Determinants of Health     Financial Resource Strain: Not on file   Food Insecurity: Not on file   Transportation Needs: Not on file   Physical Activity: Not on file   Stress: Not on file   Social Connections: Not on file   Intimate Partner Violence: Not on file   Housing Stability: Not on file      Family History   Problem Relation Age of Onset    Cancer Father         unknown type    Other Brother         Prolapsing mitral valve leaflet syndrome    No Known Problems Mother     No Known Problems Daughter     No Known Problems Maternal Grandmother     No Known Problems Maternal Grandfather     Breast cancer Paternal Grandmother 79    No Known Problems Paternal Grandfather     No Known Problems Daughter     No Known Problems Daughter     Testicular cancer Brother 27    Lung cancer Brother 61    Prostate cancer Brother 36        unsure of excat age   Ardeth Needs No Known Problems Maternal Aunt     No Known Problems Paternal Aunt     No Known Problems Paternal Aunt      Past Surgical History:   Procedure Laterality Date    APPENDECTOMY      BREAST BIOPSY Right     BREAST BIOPSY Left     BREAST CYST ASPIRATION      COLONOSCOPY      fiberoptic, 1998, 2001, 2006    CYSTOSCOPY      Diagnostic    EGD AND COLONOSCOPY N/A 3/11/2016    Procedure: EGD ;  Surgeon: Allan Dey MD;  Location: [facility];   Service:     HYSTERECTOMY  03/22/2011    Robotic-assisted, total laparoscopic approch    JOINT REPLACEMENT      KNEE SURGERY      OOPHORECTOMY Bilateral 03/22/2011    [place]    REPLACEMENT TOTAL KNEE      TONSILECTOMY AND ADNOIDECTOMY      TOTAL SHOULDER ARTHROPLASTY Right        Current Outpatient Medications:     acetaminophen (TYLENOL) 650 mg CR tablet, Take 650 mg by mouth as needed for mild pain , Disp: , Rfl:     aflibercept (Eylea) 2 MG/0 05ML SOLN, by Intravitreal route, Disp: , Rfl:     ALPHAGAN P 0 1 %, instill 1 drop into both eyes twice a day, Disp: , Rfl: 0    ALPRAZolam (XANAX) 0 25 mg tablet, Take 1 tablet (0 25 mg total) by mouth daily at bedtime as needed for anxiety, Disp: 20 tablet, Rfl: 0    aspirin 81 MG tablet, Take 1 tablet (81 mg total) by mouth daily (Patient taking differently: Take 81 mg by mouth 2 (two) times a day ), Disp: , Rfl:     bifidobacterium infantis (ALIGN) capsule, Take by mouth, Disp: , Rfl:     carvedilol (COREG) 6 25 mg tablet, TAKE 1 TABLET 2 TIMES A DAY WITH MEALS, Disp: 180 tablet, Rfl: 3    cholecalciferol (VITAMIN D3) 1,000 units tablet, Take by mouth, Disp: , Rfl:     DENTA 5000 PLUS 1 1 % CREA, BRUSH UP TO TWICE A DAY, Disp: , Rfl: 0    docusate sodium (COLACE) 100 mg capsule, Take 1 capsule (100 mg total) by mouth 2 (two) times a day (Patient not taking: Reported on 3/22/2021), Disp: 10 capsule, Rfl: 0    famotidine (PEPCID) 20 mg tablet, Take 1 tablet (20 mg total) by mouth 2 (two) times a day, Disp: 60 tablet, Rfl: 0    levothyroxine 25 mcg tablet, Take 1 tablet (25 mcg total) by mouth daily, Disp: 90 tablet, Rfl: 0    meclizine (ANTIVERT) 25 mg tablet, TAKE 1 TABLET EVERY 8 HOURS AS NEEDED FOR DIZZINESS, Disp: 30 tablet, Rfl: 0    Multiple Vitamins-Minerals (CENTRUM SILVER PO), Take by mouth, Disp: , Rfl:     oxybutynin (DITROPAN-XL) 5 mg 24 hr tablet, Take 1 tablet (5 mg total) by mouth daily at bedtime, Disp: 90 tablet, Rfl: 3    polyethylene glycol (MIRALAX) powder, Take by mouth as needed  , Disp: , Rfl:     ranibizumab (LUCENTIS) 0 3 mg/0 05mL, 0 3 mg once, Disp: , Rfl:     simvastatin (ZOCOR) 20 mg tablet, take 1 tablet by mouth once daily, Disp: 90 tablet, Rfl: 3  Allergies   Allergen Reactions    Amoxil [Amoxicillin] Hives    Biaxin [Clarithromycin] GI Intolerance     Reaction Date: 19Jul2011;   Pt gets nauseated    Esomeprazole GI Intolerance    Fosamax [Alendronate] GI Intolerance    Hydrochlorothiazide      Hyponatremia      Lansoprazole GI Intolerance    Norvasc [Amlodipine Besylate]     Pantoprazole GI Intolerance     Can tolerate IV- Not oral    Relafen [Nabumetone] GI Intolerance and Other (See Comments)       Labs:  No visits with results within 2 Month(s) from this visit  Latest known visit with results is:   Office Visit on 01/04/2022   Component Date Value    LEUKOCYTE ESTERASE,UA 01/04/2022 positive     NITRITE,UA 01/04/2022 -     SL AMB POCT UROBILINOGEN 01/04/2022 0 2     POCT URINE PROTEIN 01/04/2022 trace      PH,UA 01/04/2022 7 0     BLOOD,UA 01/04/2022 -     SPECIFIC GRAVITY,UA 01/04/2022 1 010     KETONES,UA 01/04/2022 -     BILIRUBIN,UA 01/04/2022 -     GLUCOSE, UA 01/04/2022 -      COLOR,UA 01/04/2022 yellow     CLARITY,UA 01/04/2022 clear     POST-VOID RESIDUAL VOLUM* 01/04/2022 89     Clarity, UA 01/04/2022 Clear     Color, UA 01/04/2022 Yellow     Specific Gravity, UA 01/04/2022 1 015     pH, UA 01/04/2022 7 0     Glucose, UA 01/04/2022 Negative     Ketones, UA 01/04/2022 Negative     Blood, UA 01/04/2022 Negative     Protein, UA 01/04/2022 Negative     Nitrite, UA 01/04/2022 Negative     Bilirubin, UA 01/04/2022 Negative     Urobilinogen, UA 01/04/2022 0 2     Leukocytes, UA 01/04/2022 Small*    WBC, UA 01/04/2022 None Seen     RBC, UA 01/04/2022 None Seen     Hyaline Casts, UA 01/04/2022 None Seen     Bacteria, UA 01/04/2022 None Seen     Epithelial Cells 01/04/2022 None Seen     Urine Culture 01/04/2022 No Growth <1000 cfu/mL      Imaging: No results found  Review of Systems:  Review of Systems   Eyes: Positive for visual disturbance  Musculoskeletal: Positive for arthralgias and myalgias  Psychiatric/Behavioral: The patient is nervous/anxious      All other systems reviewed and are negative  Physical Exam:  Physical Exam  Vitals reviewed  Constitutional:       Appearance: Normal appearance  Comments: Thin elderly female    HENT:      Head: Normocephalic  Eyes:      Pupils: Pupils are equal, round, and reactive to light  Cardiovascular:      Rate and Rhythm: Normal rate and regular rhythm  Pulses: Normal pulses  Heart sounds: Normal heart sounds  Pulmonary:      Effort: Pulmonary effort is normal       Breath sounds: Normal breath sounds  Abdominal:      General: Bowel sounds are normal       Palpations: Abdomen is soft  Musculoskeletal:      Cervical back: Normal range of motion and neck supple  Right lower leg: No edema  Left lower leg: No edema  Skin:     General: Skin is warm and dry  Capillary Refill: Capillary refill takes less than 2 seconds  Neurological:      General: No focal deficit present  Mental Status: She is alert and oriented to person, place, and time  Psychiatric:         Mood and Affect: Mood normal          Behavior: Behavior normal          Discussion/Summary:  1  Hypertension- LUE  Sitting 148/80, Home BP running higher, 152/88 to 181/102  I have instructed her on the proper technique for home BP monitoring  Increase Coreg from 6 25mg BID to 12 5mg BID  According to [facility], in the last day or two she has been taking Coreg 12 5mg in the evening  Continue with home BP monitoring as directed    2  Dyslipidemia 6 12 20 , TG 75, HDL 51, LDL 78 Continue on simvastatin 20 mg daily,  Heart healthy diet  3  Hyponatremia 1/25/22 sodium 139, continue on a 1500 cc fluid restriction    Follow up with Dr Watt [facility]

## 2022-03-31 ENCOUNTER — OFFICE VISIT (OUTPATIENT)
Dept: CARDIOLOGY CLINIC | Facility: CLINIC | Age: 87
End: 2022-03-31
Payer: MEDICARE

## 2022-03-31 VITALS
SYSTOLIC BLOOD PRESSURE: 180 MMHG | WEIGHT: 113.8 LBS | BODY MASS INDEX: 20.16 KG/M2 | HEIGHT: 63 IN | HEART RATE: 73 BPM | OXYGEN SATURATION: 99 % | DIASTOLIC BLOOD PRESSURE: 90 MMHG

## 2022-03-31 DIAGNOSIS — E78.5 DYSLIPIDEMIA: ICD-10-CM

## 2022-03-31 DIAGNOSIS — E87.1 HYPONATREMIA: ICD-10-CM

## 2022-03-31 DIAGNOSIS — I10 PRIMARY HYPERTENSION: Primary | ICD-10-CM

## 2022-03-31 PROCEDURE — 99215 OFFICE O/P EST HI 40 MIN: CPT | Performed by: INTERNAL MEDICINE

## 2022-03-31 RX ORDER — CARVEDILOL 12.5 MG/1
12.5 TABLET ORAL 2 TIMES DAILY WITH MEALS
Qty: 180 TABLET | Refills: 3 | Status: SHIPPED | OUTPATIENT
Start: 2022-03-31

## 2022-03-31 NOTE — PATIENT INSTRUCTIONS
Increase Coreg to 12 5mg BID with meals   1500 cc fluid restriction   Continue with home BP monitoring as directed

## 2022-05-12 DIAGNOSIS — E03.9 HYPOTHYROIDISM, UNSPECIFIED TYPE: ICD-10-CM

## 2022-05-12 RX ORDER — LEVOTHYROXINE SODIUM 0.03 MG/1
25 TABLET ORAL DAILY
Qty: 90 TABLET | Refills: 0 | Status: SHIPPED | OUTPATIENT
Start: 2022-05-12 | End: 2022-08-10

## 2022-05-16 ENCOUNTER — OFFICE VISIT (OUTPATIENT)
Dept: CARDIOLOGY CLINIC | Facility: CLINIC | Age: 87
End: 2022-05-16
Payer: MEDICARE

## 2022-05-16 VITALS
HEART RATE: 63 BPM | HEIGHT: 62 IN | WEIGHT: 114.3 LBS | SYSTOLIC BLOOD PRESSURE: 142 MMHG | BODY MASS INDEX: 21.04 KG/M2 | DIASTOLIC BLOOD PRESSURE: 70 MMHG

## 2022-05-16 DIAGNOSIS — E78.5 DYSLIPIDEMIA: ICD-10-CM

## 2022-05-16 DIAGNOSIS — I10 BENIGN ESSENTIAL HYPERTENSION: Primary | ICD-10-CM

## 2022-05-16 PROCEDURE — 93000 ELECTROCARDIOGRAM COMPLETE: CPT | Performed by: INTERNAL MEDICINE

## 2022-05-16 PROCEDURE — 99214 OFFICE O/P EST MOD 30 MIN: CPT | Performed by: INTERNAL MEDICINE

## 2022-05-16 NOTE — PROGRESS NOTES
Cardiology Follow-up    David Dunham  [id number]  1934  [facility] [address] [address]      1  Benign essential hypertension  POCT ECG   2  Dyslipidemia  POCT ECG       Discussion/Summary:  Ms Becky Wilkinson is a pleasant 27-year-old female who presents to the office today for routine follow-up  Her blood pressure is elevated in the office today  She attributes this to white coat hypertension  She does check it at home  However these readings are prior to her second dose of her carvedilol and shortly thereafter the morning dose  They are elevated  I have asked her to vary the time she takes her blood pressure given she had an episode of lightheadedness in the mid afternoon and took her blood pressure and the systolic reading was 133 mmHg  For now no changes were made to her medication regimen  She will report updated readings to the office for my review  Her most recent lipids were reviewed  They reveal acceptable numbers on her current statin regimen  I will see her back in the office in six months for re-evaluation of her blood pressure  History of Present Illness:  Ms Becky Wilkinson is a pleasant 27-year-old female who presents to the office today for the re-evaluation of hypertension  Since her last visit with me due to issues with her blood pressure she was seen by our advanced practitioner  Her carvedilol dose was increased to 12 5 mg twice daily  This made her feel unwell so she decreased the dose to 6 25 mg in the morning and 12 5 mg in the evening  She had been feeling fatigued and lightheaded on the increased dose with resolution with reduction to her current dose  She has been sedentary  With the activity she performs she denies any exertional chest pain or shortness of breath    She denies any signs or symptoms of congestive heart failure including increasing lower extremity edema, paroxysmal nocturnal dyspnea, orthopnea, acute weight gain or increasing abdominal girth  She denies lightheadedness, syncope or presyncope  She denies palpitations or symptoms of claudication        Patient Active Problem List   Diagnosis    Benign essential hypertension    Hyponatremia    Hypothyroidism    History of endometrial cancer    Abnormal breast exam    Arthritis    Constipation    Depression with anxiety    Diverticulosis    Duodenal diverticulum    Esophagitis, reflux    Dyslipidemia    Impaired fasting glucose    Insomnia    Age-related osteoporosis without current pathological fracture    Atrophic vaginitis    Urinary incontinence    Vertigo    Encounter for follow-up surveillance of endometrial cancer    Endometrial cancer ([facility] )    Arthritis of left shoulder region    Encounter for screening mammogram for malignant neoplasm of breast    Need for immunization against influenza    Nonexudative age-related macular degeneration, bilateral, early dry stage    Posterior vitreous detachment of both eyes    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    Status post cataract extraction and insertion of intraocular lens    Leukocytosis    Lackey's esophagus with dysplasia    Pericardial effusion    MGUS (monoclonal gammopathy of unknown significance)    Hypercalcemia    Lyme disease     Past Medical History:   Diagnosis Date    Anemia     post-op, 07/07/09    Anxiety     last assessed: 01/15/14    C  difficile diarrhea     Chest wall trauma     Acute, last assessed: 10/24/12    Depression     Disease of thyroid gland     hypothyroid    Diverticula of colon     Endometrial cancer ([facility] ) 03/2011    Endometrial hyperplasia     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HCC)     stage IA, Grade I endometriod adenocarcinoma with 43% myometrial invasion and no LVSI, last assessed: 30/28/73    Folliculitis     last assessed: 03/02/16    Gastroparesis     Hypercholesterolemia     Hyperlipidemia     Hypertension     Incontinence in female     Migraine     Nontoxic single thyroid nodule     last assessed: 02/16/13    Osteoporosis     last assessed: 02/22/17    Pure hypercholesterolemia     Rotator cuff (capsule) sprain     Acute, right    Skin lesion     last assessed: 04/26/13    Stomatitis     Strain of right buttock     gluteus medius    Tendinitis of right rotator cuff     last assessed: 08/12/12    Thyroid cyst     last assessed: 08/15/13     Social History     Socioeconomic History    Marital status:       Spouse name: Not on file    Number of children: Not on file    Years of education: Not on file    Highest education level: Not on file   Occupational History    Not on file   Tobacco Use    Smoking status: Never Smoker    Smokeless tobacco: Never Used   Vaping Use    Vaping Use: Never used   Substance and Sexual Activity    Alcohol use: No    Drug use: No    Sexual activity: Not on file   Other Topics Concern    Not on file   Social History Narrative    Not on file     Social Determinants of Health     Financial Resource Strain: Not on file   Food Insecurity: Not on file   Transportation Needs: Not on file   Physical Activity: Not on file   Stress: Not on file   Social Connections: Not on file   Intimate Partner Violence: Not on file   Housing Stability: Not on file      Family History   Problem Relation Age of Onset    Cancer Father         unknown type    Other Brother         Prolapsing mitral valve leaflet syndrome    No Known Problems Mother     No Known Problems Daughter     No Known Problems Maternal Grandmother     No Known Problems Maternal Grandfather     Breast cancer Paternal Grandmother 79    No Known Problems Paternal Grandfather     No Known Problems Daughter     No Known Problems Daughter     Testicular cancer Brother 27    Lung cancer Brother 61    Prostate cancer Brother 40        unsure of excat age   Christophe Yañez No Known Problems Maternal Aunt     No Known Problems Paternal Aunt     No Known Problems Paternal Aunt      Past Surgical History:   Procedure Laterality Date    APPENDECTOMY      BREAST BIOPSY Right     BREAST BIOPSY Left     BREAST CYST ASPIRATION      COLONOSCOPY      fiberoptic, 1998, 2001, 2006    CYSTOSCOPY      Diagnostic    EGD AND COLONOSCOPY N/A 3/11/2016    Procedure: EGD ;  Surgeon: Pauline Davalos MD;  Location: [facility]; Service:     HYSTERECTOMY  03/22/2011    Robotic-assisted, total laparoscopic approch    JOINT REPLACEMENT      KNEE SURGERY      OOPHORECTOMY Bilateral 03/22/2011    Salpingo    REPLACEMENT TOTAL KNEE      TONSILECTOMY AND ADNOIDECTOMY      TOTAL SHOULDER ARTHROPLASTY Right        Current Outpatient Medications:     acetaminophen (TYLENOL) 650 mg CR tablet, Take 650 mg by mouth as needed for mild pain , Disp: , Rfl:     ALPHAGAN P 0 1 %, instill 1 drop into both eyes twice a day, Disp: , Rfl: 0    ALPRAZolam (XANAX) 0 25 mg tablet, Take 1 tablet (0 25 mg total) by mouth daily at bedtime as needed for anxiety, Disp: 20 tablet, Rfl: 0    aspirin 81 MG tablet, Take 1 tablet (81 mg total) by mouth daily, Disp: , Rfl:     bifidobacterium infantis (ALIGN) capsule, Take by mouth, Disp: , Rfl:     carvedilol (COREG) 12 5 mg tablet, Take 1 tablet (12 5 mg total) by mouth 2 (two) times a day with meals (Patient taking differently: Take 12 5 mg by mouth in the morning and 12 5 mg in the evening  Take with meals   6 25mg am and 12 5mg in the PM ), Disp: 180 tablet, Rfl: 3    cholecalciferol (VITAMIN D3) 1,000 units tablet, Take by mouth, Disp: , Rfl:     DENTA 5000 PLUS 1 1 % CREA, BRUSH UP TO TWICE A DAY, Disp: , Rfl: 0    famotidine (PEPCID) 20 mg tablet, Take 1 tablet (20 mg total) by mouth 2 (two) times a day, Disp: 60 tablet, Rfl: 0    levothyroxine 25 mcg tablet, Take 1 tablet (25 mcg total) by mouth daily, Disp: 90 tablet, Rfl: 0    Multiple Vitamins-Minerals (CENTRUM SILVER PO), Take by mouth, Disp: , Rfl:     oxybutynin (DITROPAN-XL) 5 mg 24 hr tablet, Take 1 tablet (5 mg total) by mouth daily at bedtime, Disp: 90 tablet, Rfl: 3    polyethylene glycol (GLYCOLAX) 17 GM/SCOOP powder, Take by mouth as needed  , Disp: , Rfl:     simvastatin (ZOCOR) 20 mg tablet, take 1 tablet by mouth once daily, Disp: 90 tablet, Rfl: 3    docusate sodium (COLACE) 100 mg capsule, Take 1 capsule (100 mg total) by mouth 2 (two) times a day (Patient not taking: No sig reported), Disp: 10 capsule, Rfl: 0    meclizine (ANTIVERT) 25 mg tablet, TAKE 1 TABLET EVERY 8 HOURS AS NEEDED FOR DIZZINESS (Patient not taking: No sig reported), Disp: 30 tablet, Rfl: 0  Allergies   Allergen Reactions    Amoxil [Amoxicillin] Hives    Biaxin [Clarithromycin] GI Intolerance     Reaction Date: 19Jul2011;   Pt gets nauseated    Esomeprazole GI Intolerance    Fosamax [Alendronate] GI Intolerance    Hydrochlorothiazide      Hyponatremia      Lansoprazole GI Intolerance    Norvasc [Amlodipine Besylate]     Pantoprazole GI Intolerance     Can tolerate IV- Not oral    Relafen [Nabumetone] GI Intolerance and Other (See Comments)         Review of Systems:  Review of Systems   Respiratory: Negative for apnea, cough, choking, chest tightness, wheezing and stridor  Cardiovascular: Negative for chest pain  All other systems reviewed and are negative          Vitals:    05/16/22 0908   BP: 142/70   BP Location: Right arm   Patient Position: Sitting   Cuff Size: Standard   Pulse: 63   Weight: 51 8 kg (114 lb 4 8 oz)   Height: 5' 2" (1 575 m)     Vitals:    05/16/22 0908   Weight: 51 8 kg (114 lb 4 8 oz)       Physical Exam:  General:  Alert and cooperative, appears stated age  HEENT:  PERRLA, EOMI, no scleral icterus, no conjunctival pallor  Neck:  No lymphadenopathy, no thyromegaly, no carotid bruits, no elevated JVP  Heart:  Regular rate and rhythm, normal S1/S2, no S3/S4, no murmur  Lungs:  Clear to auscultation bilaterally   Abdomen:  Soft, non-tender, positive bowel sounds, no rebound or guarding,   no organomegaly   Extremities:  No clubbing, cyanosis or edema   Vascular:  2+ pedal pulses  Skin:  No rashes or lesions on exposed skin  Neurologic:  Cranial nerves II-XII grossly intact without focal deficits

## 2022-06-01 DIAGNOSIS — E78.5 HYPERLIPIDEMIA, UNSPECIFIED HYPERLIPIDEMIA TYPE: ICD-10-CM

## 2022-06-01 RX ORDER — SIMVASTATIN 20 MG
TABLET ORAL
Qty: 90 TABLET | Refills: 3 | Status: SHIPPED | OUTPATIENT
Start: 2022-06-01

## 2022-06-16 LAB
CREAT ?TM UR-SCNC: 13.6 UMOL/L
EXT PROTEIN URINE: 5.4
HBA1C MFR BLD HPLC: 5.7 %
PROT/CREAT UR: 0.4 MG/G{CREAT}

## 2022-06-28 ENCOUNTER — OFFICE VISIT (OUTPATIENT)
Dept: FAMILY MEDICINE CLINIC | Facility: CLINIC | Age: 87
End: 2022-06-28
Payer: MEDICARE

## 2022-06-28 VITALS
RESPIRATION RATE: 20 BRPM | HEIGHT: 63 IN | TEMPERATURE: 98 F | HEART RATE: 60 BPM | OXYGEN SATURATION: 98 % | WEIGHT: 112 LBS | DIASTOLIC BLOOD PRESSURE: 80 MMHG | BODY MASS INDEX: 19.84 KG/M2 | SYSTOLIC BLOOD PRESSURE: 132 MMHG

## 2022-06-28 DIAGNOSIS — F41.8 DEPRESSION WITH ANXIETY: ICD-10-CM

## 2022-06-28 DIAGNOSIS — R73.01 IMPAIRED FASTING GLUCOSE: ICD-10-CM

## 2022-06-28 DIAGNOSIS — E03.9 HYPOTHYROIDISM, UNSPECIFIED TYPE: Primary | ICD-10-CM

## 2022-06-28 DIAGNOSIS — I10 BENIGN ESSENTIAL HYPERTENSION: ICD-10-CM

## 2022-06-28 DIAGNOSIS — D72.829 LEUKOCYTOSIS, UNSPECIFIED TYPE: ICD-10-CM

## 2022-06-28 DIAGNOSIS — E87.1 HYPONATREMIA: ICD-10-CM

## 2022-06-28 DIAGNOSIS — D72.820 LYMPHOCYTOSIS: ICD-10-CM

## 2022-06-28 DIAGNOSIS — R32 URINARY INCONTINENCE, UNSPECIFIED TYPE: ICD-10-CM

## 2022-06-28 DIAGNOSIS — E78.5 DYSLIPIDEMIA: ICD-10-CM

## 2022-06-28 PROBLEM — H53.40 VISUAL FIELD DEFECT: Status: ACTIVE | Noted: 2022-06-10

## 2022-06-28 PROBLEM — H47.323 OPTIC NERVE DRUSEN, BILATERAL: Status: ACTIVE | Noted: 2022-06-10

## 2022-06-28 PROCEDURE — 99214 OFFICE O/P EST MOD 30 MIN: CPT | Performed by: FAMILY MEDICINE

## 2022-06-28 RX ORDER — PREDNISOLONE ACETATE 10 MG/ML
1 SUSPENSION/ DROPS OPHTHALMIC 2 TIMES DAILY
COMMUNITY

## 2022-06-28 NOTE — PROGRESS NOTES
Chief Complaint   Patient presents with    Follow-up     6 months and review labs  Health Maintenance   Topic Date Due    DTaP,Tdap,and Td Vaccines (1 - Tdap) Never done    Pneumococcal Vaccine: 65+ Years (2 - PPSV23 or PCV20) 04/27/2018    DXA SCAN  06/24/2021    COVID-19 Vaccine (3 - Booster for Moderna series) 07/08/2021    Breast Cancer Screening: Mammogram  01/06/2022    Influenza Vaccine (Season Ended) 09/01/2022    Fall Risk  12/28/2022    Medicare Annual Wellness Visit (AWV)  12/28/2022    BMI: Adult  06/28/2023    HIB Vaccine  Aged Out    Hepatitis B Vaccine  Aged Out    IPV Vaccine  Aged Out    Hepatitis A Vaccine  Aged Out    Meningococcal ACWY Vaccine  Aged Out    HPV Vaccine  Aged Out           Assessment/Plan:    Hypothyroidism  6/2022 free T4 normal  Continue levothyroxine 25mcg daily  Impaired fasting glucose  6/2022 hgA1C 5 7 stable  Low carb diet  Benign essential hypertension  Controlled  DASH diet  Continue carvedilol per cardiology  Dyslipidemia  Low fat diet  Continue simvastatin 20mg qhs  Depression with anxiety  Stable  Use xanax prn  Rarely  Urinary incontinence  Continue oxybutynin per urology  Hyponatremia  6/2022 Na 137 normal  FU nephrology  Lymphocytosis  FU hem/onc  Reviewed lab in 6/2022  hgA1C 5 7 stable  Lipid 117/86/39/61 good  TSH 5 23, free T4 0 97 normal  CBC showed WBC 13 5 elevated  CMP ok    Flu shot yearly    Got covid19 vaccines and boosters  Got pneumovax at pharmacy in 2013 per pt  Got prevnar 13 2017    Dexa 7/2019 showed osteoporosis  She did not get prolia because of tooth/jaw problem    RTO in 6 months            Diagnoses and all orders for this visit:    Hypothyroidism, unspecified type  -     CBC; Future  -     Comprehensive metabolic panel; Future  -     Hemoglobin A1C; Future  -     Lipid panel; Future  -     TSH, 3rd generation with Free T4 reflex; Future    Impaired fasting glucose  -     CBC;  Future  - Comprehensive metabolic panel; Future  -     Hemoglobin A1C; Future  -     Lipid panel; Future  -     TSH, 3rd generation with Free T4 reflex; Future    Benign essential hypertension  -     CBC; Future  -     Comprehensive metabolic panel; Future  -     Hemoglobin A1C; Future  -     Lipid panel; Future  -     TSH, 3rd generation with Free T4 reflex; Future    Dyslipidemia  -     CBC; Future  -     Comprehensive metabolic panel; Future  -     Hemoglobin A1C; Future  -     Lipid panel; Future  -     TSH, 3rd generation with Free T4 reflex; Future    Urinary incontinence, unspecified type    Depression with anxiety    Hyponatremia    Lymphocytosis    Leukocytosis, unspecified type  -     CBC; Future  -     Comprehensive metabolic panel; Future  -     Hemoglobin A1C; Future  -     Lipid panel; Future  -     TSH, 3rd generation with Free T4 reflex; Future    Other orders  -     prednisoLONE acetate (PRED FORTE) 1 % ophthalmic suspension; Apply 1 drop to eye 2 (two) times a day          Subjective:      Patient ID: Yudelka Bernabe is a 80 y o  female  HPI    Pt is here by herself  Hypothyroidism---on levothyroxine 25mcg daily  She cannot tolerate it empty stomach, so she takes it 2 hours after lunch      IFG---tried to eat healthy        Hyperlipidemia---Pt uses simvastatin 20mg qhs  Sometimes muscle pain      HTN---BP at home good per pt  She is on carvedilol 6 25mg am and 12 5 mg pm  FU cardiology regularly  Denies headache, vision change, Sob or CP      AnxietyDepression---Stable  Took off buspar per nephrology  She uses xanax very rarely  Vertigo---Come and go  Did not use meclizine for a while       Urinary incontinence---FU urology Dr Moreira  She is on oxybutynin 5mg QD which helped       Lackey's---follows GI  She tried nexium and prevacid but felt nausea  She is on pepcid 10mg bid which helped    Constipation---Follow GI  She is on miralax daily does not help  Tried senna but had diarrhea  Will try gas-X     Hyponatremia---FU [facility] nephrology Dr Elyse Ambrose Na 137 normal       MGUS/lymphocytosis---[facility] hem/onc Dr Neftali Castrejon  Observe now       Macular degeneration/vein occlusion----Got shot monthly and she is on baby ASA  Left eye sight is not good       Lives by herself  Does all ADL's    Denies recent falls  Use cane when she is outside  Daughter Patricia lives 1 block away from her  Patricia will retire soon           The following portions of the patient's history were reviewed and updated as appropriate: allergies, current medications, past family history, past medical history, past social history, past surgical history and problem list     Review of Systems   Constitutional: Negative for appetite change, chills and fever  HENT: Negative for congestion, ear pain, sinus pain and sore throat  Eyes: Negative for discharge and itching  Respiratory: Negative for apnea, cough, chest tightness, shortness of breath and wheezing  Cardiovascular: Negative for chest pain, palpitations and leg swelling  Gastrointestinal: Negative for abdominal pain, anal bleeding, constipation, diarrhea, nausea and vomiting  Endocrine: Negative for cold intolerance, heat intolerance and polyuria  Genitourinary: Negative for difficulty urinating and dysuria  Musculoskeletal: Negative for arthralgias, back pain and myalgias  Skin: Negative for rash  Neurological: Negative for dizziness and headaches  Psychiatric/Behavioral: Negative for agitation  Objective:      /80 (BP Location: Left arm, Patient Position: Sitting, Cuff Size: Adult)   Pulse 60   Temp 98 °F (36 7 °C) (Tympanic)   Resp 20   Ht 5' 2 5" (1 588 m)   Wt 50 8 kg (112 lb)   SpO2 98%   BMI 20 16 kg/m²          Physical Exam  Constitutional:       General: She is not in acute distress  Appearance: She is well-developed  HENT:      Head: Normocephalic  Eyes:      General:         Right eye: No discharge  Left eye: No discharge  Conjunctiva/sclera: Conjunctivae normal    Neck:      Thyroid: No thyromegaly  Cardiovascular:      Rate and Rhythm: Normal rate and regular rhythm  Heart sounds: Normal heart sounds  No murmur heard  No friction rub  No gallop  Pulmonary:      Effort: Pulmonary effort is normal  No respiratory distress  Breath sounds: Normal breath sounds  No wheezing or rales  Chest:      Chest wall: No tenderness  Abdominal:      General: Bowel sounds are normal  There is no distension  Palpations: Abdomen is soft  There is no mass  Tenderness: There is no abdominal tenderness  There is no guarding or rebound  Musculoskeletal:         General: No tenderness or deformity  Cervical back: Normal range of motion and neck supple  No tenderness  Right lower leg: No edema  Left lower leg: No edema  Comments: Use cane   Lymphadenopathy:      Cervical: No cervical adenopathy  Neurological:      Mental Status: She is alert

## 2022-07-26 ENCOUNTER — OFFICE VISIT (OUTPATIENT)
Dept: GYNECOLOGIC ONCOLOGY | Facility: CLINIC | Age: 87
End: 2022-07-26
Payer: MEDICARE

## 2022-07-26 DIAGNOSIS — N64.59 ABNORMAL BREAST EXAM: ICD-10-CM

## 2022-07-26 DIAGNOSIS — Z08 ENCOUNTER FOR FOLLOW-UP SURVEILLANCE OF ENDOMETRIAL CANCER: ICD-10-CM

## 2022-07-26 DIAGNOSIS — Z85.42 ENCOUNTER FOR FOLLOW-UP SURVEILLANCE OF ENDOMETRIAL CANCER: ICD-10-CM

## 2022-07-26 DIAGNOSIS — Z85.42 HISTORY OF ENDOMETRIAL CANCER: Primary | ICD-10-CM

## 2022-07-26 PROCEDURE — 99212 OFFICE O/P EST SF 10 MIN: CPT | Performed by: PHYSICIAN ASSISTANT

## 2022-07-26 NOTE — PROGRESS NOTES
Assessment/Plan:    Problem List Items Addressed This Visit        Other    History of endometrial cancer - Primary     History of stage IA, grade 1 endometrial cancer s/p surgical resection in March 2011  She is clinically without evidence of disease recurrence       Patient is greater than 10 years from diagnosis  Will return to office prn  Educated on signs/symptoms related to recurrence  Abnormal breast exam    Encounter for follow-up surveillance of endometrial cancer            CHIEF COMPLAINT:   Endometrial cancer surveillance    Problem:  Cancer Staging  History of endometrial cancer  Staging form: Corpus Uteri - Carcinoma, AJCC 8th Edition  - Clinical: FIGO Stage IA - Signed by Matt Castro PA-C on 2/12/2018        Previous therapy:  Oncology History   History of endometrial cancer   3/22/2011 Initial Diagnosis    Endometrial cancer ([facility] [address] [facility] )     3/22/2011 Surgery    Robotic-assisted total laparoscopic hysterectomy and bilateral salpingo-oophorectomy as well as cystoscopy   A  43% myometrial invasion, no lymphovascular space invasion  Patient ID: Yasmin Vasquez is a 80 y o  female  who has no new complaints today  No vaginal bleeding, abdominal/pelvic pain  Normal bowel and bladder function  No interval change in medical history since last visit  Quality of life is good  The following portions of the patient's history were reviewed and updated as appropriate: allergies, current medications, past medical history, past surgical history and problem list     Review of Systems   Constitutional: Negative  HENT: Negative  Eyes: Negative  Respiratory: Negative  Cardiovascular: Negative  Gastrointestinal: Negative  Genitourinary: Negative  Musculoskeletal: Positive for arthralgias and back pain  Skin: Negative  Neurological: Negative  Psychiatric/Behavioral: Negative          Current Outpatient Medications   Medication Sig Dispense Refill    acetaminophen (TYLENOL) 650 mg CR tablet Take 650 mg by mouth as needed for mild pain   ALPHAGAN P 0 1 % instill 1 drop into both eyes twice a day  0    ALPRAZolam (XANAX) 0 25 mg tablet Take 1 tablet (0 25 mg total) by mouth daily at bedtime as needed for anxiety 20 tablet 0    aspirin 81 MG tablet Take 1 tablet (81 mg total) by mouth daily      bifidobacterium infantis (ALIGN) capsule Take by mouth      carvedilol (COREG) 12 5 mg tablet Take 1 tablet (12 5 mg total) by mouth 2 (two) times a day with meals (Patient taking differently: Take 12 5 mg by mouth in the morning and 12 5 mg in the evening  Take with meals  6 25mg am and 12 5mg in the PM ) 180 tablet 3    cholecalciferol (VITAMIN D3) 1,000 units tablet Take by mouth      DENTA 5000 PLUS 1 1 % CREA BRUSH UP TO TWICE A DAY  0    famotidine (PEPCID) 20 mg tablet Take 1 tablet (20 mg total) by mouth 2 (two) times a day 60 tablet 0    levothyroxine 25 mcg tablet Take 1 tablet (25 mcg total) by mouth daily 90 tablet 0    meclizine (ANTIVERT) 25 mg tablet TAKE 1 TABLET EVERY 8 HOURS AS NEEDED FOR DIZZINESS (Patient not taking: No sig reported) 30 tablet 0    Multiple Vitamins-Minerals (CENTRUM SILVER PO) Take by mouth      oxybutynin (DITROPAN-XL) 5 mg 24 hr tablet Take 1 tablet (5 mg total) by mouth daily at bedtime 90 tablet 3    polyethylene glycol (GLYCOLAX) 17 GM/SCOOP powder Take by mouth as needed        prednisoLONE acetate (PRED FORTE) 1 % ophthalmic suspension Apply 1 drop to eye 2 (two) times a day      simvastatin (ZOCOR) 20 mg tablet take 1 tablet by mouth once daily 90 tablet 3     No current facility-administered medications for this visit  Objective:    not currently breastfeeding  There is no height or weight on file to calculate BMI  There is no height or weight on file to calculate BSA  Physical Exam  Vitals reviewed  Exam conducted with a chaperone present  Constitutional:       General: She is not in acute distress       Appearance: Normal appearance  She is not ill-appearing  HENT:      Head: Normocephalic and atraumatic  Mouth/Throat:      Mouth: Mucous membranes are moist    Eyes:      General:         Right eye: No discharge  Left eye: No discharge  Conjunctiva/sclera: Conjunctivae normal    Pulmonary:      Effort: Pulmonary effort is normal    Abdominal:      Palpations: Abdomen is soft  There is no mass  Tenderness: There is no abdominal tenderness  Hernia: No hernia is present  Genitourinary:     Comments: The external female genitalia is normal  The bartholin's, uretheral and skenes glands are normal  The urethral meatus is normal (midline with no lesions)  Anus without fissure or lesion  Speculum exam reveals a grossly normal vagina  No masses, lesions,discharge or bleeding  No significant cystocele or rectocele noted  Bimanual exam notes a surgical absent cervix, uterus and adnexal structures  No masses or fullness  Bladder is without fullness, mass or tenderness  Musculoskeletal:      Right lower leg: No edema  Left lower leg: No edema  Skin:     General: Skin is warm and dry  Coloration: Skin is not jaundiced  Findings: No rash  Neurological:      General: No focal deficit present  Mental Status: She is alert and oriented to person, place, and time  Cranial Nerves: No cranial nerve deficit  Sensory: No sensory deficit  Motor: No weakness  Gait: Gait normal    Psychiatric:         Mood and Affect: Mood normal          Behavior: Behavior normal          Thought Content:  Thought content normal          Judgment: Judgment normal

## 2022-07-26 NOTE — ASSESSMENT & PLAN NOTE
History of stage IA, grade 1 endometrial cancer s/p surgical resection in March 2011  She is clinically without evidence of disease recurrence       Patient is greater than 10 years from diagnosis  Will return to office prn  Educated on signs/symptoms related to recurrence

## 2022-08-03 DIAGNOSIS — Z12.31 ENCOUNTER FOR SCREENING MAMMOGRAM FOR BREAST CANCER: ICD-10-CM

## 2022-08-10 DIAGNOSIS — E03.9 HYPOTHYROIDISM, UNSPECIFIED TYPE: ICD-10-CM

## 2022-08-10 RX ORDER — LEVOTHYROXINE SODIUM 0.03 MG/1
25 TABLET ORAL DAILY
Qty: 90 TABLET | Refills: 0 | Status: SHIPPED | OUTPATIENT
Start: 2022-08-10 | End: 2022-11-08

## 2022-09-13 ENCOUNTER — TELEPHONE (OUTPATIENT)
Dept: CARDIOLOGY CLINIC | Facility: CLINIC | Age: 87
End: 2022-09-13

## 2022-09-13 ENCOUNTER — APPOINTMENT (EMERGENCY)
Dept: RADIOLOGY | Facility: HOSPITAL | Age: 87
DRG: 085 | End: 2022-09-13
Payer: MEDICARE

## 2022-09-13 ENCOUNTER — OFFICE VISIT (OUTPATIENT)
Dept: FAMILY MEDICINE CLINIC | Facility: CLINIC | Age: 87
End: 2022-09-13
Payer: MEDICARE

## 2022-09-13 ENCOUNTER — HOSPITAL ENCOUNTER (INPATIENT)
Facility: HOSPITAL | Age: 87
LOS: 4 days | Discharge: HOME HEALTH | DRG: 085 | End: 2022-09-17
Attending: EMERGENCY MEDICINE | Admitting: EMERGENCY MEDICINE
Payer: MEDICARE

## 2022-09-13 ENCOUNTER — APPOINTMENT (OUTPATIENT)
Dept: RADIOLOGY | Facility: HOSPITAL | Age: 87
DRG: 085 | End: 2022-09-13
Payer: MEDICARE

## 2022-09-13 VITALS
SYSTOLIC BLOOD PRESSURE: 120 MMHG | BODY MASS INDEX: 20.02 KG/M2 | HEIGHT: 63 IN | WEIGHT: 113 LBS | DIASTOLIC BLOOD PRESSURE: 82 MMHG | HEART RATE: 72 BPM | TEMPERATURE: 97.8 F | RESPIRATION RATE: 16 BRPM

## 2022-09-13 DIAGNOSIS — S06.5X9A POST-TRAUMATIC SUBDURAL HEMATOMA WITH LOSS OF CONSCIOUSNESS, INITIAL ENCOUNTER (HCC): ICD-10-CM

## 2022-09-13 DIAGNOSIS — S06.5XAA SUBDURAL HEMATOMA: Primary | ICD-10-CM

## 2022-09-13 DIAGNOSIS — R55 SYNCOPE, UNSPECIFIED SYNCOPE TYPE: ICD-10-CM

## 2022-09-13 DIAGNOSIS — S01.81XA FACIAL LACERATION, INITIAL ENCOUNTER: ICD-10-CM
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
## 2022-09-13 DIAGNOSIS — S06.5X0A POST-TRAUMATIC SUBDURAL HEMATOMA, WITHOUT LOSS OF CONSCIOUSNESS, INITIAL ENCOUNTER (HCC): ICD-10-CM

## 2022-09-13 DIAGNOSIS — R59.1 LYMPHADENOPATHY: ICD-10-CM

## 2022-09-13 DIAGNOSIS — K08.89 PAIN, DENTAL: Primary | ICD-10-CM

## 2022-09-13 DIAGNOSIS — N39.0 URINARY TRACT INFECTION WITHOUT HEMATURIA, SITE UNSPECIFIED: ICD-10-CM

## 2022-09-13 PROBLEM — H47.323 OPTIC NERVE DRUSEN, BILATERAL: Status: RESOLVED | Noted: 2022-06-10 | Resolved: 2022-09-13

## 2022-09-13 LAB
2HR DELTA HS TROPONIN: 28 NG/L
4HR DELTA HS TROPONIN: 32 NG/L
ABO GROUP BLD: NORMAL
ALBUMIN SERPL BCP-MCNC: 2.8 G/DL (ref 3.5–5)
ALP SERPL-CCNC: 58 U/L (ref 46–116)
ALT SERPL W P-5'-P-CCNC: 19 U/L (ref 12–78)
ANION GAP SERPL CALCULATED.3IONS-SCNC: 5 MMOL/L (ref 4–13)
APTT PPP: 26 SECONDS (ref 23–37)
AST SERPL W P-5'-P-CCNC: 17 U/L (ref 5–45)
ATRIAL RATE: 81 BPM
BACTERIA UR QL AUTO: ABNORMAL /HPF
BASOPHILS # BLD MANUAL: 0 THOUSAND/UL (ref 0–0.1)
BASOPHILS NFR MAR MANUAL: 0 % (ref 0–1)
BILIRUB SERPL-MCNC: 0.48 MG/DL (ref 0.2–1)
BILIRUB UR QL STRIP: NEGATIVE
BLD GP AB SCN SERPL QL: NEGATIVE
BUN SERPL-MCNC: 11 MG/DL (ref 5–25)
CALCIUM ALBUM COR SERPL-MCNC: 9.6 MG/DL (ref 8.3–10.1)
CALCIUM SERPL-MCNC: 8.6 MG/DL (ref 8.3–10.1)
CARDIAC TROPONIN I PNL SERPL HS: 34 NG/L
CARDIAC TROPONIN I PNL SERPL HS: 38 NG/L
CARDIAC TROPONIN I PNL SERPL HS: 6 NG/L
CHLORIDE SERPL-SCNC: 103 MMOL/L (ref 96–108)
CLARITY UR: CLEAR
CO2 SERPL-SCNC: 25 MMOL/L (ref 21–32)
COLOR UR: COLORLESS
CREAT SERPL-MCNC: 0.51 MG/DL (ref 0.6–1.3)
EOSINOPHIL # BLD MANUAL: 0 THOUSAND/UL (ref 0–0.4)
EOSINOPHIL NFR BLD MANUAL: 0 % (ref 0–6)
ERYTHROCYTE [DISTWIDTH] IN BLOOD BY AUTOMATED COUNT: 14.9 % (ref 11.6–15.1)
GFR SERPL CREATININE-BSD FRML MDRD: 86 ML/MIN/1.73SQ M
GLUCOSE SERPL-MCNC: 100 MG/DL (ref 65–140)
GLUCOSE UR STRIP-MCNC: NEGATIVE MG/DL
HCT VFR BLD AUTO: 35.6 % (ref 34.8–46.1)
HGB BLD-MCNC: 11.5 G/DL (ref 11.5–15.4)
HGB UR QL STRIP.AUTO: ABNORMAL
INR PPP: 1.06 (ref 0.84–1.19)
KETONES UR STRIP-MCNC: NEGATIVE MG/DL
LEUKOCYTE ESTERASE UR QL STRIP: ABNORMAL
LYMPHOCYTES # BLD AUTO: 50 % (ref 14–44)
LYMPHOCYTES # BLD AUTO: 9.7 THOUSAND/UL (ref 0.6–4.47)
MCH RBC QN AUTO: 28.5 PG (ref 26.8–34.3)
MCHC RBC AUTO-ENTMCNC: 32.3 G/DL (ref 31.4–37.4)
MCV RBC AUTO: 88 FL (ref 82–98)
MONOCYTES # BLD AUTO: 0.97 THOUSAND/UL (ref 0–1.22)
MONOCYTES NFR BLD: 5 % (ref 4–12)
NEUTROPHILS # BLD MANUAL: 8.73 THOUSAND/UL (ref 1.85–7.62)
NEUTS SEG NFR BLD AUTO: 45 % (ref 43–75)
NITRITE UR QL STRIP: NEGATIVE
NON-SQ EPI CELLS URNS QL MICRO: ABNORMAL /HPF
P AXIS: 32 DEGREES
PH UR STRIP.AUTO: 8 [PH]
PLATELET # BLD AUTO: 187 THOUSANDS/UL (ref 149–390)
PLATELET BLD QL SMEAR: ADEQUATE
PMV BLD AUTO: 9.8 FL (ref 8.9–12.7)
POTASSIUM SERPL-SCNC: 3.4 MMOL/L (ref 3.5–5.3)
PR INTERVAL: 148 MS
PROT SERPL-MCNC: 6.4 G/DL (ref 6.4–8.4)
PROT UR STRIP-MCNC: NEGATIVE MG/DL
PROTHROMBIN TIME: 14 SECONDS (ref 11.6–14.5)
QRS AXIS: 40 DEGREES
QRSD INTERVAL: 94 MS
QT INTERVAL: 342 MS
QTC INTERVAL: 397 MS
RBC # BLD AUTO: 4.04 MILLION/UL (ref 3.81–5.12)
RBC #/AREA URNS AUTO: ABNORMAL /HPF
RH BLD: POSITIVE
SODIUM SERPL-SCNC: 133 MMOL/L (ref 135–147)
SP GR UR STRIP.AUTO: 1.01 (ref 1–1.03)
SPECIMEN EXPIRATION DATE: NORMAL
T WAVE AXIS: 39 DEGREES
UROBILINOGEN UR STRIP-ACNC: <2 MG/DL
VENTRICULAR RATE: 81 BPM
WBC # BLD AUTO: 19.39 THOUSAND/UL (ref 4.31–10.16)
WBC #/AREA URNS AUTO: ABNORMAL /HPF

## 2022-09-13 PROCEDURE — 81001 URINALYSIS AUTO W/SCOPE: CPT | Performed by: EMERGENCY MEDICINE

## 2022-09-13 PROCEDURE — 86901 BLOOD TYPING SEROLOGIC RH(D): CPT | Performed by: SURGERY

## 2022-09-13 PROCEDURE — 99214 OFFICE O/P EST MOD 30 MIN: CPT | Performed by: NURSE PRACTITIONER

## 2022-09-13 PROCEDURE — 84484 ASSAY OF TROPONIN QUANT: CPT | Performed by: SURGERY

## 2022-09-13 PROCEDURE — 86850 RBC ANTIBODY SCREEN: CPT | Performed by: SURGERY

## 2022-09-13 PROCEDURE — 99291 CRITICAL CARE FIRST HOUR: CPT | Performed by: EMERGENCY MEDICINE

## 2022-09-13 PROCEDURE — 85730 THROMBOPLASTIN TIME PARTIAL: CPT | Performed by: EMERGENCY MEDICINE

## 2022-09-13 PROCEDURE — 12011 RPR F/E/E/N/L/M 2.5 CM/<: CPT | Performed by: EMERGENCY MEDICINE

## 2022-09-13 PROCEDURE — 85027 COMPLETE CBC AUTOMATED: CPT | Performed by: EMERGENCY MEDICINE

## 2022-09-13 PROCEDURE — G1004 CDSM NDSC: HCPCS

## 2022-09-13 PROCEDURE — 85007 BL SMEAR W/DIFF WBC COUNT: CPT | Performed by: EMERGENCY MEDICINE

## 2022-09-13 PROCEDURE — 99285 EMERGENCY DEPT VISIT HI MDM: CPT

## 2022-09-13 PROCEDURE — 70450 CT HEAD/BRAIN W/O DYE: CPT

## 2022-09-13 PROCEDURE — 84484 ASSAY OF TROPONIN QUANT: CPT | Performed by: EMERGENCY MEDICINE

## 2022-09-13 PROCEDURE — 86900 BLOOD TYPING SEROLOGIC ABO: CPT | Performed by: SURGERY

## 2022-09-13 PROCEDURE — 85610 PROTHROMBIN TIME: CPT | Performed by: EMERGENCY MEDICINE

## 2022-09-13 PROCEDURE — 72125 CT NECK SPINE W/O DYE: CPT

## 2022-09-13 PROCEDURE — 36415 COLL VENOUS BLD VENIPUNCTURE: CPT | Performed by: EMERGENCY MEDICINE

## 2022-09-13 PROCEDURE — 80053 COMPREHEN METABOLIC PANEL: CPT | Performed by: EMERGENCY MEDICINE

## 2022-09-13 PROCEDURE — 96374 THER/PROPH/DIAG INJ IV PUSH: CPT

## 2022-09-13 PROCEDURE — 93010 ELECTROCARDIOGRAM REPORT: CPT | Performed by: INTERNAL MEDICINE

## 2022-09-13 PROCEDURE — 71045 X-RAY EXAM CHEST 1 VIEW: CPT

## 2022-09-13 PROCEDURE — 93005 ELECTROCARDIOGRAM TRACING: CPT

## 2022-09-13 PROCEDURE — 0HQ1XZZ REPAIR FACE SKIN, EXTERNAL APPROACH: ICD-10-PCS | Performed by: EMERGENCY MEDICINE

## 2022-09-13 PROCEDURE — 96375 TX/PRO/DX INJ NEW DRUG ADDON: CPT

## 2022-09-13 RX ORDER — OXYBUTYNIN CHLORIDE 5 MG/1
5 TABLET, EXTENDED RELEASE ORAL
Status: DISCONTINUED | OUTPATIENT
Start: 2022-09-13 | End: 2022-09-13

## 2022-09-13 RX ORDER — SODIUM CHLORIDE, SODIUM LACTATE, POTASSIUM CHLORIDE, CALCIUM CHLORIDE 600; 310; 30; 20 MG/100ML; MG/100ML; MG/100ML; MG/100ML
100 INJECTION, SOLUTION INTRAVENOUS CONTINUOUS
Status: DISCONTINUED | OUTPATIENT
Start: 2022-09-13 | End: 2022-09-13

## 2022-09-13 RX ORDER — CARVEDILOL 6.25 MG/1
6.25 TABLET ORAL ONCE
Status: COMPLETED | OUTPATIENT
Start: 2022-09-13 | End: 2022-09-13

## 2022-09-13 RX ORDER — LEVOTHYROXINE SODIUM 0.03 MG/1
25 TABLET ORAL DAILY
Status: DISCONTINUED | OUTPATIENT
Start: 2022-09-14 | End: 2022-09-17 | Stop reason: HOSPADM

## 2022-09-13 RX ORDER — OXYBUTYNIN CHLORIDE 5 MG/1
5 TABLET, EXTENDED RELEASE ORAL
Status: DISCONTINUED | OUTPATIENT
Start: 2022-09-13 | End: 2022-09-17 | Stop reason: HOSPADM

## 2022-09-13 RX ORDER — ACETAMINOPHEN 325 MG/1
975 TABLET ORAL EVERY 6 HOURS SCHEDULED
Status: DISCONTINUED | OUTPATIENT
Start: 2022-09-13 | End: 2022-09-17 | Stop reason: HOSPADM

## 2022-09-13 RX ORDER — BRIMONIDINE TARTRATE 2 MG/ML
1 SOLUTION/ DROPS OPHTHALMIC 2 TIMES DAILY
Status: DISCONTINUED | OUTPATIENT
Start: 2022-09-13 | End: 2022-09-17 | Stop reason: HOSPADM

## 2022-09-13 RX ORDER — PRAVASTATIN SODIUM 20 MG
20 TABLET ORAL
Status: DISCONTINUED | OUTPATIENT
Start: 2022-09-13 | End: 2022-09-17 | Stop reason: HOSPADM

## 2022-09-13 RX ORDER — ALPRAZOLAM 0.25 MG/1
0.25 TABLET ORAL
Status: DISCONTINUED | OUTPATIENT
Start: 2022-09-13 | End: 2022-09-14

## 2022-09-13 RX ORDER — FAMOTIDINE 20 MG/1
20 TABLET, FILM COATED ORAL 2 TIMES DAILY
Status: DISCONTINUED | OUTPATIENT
Start: 2022-09-13 | End: 2022-09-17 | Stop reason: HOSPADM

## 2022-09-13 RX ORDER — CARVEDILOL 6.25 MG/1
6.25 TABLET ORAL 2 TIMES DAILY WITH MEALS
Status: DISCONTINUED | OUTPATIENT
Start: 2022-09-13 | End: 2022-09-13

## 2022-09-13 RX ORDER — SODIUM CHLORIDE 9 MG/ML
100 INJECTION, SOLUTION INTRAVENOUS CONTINUOUS
Status: DISCONTINUED | OUTPATIENT
Start: 2022-09-13 | End: 2022-09-14

## 2022-09-13 RX ORDER — CARVEDILOL 6.25 MG/1
6.25 TABLET ORAL EVERY MORNING
Status: DISCONTINUED | OUTPATIENT
Start: 2022-09-14 | End: 2022-09-17 | Stop reason: HOSPADM

## 2022-09-13 RX ORDER — LEVETIRACETAM 500 MG/1
500 TABLET ORAL EVERY 12 HOURS SCHEDULED
Status: DISCONTINUED | OUTPATIENT
Start: 2022-09-13 | End: 2022-09-17 | Stop reason: HOSPADM

## 2022-09-13 RX ORDER — CARVEDILOL 12.5 MG/1
12.5 TABLET ORAL
Status: DISCONTINUED | OUTPATIENT
Start: 2022-09-13 | End: 2022-09-14

## 2022-09-13 RX ORDER — ONDANSETRON 2 MG/ML
4 INJECTION INTRAMUSCULAR; INTRAVENOUS EVERY 6 HOURS PRN
Status: DISCONTINUED | OUTPATIENT
Start: 2022-09-13 | End: 2022-09-17 | Stop reason: HOSPADM

## 2022-09-13 RX ORDER — LIDOCAINE HYDROCHLORIDE AND EPINEPHRINE 10; 10 MG/ML; UG/ML
5 INJECTION, SOLUTION INFILTRATION; PERINEURAL ONCE
Status: COMPLETED | OUTPATIENT
Start: 2022-09-13 | End: 2022-09-13

## 2022-09-13 RX ADMIN — ACETAMINOPHEN 975 MG: 325 TABLET ORAL at 20:09

## 2022-09-13 RX ADMIN — SODIUM CHLORIDE, SODIUM LACTATE, POTASSIUM CHLORIDE, AND CALCIUM CHLORIDE 100 ML/HR: .6; .31; .03; .02 INJECTION, SOLUTION INTRAVENOUS at 19:22

## 2022-09-13 RX ADMIN — CARVEDILOL 6.25 MG: 6.25 TABLET, FILM COATED ORAL at 19:22

## 2022-09-13 RX ADMIN — LIDOCAINE HYDROCHLORIDE,EPINEPHRINE BITARTRATE 5 ML: 10; .01 INJECTION, SOLUTION INFILTRATION; PERINEURAL at 16:45

## 2022-09-13 RX ADMIN — CARVEDILOL 6.25 MG: 6.25 TABLET, FILM COATED ORAL at 20:09

## 2022-09-13 RX ADMIN — PRAVASTATIN SODIUM 20 MG: 20 TABLET ORAL at 19:23

## 2022-09-13 RX ADMIN — FAMOTIDINE 20 MG: 20 TABLET, FILM COATED ORAL at 19:22

## 2022-09-13 RX ADMIN — SODIUM CHLORIDE 100 ML/HR: 0.9 INJECTION, SOLUTION INTRAVENOUS at 21:02

## 2022-09-13 RX ADMIN — LEVETIRACETAM 1000 MG: 100 INJECTION, SOLUTION INTRAVENOUS at 17:10

## 2022-09-13 RX ADMIN — OXYBUTYNIN 5 MG: 5 TABLET, FILM COATED, EXTENDED RELEASE ORAL at 21:01

## 2022-09-13 RX ADMIN — BRIMONIDINE TARTRATE 1 DROP: 2 SOLUTION OPHTHALMIC at 22:49

## 2022-09-13 RX ADMIN — LEVETIRACETAM 500 MG: 500 TABLET, FILM COATED ORAL at 19:22

## 2022-09-13 RX ADMIN — DESMOPRESSIN ACETATE 20.4 MCG: 4 SOLUTION INTRAVENOUS at 17:29

## 2022-09-13 NOTE — ED PROVIDER NOTES
Emergency Department Trauma Note  Markel Delvalle 80 y o  female MRN: [id number]  Unit/Bed#: Z5HA/Z5HA Encounter: [id number]      Trauma Alert:    Model of Arrival: Mode of Arrival: ALS via    Trauma Team: Current Providers  Attending Provider: Brooklynn Dalton MD  Resident: Kathryn Rocha DO  Resident: Aron De Leon MD  Consultants: Trauma      History of Present Illness     Chief Complaint:   Chief Complaint   Patient presents with    Fall     Pt fell @ home after a PCP visit  Family got an alert from her fall alert button  Found pt on the floor on right side, hematoma to eye and lip  Initially confused, resolved prior to EMS arrival     HPI:  Markel Delvalle is a 80 y o  female who presents via EMS for home for Level C trauma evaluation, after falling at home, possible LOC, with head injury    Mechanism:           HPI  Review of Systems    Historical Information     Immunizations:   Immunization History   Administered Date(s) Administered    COVID-19 MODERNA VACC 0 5 ML IM 01/11/2021, 02/08/2021    H1N1, All Formulations 02/08/2010    INFLUENZA 10/03/2018, 10/31/2020    Influenza Split High Dose Preservative Free IM 09/18/2014, 09/18/2015, 10/06/2016, 11/02/2017    Influenza, seasonal, injectable 11/03/2011, 10/03/2012    Pneumococcal Conjugate 13-Valent 04/27/2017    Zoster 06/17/2015    influenza, trivalent, adjuvanted 10/11/2019       Past Medical History:   Diagnosis Date    Anemia     post-op, 07/07/09    Anxiety     last assessed: 01/15/14    C  difficile diarrhea     Chest wall trauma     Acute, last assessed: 10/24/12    Depression     Disease of thyroid gland     hypothyroid    Diverticula of colon     Endometrial cancer ([facility] [address] ) 03/2011    Endometrial hyperplasia     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HCC)     stage IA, Grade I endometriod adenocarcinoma with 43% myometrial invasion and no LVSI, last assessed: 98/10/45    Folliculitis     last assessed: 03/02/16    Gastroparesis     Hypercholesterolemia     Hyperlipidemia     Hypertension     Incontinence in female     Migraine     Nontoxic single thyroid nodule     last assessed: 02/16/13    Osteoporosis     last assessed: 02/22/17    Pure hypercholesterolemia     Rotator cuff (capsule) sprain     Acute, right    Skin lesion     last assessed: 04/26/13    Stomatitis     Strain of right buttock     gluteus medius    Tendinitis of right rotator cuff     last assessed: 08/12/12    Thyroid cyst     last assessed: 08/15/13     Family History   Problem Relation Age of Onset    No Known Problems Mother     Dementia Father     Other Brother         Prolapsing mitral valve leaflet syndrome    Testicular cancer Brother 27    Lung cancer Brother 61    Prostate cancer Brother 36        unsure of excat age   Kyra Epstein No Known Problems Daughter     No Known Problems Daughter     No Known Problems Daughter     No Known Problems Maternal Grandmother     No Known Problems Maternal Grandfather     Breast cancer Paternal Grandmother 79    No Known Problems Paternal Grandfather     No Known Problems Maternal Aunt     No Known Problems Paternal Aunt     No Known Problems Paternal Aunt      Past Surgical History:   Procedure Laterality Date    APPENDECTOMY      BREAST BIOPSY Right     BREAST BIOPSY Left     BREAST CYST ASPIRATION      COLONOSCOPY      fiberoptic, 1998, 2001, 2006    CYSTOSCOPY      Diagnostic    EGD AND COLONOSCOPY N/A 3/11/2016    Procedure: EGD ;  Surgeon: Shawn Richardson MD;  [facility]: [facility];   Service:     HYSTERECTOMY  03/22/2011    Robotic-assisted, total laparoscopic approch    JOINT REPLACEMENT      KNEE SURGERY      OOPHORECTOMY Bilateral 03/22/2011    [place]    REPLACEMENT TOTAL KNEE      TONSILECTOMY AND ADNOIDECTOMY      TOTAL SHOULDER ARTHROPLASTY Right      Social History     Tobacco Use    Smoking status: Never Smoker    Smokeless tobacco: Never Used   Vaping Use    Vaping Use: Never used   Substance Use Topics    Alcohol use: Not Currently    Drug use: No     E-Cigarette/Vaping    E-Cigarette Use Never User      E-Cigarette/Vaping Substances    Nicotine No     THC No     CBD No     Flavoring No     Other No     Unknown No        Family History: {SL IP Trauma Family History:35047::"non-contributory"}    Meds/Allergies   Prior to Admission Medications   Prescriptions Last Dose Informant Patient Reported? Taking? ALPHAGAN P 0 1 %  Self Yes No   Sig: instill 1 drop into both eyes twice a day   ALPRAZolam (XANAX) 0 25 mg tablet  Self No No   Sig: Take 1 tablet (0 25 mg total) by mouth daily at bedtime as needed for anxiety   DENTA 5000 PLUS 1 1 % CREA  Self Yes No   Sig: BRUSH UP TO TWICE A DAY   Multiple Vitamins-Minerals (CENTRUM SILVER PO)  Self Yes No   Sig: Take by mouth   acetaminophen (TYLENOL) 650 mg CR tablet  Self Yes No   Sig: Take 650 mg by mouth as needed for mild pain     aspirin 81 MG tablet  Self No No   Sig: Take 1 tablet (81 mg total) by mouth daily   bifidobacterium infantis (ALIGN) capsule  Self Yes No   Sig: Take by mouth   carvedilol (COREG) 12 5 mg tablet  Self No No   Sig: Take 1 tablet (12 5 mg total) by mouth 2 (two) times a day with meals   Patient taking differently: Take 12 5 mg by mouth 2 (two) times a day with meals 6 25mg am and 12 5mg in the PM   cholecalciferol (VITAMIN D3) 1,000 units tablet  Self Yes No   Sig: Take by mouth   famotidine (PEPCID) 20 mg tablet  Self No No   Sig: Take 1 tablet (20 mg total) by mouth 2 (two) times a day   levothyroxine 25 mcg tablet   No No   Sig: Take 1 tablet (25 mcg total) by mouth daily   meclizine (ANTIVERT) 25 mg tablet   No No   Sig: TAKE 1 TABLET EVERY 8 HOURS AS NEEDED FOR DIZZINESS   oxybutynin (DITROPAN-XL) 5 mg 24 hr tablet  Self No No   Sig: Take 1 tablet (5 mg total) by mouth daily at bedtime   polyethylene glycol (GLYCOLAX) 17 GM/SCOOP powder  Self Yes No   Sig: Take by mouth as needed simvastatin (ZOCOR) 20 mg tablet   No No   Sig: take 1 tablet by mouth once daily      Facility-Administered Medications: None       Allergies   Allergen Reactions    Amoxil [Amoxicillin] Hives    Biaxin [Clarithromycin] GI Intolerance     Reaction Date: 19Jul2011;   Pt gets nauseated    Esomeprazole GI Intolerance    Fosamax [Alendronate] GI Intolerance    Hydrochlorothiazide      Hyponatremia      Lansoprazole GI Intolerance    Norvasc [Amlodipine Besylate]     Pantoprazole GI Intolerance     Can tolerate IV- Not oral    Relafen [Nabumetone] GI Intolerance and Other (See Comments)       PHYSICAL EXAM    PE limited by: ***    Objective   Vitals:   First set:      Primary Survey:   (A) Airway: ***  (B) Breathing: ***  (C) Circulation: Pulses:   {Pulses:96708}  (D) Disabliity:  {GCS response:90527}  (E) Expose:  {Completed:05926}    Secondary Survey: (Click on Physical Exam tab above)  Physical Exam  Vitals and nursing note reviewed  Constitutional:       General: She is not in acute distress  Appearance: Normal appearance  She is normal weight  She is not ill-appearing  HENT:      Head: Normocephalic and atraumatic  Right Ear: External ear normal       Left Ear: External ear normal       Nose: Nose normal  No congestion or rhinorrhea  Mouth/Throat:      Mouth: Mucous membranes are moist       Pharynx: Oropharynx is clear  No oropharyngeal exudate or posterior oropharyngeal erythema  Eyes:      Extraocular Movements: Extraocular movements intact  Conjunctiva/sclera: Conjunctivae normal       Pupils: Pupils are equal, round, and reactive to light  Cardiovascular:      Rate and Rhythm: Normal rate and regular rhythm  Pulses: Normal pulses  Heart sounds: Normal heart sounds  No murmur heard  Pulmonary:      Effort: Pulmonary effort is normal  No respiratory distress  Breath sounds: Normal breath sounds  No wheezing or rales     Abdominal:      General: Abdomen is flat  Bowel sounds are normal  There is no distension  Palpations: Abdomen is soft  Tenderness: There is no abdominal tenderness  There is no right CVA tenderness, left CVA tenderness or guarding  Musculoskeletal:         General: No swelling or tenderness  Normal range of motion  Cervical back: Normal range of motion and neck supple  No tenderness  Skin:     General: Skin is warm and dry  Capillary Refill: Capillary refill takes less than 2 seconds  Neurological:      General: No focal deficit present  Mental Status: She is alert and oriented to person, place, and time  Cervical spine cleared by clinical criteria? {YES/NO:78712}     Invasive Devices  Report    Peripheral Intravenous Line  Duration           Peripheral IV 09/13/22 Dorsal (posterior); Left Hand <1 day                Lab Results:   Results Reviewed     None                 Imaging Studies:   Direct to CT: {YES/NO:88053}  No orders to display         Procedures  Laceration repair    Date/Time: 9/13/2022 5:29 PM  Performed by: Oswald Mast MD  Authorized by: Oswald Mast MD   Consent: Verbal consent obtained  Risks and benefits: risks, benefits and alternatives were discussed  Consent given by: patient  Required items: required blood products, implants, devices, and special equipment available  Patient identity confirmed: verbally with patient and arm band  Time out: Immediately prior to procedure a "time out" was called to verify the correct patient, procedure, equipment, support staff and site/side marked as required  Body area: head/neck (Lateral to the right eye)  Laceration length: 2 cm  Foreign bodies: no foreign bodies  Anesthesia: local infiltration    Anesthesia:  Local Anesthetic: lidocaine 1% with epinephrine  Anesthetic total: 2 mL      Procedure Details:  Preparation: Patient was prepped and draped in the usual sterile fashion    Irrigation solution: saline  Irrigation method: jet lavage  Amount of cleaning: standard  Skin closure: Ethilon (6-0)  Number of sutures: 5  Technique: simple  Approximation: close  Approximation difficulty: simple  Patient tolerance: patient tolerated the procedure well with no immediate complications                   ED Course  ED Course as of 09/13/22 1610   Tue Sep 13, 2022   1608 Right subdural hematoma noted on ER preliminary interpretation of CT head  Will order DDAVP  Trauma on call, Dr Dollie Holstein, contacted  [facility]        Disposition  Priority One Transfer: {YES/NO:63935}  Final diagnoses:   None     ED Disposition     None      Follow-up Information    None       Patient's Medications   Discharge Prescriptions    No medications on file     No discharge procedures on file      PDMP Review       Value Time User    PDMP Reviewed  Yes 12/28/2021 11:11 AM Barbara Peña MD          ED Provider  Electronically Signed by Refill: Capillary refill takes less than 2 seconds  Neurological:      General: No focal deficit present  Mental Status: She is alert and oriented to person, place, and time  Sensory: No sensory deficit  Motor: No weakness  Comments: Awake, GCS 15, no focal neurological deficits  Following commands and moving all extremities  Cervical spine cleared by clinical criteria? No (imaging required)      Invasive Devices  Report    None                 Lab Results:   Results Reviewed     Procedure Component Value Units Date/Time    HS Troponin I 4hr [[id number]]  (Abnormal) Collected: 09/13/22 2019    Lab Status: Final result Specimen: Blood from Arm, Left Updated: 09/13/22 2100     hs TnI 4hr 38 ng/L      Delta 4hr hsTnI 32 ng/L     HS Troponin I 2hr [[id number]]  (Abnormal) Collected: 09/13/22 1741    Lab Status: Final result Specimen: Blood from Arm, Right Updated: 09/13/22 1810     hs TnI 2hr 34 ng/L      Delta 2hr hsTnI 28 ng/L     CBC and differential [[id number]]  (Abnormal) Collected: 09/13/22 1535    Lab Status: Final result Specimen: Blood from Arm, Left Updated: 09/13/22 1645     WBC 19 39 Thousand/uL      RBC 4 04 Million/uL      Hemoglobin 11 5 g/dL      Hematocrit 35 6 %      MCV 88 fL      MCH 28 5 pg      MCHC 32 3 g/dL      RDW 14 9 %      MPV 9 8 fL      Platelets 418 Thousands/uL     Narrative: This is an appended report  These results have been appended to a previously verified report      Manual Differential(PHLEBS Do Not Order) [[id number]]  (Abnormal) Collected: 09/13/22 1535    Lab Status: Final result Specimen: Blood from Arm, Left Updated: 09/13/22 1645     Segmented % 45 %      Lymphocytes % 50 %      Monocytes % 5 %      Eosinophils, % 0 %      Basophils % 0 %      Absolute Neutrophils 8 73 Thousand/uL      Lymphocytes Absolute 9 70 Thousand/uL      Monocytes Absolute 0 97 Thousand/uL      Eosinophils Absolute 0 00 Thousand/uL      Basophils Absolute 0 00 Thousand/uL      Total Counted --     Platelet Estimate Adequate    HS Troponin 0hr (reflex protocol) [[id number]]  (Normal) Collected: 09/13/22 1535    Lab Status: Final result Specimen: Blood from Arm, Left Updated: 09/13/22 1620     hs TnI 0hr 6 ng/L     Comprehensive metabolic panel [[id number]]  (Abnormal) Collected: 09/13/22 1535    Lab Status: Final result Specimen: Blood from Arm, Left Updated: 09/13/22 1617     Sodium 133 mmol/L      Potassium 3 4 mmol/L      Chloride 103 mmol/L      CO2 25 mmol/L      ANION GAP 5 mmol/L      BUN 11 mg/dL      Creatinine 0 51 mg/dL      Glucose 100 mg/dL      Calcium 8 6 mg/dL      Corrected Calcium 9 6 mg/dL      AST 17 U/L      ALT 19 U/L      Alkaline Phosphatase 58 U/L      Total Protein 6 4 g/dL      Albumin 2 8 g/dL      Total Bilirubin 0 48 mg/dL      eGFR 86 ml/min/1 73sq m     Narrative:      [facility] guidelines for Chronic Kidney Disease (CKD):     Stage 1 with normal or high GFR (GFR > 90 mL/min/1 73 square meters)    Stage 2 Mild CKD (GFR = 60-89 mL/min/1 73 square meters)    Stage 3A Moderate CKD (GFR = 45-59 mL/min/1 73 square meters)    Stage 3B Moderate CKD (GFR = 30-44 mL/min/1 73 square meters)    Stage 4 Severe CKD (GFR = 15-29 mL/min/1 73 square meters)    Stage 5 End Stage CKD (GFR <15 mL/min/1 73 square meters)  Note: GFR calculation is accurate only with a steady state creatinine    Protime-INR [[id number]]  (Normal) Collected: 09/13/22 1535    Lab Status: Final result Specimen: Blood from Arm, Left Updated: 09/13/22 1609     Protime 14 0 seconds      INR 1 06    APTT [[id number]]  (Normal) Collected: 09/13/22 1535    Lab Status: Final result Specimen: Blood from Arm, Left Updated: 09/13/22 1609     PTT 26 seconds     Urine Microscopic [[id number]]  (Abnormal) Collected: 09/13/22 1537    Lab Status: Final result Specimen: Urine, Clean Catch Updated: 09/13/22 1553     RBC, UA 4-10 /hpf      WBC, UA 4-10 /hpf      Epithelial Cells Occasional /hpf      Bacteria, UA Occasional /hpf     UA w Reflex to Microscopic w Reflex to Culture [[id number]]  (Abnormal) Collected: 09/13/22 1537    Lab Status: Final result Specimen: Urine, Clean Catch Updated: 09/13/22 1545     Color, UA Colorless     Clarity, UA Clear     Specific Gravity, UA 1 008     pH, UA 8 0     Leukocytes, UA Small     Nitrite, UA Negative     Protein, UA Negative mg/dl      Glucose, UA Negative mg/dl      Ketones, UA Negative mg/dl      Urobilinogen, UA <2 0 mg/dl      Bilirubin, UA Negative     Occult Blood, UA Small                 Imaging Studies:   Direct to CT: Yes  XR chest portable   Final Result by Rose Marie Valle MD (09/16 1427)      No acute cardiopulmonary disease  Workstation performed: [id number]         CT head wo contrast   Final Result by William Jhaveri MD (09/15 1915)      Stable small subdural hematoma along the right convexity without stable local mass effect and no midline shift  Workstation performed: [id number]         CT head wo contrast   Final Result by Luis Avelar DO (09/14 1052)      Stable acute subdural hematoma within the right hemisphere  Stable underlying brain parenchyma  Workstation performed: [id number]         XR chest 1 view portable   Final Result by Kelly Campbell MD (09/13 1916)      Minimal interstitial prominence seen best in the lung bases likely representing chronic interstitial lung disease  Mild pulmonary edema or viral illness cannot be excluded  Findings marked as significant in Epic  Workstation performed: [id number]         CT cervical spine without contrast   Final Result by Devika Romo MD (09/13 1634)      No cervical spine fracture or traumatic malalignment  Workstation performed: [id number]         CT head without contrast   Final Result by Devika Romo MD (09/13 1628)      1    Acute right frontotemporoparietal subdural hematoma, as described, causing mild right to left midline shift  2   Questionable mild subarachnoid blood products within the inferior right temporal lobe, noting that this location is difficult to evaluate due to artifact from the skull base  Attention on follow-up imaging is recommended  I personally discussed this study with Dr Ana Mcgarry on 9/13/2022 at 4:25 PM                      Workstation performed: [id number]         CT head wo contrast    (Results Pending)         Procedures  Laceration repair    Date/Time: 9/13/2022 5:29 PM  Performed by: Aleta Henry MD  Authorized by: Aleta Henry MD   Consent: Verbal consent obtained  Risks and benefits: risks, benefits and alternatives were discussed  Consent given by: patient  Required items: required blood products, implants, devices, and special equipment available  Patient identity confirmed: verbally with patient and arm band  Time out: Immediately prior to procedure a "time out" was called to verify the correct patient, procedure, equipment, support staff and site/side marked as required  Body area: head/neck (Lateral to the right eye)  Laceration length: 2 cm  Foreign bodies: no foreign bodies  Anesthesia: local infiltration    Anesthesia:  Local Anesthetic: lidocaine 1% with epinephrine  Anesthetic total: 2 mL      Procedure Details:  Preparation: Patient was prepped and draped in the usual sterile fashion  Irrigation solution: saline  Irrigation method: jet lavage  Amount of cleaning: standard  Skin closure: Ethilon (6-0)  Number of sutures: 5  Technique: simple  Approximation: close  Approximation difficulty: simple  Patient tolerance: patient tolerated the procedure well with no immediate complications                   ED Course  ED Course as of 09/28/22 6901 [address] 13, 2022   1608 Right subdural hematoma noted on ER preliminary interpretation of CT head  Will order DDAVP   Trauma on call, Dr Amirah Amezquita, contacted  [facility]  Number of Diagnoses or Management Options  Facial laceration, initial encounter  Fall, initial encounter  Subdural hematoma ([facility] [address] )  Diagnosis management comments: This is an 80-year-old female presenting via EMS from home after an unwitnessed fall  Right eyebrow laceration sustained  Unclear if patient experienced syncopal episode  Brief episode of confusion per EMS, improved by the time the patient arrived to the ER  Alerted as level C trauma alert due to current aspirin use  Labs and CT imaging ordered  CT head reveals right subdural hematoma  DDAVP ordered  Contacted Trauma team for consult and admission  Disposition  Priority One Transfer: No  Final diagnoses:   Subdural hematoma ([facility] [address] )   Fall, initial encounter   Facial laceration, initial encounter     Time reflects when diagnosis was documented in both MDM as applicable and the Disposition within this note     Time User Action Codes Description Comment    9/13/2022  4:10 PM Christinia Reeks Add [S06 5X9A] Subdural hematoma     9/13/2022  4:16 PM Christinia Reeks Add Felicia Walton  VJOT] Fall, initial encounter     9/13/2022  4:17 PM Christinia Reeks Add [S01 81XA] Facial laceration, initial encounter     9/14/2022 11:40 AM Sin Rasmussen Add [R55] Syncope, unspecified syncope type     9/14/2022 11:47 AM Salma Forrest Add [S06 5X9A] Post-traumatic subdural hematoma with loss of consciousness, initial encounter ([facility] [address] )     9/17/2022  1:09 PM Jhoana Motts Add [N39 0] Urinary tract infection without hematuria, site unspecified     9/17/2022  1:09 PM Jhoana Motts Add [S06 5X0A] Post-traumatic subdural hematoma, without loss of consciousness, initial encounter [facility])       ED Disposition     ED Disposition   Admit    Condition   Stable    Date/Time   Tue Sep 13, 2022  4:18 PM    Comment   Case was discussed with Dr Haskel Bamberger, Trauma, and the patient's admission status was agreed to be Admission Status: inpatient status to the service of Dr Yajaira Garcia  Follow-up Information     Follow up With Specialties Details Why Contact Info Additional Information    Rufus Cheng MD Urogynecology Follow up  [address]    [address], [address], [place] [facility], Cardiology Imaging, Nurse Practitioner Follow up September 27th 3pm [facility] [address]  [address] [phone]       Bettye  [address] [facility]  Follow up  [address]  [address] [facility] Trauma Surgery Call Please call to schedule an appointment for suture removal on 9/20-23  [address]  [place] [address] [facility] [id number]  [address] [place] [address] Blvd, [address] [facility], [place], [place], [address] [facility]        Discharge Medication List as of 9/17/2022  2:32 PM      START taking these medications    Details   cephalexin (KEFLEX) 500 mg capsule Take 1 capsule (500 mg total) by mouth every 6 (six) hours for 7 days, Starting Fri 9/16/2022, Until Fri 9/23/2022, Normal      levETIRAcetam (KEPPRA) 500 mg tablet Take 1 tablet (500 mg total) by mouth every 12 (twelve) hours for 6 doses, Starting Sat 9/17/2022, Until Tue 9/20/2022, Normal         CONTINUE these medications which have NOT CHANGED    Details   acetaminophen (TYLENOL) 650 mg CR tablet Take 650 mg by mouth as needed for mild pain , Historical Med      ALPHAGAN P 0 1 % instill 1 drop into both eyes twice a day, Historical Med      ALPRAZolam (XANAX) 0 25 mg tablet Take 1 tablet (0 25 mg total) by mouth daily at bedtime as needed for anxiety, Starting Tue 12/28/2021, Normal      bifidobacterium infantis (ALIGN) capsule Take by mouth, Historical Med      cholecalciferol (VITAMIN D3) 1,000 units tablet Take by mouth, Historical Med      DENTA 5000 PLUS 1 1 % CREA BRUSH UP TO TWICE A DAY, Historical Med      famotidine (PEPCID) 20 mg tablet Take 1 tablet (20 mg total) by mouth 2 (two) times a day, Starting Fri 9/11/2020, No Print      levothyroxine 25 mcg tablet Take 1 tablet (25 mcg total) by mouth daily, Starting Wed 8/10/2022, Until Tue 11/8/2022, Normal      meclizine (ANTIVERT) 25 mg tablet TAKE 1 TABLET EVERY 8 HOURS AS NEEDED FOR DIZZINESS, Normal      Multiple Vitamins-Minerals (CENTRUM SILVER PO) Take by mouth, Historical Med      oxybutynin (DITROPAN-XL) 5 mg 24 hr tablet Take 1 tablet (5 mg total) by mouth daily at bedtime, Starting Thu 12/9/2021, Normal      polyethylene glycol (GLYCOLAX) 17 GM/SCOOP powder Take by mouth as needed  , Historical Med      simvastatin (ZOCOR) 20 mg tablet take 1 tablet by mouth once daily, Normal      aspirin 81 MG tablet Take 1 tablet (81 mg total) by mouth daily, Starting Fri 2/21/2020, No Print      carvedilol (COREG) 12 5 mg tablet Take 1 tablet (12 5 mg total) by mouth 2 (two) times a day with meals, Starting Thu 3/31/2022, Normal           Outpatient Discharge Orders   CT head wo contrast   Standing Status: Future Standing Exp  Date: 09/14/26     EXTERNAL: Ambulatory Referral to Home Health   Standing Status: Future Standing Exp   Date: 09/17/23      Discharge Diet     No strenuous exercise     Call provider for:  persistent nausea or vomiting     Call provider for:  severe uncontrolled pain     Call provider for:  redness, tenderness, or signs of infection (pain, swelling, redness, odor or green/yellow discharge around incision site)     Call provider for: active or persistent bleeding     Call provider for:  difficulty breathing, headache or visual disturbances     No dressing needed       PDMP Review       Value Time User    PDMP Reviewed  Yes 9/14/2022 11:56 AM Tuesday Donnell [place], [facility] Provider  Electronically Signed by         Tucker Urias MD  09/28/22 0175

## 2022-09-13 NOTE — ASSESSMENT & PLAN NOTE
Patient started Sunday with patient pain in fever  Her max temp was 101 2°  Her fever did resolve and on Monday she just continue with pain  She is scheduled for a dental procedure on Thursday  Today in the office the patient's symptoms have resolved  She did perform a COVID test at home which was negative

## 2022-09-13 NOTE — PROGRESS NOTES
[facility] - [facility]    NAME: Yasmin Vasquez  AGE: 80 y o  SEX: female  : 1934     DATE: 2022     Assessment and Plan:     Problem List Items Addressed This Visit        Immune and Lymphatic    Lymphadenopathy     The patient with a 1 day history of left breast and axillary pain with questionable lymphadenopathy  Patient was performing yard work and house cleaning on Friday  The pain started on Saturday  It has now resolved  Upon exam there is no lymphadenopathy  Recent mammogram in August was normal   I did reassure the patient that currently no further imaging or testing is required  She will keep an eye on her symptoms  If they recur she will let us know  Other    Pain, dental - Primary     Patient started  with patient pain in fever  Her max temp was 101 2°  Her fever did resolve and on Monday she just continue with pain  She is scheduled for a dental procedure on Thursday  Today in the office the patient's symptoms have resolved  She did perform a COVID test at home which was negative  No follow-ups on file  Chief Complaint:     Chief Complaint   Patient presents with    Cold Like Symptoms     She is having pain in the under arm with swollen glands, fever and head cold  Covid test neg for covid  Patient it suppose to have tooth removed on Friday        History of Present Illness: The patient presents to the office today with concerns of fever, dental pain, and left axillary pain which occurred over the weekend  Her symptoms have all resolved  She is scheduled for dental procedure on Thursday  Her exam is negative  She will contact the office should the symptoms recur  Review of Systems:     Review of Systems   Constitutional: Positive for fever  Negative for activity change and fatigue  HENT: Positive for dental problem   Negative for congestion, hearing loss, rhinorrhea, trouble swallowing and voice change  Eyes: Negative for photophobia, pain, discharge and visual disturbance  Respiratory: Negative for cough, chest tightness and shortness of breath  Cardiovascular: Negative for chest pain, palpitations and leg swelling  Gastrointestinal: Negative for abdominal pain, blood in stool, constipation, nausea and vomiting  Endocrine: Negative for cold intolerance and heat intolerance  Genitourinary: Negative for difficulty urinating, frequency, hematuria, urgency, vaginal bleeding and vaginal discharge  Musculoskeletal: Negative for arthralgias and myalgias  Skin: Negative  Neurological: Positive for headaches  Negative for dizziness, weakness and numbness  Hematological: Positive for adenopathy  Psychiatric/Behavioral: Negative for decreased concentration  The patient is not nervous/anxious           Problem List:     Patient Active Problem List   Diagnosis    Benign essential hypertension    Hyponatremia    Hypothyroidism    History of endometrial cancer    Abnormal breast exam    Arthritis    Constipation    Depression with anxiety    Diverticulosis    Duodenal diverticulum    Esophagitis, reflux    Dyslipidemia    Impaired fasting glucose    Insomnia    Age-related osteoporosis without current pathological fracture    Atrophic vaginitis    Urinary incontinence    Vertigo    Encounter for follow-up surveillance of endometrial cancer    Endometrial cancer ([facility] [address] )    Arthritis of left shoulder region    Encounter for screening mammogram for malignant neoplasm of breast    Need for immunization against influenza    Nonexudative age-related macular degeneration, bilateral, early dry stage    Posterior vitreous detachment of both eyes    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    Status post cataract extraction and insertion of intraocular lens    Lymphocytosis    Lackey's esophagus with dysplasia    Pericardial effusion    MGUS (monoclonal gammopathy of unknown significance)    Hypercalcemia    Lyme disease    Visual field defect    Pain, dental    Lymphadenopathy        Objective:     /82   Pulse 72   Temp 97 8 °F (36 6 °C) (Tympanic)   Resp 16   Ht 5' 2 5" (1 588 m)   Wt 51 3 kg (113 lb)   BMI 20 34 kg/m²     Current Outpatient Medications   Medication Instructions    acetaminophen (TYLENOL) 650 mg, Oral, As needed    ALPHAGAN P 0 1 % instill 1 drop into both eyes twice a day    ALPRAZolam (XANAX) 0 25 mg, Oral, Daily at bedtime PRN    aspirin 81 mg, Oral, Daily    bifidobacterium infantis (ALIGN) capsule Oral    carvedilol (COREG) 12 5 mg, Oral, 2 times daily with meals    cholecalciferol (VITAMIN D3) 1,000 units tablet Oral    DENTA 5000 PLUS 1 1 % CREA BRUSH UP TO TWICE A DAY    famotidine (PEPCID) 20 mg, Oral, 2 times daily    levothyroxine 25 mcg, Oral, Daily    meclizine (ANTIVERT) 25 mg tablet TAKE 1 TABLET EVERY 8 HOURS AS NEEDED FOR DIZZINESS    Multiple Vitamins-Minerals (CENTRUM SILVER PO) Oral    oxybutynin (DITROPAN-XL) 5 mg, Oral, Daily at bedtime    polyethylene glycol (GLYCOLAX) 17 GM/SCOOP powder Oral, As needed    simvastatin (ZOCOR) 20 mg tablet take 1 tablet by mouth once daily         Physical Exam  Vitals reviewed  Constitutional:       Appearance: Normal appearance  HENT:      Head: Normocephalic  Right Ear: Tympanic membrane, ear canal and external ear normal       Left Ear: Tympanic membrane, ear canal and external ear normal       Nose: Nose normal       Mouth/Throat:      Mouth: Mucous membranes are moist       Pharynx: Oropharynx is clear  Eyes:      Extraocular Movements: Extraocular movements intact  Pupils: Pupils are equal, round, and reactive to light  Cardiovascular:      Rate and Rhythm: Normal rate and regular rhythm  Pulmonary:      Effort: Pulmonary effort is normal       Breath sounds: Normal breath sounds  Chest:   Breasts:      Left: No axillary adenopathy  Musculoskeletal:         General: Normal range of motion  Lymphadenopathy:      Cervical: No cervical adenopathy  Right cervical: No superficial, deep or posterior cervical adenopathy  Left cervical: No superficial, deep or posterior cervical adenopathy  Upper Body:      Left upper body: No axillary adenopathy  Skin:     General: Skin is warm and dry  Neurological:      General: No focal deficit present  Mental Status: She is alert and oriented to person, place, and time  Psychiatric:         Mood and Affect: Mood normal          Behavior: Behavior normal          Thought Content:  Thought content normal          Judgment: Judgment normal          Renee Fischer, [id number]

## 2022-09-13 NOTE — ED ATTENDING ATTESTATION
9/13/2022  ITiffany MD, saw and evaluated the patient  I have discussed the patient with the resident/non-physician practitioner and agree with the resident's/non-physician practitioner's findings, Plan of Care, and MDM as documented in the resident's/non-physician practitioner's note, except where noted  All available labs and Radiology studies were reviewed  I was present for key portions of any procedure(s) performed by the resident/non-physician practitioner and I was immediately available to provide assistance  At this point I agree with the current assessment done in the Emergency Department  I have conducted an independent evaluation of this patient a history and physical is as follows:  45-year-old female presenting after an unwitnessed fall  Patient lives independently, and her family received the fall or from her Apple watch  Patient has no recollection of the event, and complains of worsening right-sided head pain  Also complains of needing to urinate  Review of systems otherwise -12 systems reviewed  Eyes is the patient is a 2 cm laceration over her right eyebrow  She is otherwise neurologically nonfocal   Her heart and lung exams are normal   Abdomen is benign  Pelvis is stable to rock  Her extremities are intact  Impression:  Head injury, trauma level C  IV access established  Patient is CT    Patient with significant subdural   Trauma consult for admission for subdural hematoma, DDAVP due to aspirin use    ED Course         Critical Care Time  CriticalCare Time  Performed by: Tiffany Hicks MD  Authorized by: Tiffany Hicks MD     Critical care provider statement:     Critical care time (minutes):  36    Critical care time was exclusive of:  Separately billable procedures and treating other patients and teaching time    Critical care was necessary to treat or prevent imminent or life-threatening deterioration of the following conditions:  CNS failure or compromise    Critical care was time spent personally by me on the following activities:  Blood draw for specimens, obtaining history from patient or surrogate, development of treatment plan with patient or surrogate, discussions with consultants, discussions with primary provider, evaluation of patient's response to treatment, examination of patient, review of old charts, re-evaluation of patient's condition, ordering and review of radiographic studies, ordering and review of laboratory studies and ordering and performing treatments and interventions

## 2022-09-13 NOTE — TELEPHONE ENCOUNTER
Daughter Rachel Foster called  She would like you to call her regarding her mother Best Workman, [facility]:  [phone]

## 2022-09-13 NOTE — ASSESSMENT & PLAN NOTE
The patient with a 1 day history of left breast and axillary pain with questionable lymphadenopathy  Patient was performing yard work and house cleaning on Friday  The pain started on Saturday  It has now resolved  Upon exam there is no lymphadenopathy  Recent mammogram in August was normal   I did reassure the patient that currently no further imaging or testing is required  She will keep an eye on her symptoms  If they recur she will let us know

## 2022-09-14 ENCOUNTER — TELEPHONE (OUTPATIENT)
Dept: NEUROSURGERY | Facility: CLINIC | Age: 87
End: 2022-09-14

## 2022-09-14 ENCOUNTER — TELEPHONE (OUTPATIENT)
Dept: CARDIOLOGY CLINIC | Facility: CLINIC | Age: 87
End: 2022-09-14

## 2022-09-14 ENCOUNTER — APPOINTMENT (INPATIENT)
Dept: RADIOLOGY | Facility: HOSPITAL | Age: 87
DRG: 085 | End: 2022-09-14
Payer: MEDICARE

## 2022-09-14 ENCOUNTER — APPOINTMENT (INPATIENT)
Dept: NON INVASIVE DIAGNOSTICS | Facility: HOSPITAL | Age: 87
DRG: 085 | End: 2022-09-14
Payer: MEDICARE

## 2022-09-14 DIAGNOSIS — R55 SYNCOPE, UNSPECIFIED SYNCOPE TYPE: Primary | ICD-10-CM

## 2022-09-14 PROBLEM — S06.5XAA SUBDURAL HEMATOMA, POST-TRAUMATIC: Status: ACTIVE | Noted: 2022-09-14

## 2022-09-14 PROBLEM — S01.81XA LACERATION OF BROW WITHOUT COMPLICATION: Status: ACTIVE | Noted: 2022-09-14

## 2022-09-14 PROBLEM — H53.9 VISUAL CHANGES: Status: ACTIVE | Noted: 2022-09-14

## 2022-09-14 PROBLEM — S06.5X9A SUBDURAL HEMATOMA, POST-TRAUMATIC (HCC): Status: ACTIVE | Noted: 2022-09-14
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
## 2022-09-14 LAB
ANION GAP SERPL CALCULATED.3IONS-SCNC: 3 MMOL/L (ref 4–13)
AORTIC ROOT: 3.2 CM
APICAL FOUR CHAMBER EJECTION FRACTION: 62 %
BUN SERPL-MCNC: 13 MG/DL (ref 5–25)
CALCIUM SERPL-MCNC: 8.7 MG/DL (ref 8.3–10.1)
CHLORIDE SERPL-SCNC: 103 MMOL/L (ref 96–108)
CO2 SERPL-SCNC: 27 MMOL/L (ref 21–32)
CREAT SERPL-MCNC: 0.49 MG/DL (ref 0.6–1.3)
E WAVE DECELERATION TIME: 154 MS
ERYTHROCYTE [DISTWIDTH] IN BLOOD BY AUTOMATED COUNT: 15 % (ref 11.6–15.1)
FRACTIONAL SHORTENING: 34 (ref 28–44)
GFR SERPL CREATININE-BSD FRML MDRD: 87 ML/MIN/1.73SQ M
GLUCOSE SERPL-MCNC: 99 MG/DL (ref 65–140)
HCT VFR BLD AUTO: 31.2 % (ref 34.8–46.1)
HGB BLD-MCNC: 9.9 G/DL (ref 11.5–15.4)
INTERVENTRICULAR SEPTUM IN DIASTOLE (PARASTERNAL SHORT AXIS VIEW): 1.4 CM
INTERVENTRICULAR SEPTUM: 1.4 CM (ref 0.6–1.1)
LAAS-AP2: 22.4 CM2
LAAS-AP4: 17.8 CM2
LEFT ATRIUM AREA SYSTOLE SINGLE PLANE A4C: 16.2 CM2
LEFT ATRIUM SIZE: 3.7 CM
LEFT INTERNAL DIMENSION IN SYSTOLE: 2.1 CM (ref 2.1–4)
LEFT VENTRICULAR INTERNAL DIMENSION IN DIASTOLE: 3.2 CM (ref 3.5–6)
LEFT VENTRICULAR POSTERIOR WALL IN END DIASTOLE: 1.4 CM
LEFT VENTRICULAR STROKE VOLUME: 28 ML
LVSV (TEICH): 28 ML
MCH RBC QN AUTO: 28.3 PG (ref 26.8–34.3)
MCHC RBC AUTO-ENTMCNC: 31.7 G/DL (ref 31.4–37.4)
MCV RBC AUTO: 89 FL (ref 82–98)
MV E'TISSUE VEL-SEP: 8 CM/S
MV PEAK A VEL: 0.55 M/S
MV PEAK E VEL: 66 CM/S
MV STENOSIS PRESSURE HALF TIME: 45 MS
MV VALVE AREA P 1/2 METHOD: 4.89
PLATELET # BLD AUTO: 165 THOUSANDS/UL (ref 149–390)
PMV BLD AUTO: 9.8 FL (ref 8.9–12.7)
POTASSIUM SERPL-SCNC: 3.5 MMOL/L (ref 3.5–5.3)
PULMONARY REGURGITATION LATE DIASTOLIC VELOCITY: 0.02 M/S
RA PRESSURE ESTIMATED: 5 MMHG
RBC # BLD AUTO: 3.5 MILLION/UL (ref 3.81–5.12)
RIGHT ATRIUM AREA SYSTOLE A4C: 12.7 CM2
RIGHT VENTRICLE ID DIMENSION: 3.6 CM
RV PSP: 32 MMHG
SL CV LEFT ATRIUM LENGTH A2C: 5.4 CM
SL CV LV EF: 60
SL CV PED ECHO LEFT VENTRICLE DIASTOLIC VOLUME (MOD BIPLANE) 2D: 42 ML
SL CV PED ECHO LEFT VENTRICLE SYSTOLIC VOLUME (MOD BIPLANE) 2D: 14 ML
SODIUM SERPL-SCNC: 133 MMOL/L (ref 135–147)
TR MAX PG: 27 MMHG
TR PEAK VELOCITY: 2.6 M/S
TRICUSPID VALVE PEAK REGURGITATION VELOCITY: 2.59 M/S
WBC # BLD AUTO: 16.78 THOUSAND/UL (ref 4.31–10.16)

## 2022-09-14 PROCEDURE — 93306 TTE W/DOPPLER COMPLETE: CPT | Performed by: INTERNAL MEDICINE

## 2022-09-14 PROCEDURE — 99223 1ST HOSP IP/OBS HIGH 75: CPT | Performed by: SURGERY

## 2022-09-14 PROCEDURE — 99222 1ST HOSP IP/OBS MODERATE 55: CPT | Performed by: NURSE PRACTITIONER

## 2022-09-14 PROCEDURE — 97166 OT EVAL MOD COMPLEX 45 MIN: CPT

## 2022-09-14 PROCEDURE — 97163 PT EVAL HIGH COMPLEX 45 MIN: CPT

## 2022-09-14 PROCEDURE — 99223 1ST HOSP IP/OBS HIGH 75: CPT | Performed by: INTERNAL MEDICINE

## 2022-09-14 PROCEDURE — G1004 CDSM NDSC: HCPCS

## 2022-09-14 PROCEDURE — 93306 TTE W/DOPPLER COMPLETE: CPT

## 2022-09-14 PROCEDURE — 99222 1ST HOSP IP/OBS MODERATE 55: CPT | Performed by: NEUROLOGICAL SURGERY

## 2022-09-14 PROCEDURE — 70450 CT HEAD/BRAIN W/O DYE: CPT

## 2022-09-14 PROCEDURE — 97129 THER IVNTJ 1ST 15 MIN: CPT

## 2022-09-14 PROCEDURE — 80048 BASIC METABOLIC PNL TOTAL CA: CPT

## 2022-09-14 PROCEDURE — NC001 PR NO CHARGE: Performed by: PHYSICIAN ASSISTANT

## 2022-09-14 PROCEDURE — 85027 COMPLETE CBC AUTOMATED: CPT

## 2022-09-14 RX ORDER — CARVEDILOL 6.25 MG/1
6.25 TABLET ORAL
Status: DISCONTINUED | OUTPATIENT
Start: 2022-09-14 | End: 2022-09-16

## 2022-09-14 RX ORDER — POTASSIUM CHLORIDE 20 MEQ/1
40 TABLET, EXTENDED RELEASE ORAL ONCE
Status: COMPLETED | OUTPATIENT
Start: 2022-09-14 | End: 2022-09-14

## 2022-09-14 RX ADMIN — ACETAMINOPHEN 975 MG: 325 TABLET ORAL at 17:22

## 2022-09-14 RX ADMIN — PRAVASTATIN SODIUM 20 MG: 20 TABLET ORAL at 17:22

## 2022-09-14 RX ADMIN — LEVETIRACETAM 500 MG: 500 TABLET, FILM COATED ORAL at 21:29

## 2022-09-14 RX ADMIN — CARVEDILOL 6.25 MG: 6.25 TABLET, FILM COATED ORAL at 08:57

## 2022-09-14 RX ADMIN — CARVEDILOL 6.25 MG: 6.25 TABLET, FILM COATED ORAL at 21:31

## 2022-09-14 RX ADMIN — OXYBUTYNIN 5 MG: 5 TABLET, FILM COATED, EXTENDED RELEASE ORAL at 17:22

## 2022-09-14 RX ADMIN — FAMOTIDINE 20 MG: 20 TABLET, FILM COATED ORAL at 17:22

## 2022-09-14 RX ADMIN — ACETAMINOPHEN 975 MG: 325 TABLET ORAL at 05:25

## 2022-09-14 RX ADMIN — ACETAMINOPHEN 650 MG: 325 TABLET ORAL at 13:04

## 2022-09-14 RX ADMIN — POTASSIUM CHLORIDE 40 MEQ: 1500 TABLET, EXTENDED RELEASE ORAL at 08:57

## 2022-09-14 RX ADMIN — BRIMONIDINE TARTRATE 1 DROP: 2 SOLUTION OPHTHALMIC at 21:29

## 2022-09-14 RX ADMIN — FAMOTIDINE 20 MG: 20 TABLET, FILM COATED ORAL at 08:57

## 2022-09-14 RX ADMIN — BRIMONIDINE TARTRATE 1 DROP: 2 SOLUTION OPHTHALMIC at 08:59

## 2022-09-14 RX ADMIN — LEVOTHYROXINE SODIUM 25 MCG: 25 TABLET ORAL at 08:57

## 2022-09-14 RX ADMIN — LEVETIRACETAM 500 MG: 500 TABLET, FILM COATED ORAL at 08:57

## 2022-09-14 NOTE — ASSESSMENT & PLAN NOTE
- Patient reports intact of visual acuity, but notes some double vision, particularly in her right eye  She notes that her right eye is her good eye and she has had multiple issues with her left eye in the past with chronic blurry vision that appears unchanged from baseline  She does report her right eye double vision has been improving   - Continue to monitor conservatively for now  - Repeating [address] today  - If visual changes worsen or persist, may consider inpatient ophthalmology consult  Otherwise, plan for outpatient follow-up with primary ophthalmologist for further evaluation in the next week

## 2022-09-14 NOTE — CONSULTS
[address] [facility] 1934, 80 y o  female MRN: [id number]  Unit/Bed#: [facility] 832-01 Encounter: [id number]  Primary Care Provider: Deon Pavon MD   Date and time admitted to hospital: 9/13/2022  3:15 PM    Inpatient consult to Neurosurgery  Consult performed by: Leeroy Johnson PA-C  Consult ordered by: Rudy Figueroa MD      Consult completed on 09/14/2022 at 11:25 a m  * Subdural hematoma, post-traumatic (HCC)  Assessment & Plan  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    Imaging reviewed personally and with attending, results are as follows:   CT head without contrast 09/14/2022:  Stable acute subdural hematoma within the right hemisphere  Stable underlying parenchyma  Plan:   Continue to monitor neurological exam   No surgery warranted, continue with conservative management   Repeat CT head stat if GCS declines more than 2 points in 1 hour   Systolic blood pressure less than 160   Hold all anticoagulation/antiplatelet therapies, DDAVP given for aspirin use, should hold for at least 2 weeks until evaluated by Neurosurgery  [facility] management and pain control per primary team   DVT ppx:  SCDs, okay for pharmacological DVT prophylaxis from Neurosurgery standpoint   Mobilize as tolerated with assistance, PT / OT evaluation    Neurosurgery will sign off  Outpatient follow-up in 2 weeks time with repeat CT head  Please call with questions or concerns  Syncope  Assessment & Plan  Possible syncope episode at home    Imaging:  · CT cervical spine without contrast 09/13/2022:  No cervical spine fracture or traumatic malalignment      Plan:  · Syncope workup per Trauma    Benign essential hypertension  Assessment & Plan  · Systolic blood pressure less than 160  · Continue management per primary team      History of Present Illness   HPI: Pantera Cosby is a 80y o  year old female with PMH including anxiety and depression, prior endometrial cancer, hypercholesterolemia, hypertension, osteoporosis, retinal artery occlusion on aspirin 81 mg daily who presents status post fall on 09/13/2022 with right-sided subdural hematoma  Patient states she was in her normal state of health at home when she must have fallen, does not remember the fall  She states her watch sent and alert to her daughter who came over to see how she was doing  She does not remember getting up off the ground and opening the storm door to let her daughter in  Her daughter called 911 and brought her to the hospital   Per reports she was slightly confused however this resolved  She is currently complaining of some dizziness, double vision that is improving, headaches that are improving, bruising on the right side of her face with associated jaw pain when eating  She has some occasional constipation  She takes aspirin 81 mg daily for history of retinal artery occlusion  She denies any numbness/tingling/weakness, bowel or bladder issues, saddle anesthesia  Patient lives at home alone  Her daughter lives around the corner  She expresses concern about going home by herself given her fall  Review of Systems   Constitutional: Negative for activity change, chills and fever  HENT: Negative for hearing loss, tinnitus and trouble swallowing  Eyes: Positive for visual disturbance (double vision)  Respiratory: Negative for chest tightness and shortness of breath  Cardiovascular: Negative for chest pain  Gastrointestinal: Positive for constipation  Negative for abdominal pain, diarrhea, nausea and vomiting  Genitourinary: Negative for difficulty urinating  Musculoskeletal: Negative for back pain and neck pain  Skin: Positive for wound (laceration and bruising on face)  Allergic/Immunologic: Negative for environmental allergies and food allergies  Neurological: Positive for dizziness  Negative for facial asymmetry, speech difficulty, weakness, numbness and headaches  Hematological: Does not bruise/bleed easily  Psychiatric/Behavioral: Positive for confusion (unclear how she feel)  Historical Information   Past Medical History:   Diagnosis Date    Anemia     post-op, 07/07/09    Anxiety     last assessed: 01/15/14    C  difficile diarrhea     Chest wall trauma     Acute, last assessed: 10/24/12    Depression     Disease of thyroid gland     hypothyroid    Diverticula of colon     Endometrial cancer ([facility] ) 03/2011    Endometrial hyperplasia     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 )     stage IA, Grade I endometriod adenocarcinoma with 43% myometrial invasion and no LVSI, last assessed: 80/47/91    Folliculitis     last assessed: 03/02/16    Gastroparesis     Hypercholesterolemia     Hyperlipidemia     Hypertension     Incontinence in female     Migraine     Nontoxic single thyroid nodule     last assessed: 02/16/13    Osteoporosis     last assessed: 02/22/17    Pure hypercholesterolemia     Rotator cuff (capsule) sprain     Acute, right    Skin lesion     last assessed: 04/26/13    Stomatitis     Strain of right buttock     gluteus medius    Tendinitis of right rotator cuff     last assessed: 08/12/12    Thyroid cyst     last assessed: 08/15/13     Past Surgical History:   Procedure Laterality Date    APPENDECTOMY      BREAST BIOPSY Right     BREAST BIOPSY Left     BREAST CYST ASPIRATION      COLONOSCOPY      fiberoptic, 1998, 2001, 2006    CYSTOSCOPY      Diagnostic    EGD AND COLONOSCOPY N/A 3/11/2016    Procedure: EGD ;  Surgeon: Christina Moe MD;  Location: [facility];   Service:     HYSTERECTOMY  03/22/2011    Robotic-assisted, total laparoscopic approch    JOINT REPLACEMENT      KNEE SURGERY      OOPHORECTOMY Bilateral 03/22/2011    [place]    REPLACEMENT TOTAL KNEE      TONSILECTOMY AND ADNOIDECTOMY      TOTAL SHOULDER ARTHROPLASTY Right      Social History     Substance and Sexual Activity   Alcohol Use Not Currently     Social History     Substance and Sexual Activity   Drug Use No     Social History     Tobacco Use   Smoking Status Never Smoker   Smokeless Tobacco Never Used     Family History   Problem Relation Age of Onset    No Known Problems Mother     Dementia Father     Other Brother         Prolapsing mitral valve leaflet syndrome    Testicular cancer Brother 27    Lung cancer Brother 61    Prostate cancer Brother 36        unsure of excat age   Ardeth Needs No Known Problems Daughter     No Known Problems Daughter     No Known Problems Daughter     No Known Problems Maternal Grandmother     No Known Problems Maternal Grandfather     Breast cancer Paternal Grandmother 79    No Known Problems Paternal Grandfather     No Known Problems Maternal Aunt     No Known Problems Paternal Aunt     No Known Problems Paternal Aunt        Meds/Allergies   all current active meds have been reviewed, current meds:   Current Facility-Administered Medications   Medication Dose Route Frequency    acetaminophen (TYLENOL) tablet 975 mg  975 mg Oral Q6H [address]    ALPRAZolam (XANAX) tablet 0 25 mg  0 25 mg Oral HS PRN    brimonidine tartrate 0 2 % ophthalmic solution 1 drop  1 drop Both Eyes BID    carvedilol (COREG) tablet 12 5 mg  12 5 mg Oral HS    carvedilol (COREG) tablet 6 25 mg  6 25 mg Oral QAM    diphtheria-tetanus-acellular pertussis (INFANRIX) IM injection 0 5 mL  0 5 mL Intramuscular Prior to discharge    famotidine (PEPCID) tablet 20 mg  20 mg Oral BID    levETIRAcetam (KEPPRA) tablet 500 mg  500 mg Oral Q12H [address]    levothyroxine tablet 25 mcg  25 mcg Oral Daily    ondansetron (ZOFRAN) injection 4 mg  4 mg Intravenous Q6H PRN    oxybutynin (DITROPAN-XL) 24 hr tablet 5 mg  5 mg Oral After Dinner    pravastatin (PRAVACHOL) tablet 20 mg  20 mg Oral Daily With Dinner    and PTA meds: Prior to Admission Medications   Prescriptions Last Dose Informant Patient Reported? Taking? ALPHAGAN P 0 1 % 9/13/2022 at Unknown time Self Yes Yes   Sig: instill 1 drop into both eyes twice a day   ALPRAZolam (XANAX) 0 25 mg tablet Unknown at Unknown time Self No No   Sig: Take 1 tablet (0 25 mg total) by mouth daily at bedtime as needed for anxiety   DENTA 5000 PLUS 1 1 % CREA 9/13/2022 at Unknown time Self Yes Yes   Sig: BRUSH UP TO TWICE A DAY   Multiple Vitamins-Minerals (CENTRUM SILVER PO) 9/13/2022 at Unknown time Self Yes Yes   Sig: Take by mouth   acetaminophen (TYLENOL) 650 mg CR tablet 9/12/2022 at Unknown time Self Yes Yes   Sig: Take 650 mg by mouth as needed for mild pain     aspirin 81 MG tablet 9/13/2022 at Unknown time Self No Yes   Sig: Take 1 tablet (81 mg total) by mouth daily   bifidobacterium infantis (ALIGN) capsule 9/13/2022 at Unknown time Self Yes Yes   Sig: Take by mouth   carvedilol (COREG) 12 5 mg tablet 9/13/2022 at Unknown time  No Yes   Sig: Take 1 tablet (12 5 mg total) by mouth 2 (two) times a day with meals   Patient taking differently: Take 12 5 mg by mouth 2 (two) times a day with meals 6 25mg am and 12 5mg in the PM   cholecalciferol (VITAMIN D3) 1,000 units tablet 9/13/2022 at Unknown time Self Yes Yes   Sig: Take by mouth   famotidine (PEPCID) 20 mg tablet 9/13/2022 at Unknown time Self No Yes   Sig: Take 1 tablet (20 mg total) by mouth 2 (two) times a day   levothyroxine 25 mcg tablet 9/13/2022 at Unknown time  No Yes   Sig: Take 1 tablet (25 mcg total) by mouth daily   meclizine (ANTIVERT) 25 mg tablet Unknown at Unknown time  No No   Sig: TAKE 1 TABLET EVERY 8 HOURS AS NEEDED FOR DIZZINESS   oxybutynin (DITROPAN-XL) 5 mg 24 hr tablet 9/13/2022 at Unknown time Self No Yes   Sig: Take 1 tablet (5 mg total) by mouth daily at bedtime   polyethylene glycol (GLYCOLAX) 17 GM/SCOOP powder Unknown at Unknown time Self Yes No   Sig: Take by mouth as needed     simvastatin (ZOCOR) 20 mg tablet 9/13/2022 at Unknown time  No Yes   Sig: take 1 tablet by mouth once daily      Facility-Administered Medications: None     Allergies   Allergen Reactions    Amoxil [Amoxicillin] Hives    Biaxin [Clarithromycin] GI Intolerance     Reaction Date: 19Jul2011;   Pt gets nauseated    Esomeprazole GI Intolerance    Fosamax [Alendronate] GI Intolerance    Hydrochlorothiazide      Hyponatremia      Lansoprazole GI Intolerance    Norvasc [Amlodipine Besylate]     Pantoprazole GI Intolerance     Can tolerate IV- Not oral    Relafen [Nabumetone] GI Intolerance and Other (See Comments)       Objective   I/O       09/12 0701 09/13 0700 09/13 0701 09/14 0700 09/14 0701  09/15 0700    IV Piggyback  125     Total Intake(mL/kg)  125 (2 5)     Urine (mL/kg/hr)  200     Total Output  200     Net  -75            Unmeasured Urine Occurrence  2 x           Physical Exam  Constitutional:       General: She is awake  Appearance: Normal appearance  HENT:      Head:     Eyes:      Extraocular Movements: Extraocular movements intact and EOM normal       Conjunctiva/sclera: Conjunctivae normal    Cardiovascular:      Rate and Rhythm: Normal rate  Pulmonary:      Effort: Pulmonary effort is normal  No respiratory distress  Skin:     General: Skin is warm and dry  Neurological:      Mental Status: She is alert and oriented to person, place, and time  Coordination: Finger-Nose-Finger Test normal       Deep Tendon Reflexes: Strength normal    Psychiatric:         Attention and Perception: Attention and perception normal          Mood and Affect: Mood and affect normal          Speech: Speech normal          Behavior: Behavior normal  Behavior is cooperative  Thought Content: Thought content normal          Cognition and Memory: Cognition normal  Memory is impaired (Unclear about events of fall)           Judgment: Judgment normal        Neurologic Exam     Mental Status   Oriented to person, place, and time  Follows 1 step commands  Attention: normal  Concentration: normal    Speech: speech is normal   Level of consciousness: alert  Knowledge: good  Able to perform simple calculations  Normal comprehension  Cranial Nerves     CN III, IV, VI   Extraocular motions are normal    CN III: no CN III palsy  CN VI: no CN VI palsy  Nystagmus: none   Diplopia: none  Ophthalmoparesis: none  Upgaze: normal  Downgaze: normal  Conjugate gaze: present    CN V   Right facial sensation deficit: none  Left facial sensation deficit: none    CN VII   Right facial weakness: none  Left facial weakness: none    CN VIII   Hearing: intact    CN IX, X   CN IX normal    CN X normal      CN XI   Right trapezius strength: normal  Left trapezius strength: normal    CN XII   CN XII normal      Motor Exam   Muscle bulk: normal  Overall muscle tone: normal  Right arm pronator drift: absent  Left arm pronator drift: absent    Strength   Strength 5/5 throughout  Sensory Exam   Light touch normal      Gait, Coordination, and Reflexes     Coordination   Finger to nose coordination: normal    Tremor   Resting tremor: absent  Intention tremor: absent  Action tremor: absent    Reflexes   Right : 2+  Left : 2+  Right Mckeon: absent  Left Mckeon: absent  Right ankle clonus: absent  Left ankle clonus: absent      Vitals:Blood pressure 133/67, pulse 67, temperature 98 2 °F (36 8 °C), resp  rate 18, height 5' 2" (1 575 m), weight 51 kg (112 lb 7 oz), SpO2 97 %, not currently breastfeeding  ,Body mass index is 20 56 kg/m²       Lab Results:   Results from last 7 days   Lab Units 09/14/22  0528 09/13/22  1535   WBC Thousand/uL 16 78* 19 39*   HEMOGLOBIN g/dL 9 9* 11 5   HEMATOCRIT % 31 2* 35 6   PLATELETS Thousands/uL 165 187   MONO PCT %  --  5     Results from last 7 days   Lab Units 09/14/22  0528 09/13/22  1535   POTASSIUM mmol/L 3 5 3 4*   CHLORIDE mmol/L 103 103   CO2 mmol/L 27 25   BUN mg/dL 13 11   CREATININE mg/dL 0 49* 0 51* CALCIUM mg/dL 8 7 8 6   ALK PHOS U/L  --  58   ALT U/L  --  19   AST U/L  --  17             Results from last 7 days   Lab Units 09/13/22  1535   INR  1 06   PTT seconds 26       Imaging Studies: I have personally reviewed pertinent reports  and I have personally reviewed pertinent films in PACS    XR chest 1 view portable    Result Date: 9/13/2022  Impression: Minimal interstitial prominence seen best in the lung bases likely representing chronic interstitial lung disease  Mild pulmonary edema or viral illness cannot be excluded  Findings marked as significant in Epic  Workstation performed: [id number]     CT head wo contrast    Result Date: 9/14/2022  Impression: Stable acute subdural hematoma within the right hemisphere  Stable underlying brain parenchyma  Workstation performed: [id number]     CT head without contrast    Result Date: 9/13/2022  Impression: 1  Acute right frontotemporoparietal subdural hematoma, as described, causing mild right to left midline shift  2   Questionable mild subarachnoid blood products within the inferior right temporal lobe, noting that this location is difficult to evaluate due to artifact from the skull base  Attention on follow-up imaging is recommended  I personally discussed this study with Dr Pauline Solis on 9/13/2022 at 4:25 PM  Workstation performed: [id number]     CT cervical spine without contrast    Result Date: 9/13/2022  Impression: No cervical spine fracture or traumatic malalignment  Workstation performed: [id number]     EKG, Pathology, and Other Studies: I have personally reviewed pertinent reports  VTE Prophylaxis: Sequential compression device Jessica Roberto     Code Status: Level 1 - Full Code  Advance Directive and Living Will:      Power of :    POLST:      Counseling / Coordination of Care  I spent 20 minutes with the patient

## 2022-09-14 NOTE — ASSESSMENT & PLAN NOTE
2/2 fall with head strike  Neuro exam with no focal deficits  9/13 CTH: Acute right frontotemporoparietal subdural hematoma, as described, causing mild right to left midline shift  Questionable mild subarachnoid blood products within the inferior right temporal lobe, noting that this location is difficult to evaluate due to artifact from the skull base  Attention on follow-up imaging is recommended      Plan:  - neurosurgery consult, appreciate recs  - keppra  - HOT protocol  - admit to trauma service  - repeat [address] [place] [address] 9/14 AM  - PT/OT  - CM consult for dispo planning

## 2022-09-14 NOTE — CONSULTS
Consultation - Cardiology Team 1  Luis Donohue 80 y o  female MRN: [id number]  Unit/Bed#: [facility] 200-5 Encounter: [id number]    Assessment/Plan     Principal Problem:    Subdural hematoma, post-traumatic ([address] )  Active Problems:    Benign essential hypertension    Hyponatremia    Hypothyroidism    Fall    Laceration of brow without complication    Syncope    Visual changes      Assessment    1  Syncope  No prior syncope  The patient does not recall preceding symptoms  Suspected brief LOC  Traumatic injury-SDH  HS troponin 6/34/38  WBC- 25493  K- 3 4  Na 133  EKG/telemetry-unremarkable  History normal LV function, no aortic stenosis  Current TTE pending  She is maintained on a fluid restriction for her chronic hyponatremia-drinks no more than 1/2 L of fluid a day  2  SDH  Neurosurgery evaluation- AP on hold 2 weeks until re evaluated by neurosurgery  No intervention at this time    3  Hypertension-she presented hypertensive  Most recent regimen at home has been carvedilol 6 25 mg in the morning with 12 5 mg in the evening  According to the daughter her blood pressure runs about 120-130     4  Chronic hyponatremia-she is maintaining a fluid restriction 1500 cc  [address] [facility]  Na 133    PLAN  1  followup with current TTE  2  Check orthostatics  3  2 week live zio patch- office will arrange  If unremarkable consider loop recorder  4  Maintain adequate hydration within the limits of your fluid restriction  5  Continue current coreg dosing      History of Present Illness   Physician Requesting Consult: Gonzalo Stephen MD  Reason for Consult / Principal Problem: syncope    HPI: Luis Donohue is a 80y o  year old female with HTN, HLD, hyponatremia who presents with syncope  Shortly after 1400 in the afternoon she was in the kitchen preparing lunch and had syncope  No preceding symptoms that she can recall  Patient's Apple I watch notified her daughter who lives close by    When daughter called the patient she did not respond  When she arrived at the door the screen door was locked  The patient walked to the door left the daughter in  She sat her down and check rhythm on apple watch which appeared NSR in 80's  BP was elevated  She was a bit dazed  She was repeatedly asking the same thing  She needed to urinate and daughter assisted her with that  Within about 15 minutes she was back to her usual mental status  At that point EMS arrive  Patient has been evaluated by Trauma for subdural hematoma  Patient has been lightheaded and double vision throughout her hospital stay  No chest pain or pressure  [facility] 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  Given DDAVP  No prior syncope      2 day prior to the event she had tooth pain, felt unwell  Temp to 101  2  Home COVID test negative  The following day felt better but felt like had head cold  The next day ( which was day of event ) she went to PCP office  She was in her usual state of health  She has a history of vertigo, orthostatic hypotension  She has been followed by Dr Thao Carver for hypertension and has been doing pretty well on carvedilol 6 25 mg in the morning and 12 5 mg in the evening  9/13 acute rt frontal temporal parietal SDH  Questionable mild SAH blood inferior rt temporal lobe  9/14CT head- stable acute SDH within rt hemisphere  She presented hypertensive  Inpatient consult to Cardiology  Consult performed by: NILTON García  Consult ordered by: Shahid Blood PA-C          Review of Systems   Constitutional: Negative  HENT: Negative  Eyes: Negative  Respiratory: Negative  Cardiovascular: Negative  Gastrointestinal: Negative  Endocrine: Negative  Genitourinary: Positive for frequency  Musculoskeletal: Negative  Skin: Negative  Allergic/Immunologic: Negative  Neurological: Positive for syncope and light-headedness  Psychiatric/Behavioral: Negative      All other systems reviewed and are negative  Historical Information   Past Medical History:   Diagnosis Date    Anemia     post-op, 07/07/09    Anxiety     last assessed: 01/15/14    C  difficile diarrhea     Chest wall trauma     Acute, last assessed: 10/24/12    Depression     Disease of thyroid gland     hypothyroid    Diverticula of colon     [facility] 03/2011    Endometrial hyperplasia     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[facility] )     stage IA, Grade I endometriod adenocarcinoma with 43% myometrial invasion and no LVSI, last assessed: 56/84/54    Folliculitis     last assessed: 03/02/16    Gastroparesis     Hypercholesterolemia     Hyperlipidemia     Hypertension     Incontinence in female     Migraine     Nontoxic single thyroid nodule     last assessed: 02/16/13    Osteoporosis     last assessed: 02/22/17    Pure hypercholesterolemia     Rotator cuff (capsule) sprain     Acute, right    Skin lesion     last assessed: 04/26/13    Stomatitis     Strain of right buttock     gluteus medius    Tendinitis of right rotator cuff     last assessed: 08/12/12    Thyroid cyst     last assessed: 08/15/13     Past Surgical History:   Procedure Laterality Date    APPENDECTOMY      BREAST BIOPSY Right     BREAST BIOPSY Left     BREAST CYST ASPIRATION      COLONOSCOPY      fiberoptic, 1998, 2001, 2006    CYSTOSCOPY      Diagnostic    EGD AND COLONOSCOPY N/A 3/11/2016    Procedure: EGD ;  Surgeon: Jed Gomez MD;  Location: [facility];   Service:     HYSTERECTOMY  03/22/2011    Robotic-assisted, total laparoscopic approch    JOINT REPLACEMENT      KNEE SURGERY      OOPHORECTOMY Bilateral 03/22/2011    [facility]    REPLACEMENT TOTAL KNEE      TONSILECTOMY AND ADNOIDECTOMY      TOTAL SHOULDER ARTHROPLASTY Right      Social History     Substance and Sexual Activity   Alcohol Use Not Currently     Social History     Substance and Sexual Activity   Drug Use No     Social History     Tobacco Use Smoking Status Never Smoker   Smokeless Tobacco Never Used     Family History:   Family History   Problem Relation Age of Onset    No Known Problems Mother     Dementia Father     Other Brother         Prolapsing mitral valve leaflet syndrome    Testicular cancer Brother 27    Lung cancer Brother 61    Prostate cancer Brother 36        unsure of excat age   Maxine Parker No Known Problems Daughter     No Known Problems Daughter     No Known Problems Daughter     No Known Problems Maternal Grandmother     No Known Problems Maternal Grandfather     Breast cancer Paternal Grandmother 79    No Known Problems Paternal Grandfather     No Known Problems Maternal Aunt     No Known Problems Paternal Aunt     No Known Problems Paternal Aunt        Meds/Allergies   current meds:   Current Facility-Administered Medications   Medication Dose Route Frequency    acetaminophen (TYLENOL) tablet 975 mg  975 mg Oral Q6H [address]    brimonidine tartrate 0 2 % ophthalmic solution 1 drop  1 drop Both Eyes BID    carvedilol (COREG) tablet 12 5 mg  12 5 mg Oral HS    carvedilol (COREG) tablet 6 25 mg  6 25 mg Oral QAM    diphtheria-tetanus-acellular pertussis (INFANRIX) IM injection 0 5 mL  0 5 mL Intramuscular Prior to discharge    famotidine (PEPCID) tablet 20 mg  20 mg Oral BID    levETIRAcetam (KEPPRA) tablet 500 mg  500 mg Oral Q12H [address]    levothyroxine tablet 25 mcg  25 mcg Oral Daily    ondansetron (ZOFRAN) injection 4 mg  4 mg Intravenous Q6H PRN    oxybutynin (DITROPAN-XL) 24 hr tablet 5 mg  5 mg Oral After Dinner    pravastatin (PRAVACHOL) tablet 20 mg  20 mg Oral Daily With Dinner    and PTA meds:    Medications Prior to Admission   Medication    acetaminophen (TYLENOL) 650 mg CR tablet    ALPHAGAN P 0 1 %    aspirin 81 MG tablet    bifidobacterium infantis (ALIGN) capsule    carvedilol (COREG) 12 5 mg tablet    cholecalciferol (VITAMIN D3) 1,000 units tablet    DENTA 5000 PLUS 1 1 % CREA    famotidine (PEPCID) 20 mg tablet    levothyroxine 25 mcg tablet    Multiple Vitamins-Minerals (CENTRUM SILVER PO)    oxybutynin (DITROPAN-XL) 5 mg 24 hr tablet    simvastatin (ZOCOR) 20 mg tablet    ALPRAZolam (XANAX) 0 25 mg tablet    meclizine (ANTIVERT) 25 mg tablet    polyethylene glycol (GLYCOLAX) 17 GM/SCOOP powder     Allergies   Allergen Reactions    Amoxil [Amoxicillin] Hives    Biaxin [Clarithromycin] GI Intolerance     Reaction Date: 19Jul2011;   Pt gets nauseated    Esomeprazole GI Intolerance    Fosamax [Alendronate] GI Intolerance    Hydrochlorothiazide      Hyponatremia      Lansoprazole GI Intolerance    Norvasc [Amlodipine Besylate]     Pantoprazole GI Intolerance     Can tolerate IV- Not oral    Relafen [Nabumetone] GI Intolerance and Other (See Comments)       Objective   Vitals: Blood pressure 148/68, pulse 62, temperature 98 2 °F (36 8 °C), resp  rate 18, height 5' 2" (1 575 m), weight 51 kg (112 lb 7 oz), SpO2 99 %, not currently breastfeeding  Orthostatic Blood Pressures    Flowsheet Row Most Recent Value   Blood Pressure 148/68 filed at 09/14/2022 1312   Patient Position - Orthostatic VS Lying filed at 09/14/2022 0242            Intake/Output Summary (Last 24 hours) at 9/14/2022 1349  Last data filed at 9/14/2022 0253  Gross per 24 hour   Intake 125 ml   Output 200 ml   Net -75 ml       Invasive Devices  Report    Peripheral Intravenous Line  Duration           Peripheral IV 09/13/22 Dorsal (posterior); Left Hand <1 day                Physical Exam: /68   Pulse 62   Temp 98 2 °F (36 8 °C)   Resp 18   Ht 5' 2" (1 575 m)   Wt 51 kg (112 lb 7 oz)   SpO2 99%   BMI 20 56 kg/m²   General Appearance:    Alert, cooperative, no distress, appears stated age   Head:    Normocephalic, no scleral icterus   Facial bruising,swelling   Eyes:    PERRL   Nose:   Nares normal, septum midline, mucosa normal, no drainage    Throat:   Lips, mucosa, and tongue normal   Neck:   Supple, symmetrical, trachea midline     no JVD   Back:     Symmetric   Lungs:     Clear to auscultation bilaterally, respirations unlabored        Heart:    Regular rate and rhythm, S1 and S2 normal, no murmur, rub   or gallop       Extremities:   Extremities normal, atraumatic, no cyanosis or edema       Skin:   Skin color, texture, turgor normal, no rashes or lesions   Neurologic:   Alert and oriented to person place and time   No obvious focal deficits       Lab Results:   Recent Results (from the past 72 hour(s))   ECG 12 lead    Collection Time: 09/13/22  3:33 PM   Result Value Ref Range    Ventricular Rate 81 BPM    Atrial Rate 81 BPM    CA Interval 148 ms    QRSD Interval 94 ms    QT Interval 342 ms    QTC Interval 397 ms    P Adrian 32 degrees    QRS Axis 40 degrees    T Wave Axis 39 degrees   CBC and differential    Collection Time: 09/13/22  3:35 PM   Result Value Ref Range    WBC 19 39 (H) 4 31 - 10 16 Thousand/uL    RBC 4 04 3 81 - 5 12 Million/uL    Hemoglobin 11 5 11 5 - 15 4 g/dL    Hematocrit 35 6 34 8 - 46 1 %    MCV 88 82 - 98 fL    MCH 28 5 26 8 - 34 3 pg    MCHC 32 3 31 4 - 37 4 g/dL    RDW 14 9 11 6 - 15 1 %    MPV 9 8 8 9 - 12 7 fL    Platelets 661 873 - 838 Thousands/uL   Comprehensive metabolic panel    Collection Time: 09/13/22  3:35 PM   Result Value Ref Range    Sodium 133 (L) 135 - 147 mmol/L    Potassium 3 4 (L) 3 5 - 5 3 mmol/L    Chloride 103 96 - 108 mmol/L    CO2 25 21 - 32 mmol/L    ANION GAP 5 4 - 13 mmol/L    BUN 11 5 - 25 mg/dL    Creatinine 0 51 (L) 0 60 - 1 30 mg/dL    Glucose 100 65 - 140 mg/dL    Calcium 8 6 8 3 - 10 1 mg/dL    Corrected Calcium 9 6 8 3 - 10 1 mg/dL    AST 17 5 - 45 U/L    ALT 19 12 - 78 U/L    Alkaline Phosphatase 58 46 - 116 U/L    Total Protein 6 4 6 4 - 8 4 g/dL    Albumin 2 8 (L) 3 5 - 5 0 g/dL    Total Bilirubin 0 48 0 20 - 1 00 mg/dL    eGFR 86 ml/min/1 73sq m   HS Troponin 0hr (reflex protocol)    Collection Time: 09/13/22  3:35 PM   Result Value Ref Range    hs TnI 0hr 6 "Refer to ACS Flowchart"- see link ng/L   Protime-INR    Collection Time: 09/13/22  3:35 PM   Result Value Ref Range    Protime 14 0 11 6 - 14 5 seconds    INR 1 06 0 84 - 1 19   APTT    Collection Time: 09/13/22  3:35 PM   Result Value Ref Range    PTT 26 23 - 37 seconds   Manual Differential(PHLEBS Do Not Order)    Collection Time: 09/13/22  3:35 PM   Result Value Ref Range    Segmented % 45 43 - 75 %    Lymphocytes % 50 (H) 14 - 44 %    Monocytes % 5 4 - 12 %    Eosinophils, % 0 0 - 6 %    Basophils % 0 0 - 1 %    Absolute Neutrophils 8 73 (H) 1 85 - 7 62 Thousand/uL    Lymphocytes Absolute 9 70 (H) 0 60 - 4 47 Thousand/uL    Monocytes Absolute 0 97 0 00 - 1 22 Thousand/uL    Eosinophils Absolute 0 00 0 00 - 0 40 Thousand/uL    Basophils Absolute 0 00 0 00 - 0 10 Thousand/uL    Total Counted      Platelet Estimate Adequate Adequate   UA w Reflex to Microscopic w Reflex to Culture    Collection Time: 09/13/22  3:37 PM    Specimen: Urine, Clean Catch   Result Value Ref Range    Color, UA Colorless     Clarity, UA Clear     Specific Gravity, UA 1 008 1 003 - 1 030    pH, UA 8 0 4 5, 5 0, 5 5, 6 0, 6 5, 7 0, 7 5, 8 0    Leukocytes, UA Small (A) Negative    Nitrite, UA Negative Negative    Protein, UA Negative Negative mg/dl    Glucose, UA Negative Negative mg/dl    Ketones, UA Negative Negative mg/dl    Urobilinogen, UA <2 0 <2 0 mg/dl mg/dl    Bilirubin, UA Negative Negative    Occult Blood, UA Small (A) Negative   Urine Microscopic    Collection Time: 09/13/22  3:37 PM   Result Value Ref Range    RBC, UA 4-10 (A) None Seen, 1-2 /hpf    WBC, UA 4-10 (A) None Seen, 1-2 /hpf    Epithelial Cells Occasional None Seen, Occasional /hpf    Bacteria, UA Occasional None Seen, Occasional /hpf   HS Troponin I 2hr    Collection Time: 09/13/22  5:41 PM   Result Value Ref Range    hs TnI 2hr 34 "Refer to ACS Flowchart"- see link ng/L    Delta 2hr hsTnI 28 (H) <20 ng/L   Type and Screen    Collection Time: 09/13/22  6:03 PM Result Value Ref Range    ABO Grouping A     Rh Factor Positive     Antibody Screen Negative     Specimen Expiration Date [id number]    HS Troponin I 4hr    Collection Time: 09/13/22  8:19 PM   Result Value Ref Range    hs TnI 4hr 38 "Refer to ACS Flowchart"- see link ng/L    Delta 4hr hsTnI 32 (H) <20 ng/L   Basic metabolic panel    Collection Time: 09/14/22  5:28 AM   Result Value Ref Range    Sodium 133 (L) 135 - 147 mmol/L    Potassium 3 5 3 5 - 5 3 mmol/L    Chloride 103 96 - 108 mmol/L    CO2 27 21 - 32 mmol/L    ANION GAP 3 (L) 4 - 13 mmol/L    BUN 13 5 - 25 mg/dL    Creatinine 0 49 (L) 0 60 - 1 30 mg/dL    Glucose 99 65 - 140 mg/dL    Calcium 8 7 8 3 - 10 1 mg/dL    eGFR 87 ml/min/1 73sq m   CBC and differential    Collection Time: 09/14/22  5:28 AM   Result Value Ref Range    WBC 16 78 (H) 4 31 - 10 16 Thousand/uL    RBC 3 50 (L) 3 81 - 5 12 Million/uL    Hemoglobin 9 9 (L) 11 5 - 15 4 g/dL    Hematocrit 31 2 (L) 34 8 - 46 1 %    MCV 89 82 - 98 fL    MCH 28 3 26 8 - 34 3 pg    MCHC 31 7 31 4 - 37 4 g/dL    RDW 15 0 11 6 - 15 1 %    MPV 9 8 8 9 - 12 7 fL    Platelets 745 388 - 200 Thousands/uL     Imaging: I have personally reviewed pertinent reports  EKG: NSR      Code Status: Level 1 - Full Code  Advance Directive and Living Will:      Power of :    POLST:      Counseling / Coordination of Care  Total floor / unit time spent today 45 minutes  Greater than 50% of total time was spent with the patient and / or family counseling and / or coordination of care

## 2022-09-14 NOTE — ASSESSMENT & PLAN NOTE
- Suspected syncope proceeding fall  - echocardiogram with EF 82%, grade 2 diastolic dysfunction  EKG reviewed with normal sinus rhythm and no significant abnormalities; telemetry also reviewed without significant event thus far  Troponins x3 without significant elevation and patient reports no palpitations, chest pain, shortness of breath or difficulty breathing  Cardiac event not suspected at this time  - Obtain orthostatic vital signs   - Cardiology consult per family request; follows with [facility] (Dr Lv Sanchez)  - Encourage adequate oral intake and adequate hydration     - Repleting Na and K, trend lytes  - Fall precautions   - Recommend short-term outpatient follow-up with PCP for further evaluation

## 2022-09-14 NOTE — ASSESSMENT & PLAN NOTE
- Traumatic brain injury with SDH, present on admission   - Neurosurgery evaluation and recommendations appreciated  - Hold anti-platelet and anticoagulant medications for least 2 weeks and/or until cleared by Neurosurgery  Plan to initiate chemical DVT prophylaxis with subcutaneous heparin when cleared by Neurosurgery  - Await repeat CT scan of the head today  - Continue Keppra for 7 days for seizure prophylaxis  - Monitor neurologic exam   - Continue symptomatic management with analgesia as needed  - PT and OT evaluation and treatment as indicated  - Outpatient Neurosurgery follow-up in 2 weeks with repeat CT scan of the head prior to follow-up appointment

## 2022-09-14 NOTE — ASSESSMENT & PLAN NOTE
- Hyponatremia, present on presentation, with sodium level 133  Patient has chronic history of hyponatremia and is typically on a 1500 mL fluid restriction daily at baseline   - Encourage adequate oral intake/hydration   - Repleted with Na K Phos, trend lytes  - Outpatient follow-up with PCP

## 2022-09-14 NOTE — TELEPHONE ENCOUNTER
Daughter called for Margarita Knight, currently been d/c from \[facility]", Jolie Bains saw pt in \[facility]\"" and ordered live Zio to be applied in office as a nurse visit     Spoke to [facility] and Dr Zahida Ordaz is pt general cardiologist  Scheduled appt

## 2022-09-14 NOTE — ASSESSMENT & PLAN NOTE
Possible syncope episode at home    Imaging:  · CT cervical spine without contrast 09/13/2022:  No cervical spine fracture or traumatic malalignment      Plan:  · Syncope workup per Trauma

## 2022-09-14 NOTE — PHYSICAL THERAPY NOTE
Physical Therapy Evaluation     Patient's Name: Yumiko Lezama    Admitting Diagnosis  Subdural hematoma ([facility] ) [B68 [id number]]  Facial laceration, initial encounter Tricia Nelson  Fall, initial encounter [W19  XXXA]    Problem List  Patient Active Problem List   Diagnosis    Benign essential hypertension    Hyponatremia    Hypothyroidism    History of endometrial cancer    Abnormal breast exam    Arthritis    Constipation    Depression with anxiety    Diverticulosis    Duodenal diverticulum    Esophagitis, reflux    Dyslipidemia    Impaired fasting glucose    Insomnia    Age-related osteoporosis without current pathological fracture    Atrophic vaginitis    Urinary incontinence    Vertigo    Encounter for follow-up surveillance of endometrial cancer    Endometrial cancer ([facility] )    Arthritis of left shoulder region    Encounter for screening mammogram for malignant neoplasm of breast    Need for immunization against influenza    Nonexudative age-related macular degeneration, bilateral, early dry stage    Posterior vitreous detachment of both eyes    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    Status post cataract extraction and insertion of intraocular lens    Lymphocytosis    Lackey's esophagus with dysplasia    Pericardial effusion    MGUS (monoclonal gammopathy of unknown significance)    Hypercalcemia    Lyme disease    Visual field defect    Pain, dental    Lymphadenopathy    Fall    Subdural hematoma, post-traumatic ([facility] )    Laceration of brow without complication    Syncope    Visual changes       Past Medical History  Past Medical History:   Diagnosis Date    Anemia     post-op, 07/07/09    Anxiety     last assessed: 01/15/14    C  difficile diarrhea     Chest wall trauma     Acute, last assessed: 10/24/12    Depression     Disease of thyroid gland     hypothyroid    Diverticula of colon     Endometrial cancer ([facility] ) 03/2011    Endometrial hyperplasia     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[facility] )     stage IA, Grade I endometriod adenocarcinoma with 43% myometrial invasion and no LVSI, last assessed: 06/49/75    Folliculitis     last assessed: 03/02/16    Gastroparesis     Hypercholesterolemia     Hyperlipidemia     Hypertension     Incontinence in female     Migraine     Nontoxic single thyroid nodule     last assessed: 02/16/13    Osteoporosis     last assessed: 02/22/17    Pure hypercholesterolemia     Rotator cuff (capsule) sprain     Acute, right    Skin lesion     last assessed: 04/26/13    Stomatitis     Strain of right buttock     gluteus medius    Tendinitis of right rotator cuff     last assessed: 08/12/12    Thyroid cyst     last assessed: 08/15/13       Past Surgical History  Past Surgical History:   Procedure Laterality Date    APPENDECTOMY      BREAST BIOPSY Right     BREAST BIOPSY Left     BREAST CYST ASPIRATION      COLONOSCOPY      fiberoptic, 1998, 2001, 2006    CYSTOSCOPY      Diagnostic    EGD AND COLONOSCOPY N/A 3/11/2016    Procedure: EGD ;  Surgeon: Allan Dey MD;  Location: [facility]; Service:     HYSTERECTOMY  03/22/2011    Robotic-assisted, total laparoscopic approch    JOINT REPLACEMENT      KNEE SURGERY      OOPHORECTOMY Bilateral 03/22/2011    [place]    REPLACEMENT TOTAL KNEE      TONSILECTOMY AND ADNOIDECTOMY      TOTAL SHOULDER ARTHROPLASTY Right           09/14/22 1324   PT Last Visit   PT Visit Date 09/14/22   Note Type   Note type Evaluation   Pain Assessment   Pain Assessment Tool 0-10   Pain Score No Pain   Restrictions/Precautions   Weight Bearing Precautions Per Order No   Other Precautions Telemetry; Fall Risk   Home Living   Type of [address] [place] [address] Multi-level;Performs ADLs on one level; Able to live on main level with bedroom/bathroom  (Split level home, 1 SARMAD v  ramp, FFSU)   Bathroom Shower/Tub Walk-in shower   Bathroom Toilet Raised   Bathroom Equipment Grab bars in shower; Shower chair;Commode   Home Equipment Walker;Cane  (Pt reports SPC use PTA in the community, no AD use in the home)   Prior Function   Level of [place] Independent with ADLs and functional mobility   Lives With Alone   Receives Help From Family   ADL Assistance Independent   IADLs Independent   Falls in the last 6 months 1 to 4  (1; reason for admission)   Vocational Retired   Comments Pt daughter lives "100 yards" from pt and is also retired, can help as needed  General   Family/Caregiver Present Yes  (Daughter)   Cognition   Overall Cognitive Status WFL   Arousal/Participation Alert   Orientation Level Oriented X4   Memory Within functional limits   Following Commands Follows one step commands without difficulty   Comments Pt w/ fair safety awareness and insight to condition; reports that she does feel a little more off balance than usual  Pt able to hold conversation and follow commands with ease throughout  Subjective   Subjective Pt pleasant and agreeable to participate in therapy session  RLE Assessment   RLE Assessment   (functionally 4-/5)   LLE Assessment   LLE Assessment   (functionally 4-/5)   Bed Mobility   Additional Comments Pt seated in bedside chair upon PT consult  Pt left seated in bedside chair with all needs within reach; OT still present for cognitive testing  Transfers   Sit to Stand 5  Supervision   Additional items Increased time required;Verbal cues   Stand to Sit 5  Supervision   Additional items Armrests; Increased time required;Verbal cues   Additional Comments Pt transfers w/ SPC initially  See gait comments for recommended use of RW from now on  Ambulation/Elevation   Gait pattern Narrow HUNG; Short stride; Excessively slow; Step through pattern   Gait Assistance 5  Supervision   Additional items Verbal cues; Tactile cues   Assistive Device Rolling walker   Distance 250 ft around floor   Ambulation/Elevation Additional Comments Pt ambulates w/ SPC initially followed by RW  Due to unsteadiness with beginning of ambulation, had pt switch to RW   Improved balance, gait speed, and safety; recommend use of RW for all ambulation at this time  Balance   Static Sitting Good   Dynamic Sitting Fair +   Static Standing Fair   Dynamic Standing Fair -   Ambulatory Fair -   Endurance Deficit   Endurance Deficit No   Activity Tolerance   Activity Tolerance Patient tolerated treatment well   Medical Staff Made Aware MAUDE Mendoza, [facility]   Nurse Made Aware RN cleared pt to be seen by PT  Assessment   Prognosis Good   Assessment Pt is a 80 y o  female who presents for high complexity PT evaluation at [place] [id number] with active PT eval and treat orders  Pt was admitted for a fall w/ head strike on 09/13/2022  CTH revealed acute R frontotemporoparietal subdural hematoma; neurosurgery consulted and have plans for no surgical intervention at this time  Pt  has a past medical history of Anemia, Anxiety, C  difficile diarrhea, Chest wall trauma, Depression, Disease of thyroid gland, Diverticula of colon, Endometrial cancer ([facility] ) (03/2011), Endometrial hyperplasia, 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[facility] ), Folliculitis, Gastroparesis, Hypercholesterolemia, Hyperlipidemia, Hypertension, Incontinence in female, Migraine, Nontoxic single thyroid nodule, Osteoporosis, Pure hypercholesterolemia, Rotator cuff (capsule) sprain, Skin lesion, Stomatitis, Strain of right buttock, Tendinitis of right rotator cuff, and Thyroid cyst  Pt resides alone in a split level home with 1 SARMAD v  ramped entrance and ambulates w/ SPC at baseline  Pt daughter does live close by and helps as needed  Pt requires supervision for functional transfers and ambulation at this time  Bed mobility was not assessed due to pt position in bedside chair upon consult  Based on pt current functional status, PT to discharge pt from caseload at this time with recommended discharge to home with HHPT and use of the RW full time  The patient's AM-PAC Basic Mobility Inpatient Short Form Raw Score is 18   A Raw score of greater than 16 suggests the patient may benefit from discharge to home  Please also refer to the recommendation of the Physical Therapist for safe discharge planning  Barriers to Discharge None   Goals   Patient Goals to go back home   Plan   PT Frequency Other (Comment)  (D/C PT)   Recommendation   PT Discharge Recommendation Home with home health rehabilitation   Equipment Recommended Walker  (Pt owns)   DecoSnap Recommended Wheeled walker   Change/add to DecoSnap? No   AM-PAC Basic Mobility Inpatient   Turning in Bed Without Bedrails 4   Lying on Back to Sitting on Edge of Flat Bed 3   Moving Bed to Chair 3   Standing Up From Chair 3   Walk in Room 3   Climb 3-5 Stairs 2   Basic Mobility Inpatient Raw Score 18   Basic Mobility Standardized Score 41 05   Highest Level Of Mobility   JH-HLM Goal 6: Walk 10 steps or more   JH-HLM Achieved 8: Walk 250 feet ot more   Modified Ewing Scale   Modified Ewing Scale 3   End of Consult   Patient Position at End of Consult Bedside chair; All needs within reach; Other (comment)  (OT present)         Robert Aodrno , SPT

## 2022-09-14 NOTE — PLAN OF CARE
Problem: MOBILITY - ADULT  Goal: Maintain or return to baseline ADL function  Description: INTERVENTIONS:  -  Assess patient's ability to carry out ADLs; assess patient's baseline for ADL function and identify physical deficits which impact ability to perform ADLs (bathing, care of mouth/teeth, toileting, grooming, dressing, etc )  - Assess/evaluate cause of self-care deficits   - Assess range of motion  - Assess patient's mobility; develop plan if impaired  - Assess patient's need for assistive devices and provide as appropriate  - Encourage maximum independence but intervene and supervise when necessary  - Involve family in performance of ADLs  - Assess for home care needs following discharge   - Consider OT consult to assist with ADL evaluation and planning for discharge  - Provide patient education as appropriate  Outcome: Progressing  Goal: Maintains/Returns to pre admission functional level  Description: INTERVENTIONS:  - Perform BMAT or MOVE assessment daily    - Set and communicate daily mobility goal to care team and patient/family/caregiver     - Collaborate with rehabilitation services on mobility goals if consulted  - Out of bed for toileting  - Record patient progress and toleration of activity level   Outcome: Progressing 0 = swallows foods/liquids without difficulty

## 2022-09-14 NOTE — ASSESSMENT & PLAN NOTE
- Suspected syncope proceeding fall  - Syncope workup ongoing with echocardiogram pending  EKG reviewed with normal sinus rhythm and no significant abnormalities; telemetry also reviewed without significant event thus far  Troponins x3 without significant elevation and patient reports no palpitations, chest pain, shortness of breath or difficulty breathing  Cardiac event not suspected at this time  - Obtain orthostatic vital signs   - Encourage adequate oral intake and adequate hydration  Replete potassium, but overall volume status appears adequate  - Fall precautions   - Recommend short-term outpatient follow-up with PCP for further evaluation

## 2022-09-14 NOTE — QUICK NOTE
Repeat CT scan of the head reviewed with stable appearing subdural hematoma no new or worsening hemorrhage  Discussed repeat imaging with Neurosurgery in patient cleared for initiation of chemical DVT prophylaxis subcutaneous heparin  No further workup or intervention necessary from a neurosurgical standpoint this hospital encounter  I spoke with the patient's daughter, Katie Vasques, via the phone to provide her an update and received some additional background information  The patient is chronically hyponatremic and typically on a 1 5 L fluid restriction daily and this will be resumed here  She has had episodes of orthostatic hypotension and dizziness in the past, but no prior episodes of syncope without preceding symptoms like she had yesterday prior to presentation  She does follow closely with cardiology, Dr Yasmine Wu, as an outpatient  The patient's daughter notes that she did speak with Dr Yasmine Wu after the episode yesterday and and inpatient cardiology was recommended to her for possible loop recorder  A Cardiology consult will be placed for today with echocardiogram and orthostatic vital signs still to be completed as part of her syncope workup  We can pursue further workup per Cardiology recommendations or as otherwise indicated  Patient no longer requires HOT protocol and can be transition to neuro checks every shift  Medication review per Geriatric Medicine noted patient does not take Xanax baseline and this medication will be discontinued  I spent greater than 25 minutes reviewing her chart, updating the patient's daughter and coordinating care with Neurosurgery, Geriatric Medicine, and communicating with Cardiology      Mady Roberson PA-C  9/14/2022 11:43 AM

## 2022-09-14 NOTE — ASSESSMENT & PLAN NOTE
Terrell Punches in the kitchen, no LOC  Takes daily ASA  9/13 CTH: Acute right frontotemporoparietal subdural hematoma, as described, causing mild right to left midline shift  Questionable mild subarachnoid blood products within the inferior right temporal lobe, noting that this location is difficult to evaluate due to artifact from the skull base  Attention on follow-up imaging is recommended      Plan:  - repeat CTH 9/14 AM  - f/u syncope workup: u/a, orthostatics vitals, ekg, trops

## 2022-09-14 NOTE — CASE MANAGEMENT
Case Management Assessment & Discharge Planning Note    Patient name Luis Donohue  Location [address]/PPHP 622-39 MRN [id number]  : 1934 Date 2022       Current Admission Date: 2022  Current Admission Diagnosis:Subdural hematoma, post-traumatic [facility])   Patient Active Problem List    Diagnosis Date Noted    Fall 2022    Subdural hematoma, post-traumatic ([facility] ) 2022    Laceration of brow without complication 5096    Syncope 2022    Visual changes 2022    Pain, dental 2022    Lymphadenopathy 2022    Visual field defect 06/10/2022    Lyme disease 2021    Hypercalcemia 2020    MGUS (monoclonal gammopathy of unknown significance) 2020    Pericardial effusion 10/21/2020    Lackey's esophagus with dysplasia 2020    Lymphocytosis 2020    Nonexudative age-related macular degeneration, bilateral, early dry stage 2020    Posterior vitreous detachment of both eyes 2020    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 2020    Status post cataract extraction and insertion of intraocular lens 2020    Arthritis of left shoulder region 2019    Encounter for screening mammogram for malignant neoplasm of breast 2019    Need for immunization against influenza 2019    Endometrial cancer ([facility] ) 2019    Encounter for follow-up surveillance of endometrial cancer 2019    Arthritis 2018    History of endometrial cancer 2018    Age-related osteoporosis without current pathological fracture 2017    Constipation 2017    Duodenal diverticulum 2016    Esophagitis, reflux 2016    Benign essential hypertension 03/10/2016    Hyponatremia 03/10/2016    Hypothyroidism 03/10/2016    Abnormal breast exam 2015    Vertigo 2015    Atrophic vaginitis 2014    Dyslipidemia 10/19/2012    Depression with anxiety 08/10/2012    Diverticulosis 08/10/2012    Impaired fasting glucose 08/10/2012    Insomnia 08/10/2012    Urinary incontinence 08/10/2012      LOS (days): 1  Geometric Mean LOS (GMLOS) (days): 3 00  Days to GMLOS:2 2     OBJECTIVE:    Risk of Unplanned Readmission Score: 13 57         Current admission status: Inpatient       Preferred Pharmacy:   [address] [place] [address] [id number] Jeremy Valenzuela, [address] [facility] - [address] [place] [address]  Phone: [phone] Fax: [phone]    Primary Care Provider: Steff Hannah MD    Primary Insurance: MEDICARE  Secondary Insurance: AARP    ASSESSMENT:  [place] Proxies    There are no active Health Care Proxies on file  Patient Information  Admitted from[de-identified] Home  Mental Status: Alert  During Assessment patient was accompanied by: Not accompanied during assessment  Assessment information provided by[de-identified] Patient  Support Systems: Daughter, Family members  Home entry access options  Select all that apply : Stairs  Number of steps to enter home  : 1  Do the steps have railings?: No  Type of Current Residence: [address] [place] [address] home  Upon entering residence, is there a bedroom on the main floor (no further steps)?: Yes  Upon entering residence, is there a bathroom on the main floor (no further steps)?: Yes  Living Arrangements: Lives Alone    Activities of Daily Living Prior to Admission  Functional Status: Independent  Completes ADLs independently?: Yes  Ambulates independently?: Yes  Does patient use assisted devices?: Yes  Assisted Devices (DME) used: Parish Kime, Shower Chair  Does patient currently own DME?: Yes  What DME does the patient currently own?: Parish Brown Shower Chair  Does patient have a history of Outpatient Therapy (PT/OT)?: No  Does the patient have a history of Short-Term Rehab?: No  Does patient have a history of HHC?: Yes (unsure name)  Does patient currently have [facility]?: No         Patient Information Continued  Income Source: Pension/USP  Does patient have prescription coverage?: Yes  Food insecurity resource given?: N/A  Does patient receive dialysis treatments?: No  Does patient have a history of substance abuse?: No  Does patient have a history of Mental Health Diagnosis?: No         Means of Transportation  [facility] D[facility] Self        DISCHARGE DETAILS:    Discharge planning discussed with[de-identified] patient  Freedom of Choice: Yes     CM contacted family/caregiver?: No- see comments  Were Treatment Team discharge recommendations reviewed with patient/caregiver?: Yes  Did patient/caregiver verbalize understanding of patient care needs?: Yes  Were patient/caregiver advised of the risks associated with not following Treatment Team discharge recommendations?: Yes    Contacts  Patient Contacts: daughter Lana Back  Relationship to Patient[de-identified] Family  Contact Method: Phone  Phone Number: [phone]  Reason/Outcome: Emergency Contact, Discharge [address]         Is the patient interested in [address] at discharge?: No    DME Referral Provided  Referral made for DME?: No                    Transport at Discharge : Family

## 2022-09-14 NOTE — ASSESSMENT & PLAN NOTE
- Status post fall in kitchen with suspected syncope and with the below noted injuries  - Fall precautions  - Geriatric Medicine consultation for evaluation, medication review and recommendations  - Syncope workup ongoing   - PT and OT evaluation and treatment as indicated  - Case Management consultation for disposition planning

## 2022-09-14 NOTE — PLAN OF CARE
Problem: OCCUPATIONAL THERAPY ADULT  Goal: Performs self-care activities at highest level of function for planned discharge setting  See evaluation for individualized goals  Description: Treatment Interventions: Cognitive reorientation  Equipment Recommended:  (Use of shower chair at home)       See flowsheet documentation for full assessment, interventions and recommendations  9/14/2022 1557 by Fady Garduno  Outcome: Adequate for Discharge  Note: Limitation: Decreased cognition  Prognosis: Good  Assessment: Pt is a 79 yo Female who [facility] s/p fall with +HS  CT head (+) for acute right frontotemporoparietal subdural hematoma with mild right to left midline shift  Pt with diagnosis of subdural hematoma, post-traumatic  No Neurosurgery intervention at this time  Pt  has a past medical history of Anemia, Anxiety, C  difficile diarrhea, Chest wall trauma, Depression, Disease of thyroid gland, Diverticula of colon, Endometrial cancer ([facility] ), Endometrial hyperplasia, 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[facility] ), Folliculitis, Gastroparesis, Hypercholesterolemia, Hyperlipidemia, Hypertension, Incontinence in female, Migraine, Nontoxic single thyroid nodule, Osteoporosis, Pure hypercholesterolemia, Rotator cuff (capsule) sprain, Skin lesion, Stomatitis, Strain of right buttock, Tendinitis of right rotator cuff, and Thyroid cyst  Pt greeted up in recliner chair for OT evaluation on 9/14/2022  Pt lives alone in a multi-level house and ramped entrance with bed/bath on first floor  Pt's daughter lives close by and can provide increased assistance upon DC  PTA, Pt I with ADLs/IADLs/+/No AD  Pt reports only utilizing SPC in the community  Pt demonstrating the following occupational performance levels: I for UB ADLs, S for LB ADLs, S for toileting, S for functional transfers, and S for functional mobility with RW  Limitations that impact functional performance include decreased cognition  Occupational performance areas to address cognitive reorientation  Pt would benefit from continued skilled OT services while in hospital to maximize independence with ADLs  Will continue to follow Pt's progress  Pt would benefit from returning home with increased social support upon DC to maximize safety and independence with ADLs and functional tasks of choice       OT Discharge Recommendation: No rehabilitation needs

## 2022-09-14 NOTE — PROGRESS NOTES
[address] [facility]  Progress Note - Yumiko Lezama 1934, 80 y o  female MRN: [id number]  Unit/Bed#: [facility] 832-01 Encounter: [id number]  Primary Care Provider: Ly Reynolds MD   Date and time admitted to hospital: 9/13/2022  3:15 PM    Fall  Assessment & Plan  - Status post fall in kitchen with suspected syncope and with the below noted injuries  - Fall precautions  - Geriatric Medicine consultation for evaluation, medication review and recommendations  - Syncope workup ongoing   - PT and OT evaluation and treatment as indicated  - Case Management consultation for disposition planning  Syncope  Assessment & Plan  - Suspected syncope proceeding fall  - Syncope workup ongoing with echocardiogram pending  EKG reviewed with normal sinus rhythm and no significant abnormalities; telemetry also reviewed without significant event thus far  Troponins x3 without significant elevation and patient reports no palpitations, chest pain, shortness of breath or difficulty breathing  Cardiac event not suspected at this time  - Obtain orthostatic vital signs   - Cardiology consult per family request; follows with [facility] (Dr Tammy Jiang)  - Encourage adequate oral intake and adequate hydration  Replete potassium, but overall volume status appears adequate  - Fall precautions   - Recommend short-term outpatient follow-up with PCP for further evaluation  * Subdural hematoma, post-traumatic (HCC)  Assessment & Plan  - Traumatic brain injury with SDH, present on admission   - Neurosurgery evaluation and recommendations appreciated  - Hold anti-platelet and anticoagulant medications for least 2 weeks and/or until cleared by Neurosurgery  Plan to initiate chemical DVT prophylaxis with subcutaneous heparin when cleared by Neurosurgery  - Await repeat CT scan of the head today  - Continue Keppra for 7 days for seizure prophylaxis    - Monitor neurologic exam   - Continue symptomatic management with analgesia as needed  - PT and OT evaluation and treatment as indicated  - Outpatient Neurosurgery follow-up in 2 weeks with repeat CT scan of the head prior to follow-up appointment  Laceration of brow without complication  Assessment & Plan  - Right forehead/periorbital laceration lateral to the right eyebrow status post suture repair bedside   - Local wound care as indicated  - Analgesia as needed  - Outpatient follow-up for suture removal in 5-7 days  Hyponatremia  Assessment & Plan  - Hyponatremia, present on presentation, with sodium level 133  Patient has chronic history of hyponatremia and is typically on a 1500 mL fluid restriction daily at baseline   - Stable mild hyponatremia on repeat labs from 09/14/2022   - Encourage adequate oral intake/hydration   - Outpatient follow-up with PCP  Benign essential hypertension  Assessment & Plan  - Patient with chronic history of hypertension   - Continue home medication regimen as appropriate  Orthostatic vital signs pending; may need to consider adjustment of blood pressure medication if patient has evidence of orthostasis  - Outpatient follow-up per routine  Hypothyroidism  Assessment & Plan  - Chronic history of hypothyroidism   - Continue home medication regimen   - Outpatient follow-up per routine  Visual changes  Assessment & Plan  - Patient reports intact of visual acuity, but notes some double vision, particularly in her right eye  She notes that her right eye is her good eye and she has had multiple issues with her left eye in the past with chronic blurry vision that appears unchanged from baseline  She does report her right eye double vision has been improving   - Continue to monitor conservatively for now  - If visual changes worsen or persist, may consider inpatient ophthalmology consult  Otherwise, plan for outpatient follow-up with primary ophthalmologist for further evaluation in the next week        TERTIARY TRAUMA SURVEY NOTE    Prophylaxis: Sequential compression device (Venodyne)     Disposition:  Continue current level of care with plan to downgrade and discontinue HOT protocol if repeat CT scan of the head stable  Awaiting therapy evaluation and recommendations  Awaiting formal Neurosurgery recommendations  Case Management following for disposition planning  Code status:  Level 1 - Full Code    Consultants:     1  Neurosurgery  2  Geriatric Medicine  SUBJECTIVE:     Transfer from: N/A  Mechanism of Injury:Fall, Other:  Syncope    Chief Complaint:  My head hurts a little bit      HPI/Last 24 hour events:  Patient is overall doing well this morning  She complains of a mild headache, some double vision when she woke up that appears to be improving, some dizziness and some facial pain  She has no chest pain, abdominal pain, neck or back pain, extremity pain, nausea or vomiting  She does report chronic visual issues, particularly with her left eye  The right eye double vision is new, but improving this morning since waking up      Active medications:           Current Facility-Administered Medications:     acetaminophen (TYLENOL) tablet 975 mg, 975 mg, Oral, Q6H FERNANDEZ, 975 mg at 09/14/22 0525    ALPRAZolam (XANAX) tablet 0 25 mg, 0 25 mg, Oral, HS PRN    brimonidine tartrate 0 2 % ophthalmic solution 1 drop, 1 drop, Both Eyes, BID, 1 drop at 09/14/22 0859    carvedilol (COREG) tablet 12 5 mg, 12 5 mg, Oral, HS    carvedilol (COREG) tablet 6 25 mg, 6 25 mg, Oral, QAM, 6 25 mg at 09/14/22 0857    diphtheria-tetanus-acellular pertussis (INFANRIX) IM injection 0 5 mL, 0 5 mL, Intramuscular, Prior to discharge    famotidine (PEPCID) tablet 20 mg, 20 mg, Oral, BID, 20 mg at 09/14/22 0857    levETIRAcetam (KEPPRA) tablet 500 mg, 500 mg, Oral, Q12H FERNANDEZ, 500 mg at 09/14/22 0857    levothyroxine tablet 25 mcg, 25 mcg, Oral, Daily, 25 mcg at 09/14/22 0857    ondansetron (ZOFRAN) injection 4 mg, 4 mg, Intravenous, Q6H PRN    oxybutynin (DITROPAN-XL) 24 hr tablet 5 mg, 5 mg, Oral, After Dinner, 5 mg at 09/13/22 2101    pravastatin (PRAVACHOL) tablet 20 mg, 20 mg, Oral, Daily With Dinner, 20 mg at 09/13/22 1923      OBJECTIVE:     Vitals:   Vitals:    09/14/22 0720   BP: 133/67   Pulse: 67   Resp: 18   Temp: 98 2 °F (36 8 °C)   SpO2: 97%       Physical Exam:   GENERAL APPEARANCE: Patient in no acute distress  HEENT: NC,, healing right lateral eyebrow/forehead/periorbital laceration with mild tenderness and swelling, facial contusions with ecchymosis on the right side of her face with minimal tenderness and swelling; vision grossly intact, EOMs intact; Mucous membranes moist  NECK / BACK: No midline cervical, thoracic or lumbar spine tenderness, step-offs or deformities  No paraspinal muscular tenderness in the neck or back  CV: Regular rate and rhythm; no murmur/gallops/rubs appreciated  CHEST / LUNGS: Clear to auscultation; no wheezes/rales/rhonci  No chest wall tenderness, crepitus or deformities  ABD: NABS; soft; non-distended; non-tender  :  Voiding spontaneously  EXT: +2 pulses bilaterally upper & lower extremities; no edema  Normal range of motion in all 4 extremities without pain, tenderness or deformity  NEURO: GCS 15; no focal neurologic deficits; neurovascularly intact  SKIN: Warm, dry and well perfused; no rash; no jaundice  Facial injuries as noted above  1  Before the illness or injury that brought you to the Emergency, did you need someone to help you on a regular basis? 0=No   2  Since the illness or injury that brought you to the Emergency, have you needed more help than usual to take care of yourself? 1=Yes   3  Have you been hospitalized for one or more nights during the past 6 months (excluding a stay in the Emergency Department)? 0=No   4  In general, do you see well? 1=No   5  In general, do you have serious problems with your memory? 0=No   6   Do you take more than three different medications everyday? 1=Yes   TOTAL   3     Did you order a geriatric consult if the score was 2 or greater?: yes                I/O:   I/O       09/12 0701 09/13 0700 09/13 0701  09/14 0700 09/14 0701  09/15 0700    IV Piggyback  125     Total Intake(mL/kg)  125 (2 5)     Urine (mL/kg/hr)  200     Total Output  200     Net  -75            Unmeasured Urine Occurrence  2 x           Invasive Devices: Invasive Devices  Report    Peripheral Intravenous Line  Duration           Peripheral IV 09/13/22 Dorsal (posterior); Left Hand <1 day                  Imaging:   XR chest 1 view portable    Result Date: 9/13/2022  Impression: Minimal interstitial prominence seen best in the lung bases likely representing chronic interstitial lung disease  Mild pulmonary edema or viral illness cannot be excluded  Findings marked as significant in Epic  Workstation performed: [id number]     CT head wo contrast    Result Date: 9/14/2022  Impression: Stable acute subdural hematoma within the right hemisphere  Stable underlying brain parenchyma  Workstation performed: [id number]     CT head without contrast    Result Date: 9/13/2022  Impression: 1  Acute right frontotemporoparietal subdural hematoma, as described, causing mild right to left midline shift  2   Questionable mild subarachnoid blood products within the inferior right temporal lobe, noting that this location is difficult to evaluate due to artifact from the skull base  Attention on follow-up imaging is recommended  I personally discussed this study with Dr Deb Cervantes on 9/13/2022 at 4:25 PM  Workstation performed: [id number]     CT cervical spine without contrast    Result Date: 9/13/2022  Impression: No cervical spine fracture or traumatic malalignment    Workstation performed: [id number]       Labs:   CBC:   Lab Results   Component Value Date    WBC 16 78 (H) 09/14/2022    HGB 9 9 (L) 09/14/2022    HCT 31 2 (L) 09/14/2022    MCV 89 09/14/2022     09/14/2022    MCH 28 3 09/14/2022    MCHC 31 7 09/14/2022    RDW 15 0 09/14/2022    MPV 9 8 09/14/2022     CMP:   Lab Results   Component Value Date     09/14/2022    CO2 27 09/14/2022    BUN 13 09/14/2022    CREATININE 0 49 (L) 09/14/2022    CALCIUM 8 7 09/14/2022    AST 17 09/13/2022    ALT 19 09/13/2022    ALKPHOS 58 09/13/2022    EGFR 87 09/14/2022     Coagulation:   Lab Results   Component Value Date    INR 1 06 09/13/2022       Mamadou Bergeron PA-C  9/14/2022 08:06 AM

## 2022-09-14 NOTE — ASSESSMENT & PLAN NOTE
- Patient reports intact of visual acuity, but notes some double vision, particularly in her right eye  She notes that her right eye is her good eye and she has had multiple issues with her left eye in the past with chronic blurry vision that appears unchanged from baseline  She does report her right eye double vision has been improving   - Continue to monitor conservatively for now  - If visual changes worsen or persist, may consider inpatient ophthalmology consult  Otherwise, plan for outpatient follow-up with primary ophthalmologist for further evaluation in the next week

## 2022-09-14 NOTE — H&P
[address] [facility]  H&P- Santiago Hanson 1934, 80 y o  female MRN: [id number]  Unit/Bed#: [facility] 832-01 Encounter: [id number]  Primary Care Provider: Omer Le MD   Date and time admitted to hospital: 9/13/2022  3:15 PM    * Subdural hematoma, post-traumatic ([address] )  Assessment & Plan  2/2 fall with head strike  Neuro exam with no focal deficits  9/13 CTH: Acute right frontotemporoparietal subdural hematoma, as described, causing mild right to left midline shift  Questionable mild subarachnoid blood products within the inferior right temporal lobe, noting that this location is difficult to evaluate due to artifact from the skull base  Attention on follow-up imaging is recommended  Plan:  - neurosurgery consult, appreciate recs  - keppra  - HOT protocol  - admit to trauma service  - repeat [address] [place] [address] 9/14 AM  - PT/OT  - CM consult for dispo planning    Laceration of brow without complication  Assessment & Plan  Right brow laceration  Repaired in ED    [facility] in the kitchen, no LOC  Takes daily ASA  9/13 CTH: Acute right frontotemporoparietal subdural hematoma, as described, causing mild right to left midline shift  Questionable mild subarachnoid blood products within the inferior right temporal lobe, noting that this location is difficult to evaluate due to artifact from the skull base  Attention on follow-up imaging is recommended  Plan:  - repeat CTH 9/14 AM  - f/u syncope workup: u/a, orthostatics vitals, ekg, trops      Hypothyroidism  Assessment & Plan  Plan:  - cont home levothyroxine    Hyponatremia  Assessment & Plan  Plan:  - continue to monitor  - NS @100  - add salt tabs PRN    Benign essential hypertension  Assessment & Plan  Plan:  - continue to monitor  - cont home coreg      Trauma Alert:  Other Level C   Model of Arrival: Ambulance    Trauma Team: Attending Dr Tanisha Landaverde and Residents Marques Proctor  Consultants:     Neurosurgery: routine consult; Psychiatric consult order placed; History of Present Illness     Chief Complaint: headache  Mechanism:Fall     HPI:    Markel Delvalle is a 80 y o  female who presents with fall in kitchen  Takes asa daily  SDH on CT  GCS 15  Neuro intact  R eyebrow lac repaired in Ed  Review of Systems   Constitutional: Negative  HENT: Negative  Respiratory: Negative  Cardiovascular: Negative  Gastrointestinal: Negative  Genitourinary: Negative  Musculoskeletal: Negative  Skin: Negative  Neurological: Negative  Psychiatric/Behavioral: Negative  12-point, complete review of systems was reviewed and negative except as stated above       Historical Information     Past Medical History:   Diagnosis Date    Anemia     post-op, 07/07/09    Anxiety     last assessed: 01/15/14    C  difficile diarrhea     Chest wall trauma     Acute, last assessed: 10/24/12    Depression     Disease of thyroid gland     hypothyroid    Diverticula of colon     Endometrial cancer ([facility] [address] ) 03/2011    Endometrial hyperplasia     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HCC)     stage IA, Grade I endometriod adenocarcinoma with 43% myometrial invasion and no LVSI, last assessed: 25/67/37    Folliculitis     last assessed: 03/02/16    Gastroparesis     Hypercholesterolemia     Hyperlipidemia     Hypertension     Incontinence in female     Migraine     Nontoxic single thyroid nodule     last assessed: 02/16/13    Osteoporosis     last assessed: 02/22/17    Pure hypercholesterolemia     Rotator cuff (capsule) sprain     Acute, right    Skin lesion     last assessed: 04/26/13    Stomatitis     Strain of right buttock     gluteus medius    Tendinitis of right rotator cuff     last assessed: 08/12/12    Thyroid cyst     last assessed: 08/15/13     Past Surgical History:   Procedure Laterality Date    APPENDECTOMY      BREAST BIOPSY Right     BREAST BIOPSY Left     BREAST CYST ASPIRATION      COLONOSCOPY fiberoptic, 1998, 2001, 2006    CYSTOSCOPY      Diagnostic    EGD AND COLONOSCOPY N/A 3/11/2016    Procedure: EGD ;  Surgeon: Junior Felix MD;  Location: [facility]; Service:     HYSTERECTOMY  03/22/2011    Robotic-assisted, total laparoscopic approch    JOINT REPLACEMENT      KNEE SURGERY      OOPHORECTOMY Bilateral 03/22/2011    Salpingo    REPLACEMENT TOTAL KNEE      TONSILECTOMY AND ADNOIDECTOMY      TOTAL SHOULDER ARTHROPLASTY Right         Social History     Tobacco Use    Smoking status: Never Smoker    Smokeless tobacco: Never Used   Vaping Use    Vaping Use: Never used   Substance Use Topics    Alcohol use: Not Currently    Drug use: No     Immunization History   Administered Date(s) Administered    COVID-19 MODERNA VACC 0 5 ML IM 01/11/2021, 02/08/2021    H1N1, All Formulations 02/08/2010    INFLUENZA 10/03/2018, 10/31/2020    Influenza Split High Dose Preservative Free IM 09/18/2014, 09/18/2015, 10/06/2016, 11/02/2017    Influenza, seasonal, injectable 11/03/2011, 10/03/2012    Pneumococcal Conjugate 13-Valent 04/27/2017    Zoster 06/17/2015    influenza, trivalent, adjuvanted 10/11/2019     Last Tetanus: ordered 9/14  Family History: Non-contributory    1  Before the illness or injury that brought you to the Emergency, did you need someone to help you on a regular basis? 0=No   2  Since the illness or injury that brought you to the Emergency, have you needed more help than usual to take care of yourself? 1=Yes   3  Have you been hospitalized for one or more nights during the past 6 months (excluding a stay in the Emergency Department)? 0=No   4  In general, do you see well? 0=Yes   5  In general, do you have serious problems with your memory? 0=No   6  Do you take more than three different medications everyday?  1=Yes   TOTAL   2     Did you order a geriatric consult if the score was 2 or greater?: yes     Meds/Allergies   current meds:   Current Facility-Administered Medications   Medication Dose Route Frequency    acetaminophen (TYLENOL) tablet 975 mg  975 mg Oral Q6H [facility]    ALPRAZolam (XANAX) tablet 0 25 mg  0 25 mg Oral HS PRN    brimonidine tartrate 0 2 % ophthalmic solution 1 drop  1 drop Both Eyes BID    carvedilol (COREG) tablet 12 5 mg  12 5 mg Oral HS    carvedilol (COREG) tablet 6 25 mg  6 25 mg Oral QAM    diphtheria-tetanus-acellular pertussis (INFANRIX) IM injection 0 5 mL  0 5 mL Intramuscular Once    famotidine (PEPCID) tablet 20 mg  20 mg Oral BID    levETIRAcetam (KEPPRA) tablet 500 mg  500 mg Oral Q12H [facility]    levothyroxine tablet 25 mcg  25 mcg Oral Daily    ondansetron (ZOFRAN) injection 4 mg  4 mg Intravenous Q6H PRN    oxybutynin (DITROPAN-XL) 24 hr tablet 5 mg  5 mg Oral After Dinner    pravastatin (PRAVACHOL) tablet 20 mg  20 mg Oral Daily With Dinner    sodium chloride 0 9 % infusion  100 mL/hr Intravenous Continuous        Allergies   Allergen Reactions    Amoxil [Amoxicillin] Hives    Biaxin [Clarithromycin] GI Intolerance     Reaction Date: 19Jul2011;   Pt gets nauseated    Esomeprazole GI Intolerance    Fosamax [Alendronate] GI Intolerance    Hydrochlorothiazide      Hyponatremia      Lansoprazole GI Intolerance    Norvasc [Amlodipine Besylate]     Pantoprazole GI Intolerance     Can tolerate IV- Not oral    Relafen [Nabumetone] GI Intolerance and Other (See Comments)       Objective   Initial Vitals:   Temperature: 98 1 °F (36 7 °C) (09/13/22 1523)  Pulse: 84 (09/13/22 1523)  Respirations: 18 (09/13/22 1523)  Blood Pressure: (!) 181/130 (09/13/22 1523)    Primary Survey:   Airway:        Status: patent;        Pre-hospital Interventions: none        Hospital Interventions: none  Breathing:        Pre-hospital Interventions: none       Effort: normal       Right breath sounds: normal       Left breath sounds: normal  Circulation:        Rhythm: regular       Rate: regular   Right Pulses Left Pulses    R radial: 2+  R femoral: 2+  R pedal: 2+     L radial: 2+  L femoral: 2+  L pedal: 2+       Disability:        GCS: Eye: 4; Verbal: 5 Motor: 6 Total: 15       Right Pupil: 4 mm;  round;  reactive         Left Pupil:  4 mm;  round;  reactive      R Motor Strength L Motor Strength    R : 5/5  R dorsiflex: 5/5  R plantarflex: 5/5 L : 5/5  L dorsiflex: 5/5  L plantarflex: 5/5          Exposure:       Completed: Yes      Secondary Survey:  Physical Exam  Constitutional:       General: She is not in acute distress  Appearance: Normal appearance  HENT:      Head: Normocephalic  Comments: Laceration on the right brow     Right Ear: External ear normal       Left Ear: External ear normal       Nose: Nose normal       Mouth/Throat:      Mouth: Mucous membranes are moist       Pharynx: Oropharynx is clear  Eyes:      Extraocular Movements: Extraocular movements intact  Cardiovascular:      Rate and Rhythm: Normal rate and regular rhythm  Pulses: Normal pulses  Pulmonary:      Effort: Pulmonary effort is normal  No respiratory distress  Abdominal:      Palpations: Abdomen is soft  Tenderness: There is no abdominal tenderness  There is no guarding  Musculoskeletal:         General: Normal range of motion  Cervical back: Normal range of motion  Neurological:      General: No focal deficit present  Mental Status: She is oriented to person, place, and time  Invasive Devices  Report    Peripheral Intravenous Line  Duration           Peripheral IV 09/13/22 Dorsal (posterior); Left Hand <1 day              Lab Results:   BMP/CMP:   Lab Results   Component Value Date    SODIUM 133 (L) 09/13/2022    K 3 4 (L) 09/13/2022     09/13/2022    CO2 25 09/13/2022    BUN 11 09/13/2022    CREATININE 0 51 (L) 09/13/2022    CALCIUM 8 6 09/13/2022    AST 17 09/13/2022    ALT 19 09/13/2022    ALKPHOS 58 09/13/2022    EGFR 86 09/13/2022    and CBC:   Lab Results   Component Value Date    WBC 19 39 (H) 09/13/2022 HGB 11 5 09/13/2022    HCT 35 6 09/13/2022    MCV 88 09/13/2022     09/13/2022    MCH 28 5 09/13/2022    MCHC 32 3 09/13/2022    RDW 14 9 09/13/2022    MPV 9 8 09/13/2022       Imaging Results: I have personally reviewed pertinent films in PACS  Chest Xray(s): Minimal interstitial prominence seen best in the lung bases likely representing chronic interstitial lung disease  Mild pulmonary edema or viral illness cannot be excluded  FAST exam(s): N/A   CT Scan(s): positive for acute findings: 1  Acute right frontotemporoparietal subdural hematoma, as described, causing mild right to left midline shift  2   Questionable mild subarachnoid blood products within the inferior right temporal lobe, noting that this location is difficult to evaluate due to artifact from the skull base  Attention on follow-up imaging is recommended  No cervical spine fracture or traumatic malalignment     Additional Xray(s): N/A

## 2022-09-14 NOTE — ASSESSMENT & PLAN NOTE
- Chronic history of hypothyroidism   - Continue home medication regimen   - Outpatient follow-up per routine

## 2022-09-14 NOTE — ASSESSMENT & PLAN NOTE
- Hyponatremia, present on presentation, with sodium level 133  - Stable mild hyponatremia on repeat labs from 09/14/2022   - Encourage adequate oral intake/hydration   - Outpatient follow-up with PCP

## 2022-09-14 NOTE — PLAN OF CARE
Problem: MOBILITY - ADULT  Goal: Maintain or return to baseline ADL function  Description: INTERVENTIONS:  -  Assess patient's ability to carry out ADLs; assess patient's baseline for ADL function and identify physical deficits which impact ability to perform ADLs (bathing, care of mouth/teeth, toileting, grooming, dressing, etc )  - Assess/evaluate cause of self-care deficits   - Assess range of motion  - Assess patient's mobility; develop plan if impaired  - Assess patient's need for assistive devices and provide as appropriate  - Encourage maximum independence but intervene and supervise when necessary  - Involve family in performance of ADLs  - Assess for home care needs following discharge   - Consider OT consult to assist with ADL evaluation and planning for discharge  - Provide patient education as appropriate  Outcome: Progressing  Goal: Maintains/Returns to pre admission functional level  Description: INTERVENTIONS:  - Perform BMAT or MOVE assessment daily    - Set and communicate daily mobility goal to care team and patient/family/caregiver  - Collaborate with rehabilitation services on mobility goals if consulted  - Reposition patient every 2 hours    - Stand patient 3 times a day  - Ambulate patient 3 times a day  - Out of bed to chair 3 times a day   - Out of bed for meals 3 times a day  - Out of bed for toileting  - Record patient progress and toleration of activity level   Outcome: Progressing     Problem: Potential for Falls  Goal: Patient will remain free of falls  Description: INTERVENTIONS:  - Educate patient/family on patient safety including physical limitations  - Instruct patient to call for assistance with activity   - Consult OT/PT to assist with strengthening/mobility   - Keep Call bell within reach  - Keep bed low and locked with side rails adjusted as appropriate  - Keep care items and personal belongings within reach  - Initiate and maintain comfort rounds  - Make Fall Risk Sign visible to staff  - Offer Toileting every 2 Hours, in advance of need  - Initiate/Maintain alarm  - Obtain necessary fall risk management equipment  - Apply yellow socks and bracelet for high fall risk patients  - Consider moving patient to room near nurses station  Outcome: Progressing

## 2022-09-14 NOTE — OCCUPATIONAL THERAPY NOTE
Occupational Therapy Evaluation & Treatment     Patient Name: Markel DENGLPDARRON Date: 9/14/2022  Problem List  Principal Problem:    Subdural hematoma, post-traumatic ([facility] )  Active Problems:    Benign essential hypertension    Hyponatremia    Hypothyroidism    Fall    Laceration of brow without complication    Syncope    Visual changes    Past Medical History  Past Medical History:   Diagnosis Date    Anemia     post-op, 07/07/09    Anxiety     last assessed: 01/15/14    C  difficile diarrhea     Chest wall trauma     Acute, last assessed: 10/24/12    Depression     Disease of thyroid gland     hypothyroid    Diverticula of colon     Endometrial cancer ([facility] ) 03/2011    Endometrial hyperplasia     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[facility] )     stage IA, Grade I endometriod adenocarcinoma with 43% myometrial invasion and no LVSI, last assessed: 53/40/92    Folliculitis     last assessed: 03/02/16    Gastroparesis     Hypercholesterolemia     Hyperlipidemia     Hypertension     Incontinence in female     Migraine     Nontoxic single thyroid nodule     last assessed: 02/16/13    Osteoporosis     last assessed: 02/22/17    Pure hypercholesterolemia     Rotator cuff (capsule) sprain     Acute, right    Skin lesion     last assessed: 04/26/13    Stomatitis     Strain of right buttock     gluteus medius    Tendinitis of right rotator cuff     last assessed: 08/12/12    Thyroid cyst     last assessed: 08/15/13     Past Surgical History  Past Surgical History:   Procedure Laterality Date    APPENDECTOMY      BREAST BIOPSY Right     BREAST BIOPSY Left     BREAST CYST ASPIRATION      COLONOSCOPY      fiberoptic, 1998, 2001, 2006    CYSTOSCOPY      Diagnostic    EGD AND COLONOSCOPY N/A 3/11/2016    Procedure: EGD ;  Surgeon: Tangela Huntley MD;  Location: [facility];   Service:     HYSTERECTOMY  03/22/2011    Robotic-assisted, total laparoscopic approch    JOINT REPLACEMENT      KNEE SURGERY      OOPHORECTOMY Bilateral 03/22/2011    [place]    REPLACEMENT TOTAL KNEE      TONSILECTOMY AND ADNOIDECTOMY      TOTAL SHOULDER ARTHROPLASTY Right         09/14/22 1349   OT Last Visit   OT Visit Date 09/14/22   Note Type   Note type Evaluation   Restrictions/Precautions   Weight Bearing Precautions Per Order No   Other Precautions Telemetry; Fall Risk   Pain Assessment   Pain Assessment Tool 0-10   Pain Score No Pain   Home Living   Type of Home House   Home Layout Multi-level;Performs ADLs on one level; Able to live on main level with bedroom/bathroom; Ramped entrance   [employer] Grab bars in shower; Shower chair;Commode   Bathroom Accessibility Accessible   Home Equipment Walker;Cane   Additional Comments Pt lives alone in a multi-level house and ramped entrance with bed/bath on first floor  Pt's daughter lives close by and can provide increased assistance upon DC  Prior Function   Level of [place] Independent with ADLs and functional mobility   Lives With Alone   Receives Help From Family   ADL Assistance Independent   IADLs Independent   Falls in the last 6 months 1 to 4  (x1)   Vocational Retired   Comments PTA, Pt I with ADLs/IADLs/+/No AD  Pt reports only utilizing SPC in the community  Lifestyle   Autonomy I with ADLs/IADLs and functional mobility without AD  Reciprocal Relationships Supportive family   Service to Others Retired   Intrinsic Gratification Enjoys sewing and knitting   Psychosocial   Psychosocial ([address]) WDL   ADL   Where Assessed Chair   Eating Assistance 7  Independent   Grooming Assistance 7  Independent   UB Bathing Assistance 7  [id number] [facility]  [place]  [address]  7  [address]  [place]; Increased time to complete; Don/doff R sock; Don/doff L sock   Toileting Assistance  5  Supervision/Setup   Functional Assistance 5 Supervision/Setup   Functional Deficit Setup;Supervision/safety   Additional Comments Pt provided education on utilizing shower chair, implementing energy conservation strategies, and utilizing compensatory techniques at home upon DC  Bed Mobility   Supine to Sit Unable to assess   Sit to Supine Unable to assess   Additional Comments Pt greeted OOB in recliner and left up in chair at end of session  Call bell left nearby  All needs met  Transfers   Sit to Stand 5  Supervision   Additional items Increased time required;Verbal cues   Stand to Sit 5  Supervision   Additional items Increased time required;Verbal cues;Armrests   Additional Comments Pt completes functional STS transfers at S level with RW  Functional Mobility   Functional Mobility 5  Supervision   Additional Comments Pt completes household distances at S level with RW  Additional items Rolling walker   Balance   Static Sitting Good   Dynamic Sitting Fair +   Static Standing Fair   Dynamic Standing Fair -   Ambulatory Fair -   Activity Tolerance   Activity Tolerance Patient tolerated treatment well   Medical Staff Made Aware Co-evaluation completed with PT 2* Pt's medical complexity and decreased activity tolerance  Nurse Made Aware RN cleared/updated  RUE Assessment   RUE Assessment WFL   LUE Assessment   LUE Assessment WFL   Hand Function   Gross Motor Coordination Functional   Fine Motor Coordination Functional   Sensation   Light Touch No apparent deficits   Vision-Basic Assessment   Current Vision Wears glasses only for reading   Patient Visual Report Diplopia  (Pt reports onset of diplopia s/p fall that has decreased since incident but still present)   Vision - Complex Assessment   Tracking Able to track stimulus in all quads without difficulty   Acuity Able to read clock/calendar on wall without difficulty; Able to read employee name badge without difficulty   Cognition   Overall Cognitive Status [facility]   Arousal/Participation Alert; Cooperative   Attention Within functional limits   Orientation Level Oriented X4   Memory Within functional limits   Following Commands Follows one step commands without difficulty   Comments Pt very pleasant and cooperative throughout OT session  Pt able to verbalize understanding of education provided at end of session  Cognition Assessment Tools MOCA   Score 26   Assessment   Limitation Decreased cognition   Prognosis Good   Assessment Pt is a 81 yo Female who [facility]/20[facility] s/p fall with +HS  CT head (+) for acute right frontotemporoparietal subdural hematoma with mild right to left midline shift  Pt with diagnosis of subdural hematoma, post-traumatic  No Neurosurgery intervention at this time  Pt  has a past medical history of Anemia, Anxiety, C  difficile diarrhea, Chest wall trauma, Depression, Disease of thyroid gland, Diverticula of colon, Endometrial cancer ([facility] ), Endometrial hyperplasia, 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[facility] ), Folliculitis, Gastroparesis, Hypercholesterolemia, Hyperlipidemia, Hypertension, Incontinence in female, Migraine, Nontoxic single thyroid nodule, Osteoporosis, Pure hypercholesterolemia, Rotator cuff (capsule) sprain, Skin lesion, Stomatitis, Strain of right buttock, Tendinitis of right rotator cuff, and Thyroid cyst  Pt greeted up in recliner chair for OT evaluation on 9/14/2022  Pt lives alone in a multi-level house and ramped entrance with bed/bath on first floor  Pt's daughter lives close by and can provide increased assistance upon DC  PTA, Pt I with ADLs/IADLs/+/No AD  Pt reports only utilizing SPC in the community  Pt demonstrating the following occupational performance levels: I for UB ADLs, S for LB ADLs, S for toileting, S for functional transfers, and S for functional mobility with RW  Limitations that impact functional performance include decreased cognition  Occupational performance areas to address cognitive reorientation   Pt would benefit from continued skilled OT services while in hospital to maximize independence with ADLs  Will continue to follow Pt's progress  Pt would benefit from returning home with increased social support upon DC to maximize safety and independence with ADLs and functional tasks of choice  Goals   Patient Goals To return home  STG Time Frame 1-3   Short Term Goal #1 See goal listed below  Plan   Treatment Interventions Cognitive reorientation   Goal Expiration Date 09/14/22   OT Treatment Day 1   OT Frequency 1-2x/wk   Additional Treatment Session   Start Time 1331   End Time 7054   Treatment Assessment Pt greeted up in recliner chair for OT treatment on 9/14/2022 to complete the [address] [place] [place]  Pt scored 26/30 on assessment indicating normal for age/education  Pt encouraged to participate in ADLs/functional mobility with nursing/restorative staff during hospitalization  Pt with no further acute OT concerns  Pt provided education on utilizing shower chair, implementing energy conservation strategies, and utilizing compensatory techniques at home upon DC  Pt would benefit from returning home with increased social support upon DC to maximize safety and independence with ADLs and functional tasks of choice  DC skilled OT services     Additional Treatment Day 1   Recommendation   OT Discharge Recommendation No rehabilitation needs   Equipment Recommended   (Use of shower chair at home)   AM-PAC Daily Activity Inpatient   Lower Body Dressing 3   Bathing 3   Toileting 3   Upper Body Dressing 4   Grooming 4   Eating 4   Daily Activity Raw Score 21   Daily Activity Standardized Score (Calc for Raw Score >=11) 44 27   AM-PAC Applied Cognition Inpatient   Following a Speech/Presentation 4   Understanding Ordinary Conversation 4   Taking Medications 4   Remembering Where Things Are Placed or Put Away 4   Remembering List of 4-5 Errands 3   Taking Care of Complicated Tasks 3   Applied Cognition Raw Score 22   Applied Cognition Standardized Score 47 83      Goal:    Pt will be attentive 100% of time for ongoing functional/formal cognitive assessment to assist with safe DC planning PRN  PT SEEN FOR GILL COGNITIVE ASSESSMENT  SCORED  26/30 INDICATING NORMAL FOR AGE/EDUCATION  SCORES ARE AS FOLLOWS:    VISUOSPATIAL/EXECUTIVE FUNCTION: 4/5  Pt was able to complete trail  Pt was able to copy cube  Pt was unable to draw a clock shape but was able to accurately place the number, and properly place the hands at ten past eleven  NAMING: 3/3  Pt able to name 3/3 animals  ATTENTION: 6/6  Pt was able to repeat sequence of numbers forwards and backwards  Pt able to attend to sequence of letters  Pt was able to correctly subtract 7 from 100 5/5 times  LANGUAGE: 3/3  Pt was able to repeat sentences back to therapist  Pt was able to produce 11 words starting with the letter F in 1 minute  ABSTRACTION: 2/2  Pt was able to identify the similarity of two items x2 trials  DELAYED RECALL: 2/5  Pt recalled 2/5 words without cues  Pt recalled 1/5 words with category cue  Pt recalled 2/5 words with multiple choice options  ORIENTATION: 6/6  Pt is oriented to date, month, year, day, place and city         AGAPITO Alvarez

## 2022-09-14 NOTE — ASSESSMENT & PLAN NOTE
- Right forehead/periorbital laceration lateral to the right eyebrow status post suture repair bedside   - Local wound care as indicated  - Analgesia as needed  - Outpatient follow-up for suture removal in 5-7 days

## 2022-09-14 NOTE — ASSESSMENT & PLAN NOTE
- Patient with chronic history of hypertension   - Continue home medication regimen as appropriate  Orthostatic vital signs pending; may need to consider adjustment of blood pressure medication if patient has evidence of orthostasis  - given 1 dose of IV hydralazine today for HTN with HA   - Outpatient follow-up per routine

## 2022-09-14 NOTE — CONSULTS
Consultation - Brice Roman 80 y o  female MRN: [id number]  Unit/Bed#: [address] 832-01 Encounter: [id number]      Assessment/Plan    Ambulatory dysfunction  At risk for falls secondary to age, hx of fall, gait instability, polypharmacy, visual impairment  Monitor orthostatics  Cardiology on consult  Recommend increased protein intake  Fall precautions  PT/OT  Pt took balance classes in past, recommend restart balance excercises  Increased physical exercise, recommend balance and strengthening exercises  Rehab post hospitalization     Acute pain due to trauma  Geriatric pain protocol  Scheduled acetaminophen 975 mg po Q8  Oxycodone 2 5 mg po Q 4 prn moderate pain  Oxycodone 5 mg po Q 4 prn severe pain   Monitor for constipation  Lidoderm patch      Frailty  Clinical Frail Scale: 5- Mildly Frail  Albumin 2 0, recommend protein supplementation  PT/OT  Continue supportive care     Cognitive screening  No reported prior memory issues  TSH 5 23 6/16/22  Recommend check TSH and vitamin B12  CT head 9/14/22- stable chronic microangiopathic changes  Keep physically, mentally and socially active     Delirium precautions  Baseline: alert and oriented x 3  Provide frequent redirection, reorientation, distraction techniques  Avoid deliriogenic medications such as tramadol, benzodiazepines, anticholinergics,  Benadryl  Treat pain, See geriatric pain protocol  Monitor for constipation and urinary retention  Encourage early and frequent moblization, OOB  Encourage Hydration/ Nutrition  Implement sleep hygiene, limit night time interuptions, group activities     Insomnia  Related to hospitalization  First line is behavioral therapy  Avoid sedative hypnotics such as benzodiazepines and benadryl  Encourage staying awake during the day  Encourage daytime activity, morning exercise  Decrease or eliminate day time naps  Avoid caffeine especially during late afternoon and evening hours  Establish a night time routine    Urinary incontinence  Pt on oxybutynin for years, continues to use pads/depends  Do not recommend oxybutynin secondary to side effect of orthostasis, and memory loss  States was unable to tolerate myrbetiq due to side effect of elevated BP  Symptoms unimproved with kegel exercises  Recommend follow up with urogynecology as outpatient for alternative treatments  ambulatory referral placed on discharge     Advanced Care Planning  Full code    Home medication review  [facility]   alphagan 0 1%   Carvedilol 12 5 mg po 1 tablet in the evening  Carvedilol 6 25 mg po 1 tablet in the morning  Levothyroxine 25 mcg po daily  Oxybutynin ER 5mg po QHS  Simvastatin 20 mg po Q evening    12/28/2021  1   12/28/2021  Alprazolam 0 25 MG Tablet    20 00  20 [facility]   [id number]   Thr (0299)     Medicare   [place]              History of Present Illness   Physician Requesting Consult: Nghia Wheatley MD  Reason for Consult / Principal Problem: ISAR greater than 2  Hx and PE limited by: NA  HPI: Norberto Gordon is a 80y o  year old female who presents with fall in the kitchen  She does not remember the fall  She has an apple watch which sent and alert to her daughter after she fell  Her daughter called 911  Per daughter pt had no prodromal symptoms  Recent appointments:  8/2/22 ophthalmology- blurry vision  8/3/22 Hem- Onc- lymphocytosis  9/13/22- Internal med- tooth pain, fever        She has macular edema left eye, posterior detachment of right eye, hx of vitrectomy,  Barretts esophagus, SAIDH  Prior to arrival pt lives at home alone  Her daughter lives nearby  She is independent with IADLs and ADLs  She ambulates with a cane  She has visual impairment  She has a broken tooth  She states she lost 32 pounds a few years ago  She had been taking boost supplements 2x day daily up until last month  She decided she did not need it and cut back to once daily  She has urinary incontinence she wears pads/depends      Upon exam pt is oob in recliner chair  She is complaining of dizziness while sitting in chair  Daughter at bedside to review HPI and medications  Inpatient consult to Gerontology  Consult performed by: NILTON Altman  Consult ordered by: Lane Calderón PA-C          Review of Systems   Constitutional: Negative for unexpected weight change  HENT: Positive for dental problem  Negative for hearing loss  Eyes: Positive for visual disturbance  Respiratory: Negative for cough  Cardiovascular: Negative for chest pain  Gastrointestinal: Negative for constipation  Genitourinary: Positive for frequency  Incontinence   Musculoskeletal: Positive for gait problem  Skin: Negative for color change  Neurological: Negative for weakness  Psychiatric/Behavioral: Negative for sleep disturbance         Historical Information   Past Medical History:   Diagnosis Date    Anemia     post-op, 07/07/09    Anxiety     last assessed: 01/15/14    C  difficile diarrhea     Chest wall trauma     Acute, last assessed: 10/24/12    Depression     Disease of thyroid gland     hypothyroid    Diverticula of colon     Endometrial cancer ([facility] ) 03/2011    Endometrial hyperplasia     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[facility] )     stage IA, Grade I endometriod adenocarcinoma with 43% myometrial invasion and no LVSI, last assessed: 49/65/40    Folliculitis     last assessed: 03/02/16    Gastroparesis     Hypercholesterolemia     Hyperlipidemia     Hypertension     Incontinence in female     Migraine     Nontoxic single thyroid nodule     last assessed: 02/16/13    Osteoporosis     last assessed: 02/22/17    Pure hypercholesterolemia     Rotator cuff (capsule) sprain     Acute, right    Skin lesion     last assessed: 04/26/13    Stomatitis     Strain of right buttock     gluteus medius    Tendinitis of right rotator cuff     last assessed: 08/12/12    Thyroid cyst     last assessed: 08/15/13     Past Surgical History:   Procedure Laterality Date    APPENDECTOMY      BREAST BIOPSY Right     BREAST BIOPSY Left     BREAST CYST ASPIRATION      COLONOSCOPY      fiberoptic, 1998, 2001, 2006    CYSTOSCOPY      Diagnostic    EGD AND COLONOSCOPY N/A 3/11/2016    Procedure: EGD ;  Surgeon: Alyssa Zepeda MD;  Location: [facility];   Service:     HYSTERECTOMY  03/22/2011    Robotic-assisted, total laparoscopic approch    JOINT REPLACEMENT      KNEE SURGERY      OOPHORECTOMY Bilateral 03/22/2011    [place]    REPLACEMENT TOTAL KNEE      TONSILECTOMY AND ADNOIDECTOMY      TOTAL SHOULDER ARTHROPLASTY Right      Social History   Social History     Substance and Sexual Activity   Alcohol Use Not Currently     Social History     Substance and Sexual Activity   Drug Use No     Social History     Tobacco Use   Smoking Status Never Smoker   Smokeless Tobacco Never Used         Family History:   Family History   Problem Relation Age of Onset    No Known Problems Mother     Dementia Father     Other Brother         Prolapsing mitral valve leaflet syndrome    Testicular cancer Brother 27    Lung cancer Brother 61    Prostate cancer Brother 36        unsure of excat age   Clay Leal No Known Problems Daughter     No Known Problems Daughter     No Known Problems Daughter     No Known Problems Maternal Grandmother     No Known Problems Maternal Grandfather     Breast cancer Paternal Grandmother 79    No Known Problems Paternal Grandfather     No Known Problems Maternal Aunt     No Known Problems Paternal Aunt     No Known Problems Paternal Aunt        Meds/Allergies   Current meds:   Current Facility-Administered Medications   Medication Dose Route Frequency    acetaminophen (TYLENOL) tablet 975 mg  975 mg Oral Q6H [facility]    ALPRAZolam (XANAX) tablet 0 25 mg  0 25 mg Oral HS PRN    brimonidine tartrate 0 2 % ophthalmic solution 1 drop  1 drop Both Eyes BID    carvedilol (COREG) tablet 12 5 mg  12 5 mg Oral HS    carvedilol (COREG) tablet 6 25 mg  6 25 mg Oral QAM    diphtheria-tetanus-acellular pertussis (INFANRIX) IM injection 0 5 mL  0 5 mL Intramuscular Prior to discharge    famotidine (PEPCID) tablet 20 mg  20 mg Oral BID    levETIRAcetam (KEPPRA) tablet 500 mg  500 mg Oral Q12H [address]    levothyroxine tablet 25 mcg  25 mcg Oral Daily    ondansetron (ZOFRAN) injection 4 mg  4 mg Intravenous Q6H PRN    oxybutynin (DITROPAN-XL) 24 hr tablet 5 mg  5 mg Oral After Dinner    pravastatin (PRAVACHOL) tablet 20 mg  20 mg Oral Daily With Dinner          Allergies   Allergen Reactions    Amoxil [Amoxicillin] Hives    Biaxin [Clarithromycin] GI Intolerance     Reaction Date: 19Jul2011;   Pt gets nauseated    Esomeprazole GI Intolerance    Fosamax [Alendronate] GI Intolerance    Hydrochlorothiazide      Hyponatremia      Lansoprazole GI Intolerance    Norvasc [Amlodipine Besylate]     Pantoprazole GI Intolerance     Can tolerate IV- Not oral    Relafen [Nabumetone] GI Intolerance and Other (See Comments)       Objective   Vitals: Blood pressure 133/67, pulse 67, temperature 98 2 °F (36 8 °C), resp  rate 18, height 5' 2" (1 575 m), weight 51 kg (112 lb 7 oz), SpO2 97 %, not currently breastfeeding  ,Body mass index is 20 56 kg/m²  Physical Exam  Vitals and nursing note reviewed  HENT:      Head: Normocephalic  Nose: No congestion  Mouth/Throat:      Mouth: Mucous membranes are moist    Eyes:      General:         Right eye: No discharge  Left eye: No discharge  Cardiovascular:      Rate and Rhythm: Normal rate and regular rhythm  Pulses: Normal pulses  Pulmonary:      Effort: Pulmonary effort is normal       Breath sounds: Normal breath sounds  Abdominal:      General: Bowel sounds are normal       Palpations: Abdomen is soft  Musculoskeletal:         General: Normal range of motion  Cervical back: Normal range of motion  Skin:     General: Skin is warm and dry        Findings: Bruising present  Neurological:      Mental Status: She is alert and oriented to person, place, and time  Mental status is at baseline  Psychiatric:         Mood and Affect: Mood normal          Lab Results:   Results from last 7 days   Lab Units 09/14/22  0528   WBC Thousand/uL 16 78*   HEMOGLOBIN g/dL 9 9*   HEMATOCRIT % 31 2*   PLATELETS Thousands/uL 165        Results from last 7 days   Lab Units 09/14/22  0528 09/13/22  1535   POTASSIUM mmol/L 3 5 3 4*   CHLORIDE mmol/L 103 103   CO2 mmol/L 27 25   BUN mg/dL 13 11   CREATININE mg/dL 0 49* 0 51*   CALCIUM mg/dL 8 7 8 6   ALK PHOS U/L  --  58   ALT U/L  --  19   AST U/L  --  17       Imaging Studies: I have personally reviewed pertinent reports  EKG, Pathology, and Other Studies: I have personally reviewed pertinent reports      VTE Prophylaxis: Sequential compression device (Venodyne)     Code Status: Level 1 - Full Code

## 2022-09-14 NOTE — ASSESSMENT & PLAN NOTE
- Status post fall in kitchen with suspected syncope and with the below noted injuries  - Fall precautions  - Geriatric Medicine consultation for evaluation, medication review and recommendations  - Syncope workup as above  - PT and OT evaluation and treatment as indicated  - Case Management consultation for disposition planning

## 2022-09-14 NOTE — ASSESSMENT & PLAN NOTE
- Traumatic brain injury with SDH, present on admission   - Neurosurgery evaluation and recommendations appreciated  - Hold anti-platelet and anticoagulant medications for least 2 weeks and/or until cleared by Neurosurgery  Plan to initiate chemical DVT prophylaxis with subcutaneous heparin when cleared by Neurosurgery   - Repeating [facility] today for worsened HA   - Continue Keppra for 7 days for seizure prophylaxis  - Monitor neurologic exam, no focal deficits  - Continue symptomatic management with analgesia as needed  - PT and OT evaluation and treatment as indicated  - Outpatient Neurosurgery follow-up in 2 weeks with repeat CT scan of the head prior to follow-up appointment

## 2022-09-14 NOTE — ASSESSMENT & PLAN NOTE
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    Imaging reviewed personally and with attending, results are as follows:   CT head without contrast 09/14/2022:  Stable acute subdural hematoma within the right hemisphere  Stable underlying parenchyma  Plan:   Continue to monitor neurological exam   No surgery warranted, continue with conservative management   Repeat CT head stat if GCS declines more than 2 points in 1 hour   Systolic blood pressure less than 160   Hold all anticoagulation/antiplatelet therapies, DDAVP given for aspirin use, should hold for at least 2 weeks until evaluated by Neurosurgery  [facility] management and pain control per primary team   DVT ppx:  SCDs, okay for pharmacological DVT prophylaxis from Neurosurgery standpoint   Mobilize as tolerated with assistance, PT / OT evaluation    Neurosurgery will sign off  Outpatient follow-up in 2 weeks time with repeat CT head  Please call with questions or concerns

## 2022-09-14 NOTE — ASSESSMENT & PLAN NOTE
Patient with chronic history of hypertension  Continue home medication regimen as appropriate  Orthostatic vital signs pending; may need to consider adjustment of blood pressure medication if patient has evidence of orthostasis  Outpatient follow-up per routine

## 2022-09-15 ENCOUNTER — APPOINTMENT (INPATIENT)
Dept: RADIOLOGY | Facility: HOSPITAL | Age: 87
DRG: 085 | End: 2022-09-15
Payer: MEDICARE

## 2022-09-15 ENCOUNTER — HOME HEALTH ADMISSION (OUTPATIENT)
Dept: HOME HEALTH SERVICES | Facility: HOME HEALTHCARE | Age: 87
End: 2022-09-15
Payer: MEDICARE

## 2022-09-15 LAB
ANION GAP SERPL CALCULATED.3IONS-SCNC: 6 MMOL/L (ref 4–13)
BUN SERPL-MCNC: 12 MG/DL (ref 5–25)
CALCIUM SERPL-MCNC: 8.9 MG/DL (ref 8.3–10.1)
CHLORIDE SERPL-SCNC: 96 MMOL/L (ref 96–108)
CO2 SERPL-SCNC: 26 MMOL/L (ref 21–32)
CREAT SERPL-MCNC: 0.46 MG/DL (ref 0.6–1.3)
ERYTHROCYTE [DISTWIDTH] IN BLOOD BY AUTOMATED COUNT: 14.5 % (ref 11.6–15.1)
GFR SERPL CREATININE-BSD FRML MDRD: 89 ML/MIN/1.73SQ M
GLUCOSE SERPL-MCNC: 97 MG/DL (ref 65–140)
HCT VFR BLD AUTO: 30.1 % (ref 34.8–46.1)
HGB BLD-MCNC: 9.7 G/DL (ref 11.5–15.4)
MCH RBC QN AUTO: 28.4 PG (ref 26.8–34.3)
MCHC RBC AUTO-ENTMCNC: 32.2 G/DL (ref 31.4–37.4)
MCV RBC AUTO: 88 FL (ref 82–98)
PLATELET # BLD AUTO: 184 THOUSANDS/UL (ref 149–390)
PMV BLD AUTO: 10.1 FL (ref 8.9–12.7)
POTASSIUM SERPL-SCNC: 3.4 MMOL/L (ref 3.5–5.3)
RBC # BLD AUTO: 3.42 MILLION/UL (ref 3.81–5.12)
SODIUM SERPL-SCNC: 128 MMOL/L (ref 135–147)
WBC # BLD AUTO: 17.97 THOUSAND/UL (ref 4.31–10.16)

## 2022-09-15 PROCEDURE — 99233 SBSQ HOSP IP/OBS HIGH 50: CPT | Performed by: EMERGENCY MEDICINE

## 2022-09-15 PROCEDURE — 99232 SBSQ HOSP IP/OBS MODERATE 35: CPT | Performed by: NURSE PRACTITIONER

## 2022-09-15 PROCEDURE — 70450 CT HEAD/BRAIN W/O DYE: CPT

## 2022-09-15 PROCEDURE — 80048 BASIC METABOLIC PNL TOTAL CA: CPT | Performed by: PHYSICIAN ASSISTANT

## 2022-09-15 PROCEDURE — 85027 COMPLETE CBC AUTOMATED: CPT | Performed by: PHYSICIAN ASSISTANT

## 2022-09-15 PROCEDURE — 99232 SBSQ HOSP IP/OBS MODERATE 35: CPT | Performed by: INTERNAL MEDICINE

## 2022-09-15 RX ORDER — HYDRALAZINE HYDROCHLORIDE 20 MG/ML
10 INJECTION INTRAMUSCULAR; INTRAVENOUS ONCE
Status: COMPLETED | OUTPATIENT
Start: 2022-09-15 | End: 2022-09-15

## 2022-09-15 RX ORDER — METOCLOPRAMIDE HYDROCHLORIDE 5 MG/ML
10 INJECTION INTRAMUSCULAR; INTRAVENOUS EVERY 6 HOURS PRN
Status: DISCONTINUED | OUTPATIENT
Start: 2022-09-15 | End: 2022-09-16

## 2022-09-15 RX ORDER — DIPHENHYDRAMINE HCL 25 MG
25 TABLET ORAL DAILY PRN
Status: DISCONTINUED | OUTPATIENT
Start: 2022-09-15 | End: 2022-09-17 | Stop reason: HOSPADM

## 2022-09-15 RX ADMIN — METOCLOPRAMIDE HYDROCHLORIDE 10 MG: 5 INJECTION INTRAMUSCULAR; INTRAVENOUS at 15:59

## 2022-09-15 RX ADMIN — OXYBUTYNIN 5 MG: 5 TABLET, FILM COATED, EXTENDED RELEASE ORAL at 17:39

## 2022-09-15 RX ADMIN — LEVETIRACETAM 500 MG: 500 TABLET, FILM COATED ORAL at 20:49

## 2022-09-15 RX ADMIN — LEVETIRACETAM 500 MG: 500 TABLET, FILM COATED ORAL at 08:48

## 2022-09-15 RX ADMIN — BRIMONIDINE TARTRATE 1 DROP: 2 SOLUTION OPHTHALMIC at 08:48

## 2022-09-15 RX ADMIN — CARVEDILOL 6.25 MG: 6.25 TABLET, FILM COATED ORAL at 07:46

## 2022-09-15 RX ADMIN — CARVEDILOL 6.25 MG: 6.25 TABLET, FILM COATED ORAL at 21:00

## 2022-09-15 RX ADMIN — DIBASIC SODIUM PHOSPHATE, MONOBASIC POTASSIUM PHOSPHATE AND MONOBASIC SODIUM PHOSPHATE 1 TABLET: 852; 155; 130 TABLET ORAL at 11:21

## 2022-09-15 RX ADMIN — LEVOTHYROXINE SODIUM 25 MCG: 25 TABLET ORAL at 20:49

## 2022-09-15 RX ADMIN — ACETAMINOPHEN 975 MG: 325 TABLET ORAL at 05:07

## 2022-09-15 RX ADMIN — FAMOTIDINE 20 MG: 20 TABLET, FILM COATED ORAL at 08:48

## 2022-09-15 RX ADMIN — ACETAMINOPHEN 975 MG: 325 TABLET ORAL at 23:33

## 2022-09-15 RX ADMIN — FAMOTIDINE 20 MG: 20 TABLET, FILM COATED ORAL at 17:39

## 2022-09-15 RX ADMIN — ACETAMINOPHEN 975 MG: 325 TABLET ORAL at 11:21

## 2022-09-15 RX ADMIN — ACETAMINOPHEN 975 MG: 325 TABLET ORAL at 17:39

## 2022-09-15 RX ADMIN — ONDANSETRON 4 MG: 2 INJECTION INTRAMUSCULAR; INTRAVENOUS at 07:44

## 2022-09-15 RX ADMIN — BRIMONIDINE TARTRATE 1 DROP: 2 SOLUTION OPHTHALMIC at 20:50

## 2022-09-15 RX ADMIN — PRAVASTATIN SODIUM 20 MG: 20 TABLET ORAL at 17:39

## 2022-09-15 RX ADMIN — HYDRALAZINE HYDROCHLORIDE 10 MG: 20 INJECTION INTRAMUSCULAR; INTRAVENOUS at 10:06

## 2022-09-15 RX ADMIN — DIBASIC SODIUM PHOSPHATE, MONOBASIC POTASSIUM PHOSPHATE AND MONOBASIC SODIUM PHOSPHATE 1 TABLET: 852; 155; 130 TABLET ORAL at 17:39

## 2022-09-15 NOTE — PROGRESS NOTES
[address] [facility]  Progress Note - Yue Milder 1934, 80 y o  female MRN: [id number]  Unit/Bed#: [facility] 832-01 Encounter: [id number]  Primary Care Provider: Clarissa Cotter MD   Date and time admitted to hospital: 9/13/2022  3:15 PM    Visual changes  Assessment & Plan  - Patient reports intact of visual acuity, but notes some double vision, particularly in her right eye  She notes that her right eye is her good eye and she has had multiple issues with her left eye in the past with chronic blurry vision that appears unchanged from baseline  She does report her right eye double vision has been improving   - Continue to monitor conservatively for now  - Repeating [address] today  - If visual changes worsen or persist, may consider inpatient ophthalmology consult  Otherwise, plan for outpatient follow-up with primary ophthalmologist for further evaluation in the next week  Syncope  Assessment & Plan  - Suspected syncope proceeding fall  - echocardiogram with EF 37%, grade 2 diastolic dysfunction  EKG reviewed with normal sinus rhythm and no significant abnormalities; telemetry also reviewed without significant event thus far  Troponins x3 without significant elevation and patient reports no palpitations, chest pain, shortness of breath or difficulty breathing  Cardiac event not suspected at this time  - Obtain orthostatic vital signs   - Cardiology consult per family request; follows with [facility] (Dr Leon Dotson)  - Encourage adequate oral intake and adequate hydration     - Repleting Na and K, trend lytes  - Fall precautions   - Recommend short-term outpatient follow-up with PCP for further evaluation  Laceration of brow without complication  Assessment & Plan  - Right forehead/periorbital laceration lateral to the right eyebrow status post suture repair bedside   - Local wound care as indicated  - Analgesia as needed    - Outpatient follow-up for suture removal in 5-7 days     Fall  Assessment & Plan  - Status post fall in kitchen with suspected syncope and with the below noted injuries  - Fall precautions  - Geriatric Medicine consultation for evaluation, medication review and recommendations  - Syncope workup as above  - PT and OT evaluation and treatment as indicated  - Case Management consultation for disposition planning  Hypothyroidism  Assessment & Plan  - Chronic history of hypothyroidism   - Continue home medication regimen   - Outpatient follow-up per routine  Hyponatremia  Assessment & Plan  - Hyponatremia, present on presentation, with sodium level 133  Patient has chronic history of hyponatremia and is typically on a 1500 mL fluid restriction daily at baseline   - Encourage adequate oral intake/hydration   - Repleted with Na K Phos, trend lytes  - Outpatient follow-up with PCP  Benign essential hypertension  Assessment & Plan  - Patient with chronic history of hypertension   - Continue home medication regimen as appropriate  Orthostatic vital signs pending; may need to consider adjustment of blood pressure medication if patient has evidence of orthostasis  - given 1 dose of IV hydralazine today for HTN with HA   - Outpatient follow-up per routine  * Subdural hematoma, post-traumatic (HCC)  Assessment & Plan  - Traumatic brain injury with SDH, present on admission   - Neurosurgery evaluation and recommendations appreciated  - Hold anti-platelet and anticoagulant medications for least 2 weeks and/or until cleared by Neurosurgery  Plan to initiate chemical DVT prophylaxis with subcutaneous heparin when cleared by Neurosurgery   - Repeating [address] today for worsened HA   - Continue Keppra for 7 days for seizure prophylaxis  - Monitor neurologic exam, no focal deficits  - Continue symptomatic management with analgesia as needed  - PT and OT evaluation and treatment as indicated    - Outpatient Neurosurgery follow-up in 2 weeks with repeat CT scan of the head prior to follow-up appointment  Disposition:  Continue inpatient treatment    SUBJECTIVE:  Chief Complaint:  Fall    Subjective: Today patient reports worsened frontal headache  She denies focal weakness or numbness, and denies chest pain, shortness of breath, or abdominal pain  She had nausea that initially improved with Zofran but returned and the headache has remained persistent  She is going for repeat CT head today  OBJECTIVE:   Vitals:   Temp:  [97 °F (36 1 °C)-98 4 °F (36 9 °C)] 97 7 °F (36 5 °C)  HR:  [57-75] 75  Resp:  [16-20] 16  BP: (115-182)/(45-89) 165/78    Intake/Output:  I/O       09/13 0701  09/14 0700 09/14 0701  09/15 0700 09/15 0701 09/16 0700    P  O    240    IV Piggyback 125      Total Intake(mL/kg) 125 (2 5)  240 (4 7)    Urine (mL/kg/hr) 200 650 (0 5) 250 (0 5)    Total Output 200 650 250    Net -75 -650 -10           Unmeasured Urine Occurrence 2 x           Nutrition: Diet Regular; Regular House; Fluid Restriction 1500 ML  GI Proph/Bowel Reg:  Pepcid  VTE Prophylaxis:Reason for no pharmacologic prophylaxis SDH     Physical Exam:   GENERAL APPEARANCE:  No acute distress  NEURO:  Awake and alert, motor strength and sensation intact, cranial nerves intact  HEENT:  Ecchymosis along right side of face  CV:  Regular rate and rhythm  LUNGS:  Clear to auscultation bilaterally  GI:  Nondistended, nontender to palpation  MSK:  No lower extremity edema  SKIN:  Warm and dry    Invasive Devices  Report    Peripheral Intravenous Line  Duration           Peripheral IV 09/13/22 Dorsal (posterior); Left Hand 2 days                      Lab Results: Results: I have personally reviewed all pertinent laboratory/tests results  Imaging/EKG Studies: I have personally reviewed pertinent reports

## 2022-09-15 NOTE — RESTORATIVE TECHNICIAN NOTE
Restorative Technician Note      Patient Name: Shelbie Dykes     Restorative Tech Visit Date: 9/15/2022  Note Type: Mobility  Patient Position Upon Consult: Supine  Patient Position at End of Consult: Supine; All needs within reach    Pt educated on importance of mobility and its benefits  She declines participation at this time due to complaints of nausea and headache       Left in the care of PCA    Hansford Innocent  DPT, Restorative Technician

## 2022-09-15 NOTE — PROGRESS NOTES
Progress Note - Ashok Wright 80 y o  female MRN: [id number]    [facility] Encounter: [id number]      Assessment/Plan:  Ambulatory dysfunction  At risk for falls secondary to age, hx of fall, gait instability, polypharmacy, visual impairment  Monitor orthostatics  Cardiology on consult:coreg dose decreased  Recommend increased protein intake  Fall precautions  PT/OT  Pt took balance classes in past, recommend restart balance excercises  Increased physical exercise, recommend balance and strengthening exercises  Rehab post hospitalization     Acute pain due to trauma  Geriatric pain protocol  Scheduled acetaminophen 975 mg po Q8  Oxycodone 2 5 mg po Q 4 prn moderate pain  Oxycodone 5 mg po Q 4 prn severe pain   Monitor for constipation  Lidoderm patch      Frailty  Clinical Frail Scale: 5- Mildly Frail  Albumin 2 8, continue protein supplementation  PT/OT  Continue supportive care     Cognitive screening  No reported prior memory issues  TSH 5 23 6/16/22  Recommend check TSH and vitamin B12  CT head 9/14/22- stable chronic microangiopathic changes  Keep physically, mentally and socially active     Delirium precautions  Baseline: alert and oriented x 3  Provide frequent redirection, reorientation, distraction techniques  Avoid deliriogenic medications such as tramadol, benzodiazepines, anticholinergics,  Benadryl  Treat pain, See geriatric pain protocol  Monitor for constipation and urinary retention  Encourage early and frequent moblization, OOB  Encourage Hydration/ Nutrition  Implement sleep hygiene, limit night time interuptions, group activities     Insomnia  Related to hospitalization  First line is behavioral therapy  Avoid sedative hypnotics such as benzodiazepines and benadryl  Encourage staying awake during the day  Encourage daytime activity, morning exercise  Decrease or eliminate day time naps  Avoid caffeine especially during late afternoon and evening hours  Establish a night time routine Urinary incontinence  Pt on oxybutynin for years, continues to use pads/depends  Do not recommend oxybutynin secondary to side effect of orthostasis, and memory loss  States was unable to tolerate myrbetiq due to side effect of elevated BP  Symptoms unimproved with kegel exercises  Recommend follow up with urogynecology as outpatient for alternative treatments  ambulatory referral placed on discharge     Subjective:   Upon exam pt is lying in bed  She is alert and oriented x 3  She reports she is not feeling well today, she is cold and nauseous  Objective:     Vitals: Blood pressure 144/77, pulse 68, temperature (!) 97 °F (36 1 °C), resp  rate 18, height 5' 2" (1 575 m), weight 50 8 kg (112 lb), SpO2 94 %, not currently breastfeeding  ,Body mass index is 20 49 kg/m²  Intake/Output Summary (Last 24 hours) at 9/15/2022 1136  Last data filed at 9/15/2022 1134  Gross per 24 hour   Intake 240 ml   Output 900 ml   Net -660 ml       Physical Exam:  General : NAD  HEENT : MMM   Heart : Normal rate, no murmur rub or gallop  Lungs : CTA no wheezes, rales or rhonchi  Abdomen : Soft, NT/ND, BS auscultated in all 4 quads  Ext :  no edema, ecchymosis right eye  Skin : Pink, warm, dry, age appropriate turgor and mobility  Neuro : Nonfocal  Psych : Alert and O x 3       Invasive Devices  Report    Peripheral Intravenous Line  Duration           Peripheral IV 09/13/22 Dorsal (posterior); Left Hand 1 day                Lab, Imaging and other studies: I have personally reviewed pertinent reports      VTE Pharmacologic Prophylaxis: Sequential compression device (Venodyne)   VTE Mechanical Prophylaxis: sequential compression device

## 2022-09-15 NOTE — PROGRESS NOTES
Progress Note - Cardiology Team 1  Katy March 80 y o  female MRN: [id number]  Unit/Bed#: [facility] 832-01 Encounter: [id number]        Principal Problem:    Subdural hematoma, post-traumatic ([address] )  Active Problems:    Benign essential hypertension    Hyponatremia    Hypothyroidism    Fall    Laceration of brow without complication    Syncope    Visual changes        Assessment     1  Syncope  No prior syncope  No prodrome  Suspected brief LOC  Traumatic injury-SDH  HS troponin 6/34/38  EKG/telemetry- remains unremarkable  History normal LV function, no aortic stenosis  Current TTE - normal LVEF, no significant AS  She is maintained on a fluid restriction for her chronic hyponatremia-drinks no more than 1/2 L of fluid a day      2  SDH  Neurosurgery evaluation- AP on hold 2 weeks until re evaluated by neurosurgery  No intervention at this time    Today - c o headache  Ongoing mild "dizziness"     3  Hypertension-she presented hypertensive  Most recent regimen at home has been carvedilol 6 25 mg in the morning with 12 5 mg in the evening  According to the daughter her blood pressure runs about 120-130  Dosing was changed to 6 25mg BID       4  Chronic hyponatremia-she is maintaining a fluid restriction 1500 cc  [address] [facility]  [address]     PLAN  1  Coreg decreased to 6 25mg BID  She is hypertensive this am  Suspect we will need to go back to original dosing - 6 25mg AM and 12 5mg PM    2  Orthostatics pending  3  2 week live zio patch- office will arrange  If unremarkable consider loop recorder  4  Maintain adequate hydration within the limits of your fluid restriction       Subjective/Objective   Chief Complaint/Subjective  Patient c o frontal headache  Feels lousy  Still feeling "dizzy"  Worse OOB   No cp sob         Vitals: BP (!) 182/88 (BP Location: Right arm)   Pulse 60   Temp 97 9 °F (36 6 °C)   Resp 20   Ht 5' 2" (1 575 m)   Wt 50 8 kg (112 lb)   SpO2 94%   BMI 20 49 kg/m²     Vitals:    09/13/22 1923 09/14/22 1459   Weight: 51 kg (112 lb 7 oz) 50 8 kg (112 lb)     Orthostatic Blood Pressures    Flowsheet Row Most Recent Value   Blood Pressure 182/88 filed at 09/15/2022 0847   Patient Position - Orthostatic VS Lying filed at 09/14/2022 0242            Intake/Output Summary (Last 24 hours) at 9/15/2022 0933  Last data filed at 9/15/2022 0515  Gross per 24 hour   Intake --   Output 650 ml   Net -650 ml       Invasive Devices  Report    Peripheral Intravenous Line  Duration           Peripheral IV 09/13/22 Dorsal (posterior); Left Hand 1 day                Current Facility-Administered Medications   Medication Dose Route Frequency    acetaminophen (TYLENOL) tablet 975 mg  975 mg Oral Q6H [facility]    brimonidine tartrate 0 2 % ophthalmic solution 1 drop  1 drop Both Eyes BID    carvedilol (COREG) tablet 6 25 mg  6 25 mg Oral QAM    carvedilol (COREG) tablet 6 25 mg  6 25 mg Oral HS    diphtheria-tetanus-acellular pertussis (INFANRIX) IM injection 0 5 mL  0 5 mL Intramuscular Prior to discharge    famotidine (PEPCID) tablet 20 mg  20 mg Oral BID    levETIRAcetam (KEPPRA) tablet 500 mg  500 mg Oral Q12H [facility]    levothyroxine tablet 25 mcg  25 mcg Oral Daily    ondansetron (ZOFRAN) injection 4 mg  4 mg Intravenous Q6H PRN    oxybutynin (DITROPAN-XL) 24 hr tablet 5 mg  5 mg Oral After Dinner    pravastatin (PRAVACHOL) tablet 20 mg  20 mg Oral Daily With Dinner         Physical Exam: BP (!) 182/88 (BP Location: Right arm)   Pulse 60   Temp 97 9 °F (36 6 °C)   Resp 20   Ht 5' 2" (1 575 m)   Wt 50 8 kg (112 lb)   SpO2 94%   BMI 20 49 kg/m²     General Appearance:    Alert, cooperative, no distress, appears stated age   Head:    Normocephalic, no scleral icterus       Nose:   Nares normal, septum midline, no drainage    Throat:   Lips, mucosa, and tongue normal   Neck:   Supple, symmetrical, trachea midline,             Lungs:     Clear to auscultation bilaterally, respirations unlabored        Heart:    Regular rate and rhythm, S1 and S2 normal, no murmur, rub   or gallop       Extremities:   Extremities normal, atraumatic, no cyanosis or edema       Skin:   Skin -facial bruising   Neurologic:   Alert and oriented to person place and time, no obvious focal deficits                 Lab Results:   Recent Results (from the past 72 hour(s))   ECG 12 lead    Collection Time: 09/13/22  3:33 PM   Result Value Ref Range    Ventricular Rate 81 BPM    Atrial Rate 81 BPM    OK Interval 148 ms    QRSD Interval 94 ms    QT Interval 342 ms    QTC Interval 397 ms    P [place] 32 degrees    QRS Axis 40 degrees    T Wave Axis 39 degrees   CBC and differential    Collection Time: 09/13/22  3:35 PM   Result Value Ref Range    WBC 19 39 (H) 4 31 - 10 16 Thousand/uL    RBC 4 04 3 81 - 5 12 Million/uL    Hemoglobin 11 5 11 5 - 15 4 g/dL    Hematocrit 35 6 34 8 - 46 1 %    MCV 88 82 - 98 fL    MCH 28 5 26 8 - 34 3 pg    MCHC 32 3 31 4 - 37 4 g/dL    RDW 14 9 11 6 - 15 1 %    MPV 9 8 8 9 - 12 7 fL    Platelets 813 389 - 553 Thousands/uL   Comprehensive metabolic panel    Collection Time: 09/13/22  3:35 PM   Result Value Ref Range    Sodium 133 (L) 135 - 147 mmol/L    Potassium 3 4 (L) 3 5 - 5 3 mmol/L    Chloride 103 96 - 108 mmol/L    CO2 25 21 - 32 mmol/L    ANION GAP 5 4 - 13 mmol/L    BUN 11 5 - 25 mg/dL    Creatinine 0 51 (L) 0 60 - 1 30 mg/dL    Glucose 100 65 - 140 mg/dL    Calcium 8 6 8 3 - 10 1 mg/dL    Corrected Calcium 9 6 8 3 - 10 1 mg/dL    AST 17 5 - 45 U/L    ALT 19 12 - 78 U/L    Alkaline Phosphatase 58 46 - 116 U/L    Total Protein 6 4 6 4 - 8 4 g/dL    Albumin 2 8 (L) 3 5 - 5 0 g/dL    Total Bilirubin 0 48 0 20 - 1 00 mg/dL    eGFR 86 ml/min/1 73sq m   HS Troponin 0hr (reflex protocol)    Collection Time: 09/13/22  3:35 PM   Result Value Ref Range    hs TnI 0hr 6 "Refer to ACS Flowchart"- see link ng/L   Protime-INR    Collection Time: 09/13/22  3:35 PM   Result Value Ref Range    Protime 14 0 11 6 - 14 5 seconds    INR 1 06 0 84 - 1  19   APTT    Collection Time: 09/13/22  3:35 PM   Result Value Ref Range    PTT 26 23 - 37 seconds   Manual Differential(PHLEBS Do Not Order)    Collection Time: 09/13/22  3:35 PM   Result Value Ref Range    Segmented % 45 43 - 75 %    Lymphocytes % 50 (H) 14 - 44 %    Monocytes % 5 4 - 12 %    Eosinophils, % 0 0 - 6 %    Basophils % 0 0 - 1 %    Absolute Neutrophils 8 73 (H) 1 85 - 7 62 Thousand/uL    Lymphocytes Absolute 9 70 (H) 0 60 - 4 47 Thousand/uL    Monocytes Absolute 0 97 0 00 - 1 22 Thousand/uL    Eosinophils Absolute 0 00 0 00 - 0 40 Thousand/uL    Basophils Absolute 0 00 0 00 - 0 10 Thousand/uL    Total Counted      Platelet Estimate Adequate Adequate   UA w Reflex to Microscopic w Reflex to Culture    Collection Time: 09/13/22  3:37 PM    Specimen: Urine, Clean Catch   Result Value Ref Range    Color, UA Colorless     Clarity, UA Clear     Specific Gravity, UA 1 008 1 003 - 1 030    pH, UA 8 0 4 5, 5 0, 5 5, 6 0, 6 5, 7 0, 7 5, 8 0    Leukocytes, UA Small (A) Negative    Nitrite, UA Negative Negative    Protein, UA Negative Negative mg/dl    Glucose, UA Negative Negative mg/dl    Ketones, UA Negative Negative mg/dl    Urobilinogen, UA <2 0 <2 0 mg/dl mg/dl    Bilirubin, UA Negative Negative    Occult Blood, UA Small (A) Negative   Urine Microscopic    Collection Time: 09/13/22  3:37 PM   Result Value Ref Range    RBC, UA 4-10 (A) None Seen, 1-2 /hpf    WBC, UA 4-10 (A) None Seen, 1-2 /hpf    Epithelial Cells Occasional None Seen, Occasional /hpf    Bacteria, UA Occasional None Seen, Occasional /hpf   HS Troponin I 2hr    Collection Time: 09/13/22  5:41 PM   Result Value Ref Range    hs TnI 2hr 34 "Refer to ACS Flowchart"- see link ng/L    Delta 2hr hsTnI 28 (H) <20 ng/L   Type and Screen    Collection Time: 09/13/22  6:03 PM   Result Value Ref Range    ABO Grouping A     Rh Factor Positive     Antibody Screen Negative     Specimen Expiration Date [id number]    HS Troponin I 4hr    Collection Time: 09/13/22  8:19 PM   Result Value Ref Range    hs TnI 4hr 38 "Refer to ACS Flowchart"- see link ng/L    Delta 4hr hsTnI 32 (H) <20 ng/L   Basic metabolic panel    Collection Time: 09/14/22  5:28 AM   Result Value Ref Range    Sodium 133 (L) 135 - 147 mmol/L    Potassium 3 5 3 5 - 5 3 mmol/L    Chloride 103 96 - 108 mmol/L    CO2 27 21 - 32 mmol/L    ANION GAP 3 (L) 4 - 13 mmol/L    BUN 13 5 - 25 mg/dL    Creatinine 0 49 (L) 0 60 - 1 30 mg/dL    Glucose 99 65 - 140 mg/dL    Calcium 8 7 8 3 - 10 1 mg/dL    eGFR 87 ml/min/1 73sq m   CBC and differential    Collection Time: 09/14/22  5:28 AM   Result Value Ref Range    WBC 16 78 (H) 4 31 - 10 16 Thousand/uL    RBC 3 50 (L) 3 81 - 5 12 Million/uL    Hemoglobin 9 9 (L) 11 5 - 15 4 g/dL    Hematocrit 31 2 (L) 34 8 - 46 1 %    MCV 89 82 - 98 fL    MCH 28 3 26 8 - 34 3 pg    MCHC 31 7 31 4 - 37 4 g/dL    RDW 15 0 11 6 - 15 1 %    MPV 9 8 8 9 - 12 7 fL    Platelets 637 102 - 989 Thousands/uL   Echo complete w/ contrast if indicated    Collection Time: 09/14/22  2:59 PM   Result Value Ref Range    A4C EF 62 %    LVIDd 3 20 cm    LVIDS 2 10 cm    IVSd 1 40 cm    LVPWd 1 40 cm    FS 34 28 - 44    MV E' Tissue Velocity Septal 8 cm/s    E wave deceleration time 154 ms    MV Peak E Luis Fernando 66 cm/s    MV Peak A Luis Fernando 0 55 m/s    RVID d 3 6 cm    LA size 3 7 cm    LA length (A2C) 5 40 cm    ASHLEY A4C 16 2 cm2    RAA A4C 12 7 cm2    MV stenosis pressure 1/2 time 45 ms    MV valve area p 1/2 method 4 89     TR Peak Luis Fernando 2 6 m/s    Triscuspid Valve Regurgitation Peak Gradient 27 0 mmHg    Ao root 3 20 cm    Pulmonary regurgitation late diastolic velocity 8 41 m/s    Tricuspid valve peak regurgitation velocity 2 59 m/s    Left ventricular stroke volume (2D) 28 00 mL    IVS 1 4 cm    LEFT VENTRICLE SYSTOLIC VOLUME (MOD BIPLANE) 2D 14 mL    LV DIASTOLIC VOLUME (MOD BIPLANE) 2D 42 mL    Left Atrium Area-systolic Four Chamber 95 5 cm2    Left Atrium Area-systolic Apical Two Chamber 22 4 cm2    LVSV, 2D 28 mL    LV EF 60     Est  RA pres 5 0 mmHg    Right Ventricular Peak Systolic Pressure 97 00 mmHg   CBC    Collection Time: 09/15/22  5:01 AM   Result Value Ref Range    WBC 17 97 (H) 4 31 - 10 16 Thousand/uL    RBC 3 42 (L) 3 81 - 5 12 Million/uL    Hemoglobin 9 7 (L) 11 5 - 15 4 g/dL    Hematocrit 30 1 (L) 34 8 - 46 1 %    MCV 88 82 - 98 fL    MCH 28 4 26 8 - 34 3 pg    MCHC 32 2 31 4 - 37 4 g/dL    RDW 14 5 11 6 - 15 1 %    Platelets 493 087 - 110 Thousands/uL    MPV 10 1 8 9 - 12 7 fL   Basic metabolic panel    Collection Time: 09/15/22  5:01 AM   Result Value Ref Range    Sodium 128 (L) 135 - 147 mmol/L    Potassium 3 4 (L) 3 5 - 5 3 mmol/L    Chloride 96 96 - 108 mmol/L    CO2 26 21 - 32 mmol/L    ANION GAP 6 4 - 13 mmol/L    BUN 12 5 - 25 mg/dL    Creatinine 0 46 (L) 0 60 - 1 30 mg/dL    Glucose 97 65 - 140 mg/dL    Calcium 8 9 8 3 - 10 1 mg/dL    eGFR 89 ml/min/1 73sq m     Imaging: I have personally reviewed pertinent reports  Tele- nsr      Counseling / Coordination of Care  Total time spent today 25 minutes  Greater than 50% of total time was spent with the patient and / or family counseling and / or coordination of care

## 2022-09-15 NOTE — CASE MANAGEMENT
Case Management Discharge Planning Note    Patient name Zeferino Salinas  Location [address] 832/PPHP 358-94 MRN [id number]  : 1934 Date 9/15/2022       Current Admission Date: 2022  Current Admission Diagnosis:Subdural hematoma, post-traumatic [facility])   Patient Active Problem List    Diagnosis Date Noted    Fall 2022    Subdural hematoma, post-traumatic ([facility] [address] ) 2022    Laceration of brow without complication     Syncope 2022    Visual changes 2022    Pain, dental 2022    Lymphadenopathy 2022    Visual field defect 06/10/2022    Lyme disease 2021    Hypercalcemia 2020    MGUS (monoclonal gammopathy of unknown significance) 2020    Pericardial effusion 10/21/2020    Lackey's esophagus with dysplasia 2020    Lymphocytosis 2020    Nonexudative age-related macular degeneration, bilateral, early dry stage 2020    Posterior vitreous detachment of both eyes 2020    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 2020    Status post cataract extraction and insertion of intraocular lens 2020    Arthritis of left shoulder region 2019    Encounter for screening mammogram for malignant neoplasm of breast 2019    Need for immunization against influenza 2019    Endometrial cancer ([facility] ) 2019    Encounter for follow-up surveillance of endometrial cancer 2019    Arthritis 2018    History of endometrial cancer 2018    Age-related osteoporosis without current pathological fracture 2017    Constipation 2017    Duodenal diverticulum 2016    Esophagitis, reflux 2016    Benign essential hypertension 03/10/2016    Hyponatremia 03/10/2016    Hypothyroidism 03/10/2016    Abnormal breast exam 2015    Vertigo 2015    Atrophic vaginitis 2014    Dyslipidemia 10/19/2012    Depression with anxiety 08/10/2012    Diverticulosis 08/10/2012    Impaired fasting glucose 08/10/2012    Insomnia 08/10/2012    Urinary incontinence 08/10/2012      LOS (days): 2  Geometric Mean LOS (GMLOS) (days): 3 00  Days to GMLOS:1 1     OBJECTIVE:  Risk of Unplanned Readmission Score: 13 96         Current admission status: Inpatient   Preferred Pharmacy:   [id number]et[facility] - [address]  [address] [facility] [address]  Phone: [phone] Fax: [phone]    Primary Care Provider: Hanna Diallo MD    Primary Insurance: MEDICARE  Secondary Insurance: AAR    DISCHARGE DETAILS:                                [address]         Is the patient interested in [address] at discharge?: Yes  [address] [facility] requested[de-identified] Nursing, Occupational Therapy, Physical [address] Name[de-identified] [address] [facility] Provider[de-identified] PCP  Home Health Services Needed[de-identified] Evaluate Functional Status and Safety, Strengthening/Theraputic Exercises to Improve Function  Homebound Criteria Met[de-identified] Requires the Assistance of Another Person for Safe Ambulation or to Leave the Home  Supporting Clincal Findings[de-identified] Limited Endurance    DME Referral Provided  Referral made for DME?: No     A post acute care recommendation was made by your care team for [address]  Discussed Freedom of Choice with both patient and caregiver  List of agencies given to both patient and caregiver via in person  both patient and caregiver aware the list is custom filtered for them by zip code location and that [facility] post acute providers are designated  Request referral to  [facility]    Referral entered in [facility]

## 2022-09-15 NOTE — PLAN OF CARE
Problem: MOBILITY - ADULT  Goal: Maintain or return to baseline ADL function  Description: INTERVENTIONS:  -  Assess patient's ability to carry out ADLs; assess patient's baseline for ADL function and identify physical deficits which impact ability to perform ADLs (bathing, care of mouth/teeth, toileting, grooming, dressing, etc )  - Assess/evaluate cause of self-care deficits   - Assess range of motion  - Assess patient's mobility; develop plan if impaired  - Assess patient's need for assistive devices and provide as appropriate  - Encourage maximum independence but intervene and supervise when necessary  - Involve family in performance of ADLs  - Assess for home care needs following discharge   - Consider OT consult to assist with ADL evaluation and planning for discharge  - Provide patient education as appropriate  Outcome: Progressing  Goal: Maintains/Returns to pre admission functional level  Description: INTERVENTIONS:  - Perform BMAT or MOVE assessment daily    - Set and communicate daily mobility goal to care team and patient/family/caregiver     - Collaborate with rehabilitation services on mobility goals if consulted  - Ambulate patient 3 times a day  - Out of bed for toileting  - Record patient progress and toleration of activity level   Outcome: Progressing     Problem: Potential for Falls  Goal: Patient will remain free of falls  Description: INTERVENTIONS:  - Educate patient/family on patient safety including physical limitations  - Instruct patient to call for assistance with activity   - Consult OT/PT to assist with strengthening/mobility   - Keep Call bell within reach  - Keep bed low and locked with side rails adjusted as appropriate  - Keep care items and personal belongings within reach  - Initiate and maintain comfort rounds  - Make Fall Risk Sign visible to staff  - Initiate/Maintain BED alarm  - Apply yellow socks and bracelet for high fall risk patients  - Consider moving patient to room near nurses station  Outcome: Progressing     Problem: CARDIOVASCULAR - ADULT  Goal: Maintains optimal cardiac output and hemodynamic stability  Description: INTERVENTIONS:  - Monitor I/O, vital signs and rhythm  - Monitor for S/S and trends of decreased cardiac output  - Administer and titrate ordered vasoactive medications to optimize hemodynamic stability  - Assess quality of pulses, skin color and temperature  - Assess for signs of decreased coronary artery perfusion  - Instruct patient to report change in severity of symptoms  Outcome: Progressing  Goal: Absence of cardiac dysrhythmias or at baseline rhythm  Description: INTERVENTIONS:  - Continuous cardiac monitoring, vital signs, obtain 12 lead EKG if ordered  - Administer antiarrhythmic and heart rate control medications as ordered  - Monitor electrolytes and administer replacement therapy as ordered  Outcome: Progressing     Problem: METABOLIC, FLUID AND ELECTROLYTES - ADULT  Goal: Electrolytes maintained within normal limits  Description: INTERVENTIONS:  - Monitor labs and assess patient for signs and symptoms of electrolyte imbalances  - Administer electrolyte replacement as ordered  - Monitor response to electrolyte replacements, including repeat lab results as appropriate  - Instruct patient on fluid and nutrition as appropriate  Outcome: Progressing

## 2022-09-16 ENCOUNTER — APPOINTMENT (OUTPATIENT)
Dept: RADIOLOGY | Facility: HOSPITAL | Age: 87
DRG: 085 | End: 2022-09-16
Payer: MEDICARE

## 2022-09-16 LAB
AMORPH URATE CRY URNS QL MICRO: ABNORMAL
ANION GAP SERPL CALCULATED.3IONS-SCNC: 10 MMOL/L (ref 4–13)
ANION GAP SERPL CALCULATED.3IONS-SCNC: 6 MMOL/L (ref 4–13)
BACTERIA UR QL AUTO: ABNORMAL /HPF
BILIRUB UR QL STRIP: NEGATIVE
BUN SERPL-MCNC: 10 MG/DL (ref 5–25)
BUN SERPL-MCNC: 14 MG/DL (ref 5–25)
CALCIUM SERPL-MCNC: 9.4 MG/DL (ref 8.3–10.1)
CALCIUM SERPL-MCNC: 9.5 MG/DL (ref 8.3–10.1)
CHLORIDE SERPL-SCNC: 89 MMOL/L (ref 96–108)
CHLORIDE SERPL-SCNC: 89 MMOL/L (ref 96–108)
CLARITY UR: ABNORMAL
CO2 SERPL-SCNC: 25 MMOL/L (ref 21–32)
CO2 SERPL-SCNC: 28 MMOL/L (ref 21–32)
COLOR UR: COLORLESS
CREAT SERPL-MCNC: 0.61 MG/DL (ref 0.6–1.3)
CREAT SERPL-MCNC: 0.92 MG/DL (ref 0.6–1.3)
ERYTHROCYTE [DISTWIDTH] IN BLOOD BY AUTOMATED COUNT: 14.1 % (ref 11.6–15.1)
GFR SERPL CREATININE-BSD FRML MDRD: 55 ML/MIN/1.73SQ M
GFR SERPL CREATININE-BSD FRML MDRD: 81 ML/MIN/1.73SQ M
GLUCOSE SERPL-MCNC: 121 MG/DL (ref 65–140)
GLUCOSE SERPL-MCNC: 134 MG/DL (ref 65–140)
GLUCOSE UR STRIP-MCNC: NEGATIVE MG/DL
HCT VFR BLD AUTO: 35.3 % (ref 34.8–46.1)
HGB BLD-MCNC: 12 G/DL (ref 11.5–15.4)
HGB UR QL STRIP.AUTO: ABNORMAL
KETONES UR STRIP-MCNC: NEGATIVE MG/DL
LEUKOCYTE ESTERASE UR QL STRIP: ABNORMAL
MCH RBC QN AUTO: 28.4 PG (ref 26.8–34.3)
MCHC RBC AUTO-ENTMCNC: 34 G/DL (ref 31.4–37.4)
MCV RBC AUTO: 84 FL (ref 82–98)
NITRITE UR QL STRIP: NEGATIVE
NON-SQ EPI CELLS URNS QL MICRO: ABNORMAL /HPF
OSMOLALITY UR/SERPL-RTO: 261 MMOL/KG (ref 282–298)
OSMOLALITY UR: 262 MMOL/KG
PH UR STRIP.AUTO: 6.5 [PH]
PLATELET # BLD AUTO: 270 THOUSANDS/UL (ref 149–390)
PMV BLD AUTO: 9.1 FL (ref 8.9–12.7)
POTASSIUM SERPL-SCNC: 2.8 MMOL/L (ref 3.5–5.3)
POTASSIUM SERPL-SCNC: 4 MMOL/L (ref 3.5–5.3)
PROT UR STRIP-MCNC: NEGATIVE MG/DL
RBC # BLD AUTO: 4.23 MILLION/UL (ref 3.81–5.12)
RBC #/AREA URNS AUTO: ABNORMAL /HPF
SODIUM 24H UR-SCNC: 9 MOL/L
SODIUM SERPL-SCNC: 123 MMOL/L (ref 135–147)
SODIUM SERPL-SCNC: 124 MMOL/L (ref 135–147)
SP GR UR STRIP.AUTO: 1.01 (ref 1–1.03)
UROBILINOGEN UR STRIP-ACNC: <2 MG/DL
WBC # BLD AUTO: 22.15 THOUSAND/UL (ref 4.31–10.16)
WBC #/AREA URNS AUTO: ABNORMAL /HPF

## 2022-09-16 PROCEDURE — 80048 BASIC METABOLIC PNL TOTAL CA: CPT | Performed by: SURGERY

## 2022-09-16 PROCEDURE — 83930 ASSAY OF BLOOD OSMOLALITY: CPT | Performed by: SURGERY

## 2022-09-16 PROCEDURE — NC001 PR NO CHARGE: Performed by: EMERGENCY MEDICINE

## 2022-09-16 PROCEDURE — 87186 SC STD MICRODIL/AGAR DIL: CPT | Performed by: SURGERY

## 2022-09-16 PROCEDURE — 99232 SBSQ HOSP IP/OBS MODERATE 35: CPT | Performed by: EMERGENCY MEDICINE

## 2022-09-16 PROCEDURE — 87077 CULTURE AEROBIC IDENTIFY: CPT | Performed by: SURGERY

## 2022-09-16 PROCEDURE — 81001 URINALYSIS AUTO W/SCOPE: CPT | Performed by: SURGERY

## 2022-09-16 PROCEDURE — 87086 URINE CULTURE/COLONY COUNT: CPT | Performed by: SURGERY

## 2022-09-16 PROCEDURE — 83935 ASSAY OF URINE OSMOLALITY: CPT | Performed by: SURGERY

## 2022-09-16 PROCEDURE — 85027 COMPLETE CBC AUTOMATED: CPT

## 2022-09-16 PROCEDURE — 84300 ASSAY OF URINE SODIUM: CPT | Performed by: SURGERY

## 2022-09-16 PROCEDURE — 71045 X-RAY EXAM CHEST 1 VIEW: CPT

## 2022-09-16 PROCEDURE — 99232 SBSQ HOSP IP/OBS MODERATE 35: CPT | Performed by: INTERNAL MEDICINE

## 2022-09-16 PROCEDURE — 80048 BASIC METABOLIC PNL TOTAL CA: CPT

## 2022-09-16 RX ORDER — POTASSIUM CHLORIDE 14.9 MG/ML
20 INJECTION INTRAVENOUS
Status: COMPLETED | OUTPATIENT
Start: 2022-09-16 | End: 2022-09-16

## 2022-09-16 RX ORDER — POTASSIUM CHLORIDE 20 MEQ/1
40 TABLET, EXTENDED RELEASE ORAL ONCE
Status: COMPLETED | OUTPATIENT
Start: 2022-09-16 | End: 2022-09-16

## 2022-09-16 RX ORDER — HEPARIN SODIUM 5000 [USP'U]/ML
5000 INJECTION, SOLUTION INTRAVENOUS; SUBCUTANEOUS EVERY 8 HOURS SCHEDULED
Status: DISCONTINUED | OUTPATIENT
Start: 2022-09-16 | End: 2022-09-17 | Stop reason: HOSPADM

## 2022-09-16 RX ORDER — SODIUM CHLORIDE 9 MG/ML
50 INJECTION, SOLUTION INTRAVENOUS CONTINUOUS
Status: DISCONTINUED | OUTPATIENT
Start: 2022-09-16 | End: 2022-09-17 | Stop reason: HOSPADM

## 2022-09-16 RX ORDER — CEPHALEXIN 500 MG/1
500 CAPSULE ORAL EVERY 6 HOURS SCHEDULED
Status: DISCONTINUED | OUTPATIENT
Start: 2022-09-16 | End: 2022-09-17 | Stop reason: HOSPADM

## 2022-09-16 RX ORDER — METOCLOPRAMIDE HYDROCHLORIDE 5 MG/ML
10 INJECTION INTRAMUSCULAR; INTRAVENOUS EVERY 6 HOURS PRN
Status: DISCONTINUED | OUTPATIENT
Start: 2022-09-16 | End: 2022-09-17 | Stop reason: HOSPADM

## 2022-09-16 RX ORDER — CARVEDILOL 12.5 MG/1
12.5 TABLET ORAL
Status: DISCONTINUED | OUTPATIENT
Start: 2022-09-16 | End: 2022-09-17 | Stop reason: HOSPADM

## 2022-09-16 RX ADMIN — ACETAMINOPHEN 975 MG: 325 TABLET ORAL at 05:24

## 2022-09-16 RX ADMIN — CEPHALEXIN 500 MG: 500 CAPSULE ORAL at 17:51

## 2022-09-16 RX ADMIN — ACETAMINOPHEN 975 MG: 325 TABLET ORAL at 12:11

## 2022-09-16 RX ADMIN — PRAVASTATIN SODIUM 20 MG: 20 TABLET ORAL at 17:36

## 2022-09-16 RX ADMIN — SODIUM CHLORIDE 500 ML: 0.9 INJECTION, SOLUTION INTRAVENOUS at 12:08

## 2022-09-16 RX ADMIN — LEVETIRACETAM 500 MG: 500 TABLET, FILM COATED ORAL at 09:21

## 2022-09-16 RX ADMIN — LEVETIRACETAM 500 MG: 500 TABLET, FILM COATED ORAL at 20:59

## 2022-09-16 RX ADMIN — LEVOTHYROXINE SODIUM 25 MCG: 25 TABLET ORAL at 20:59

## 2022-09-16 RX ADMIN — SODIUM CHLORIDE 50 ML/HR: 0.9 INJECTION, SOLUTION INTRAVENOUS at 14:48

## 2022-09-16 RX ADMIN — FAMOTIDINE 20 MG: 20 TABLET, FILM COATED ORAL at 09:21

## 2022-09-16 RX ADMIN — CARVEDILOL 6.25 MG: 6.25 TABLET, FILM COATED ORAL at 09:21

## 2022-09-16 RX ADMIN — HEPARIN SODIUM 5000 UNITS: 5000 INJECTION INTRAVENOUS; SUBCUTANEOUS at 21:09

## 2022-09-16 RX ADMIN — POTASSIUM CHLORIDE 20 MEQ: 14.9 INJECTION, SOLUTION INTRAVENOUS at 12:06

## 2022-09-16 RX ADMIN — OXYBUTYNIN 5 MG: 5 TABLET, FILM COATED, EXTENDED RELEASE ORAL at 17:51

## 2022-09-16 RX ADMIN — CEPHALEXIN 500 MG: 500 CAPSULE ORAL at 23:00

## 2022-09-16 RX ADMIN — ACETAMINOPHEN 975 MG: 325 TABLET ORAL at 17:53

## 2022-09-16 RX ADMIN — POTASSIUM CHLORIDE 40 MEQ: 1500 TABLET, EXTENDED RELEASE ORAL at 12:12

## 2022-09-16 RX ADMIN — BRIMONIDINE TARTRATE 1 DROP: 2 SOLUTION OPHTHALMIC at 20:59

## 2022-09-16 RX ADMIN — POTASSIUM CHLORIDE 20 MEQ: 14.9 INJECTION, SOLUTION INTRAVENOUS at 14:07

## 2022-09-16 RX ADMIN — CARVEDILOL 12.5 MG: 12.5 TABLET, FILM COATED ORAL at 21:02

## 2022-09-16 RX ADMIN — BRIMONIDINE TARTRATE 1 DROP: 2 SOLUTION OPHTHALMIC at 09:21

## 2022-09-16 RX ADMIN — ACETAMINOPHEN 975 MG: 325 TABLET ORAL at 23:00

## 2022-09-16 RX ADMIN — FAMOTIDINE 20 MG: 20 TABLET, FILM COATED ORAL at 17:36

## 2022-09-16 NOTE — PROGRESS NOTES
[address] [facility]  Progress Note - Heather Race 1934, 80 y o  female MRN: [id number]  Unit/Bed#: [facility] 832-01 Encounter: [id number]  Primary Care Provider: Alannah Will MD   Date and time admitted to hospital: 9/13/2022  3:15 PM    Visual changes  Assessment & Plan  - Patient reports intact of visual acuity, but notes some double vision, particularly in her right eye  She notes that her right eye is her good eye and she has had multiple issues with her left eye in the past with chronic blurry vision that appears unchanged from baseline  She does report her right eye double vision has been improving   - Continue to monitor conservatively for now, repeat [facility] 9/15 without changes, vision without changes this morning  - If visual changes worsen or persist, may consider inpatient ophthalmology consult  Otherwise, plan for outpatient follow-up with primary ophthalmologist for further evaluation in the next week  Syncope  Assessment & Plan  - Suspected syncope proceeding fall  - echocardiogram with EF 67%, grade 2 diastolic dysfunction  EKG reviewed with normal sinus rhythm and no significant abnormalities; telemetry also reviewed without significant event thus far  Troponins x3 without significant elevation and patient reports no palpitations, chest pain, shortness of breath or difficulty breathing  Cardiac event not suspected at this time  - Obtain orthostatic vital signs   - Cardiology recs - adjusting coreg dosing  - Encourage adequate oral intake and adequate hydration     - Fall precautions   - Recommend short-term outpatient follow-up with PCP for further evaluation  Laceration of brow without complication  Assessment & Plan  - Right forehead/periorbital laceration lateral to the right eyebrow status post suture repair bedside   - Local wound care as indicated  - Analgesia as needed  - Outpatient follow-up for suture removal in 5-7 days      Fall  Assessment & Plan  - Status post fall in kitchen with suspected syncope and with the below noted injuries  - Fall precautions  - Geriatric Medicine consultation for evaluation, medication review and recommendations  - Syncope workup as above  - PT and OT evaluation and treatment as indicated  - Case Management consultation for disposition planning  Hypothyroidism  Assessment & Plan  - Chronic history of hypothyroidism   - Continue home medication regimen   - Outpatient follow-up per routine  Hyponatremia  Assessment & Plan  - Hyponatremia, present on presentation, with sodium level 133  Patient has chronic history of hyponatremia and is typically on a 1500 mL fluid restriction daily at baseline   - Encourage adequate oral intake/hydration   - Repleted with NS, K also repleted  Trend PM BMP   - Outpatient follow-up with PCP  Benign essential hypertension  Assessment & Plan  - Patient with chronic history of hypertension   - Continue home medication regimen as appropriate  Orthostatic vital signs pending; may need to consider adjustment of blood pressure medication if patient has evidence of orthostasis  - increasing coreg per cardiology   - Outpatient follow-up per routine  * Subdural hematoma, post-traumatic (HCC)  Assessment & Plan  - Traumatic brain injury with SDH, present on admission   - Neurosurgery evaluation and recommendations appreciated  - Hold anti-platelet and anticoagulant medications for least 2 weeks and/or until cleared by Neurosurgery  Plan to initiate chemical DVT prophylaxis with subcutaneous heparin when cleared by Neurosurgery  - Continue Keppra for 7 days for seizure prophylaxis  - Monitor neurologic exam, no focal deficits  - repeat CTH 9/15 stable, HA improved  - Continue symptomatic management with analgesia as needed  - PT and OT evaluation and treatment as indicated    - Outpatient Neurosurgery follow-up in 2 weeks with repeat CT scan of the head prior to follow-up appointment  Disposition:  Continue inpatient treatment    SUBJECTIVE:  Chief Complaint: SDH    Subjective: This morning patient reports feeling greatly improved compared to yesterday  She still has a mild headache but reports it is significantly less than yesterday  She denies new vision changes, or weakness or numbness  She denies chest pain, shortness of breath, nausea, or abdominal pain  OBJECTIVE:   Vitals:   Temp:  [98 1 °F (36 7 °C)-99 °F (37 2 °C)] 98 4 °F (36 9 °C)  HR:  [61-77] 62  Resp:  [16-17] 16  BP: (121-166)/(59-84) 166/84    Intake/Output:  I/O       09/14 0701  09/15 0700 09/15 0701 09/16 0700 09/16 0701 09/17 0700    P  O   240 240    IV Piggyback   100    Total Intake(mL/kg)  240 (4 7) 340 (6 7)    Urine (mL/kg/hr) 650 (0 5) 1200 (1) 100 (0 2)    Total Output 650 1200 100    Net -650 -960 +240                Nutrition: Diet Regular; Regular House; Fluid Restriction 1500 ML  GI Proph/Bowel Reg:  Pepcid  VTE Prophylaxis:Heparin     Physical Exam:   GENERAL APPEARANCE:  No acute distress  NEURO:  Awake and alert, motor strength and sensation intact and symmetric, cranial nerves intact  HEENT:  Ecchymosis along right face  CV:  Regular rate rhythm  LUNGS:  Clear to auscultation bilaterally  GI:  Nondistended, nontender to palpation  MSK:  No lower extremity edema  SKIN:  Warm and dry    Invasive Devices  Report    Peripheral Intravenous Line  Duration           Peripheral IV 09/13/22 Dorsal (posterior); Left Hand 3 days                      Lab Results: Results: I have personally reviewed all pertinent laboratory/tests results  Imaging/EKG Studies: I have personally reviewed pertinent reports

## 2022-09-16 NOTE — ASSESSMENT & PLAN NOTE
- Traumatic brain injury with SDH, present on admission   - Neurosurgery evaluation and recommendations appreciated  - Hold anti-platelet and anticoagulant medications for least 2 weeks and/or until cleared by Neurosurgery  Plan to initiate chemical DVT prophylaxis with subcutaneous heparin when cleared by Neurosurgery  - Continue Keppra for 7 days for seizure prophylaxis  - Monitor neurologic exam, no focal deficits  - repeat CTH 9/15 stable, HA improved  - Continue symptomatic management with analgesia as needed  - PT and OT evaluation and treatment as indicated  - Outpatient Neurosurgery follow-up in 2 weeks with repeat CT scan of the head prior to follow-up appointment

## 2022-09-16 NOTE — ASSESSMENT & PLAN NOTE
- Patient with chronic history of hypertension   - Continue home medication regimen as appropriate  Orthostatic vital signs pending; may need to consider adjustment of blood pressure medication if patient has evidence of orthostasis  - increasing coreg per cardiology   - Outpatient follow-up per routine

## 2022-09-16 NOTE — PLAN OF CARE
Problem: MOBILITY - ADULT  Goal: Maintain or return to baseline ADL function  Description: INTERVENTIONS:  -  Assess patient's ability to carry out ADLs; assess patient's baseline for ADL function and identify physical deficits which impact ability to perform ADLs (bathing, care of mouth/teeth, toileting, grooming, dressing, etc )  - Assess/evaluate cause of self-care deficits   - Assess range of motion  - Assess patient's mobility; develop plan if impaired  - Assess patient's need for assistive devices and provide as appropriate  - Encourage maximum independence but intervene and supervise when necessary  - Involve family in performance of ADLs  - Assess for home care needs following discharge   - Consider OT consult to assist with ADL evaluation and planning for discharge  - Provide patient education as appropriate  Outcome: Progressing  Goal: Maintains/Returns to pre admission functional level  Description: INTERVENTIONS:  - Perform BMAT or MOVE assessment daily    - Set and communicate daily mobility goal to care team and patient/family/caregiver     - Collaborate with rehabilitation services on mobility goals if consulted  - Ambulate patient 3 times a day  - Out of bed for toileting  - Record patient progress and toleration of activity level   Outcome: Progressing     Problem: Potential for Falls  Goal: Patient will remain free of falls  Description: INTERVENTIONS:  - Educate patient/family on patient safety including physical limitations  - Instruct patient to call for assistance with activity   - Consult OT/PT to assist with strengthening/mobility   - Keep Call bell within reach  - Keep bed low and locked with side rails adjusted as appropriate  - Keep care items and personal belongings within reach  - Initiate and maintain comfort rounds  - Make Fall Risk Sign visible to staff  - Initiate/Maintain BED alarm  - Apply yellow socks and bracelet for high fall risk patients  - Consider moving patient to room near nurses station  Outcome: Progressing     Problem: CARDIOVASCULAR - ADULT  Goal: Maintains optimal cardiac output and hemodynamic stability  Description: INTERVENTIONS:  - Monitor I/O, vital signs and rhythm  - Monitor for S/S and trends of decreased cardiac output  - Administer and titrate ordered vasoactive medications to optimize hemodynamic stability  - Assess quality of pulses, skin color and temperature  - Assess for signs of decreased coronary artery perfusion  - Instruct patient to report change in severity of symptoms  Outcome: Progressing  Goal: Absence of cardiac dysrhythmias or at baseline rhythm  Description: INTERVENTIONS:  - Continuous cardiac monitoring, vital signs, obtain 12 lead EKG if ordered  - Administer antiarrhythmic and heart rate control medications as ordered  - Monitor electrolytes and administer replacement therapy as ordered  Outcome: Progressing     Problem: METABOLIC, FLUID AND ELECTROLYTES - ADULT  Goal: Electrolytes maintained within normal limits  Description: INTERVENTIONS:  - Monitor labs and assess patient for signs and symptoms of electrolyte imbalances  - Administer electrolyte replacement as ordered  - Monitor response to electrolyte replacements, including repeat lab results as appropriate  - Instruct patient on fluid and nutrition as appropriate  Outcome: Progressing     Problem: Nutrition/Hydration-ADULT  Goal: Nutrient/Hydration intake appropriate for improving, restoring or maintaining nutritional needs  Description: Monitor and assess patient's nutrition/hydration status for malnutrition  Collaborate with interdisciplinary team and initiate plan and interventions as ordered  Monitor patient's weight and dietary intake as ordered or per policy  Utilize nutrition screening tool and intervene as necessary  Determine patient's food preferences and provide high-protein, high-caloric foods as appropriate       INTERVENTIONS:  - Monitor oral intake, urinary output, labs, and treatment plans  - Assess nutrition and hydration status and recommend course of action  - Evaluate amount of meals eaten  - Assist patient with eating if necessary   - Allow adequate time for meals  - Recommend/ encourage appropriate diets, oral nutritional supplements, and vitamin/mineral supplements  - Order, calculate, and assess calorie counts as needed  - Recommend, monitor, and adjust tube feedings and TPN/PPN based on assessed needs  - Assess need for intravenous fluids  - Provide specific nutrition/hydration education as appropriate  - Include patient/family/caregiver in decisions related to nutrition  Outcome: Progressing

## 2022-09-16 NOTE — ASSESSMENT & PLAN NOTE
- Patient reports intact of visual acuity, but notes some double vision, particularly in her right eye  She notes that her right eye is her good eye and she has had multiple issues with her left eye in the past with chronic blurry vision that appears unchanged from baseline  She does report her right eye double vision has been improving   - Continue to monitor conservatively for now, repeat [facility] 9/15 without changes, vision without changes this morning  - If visual changes worsen or persist, may consider inpatient ophthalmology consult  Otherwise, plan for outpatient follow-up with primary ophthalmologist for further evaluation in the next week

## 2022-09-16 NOTE — RESTORATIVE TECHNICIAN NOTE
Restorative Technician Note      Patient Name: Sky Lo     Restorative Tech Visit Date: 9/16/2022  Note Type: Mobility  Patient Position Upon Consult: Seated edge of bed  Activity Performed: Ambulated  Assistive Device: Standard walker  Patient Position at End of Consult: Bedside chair;  All needs within reach    Liat CHANDLERT, Restorative Technician

## 2022-09-16 NOTE — ASSESSMENT & PLAN NOTE
- Hyponatremia, present on presentation, with sodium level 133  Patient has chronic history of hyponatremia and is typically on a 1500 mL fluid restriction daily at baseline   - Encourage adequate oral intake/hydration   - Repleted with NS, K also repleted  Trend PM BMP   - Outpatient follow-up with PCP

## 2022-09-16 NOTE — ASSESSMENT & PLAN NOTE
- Suspected syncope proceeding fall  - echocardiogram with EF 55%, grade 2 diastolic dysfunction  EKG reviewed with normal sinus rhythm and no significant abnormalities; telemetry also reviewed without significant event thus far  Troponins x3 without significant elevation and patient reports no palpitations, chest pain, shortness of breath or difficulty breathing  Cardiac event not suspected at this time  - Obtain orthostatic vital signs   - Cardiology recs - adjusting coreg dosing  - Encourage adequate oral intake and adequate hydration     - Fall precautions   - Recommend short-term outpatient follow-up with PCP for further evaluation

## 2022-09-17 VITALS
WEIGHT: 112 LBS | DIASTOLIC BLOOD PRESSURE: 59 MMHG | HEIGHT: 62 IN | HEART RATE: 66 BPM | OXYGEN SATURATION: 93 % | RESPIRATION RATE: 18 BRPM | TEMPERATURE: 98.8 F | BODY MASS INDEX: 20.61 KG/M2 | SYSTOLIC BLOOD PRESSURE: 120 MMHG

## 2022-09-17 LAB
ANION GAP SERPL CALCULATED.3IONS-SCNC: 3 MMOL/L (ref 4–13)
ANION GAP SERPL CALCULATED.3IONS-SCNC: 6 MMOL/L (ref 4–13)
ANISOCYTOSIS BLD QL SMEAR: PRESENT
BASOPHILS # BLD MANUAL: 0 THOUSAND/UL (ref 0–0.1)
BASOPHILS NFR MAR MANUAL: 0 % (ref 0–1)
BUN SERPL-MCNC: 12 MG/DL (ref 5–25)
BUN SERPL-MCNC: 14 MG/DL (ref 5–25)
CALCIUM SERPL-MCNC: 9.3 MG/DL (ref 8.3–10.1)
CALCIUM SERPL-MCNC: 9.7 MG/DL (ref 8.3–10.1)
CHLORIDE SERPL-SCNC: 101 MMOL/L (ref 96–108)
CHLORIDE SERPL-SCNC: 96 MMOL/L (ref 96–108)
CO2 SERPL-SCNC: 26 MMOL/L (ref 21–32)
CO2 SERPL-SCNC: 27 MMOL/L (ref 21–32)
CREAT SERPL-MCNC: 0.6 MG/DL (ref 0.6–1.3)
CREAT SERPL-MCNC: 0.62 MG/DL (ref 0.6–1.3)
EOSINOPHIL # BLD MANUAL: 0 THOUSAND/UL (ref 0–0.4)
EOSINOPHIL NFR BLD MANUAL: 0 % (ref 0–6)
ERYTHROCYTE [DISTWIDTH] IN BLOOD BY AUTOMATED COUNT: 14.4 % (ref 11.6–15.1)
GFR SERPL CREATININE-BSD FRML MDRD: 80 ML/MIN/1.73SQ M
GFR SERPL CREATININE-BSD FRML MDRD: 81 ML/MIN/1.73SQ M
GLUCOSE SERPL-MCNC: 104 MG/DL (ref 65–140)
GLUCOSE SERPL-MCNC: 107 MG/DL (ref 65–140)
HCT VFR BLD AUTO: 32.6 % (ref 34.8–46.1)
HGB BLD-MCNC: 11 G/DL (ref 11.5–15.4)
LYMPHOCYTES # BLD AUTO: 13.93 THOUSAND/UL (ref 0.6–4.47)
LYMPHOCYTES # BLD AUTO: 62 % (ref 14–44)
MACROCYTES BLD QL AUTO: PRESENT
MCH RBC QN AUTO: 28.7 PG (ref 26.8–34.3)
MCHC RBC AUTO-ENTMCNC: 33.7 G/DL (ref 31.4–37.4)
MCV RBC AUTO: 85 FL (ref 82–98)
MONOCYTES # BLD AUTO: 0.45 THOUSAND/UL (ref 0–1.22)
MONOCYTES NFR BLD: 2 % (ref 4–12)
NEUTROPHILS # BLD MANUAL: 5.39 THOUSAND/UL (ref 1.85–7.62)
NEUTS SEG NFR BLD AUTO: 24 % (ref 43–75)
PATHOLOGIST INTERPRETATION: NORMAL
PATHOLOGY REVIEW: YES
PLATELET # BLD AUTO: 261 THOUSANDS/UL (ref 149–390)
PLATELET BLD QL SMEAR: ADEQUATE
PMV BLD AUTO: 9.4 FL (ref 8.9–12.7)
POLYCHROMASIA BLD QL SMEAR: PRESENT
POTASSIUM SERPL-SCNC: 3.9 MMOL/L (ref 3.5–5.3)
POTASSIUM SERPL-SCNC: 4 MMOL/L (ref 3.5–5.3)
RBC # BLD AUTO: 3.83 MILLION/UL (ref 3.81–5.12)
SMUDGE CELLS BLD QL SMEAR: PRESENT
SODIUM SERPL-SCNC: 128 MMOL/L (ref 135–147)
SODIUM SERPL-SCNC: 131 MMOL/L (ref 135–147)
VARIANT LYMPHS # BLD AUTO: 12 %
WBC # BLD AUTO: 22.47 THOUSAND/UL (ref 4.31–10.16)

## 2022-09-17 PROCEDURE — 99238 HOSP IP/OBS DSCHRG MGMT 30/<: CPT | Performed by: SURGERY

## 2022-09-17 PROCEDURE — 80048 BASIC METABOLIC PNL TOTAL CA: CPT | Performed by: SURGERY

## 2022-09-17 PROCEDURE — 85025 COMPLETE CBC W/AUTO DIFF WBC: CPT | Performed by: SURGERY

## 2022-09-17 PROCEDURE — 85027 COMPLETE CBC AUTOMATED: CPT | Performed by: SURGERY

## 2022-09-17 PROCEDURE — 85007 BL SMEAR W/DIFF WBC COUNT: CPT | Performed by: SURGERY

## 2022-09-17 RX ORDER — LEVETIRACETAM 500 MG/1
500 TABLET ORAL EVERY 12 HOURS SCHEDULED
Qty: 6 TABLET | Refills: 0 | Status: SHIPPED | OUTPATIENT
Start: 2022-09-17 | End: 2022-09-29

## 2022-09-17 RX ORDER — CEPHALEXIN 500 MG/1
500 CAPSULE ORAL EVERY 6 HOURS SCHEDULED
Qty: 28 CAPSULE | Refills: 0 | Status: SHIPPED | OUTPATIENT
Start: 2022-09-16 | End: 2022-09-23

## 2022-09-17 RX ADMIN — CARVEDILOL 6.25 MG: 6.25 TABLET, FILM COATED ORAL at 08:33

## 2022-09-17 RX ADMIN — HEPARIN SODIUM 5000 UNITS: 5000 INJECTION INTRAVENOUS; SUBCUTANEOUS at 14:13

## 2022-09-17 RX ADMIN — CEPHALEXIN 500 MG: 500 CAPSULE ORAL at 12:09

## 2022-09-17 RX ADMIN — CEPHALEXIN 500 MG: 500 CAPSULE ORAL at 05:01

## 2022-09-17 RX ADMIN — FAMOTIDINE 20 MG: 20 TABLET, FILM COATED ORAL at 08:33

## 2022-09-17 RX ADMIN — LEVETIRACETAM 500 MG: 500 TABLET, FILM COATED ORAL at 08:33

## 2022-09-17 RX ADMIN — BRIMONIDINE TARTRATE 1 DROP: 2 SOLUTION OPHTHALMIC at 08:33

## 2022-09-17 RX ADMIN — ACETAMINOPHEN 975 MG: 325 TABLET ORAL at 05:02

## 2022-09-17 RX ADMIN — ACETAMINOPHEN 975 MG: 325 TABLET ORAL at 12:09

## 2022-09-17 RX ADMIN — HEPARIN SODIUM 5000 UNITS: 5000 INJECTION INTRAVENOUS; SUBCUTANEOUS at 05:01

## 2022-09-17 NOTE — ASSESSMENT & PLAN NOTE
- Traumatic brain injury with SDH, present on admission   - Neurosurgery evaluation and recommendations appreciated  - Hold anti-platelet and anticoagulant medications for least 2 weeks and/or until cleared by Neurosurgery  Plan to initiate chemical DVT prophylaxis with subcutaneous heparin when cleared by Neurosurgery  - Continue Keppra for 7 days for seizure prophylaxis  - Monitor neurologic exam, no focal deficits  - repeat CTH 9/15 stable, HA improved  - Continue symptomatic management with analgesia as needed  - PT and OT recommending home with [facility]  - Outpatient Neurosurgery follow-up in 2 weeks with repeat CT scan of the head prior to follow-up appointment

## 2022-09-17 NOTE — ASSESSMENT & PLAN NOTE
- Patient reports intact of visual acuity, but notes some double vision, particularly in her right eye  She notes that her right eye is her good eye and she has had multiple issues with her left eye in the past with chronic blurry vision that appears unchanged from baseline  She does report her right eye double vision has been improving   - Continue to monitor conservatively for now, repeat [facility] 9/15 without changes, vision without changes this morning  - If visual changes worsen or persist, may consider inpatient ophthalmology consult  Otherwise, plan for outpatient follow-up with primary ophthalmologist for further evaluation in the next week

## 2022-09-17 NOTE — ASSESSMENT & PLAN NOTE
- Status post fall in kitchen with suspected syncope and with the below noted injuries  - Fall precautions  - Geriatric Medicine consultation for evaluation, medication review and recommendations  - Syncope workup as above  - PT and OT recommending home with [facility]  - Case Management consultation for disposition planning

## 2022-09-17 NOTE — ASSESSMENT & PLAN NOTE
- Suspected syncope proceeding fall  - echocardiogram with EF 61%, grade 2 diastolic dysfunction  EKG reviewed with normal sinus rhythm and no significant abnormalities; telemetry also reviewed without significant event thus far  Troponins x3 without significant elevation and patient reports no palpitations, chest pain, shortness of breath or difficulty breathing  Cardiac event not suspected at this time  - Obtain orthostatic vital signs   - Cardiology recs - adjusting coreg dosing  - Encourage adequate oral intake and adequate hydration     - Fall precautions   - Recommend short-term outpatient follow-up with PCP for further evaluation

## 2022-09-17 NOTE — DISCHARGE SUMMARY
[facility]  Discharge- Susan Mons 1934, 80 y o  female MRN: [id number]  Unit/Bed#: [facility] 832-01 Encounter: [id number]  Primary Care Provider: Darylene Doles, MD   Date and time admitted to hospital: 9/13/2022  3:15 PM    * Subdural hematoma, post-traumatic [facility])  Assessment & Plan  - Traumatic brain injury with SDH, present on admission   - Neurosurgery evaluation and recommendations appreciated  - Hold anti-platelet and anticoagulant medications for least 2 weeks and/or until cleared by Neurosurgery  Plan to initiate chemical DVT prophylaxis with subcutaneous heparin when cleared by Neurosurgery  - Continue Keppra for 7 days for seizure prophylaxis  - Monitor neurologic exam, no focal deficits  - repeat CTH 9/15 stable, HA improved  - Continue symptomatic management with analgesia as needed  - PT and OT recommending home with [facility]  - Outpatient Neurosurgery follow-up in 2 weeks with repeat CT scan of the head prior to follow-up appointment  Visual changes  Assessment & Plan  - Patient reports intact of visual acuity, but notes some double vision, particularly in her right eye  She notes that her right eye is her good eye and she has had multiple issues with her left eye in the past with chronic blurry vision that appears unchanged from baseline  She does report her right eye double vision has been improving   - Continue to monitor conservatively for now, repeat [facility] 9/15 without changes, vision without changes this morning  - If visual changes worsen or persist, may consider inpatient ophthalmology consult  Otherwise, plan for outpatient follow-up with primary ophthalmologist for further evaluation in the next week  Syncope  Assessment & Plan  - Suspected syncope proceeding fall  - echocardiogram with EF 29%, grade 2 diastolic dysfunction    EKG reviewed with normal sinus rhythm and no significant abnormalities; telemetry also reviewed without significant event thus far   Troponins x3 without significant elevation and patient reports no palpitations, chest pain, shortness of breath or difficulty breathing  Cardiac event not suspected at this time  - Obtain orthostatic vital signs   - Cardiology recs - adjusting coreg dosing  - Encourage adequate oral intake and adequate hydration     - Fall precautions   - Recommend short-term outpatient follow-up with PCP for further evaluation  Laceration of brow without complication  Assessment & Plan  - Right forehead/periorbital laceration lateral to the right eyebrow status post suture repair bedside   - Local wound care as indicated  - Analgesia as needed  - Outpatient follow-up for suture removal in 5-7 days  Fall  Assessment & Plan  - Status post fall in kitchen with suspected syncope and with the below noted injuries  - Fall precautions  - Geriatric Medicine consultation for evaluation, medication review and recommendations  - Syncope workup as above  - PT and OT recommending home with [facility]  - Case Management consultation for disposition planning  Hypothyroidism  Assessment & Plan  - Chronic history of hypothyroidism   - Continue home medication regimen   - Outpatient follow-up per routine  Hyponatremia  Assessment & Plan  - Hyponatremia, present on presentation, with sodium level 133  Patient has chronic history of hyponatremia and is typically on a 1500 mL fluid restriction daily at baseline   - Encourage adequate oral intake/hydration   - Repleted with NS, K also repleted  Trend PM BMP   - Outpatient follow-up with PCP  Benign essential hypertension  Assessment & Plan  - Patient with chronic history of hypertension   - Continue home medication regimen as appropriate  Orthostatic vital signs pending; may need to consider adjustment of blood pressure medication if patient has evidence of orthostasis  - increasing coreg per cardiology   - Outpatient follow-up per routine          Physical Exam:  General: No acute distress  Neuro: alert and oriented, no focal deficits  HEENT: ecchymosis on her right cheek and right periorbital region  CV: Well perfused, regular rate and rhythm  Lungs: Normal work of breathing, no increased respiratory effort  Abdomen: Soft, non-tender, non-distended  Extremities: No edema, clubbing or cyanosis  Skin: Warm, dry        Medical Problems             Resolved Problems  Date Reviewed: 9/14/2022   None                 Admission Date:   Admission Orders (From admission, onward)     Ordered        09/13/22 1744  Inpatient Admission  Once                        Admitting Diagnosis: Subdural hematoma ([address] ) [S06 5X9A]  Facial laceration, initial encounter Lj Been  Fall, initial encounter [W19  XXXA]    HPI: Heather Grady is a 80 y o  female who presents with fall in kitchen  Takes asa daily  SDH on CT  GCS 15  Neuro intact  R eyebrow lac repaired in Ed  Procedures Performed:   Orders Placed This Encounter   Procedures    Critical Care    Laceration repair       Summary of Hospital Course: The patient was admitted for her neurological injuries caused by her fall  Neurosurgery evaluated her and managed her subdural hematoma with non-operative management  She is to follow up with them in clinic after discharge  Cardiology followed the patient as well given her syncopal episode causing her fall  Cardiac workup was unremarkable, except her orthostatic vitals were positive  She is on fluid restriction due to her chronic hyponatremia, explaining the orthostatic findings  On the day of discharge, the patient was tolerating her diet, ambulating, and pain was well controlled  She was instructed to follow up with neurosurgery as discussed and will follow up for suture removal     Significant Findings, Care, Treatment and Services Provided:   9/13 CTH: Acute right frontotemporoparietal subdural hematoma, as described, causing mild right to left midline shift    Questionable mild subarachnoid blood products within the inferior right temporal lobe, noting that this location is difficult to evaluate due to artifact from the skull base  Attention on follow-up imaging is recommended  9/13 CT Cspine: negative  9/13 u/a negative  9/13 trop negative  9/14 CT H: stable subdural hematoma    Complications: None    Condition at Discharge: stable         Discharge instructions/Information to patient and family:   See after visit summary for information provided to patient and family  Provisions for Follow-Up Care:  See after visit summary for information related to follow-up care and any pertinent home health orders  PCP: Alannah Will MD    Disposition: Home with [facility]    Planned Readmission: No    Discharge Statement   I spent 15 minutes discharging the patient  This time was spent on the day of discharge  I had direct contact with the patient on the day of discharge  Additional documentation is required if more than 30 minutes were spent on discharge  Discharge Medications:  See after visit summary for reconciled discharge medications provided to patient and family

## 2022-09-19 ENCOUNTER — HOME CARE VISIT (OUTPATIENT)
Dept: HOME HEALTH SERVICES | Facility: HOME HEALTHCARE | Age: 87
End: 2022-09-19

## 2022-09-19 ENCOUNTER — TELEPHONE (OUTPATIENT)
Dept: CARDIOLOGY CLINIC | Facility: CLINIC | Age: 87
End: 2022-09-19

## 2022-09-19 ENCOUNTER — CLINICAL SUPPORT (OUTPATIENT)
Dept: CARDIOLOGY CLINIC | Facility: CLINIC | Age: 87
End: 2022-09-19
Payer: MEDICARE

## 2022-09-19 DIAGNOSIS — R55 SYNCOPE, UNSPECIFIED SYNCOPE TYPE: Primary | ICD-10-CM

## 2022-09-19 PROCEDURE — 99211 OFF/OP EST MAY X REQ PHY/QHP: CPT | Performed by: INTERNAL MEDICINE

## 2022-09-19 NOTE — TELEPHONE ENCOUNTER
Pt will be coming in today at lpm, Dr Connie Capellan pt, was d/c from [facility] ordered from [employer]  Please advise if needs prior auth      Thanks

## 2022-09-20 ENCOUNTER — HOME CARE VISIT (OUTPATIENT)
Dept: HOME HEALTH SERVICES | Facility: HOME HEALTHCARE | Age: 87
End: 2022-09-20
Payer: MEDICARE

## 2022-09-20 VITALS
HEART RATE: 76 BPM | OXYGEN SATURATION: 98 % | SYSTOLIC BLOOD PRESSURE: 200 MMHG | TEMPERATURE: 96.5 F | DIASTOLIC BLOOD PRESSURE: 74 MMHG | RESPIRATION RATE: 16 BRPM

## 2022-09-20 LAB
BACTERIA UR CULT: ABNORMAL
BACTERIA UR CULT: ABNORMAL

## 2022-09-20 PROCEDURE — 10330081 VN NO-PAY CLAIM PROCEDURE

## 2022-09-20 PROCEDURE — G0299 HHS/HOSPICE OF RN EA 15 MIN: HCPCS

## 2022-09-20 PROCEDURE — 400013 VN SOC

## 2022-09-20 NOTE — CASE COMMUNICATION
[facility] has admitted your patient to [address] [place] [address] service with the following disciplines:      SN, PT and OT  Response needed, please respond via In basket or Tiger connect with Verbal for coreg aspirin and suture removal   Primary focus of home health care: Neuro  Patient stated goals of care: think more clearly and walk more steady and have more energy  Anticipated visit pattern: 2w3 and next visit date: 9/22/22 SP Fall with SDH  HTN  See medication list - meds in home differ from AVS: Patient is taking areds 2 daily, and Zinc 30 mg intermittantly in addition to AVS    Significant clinical findings: Would you like VNA to remove sutures on Wednesday  /82 intermittant dizziness with changes in postion  Check orthostatics /94 HR 76 sitting then 200/74 HR 76 standing  [place] Connect to Dr Amy Ash to request verbal order to change coreg to 6 25 mg BID pe r cardiology recomendation and to DC aspirin due to SDH  Potential barriers to goal achievement: Patient lives alone    Thank you for allowing us to participate in the care of your patient        National Vasu Hernandez RN

## 2022-09-20 NOTE — TELEPHONE ENCOUNTER
9/20/22- PT DISCHARGED HOME  SPOKE TO PTS DAUGHTER, [place], AND CONFIRMED 10/5/22 F/U AND CTH ALREADY SCHEDULED FOR 9/30/22   PTS DAUGHTER AWARE OF BOTH

## 2022-09-20 NOTE — Clinical Note
Thank you  May I also have a verbal order to change coreg to 6 25 mg BID per cardiology recomendation and to DC aspirin due to SDH?    ----- Message -----  From: Clarence Ware MD  Sent: 9/20/2022   4:48 PM EDT  To: Lynette Jc RN      Yes, please remove sutures  If you're able to remove sutures then she can cancel the follow up appointment she had with us as long as she keeps her follow up appointments with neurosurgery and other consultants who requested follow up during her admission thank you  ----- Message -----  From: Lynette Jc RN  Sent: 9/20/2022  11:02 AM EDT  To: Clarence Ware MD    [facility] to [address] [place] [address] service with the following disciplines:      SN, PT and OT  Response needed, please respond via In basket or Tiger connect with Verbal for coreg aspirin and suture removal   Primary focus of home health care: Neuro  Patient stated goals of care: think more clearly and walk more steady and have more energy  Anticipated visit pattern: 2w3 and next visit date: 9/22/22 SP Fall with SDH HTN  See medication list - meds in home differ from AVS: Patient is taking areds 2 daily, and Zinc 30 mg intermittantly in addition to AVS    Significant clinical findings: Would you like VNA to remove sutures on Wednesday  /82 intermittant dizziness with changes in postion  Check orthostatics /94 HR 76 sitting then 200/74 HR 76 standing  [place] Connect to Dr Mirian Stewart to request verbal order to change coreg to 6 25 mg BID per cardiology recomendation and to DC aspirin due to SDH  Potential barriers to goal achievement: Patient lives alone    Thank you for allowing us to participate in the care of your patient        National Vasu Hernandez RN

## 2022-09-21 NOTE — CASE COMMUNICATION
----- Message -----  From: Danuta Cheney MD  Sent: 9/20/2022   4:48 PM EDT  To: Shahzad Og RN      Yes, please remove sutures  If you're able to remove sutures then she can cancel the follow up appointment she had with us as long as she keeps her follow up appointments with neurosurgery and other consultants who requested follow up during her admission thank you  ----- Message -----  From: Shahzad Og RN  Sent: 9/20/2022  1 1:02 AM EDT  To: Danuta Cheney MD    [facility] has admitted your patient to [address] [place] [address] service with the following disciplines:      SN, PT and OT  Response needed, please respond via In basket or Tiger connect with Verbal for coreg aspirin and suture removal   Primary focus of home health care: Neuro  Patient stated goals of care: think more clearly and walk more steady and have more energy  Anticipated visit pattern: 2w3 and ne xt visit date: 9/22/22 SP Fall with SDH HTN  See medication list - meds in home differ from AVS: Patient is taking areds 2 daily, and Zinc 30 mg intermittantly in addition to AVS    Significant clinical findings: Would you like VNA to remove sutures on Wednesday  /82 intermittant dizziness with changes in postion  Check orthostatics /94 HR 76 sitting then 200/74 HR 76 standing  [place] Connect to Dr Trae Berumen to request verbal  order to change coreg to 6 25 mg BID per cardiology recomendation and to DC aspirin due to SDH  Potential barriers to goal achievement: Patient lives alone    Thank you for allowing us to participate in the care of your patient        National Vasu Hernandez RN

## 2022-09-22 ENCOUNTER — HOME CARE VISIT (OUTPATIENT)
Dept: HOME HEALTH SERVICES | Facility: HOME HEALTHCARE | Age: 87
End: 2022-09-22
Payer: MEDICARE

## 2022-09-22 ENCOUNTER — LAB REQUISITION (OUTPATIENT)
Dept: LAB | Facility: HOSPITAL | Age: 87
End: 2022-09-22
Payer: MEDICARE

## 2022-09-22 VITALS
RESPIRATION RATE: 18 BRPM | TEMPERATURE: 98.4 F | OXYGEN SATURATION: 95 % | HEART RATE: 68 BPM | DIASTOLIC BLOOD PRESSURE: 64 MMHG | SYSTOLIC BLOOD PRESSURE: 112 MMHG

## 2022-09-22 DIAGNOSIS — D47.2 MONOCLONAL GAMMOPATHY: ICD-10-CM

## 2022-09-22 DIAGNOSIS — D72.820 LYMPHOCYTOSIS: ICD-10-CM

## 2022-09-22 DIAGNOSIS — D72.820 LYMPHOCYTOSIS (SYMPTOMATIC): ICD-10-CM

## 2022-09-22 LAB
BASOPHILS # BLD MANUAL: 0 THOUSAND/UL (ref 0–0.1)
BASOPHILS NFR MAR MANUAL: 0 % (ref 0–1)
DIFFERENTIAL COMMENT: ABNORMAL
EOSINOPHIL # BLD MANUAL: 0 THOUSAND/UL (ref 0–0.4)
EOSINOPHIL NFR BLD MANUAL: 0 % (ref 0–6)
ERYTHROCYTE [DISTWIDTH] IN BLOOD BY AUTOMATED COUNT: 15.3 % (ref 11.6–15.1)
HCT VFR BLD AUTO: 35.1 % (ref 34.8–46.1)
HGB BLD-MCNC: 11.1 G/DL (ref 11.5–15.4)
LYMPHOCYTES # BLD AUTO: 14.13 THOUSAND/UL (ref 0.6–4.47)
LYMPHOCYTES # BLD AUTO: 67 % (ref 14–44)
MCH RBC QN AUTO: 28.5 PG (ref 26.8–34.3)
MCHC RBC AUTO-ENTMCNC: 31.6 G/DL (ref 31.4–37.4)
MCV RBC AUTO: 90 FL (ref 82–98)
MONOCYTES # BLD AUTO: 2.11 THOUSAND/UL (ref 0–1.22)
MONOCYTES NFR BLD: 10 % (ref 4–12)
NEUTROPHILS # BLD MANUAL: 4.22 THOUSAND/UL (ref 1.85–7.62)
NEUTS SEG NFR BLD AUTO: 20 % (ref 43–75)
PLATELET # BLD AUTO: 372 THOUSANDS/UL (ref 149–390)
PLATELET BLD QL SMEAR: ADEQUATE
PMV BLD AUTO: 9 FL (ref 8.9–12.7)
RBC # BLD AUTO: 3.9 MILLION/UL (ref 3.81–5.12)
RBC MORPH BLD: NORMAL
SMUDGE CELLS BLD QL SMEAR: PRESENT
VARIANT LYMPHS # BLD AUTO: 3 %
WBC # BLD AUTO: 21.09 THOUSAND/UL (ref 4.31–10.16)

## 2022-09-22 PROCEDURE — G0299 HHS/HOSPICE OF RN EA 15 MIN: HCPCS

## 2022-09-22 PROCEDURE — G0180 MD CERTIFICATION HHA PATIENT: HCPCS | Performed by: SURGERY

## 2022-09-22 PROCEDURE — 85007 BL SMEAR W/DIFF WBC COUNT: CPT | Performed by: INTERNAL MEDICINE

## 2022-09-22 PROCEDURE — 85027 COMPLETE CBC AUTOMATED: CPT | Performed by: INTERNAL MEDICINE

## 2022-09-22 PROCEDURE — 88185 FLOWCYTOMETRY/TC ADD-ON: CPT | Performed by: INTERNAL MEDICINE

## 2022-09-22 PROCEDURE — 36415 COLL VENOUS BLD VENIPUNCTURE: CPT | Performed by: SURGERY

## 2022-09-22 PROCEDURE — 88184 FLOWCYTOMETRY/ TC 1 MARKER: CPT | Performed by: INTERNAL MEDICINE

## 2022-09-23 ENCOUNTER — HOME CARE VISIT (OUTPATIENT)
Dept: HOME HEALTH SERVICES | Facility: HOME HEALTHCARE | Age: 87
End: 2022-09-23
Payer: MEDICARE

## 2022-09-23 VITALS — HEART RATE: 77 BPM | OXYGEN SATURATION: 98 % | SYSTOLIC BLOOD PRESSURE: 156 MMHG | DIASTOLIC BLOOD PRESSURE: 70 MMHG

## 2022-09-23 VITALS
DIASTOLIC BLOOD PRESSURE: 78 MMHG | RESPIRATION RATE: 20 BRPM | HEART RATE: 72 BPM | OXYGEN SATURATION: 97 % | SYSTOLIC BLOOD PRESSURE: 142 MMHG

## 2022-09-23 PROCEDURE — G0151 HHCP-SERV OF PT,EA 15 MIN: HCPCS

## 2022-09-23 PROCEDURE — G0152 HHCP-SERV OF OT,EA 15 MIN: HCPCS

## 2022-09-23 NOTE — CASE COMMUNICATION
OT referral received and evaluation administered 9/23/22  Currently, pt limited by weakness, min impaired safety awareness, environmental barriers, and instability on feet  Pt to benefit from skilled OT to address functional deficits  Pt agreeable to OT POC 1 x week 1 2 x week 2 for 2 weeks

## 2022-09-26 ENCOUNTER — HOME CARE VISIT (OUTPATIENT)
Dept: HOME HEALTH SERVICES | Facility: HOME HEALTHCARE | Age: 87
End: 2022-09-26
Payer: MEDICARE

## 2022-09-26 VITALS — OXYGEN SATURATION: 96 % | HEART RATE: 74 BPM | DIASTOLIC BLOOD PRESSURE: 74 MMHG | SYSTOLIC BLOOD PRESSURE: 110 MMHG

## 2022-09-26 DIAGNOSIS — I10 BENIGN ESSENTIAL HYPERTENSION: Primary | ICD-10-CM

## 2022-09-26 LAB — SCAN RESULT: NORMAL

## 2022-09-26 PROCEDURE — G0152 HHCP-SERV OF OT,EA 15 MIN: HCPCS

## 2022-09-26 RX ORDER — CARVEDILOL 12.5 MG/1
6.25 TABLET ORAL 2 TIMES DAILY WITH MEALS
Qty: 180 TABLET | Refills: 3 | Status: SHIPPED | OUTPATIENT
Start: 2022-09-26 | End: 2022-12-12

## 2022-09-27 ENCOUNTER — HOME CARE VISIT (OUTPATIENT)
Dept: HOME HEALTH SERVICES | Facility: HOME HEALTHCARE | Age: 87
End: 2022-09-27
Payer: MEDICARE

## 2022-09-27 VITALS
RESPIRATION RATE: 14 BRPM | OXYGEN SATURATION: 96 % | HEART RATE: 64 BPM | SYSTOLIC BLOOD PRESSURE: 132 MMHG | DIASTOLIC BLOOD PRESSURE: 70 MMHG | TEMPERATURE: 96.7 F

## 2022-09-27 PROCEDURE — G0300 HHS/HOSPICE OF LPN EA 15 MIN: HCPCS

## 2022-09-28 ENCOUNTER — HOME CARE VISIT (OUTPATIENT)
Dept: HOME HEALTH SERVICES | Facility: HOME HEALTHCARE | Age: 87
End: 2022-09-28
Payer: MEDICARE

## 2022-09-28 VITALS
HEART RATE: 71 BPM | RESPIRATION RATE: 20 BRPM | DIASTOLIC BLOOD PRESSURE: 78 MMHG | SYSTOLIC BLOOD PRESSURE: 136 MMHG | OXYGEN SATURATION: 97 %

## 2022-09-28 PROCEDURE — G0151 HHCP-SERV OF PT,EA 15 MIN: HCPCS

## 2022-09-28 PROCEDURE — G0152 HHCP-SERV OF OT,EA 15 MIN: HCPCS

## 2022-09-28 NOTE — PROGRESS NOTES
Cardiology Follow Up    Natasha Orr  1934  [id number]  [address] [id number] [phone]    1  Syncope, unspecified syncope type     2  Orthostatic hypotension     3  Hypertension, unspecified type     4  Dyslipidemia     5  Hyponatremia         Interval History:   Ms Sakina Mahoney was admitted to [facility] on 9/13 - 9/17/22 with Subdural Hematoma, post traumatic  Meghan Boyd presented to the ED via EMS afer a syncopal episodes at home  She was standing at her refrigerator prior to syncopal episode  Her Apple watch alerted Polisce and her daughter who came to her home  Meghan Boyd was able to unlock the screen door to let her daughter in her home  Meghan Boyd does not remember this event  After 15 minutes she returned to usual mental state  EMS arrived she admitted to lightheadedness and double vision  She sustained a right eye laceration with fall  On admission CT of the head showed  Acute right frontal temporal parietal subdural hematoma  Mild right to left midline shift  Questionable mild subarachnoid blood products within the inferior right lobe  Neurosurgery consulted/  9/14 CT of the head showed stable Subdural hematoma  She was placed on Keppra for 7 days procedure prophylaxis  Antiplatelets and anticoagulations were held for 2 weeks and 2 cleared by Neurosurgery  Repeat CT of the head to be done 2 weeks after discharge  Cardiology consulted  Event monitor ordered   Coreg was decreased  Loop implant discussed with Meghan Boyd and her daughter pending event monitor results  9/14/22 TTE:  Ventricle cavity size normal wall thickness mildly increased LVEF 58% diastolic function moderately abnormal grade 2 pseudonormal relaxation  Right ventricle size systolic function normal   Left atrium mildly dilated    Mild aortic valve regurgitation, mild mitral regurgitation, mild tricuspid regurgitation  She was instructed to maintain adequate hydration within fluid limits for hyponatremia  9/22/22 seen by Hem-Onc at [facility] flow cytometry positive for CLL    Ms Dalila Reyes presents to our office for a recent hospitalization follow up visit  She is accompanied by her daughter who lives close to [place] is wearing an Apple watch with fall detection and notifications to her daughter and EMS if she does not respond after a fall  [place]  Denies dyspnea with minimal exertion chest pain palpitations lightheadedness or dizziness    She is using a cane to assist with ambulation      Medical History   Primary Cardiologist Dr Rachid Berumen   Hypertension  Dyslipidemia 6/12/20 , TG 75, HDL 51, LDL 78   HgbA1C 5 7 on 11/29/21, with gastroparesis  Hyponatremia Followed by  Nephrology  9/17/22 sodium 131  Branch Retinal vein occlusion with macular edema of left eye        Allergies: HCTZ, Norvasc     Patient Active Problem List   Diagnosis    Benign essential hypertension    Hyponatremia    Hypothyroidism    History of endometrial cancer    Abnormal breast exam    Arthritis    Constipation    Depression with anxiety    Diverticulosis    Duodenal diverticulum    Esophagitis, reflux    Dyslipidemia    Impaired fasting glucose    Insomnia    Age-related osteoporosis without current pathological fracture    Atrophic vaginitis    Urinary incontinence    Vertigo    Encounter for follow-up surveillance of endometrial cancer    Endometrial cancer ([address] )    Arthritis of left shoulder region    Encounter for screening mammogram for malignant neoplasm of breast    Need for immunization against influenza    Nonexudative age-related macular degeneration, bilateral, early dry stage    Posterior vitreous detachment of both eyes    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    Status post cataract extraction and insertion of intraocular lens    Lymphocytosis    Lackey's esophagus with dysplasia    Pericardial effusion    MGUS (monoclonal gammopathy of unknown significance)    Hypercalcemia    Lyme disease    Visual field defect    Pain, dental    Lymphadenopathy    Fall    Subdural hematoma, post-traumatic (HCC)    Laceration of brow without complication    Syncope    Visual changes     Past Medical History:   Diagnosis Date    Anemia     post-op, 07/07/09    Anxiety     last assessed: 01/15/14    C  difficile diarrhea     Chest wall trauma     Acute, last assessed: 10/24/12    Depression     Disease of thyroid gland     hypothyroid    Diverticula of colon     Endometrial cancer ([facility] [address] ) 03/2011    Endometrial hyperplasia     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HCC)     stage IA, Grade I endometriod adenocarcinoma with 43% myometrial invasion and no LVSI, last assessed: 88/08/66    Folliculitis     last assessed: 03/02/16    Gastroparesis     Hypercholesterolemia     Hyperlipidemia     Hypertension     Incontinence in female     Migraine     Nontoxic single thyroid nodule     last assessed: 02/16/13    Osteoporosis     last assessed: 02/22/17    Pure hypercholesterolemia     Rotator cuff (capsule) sprain     Acute, right    Skin lesion     last assessed: 04/26/13    Stomatitis     Strain of right buttock     gluteus medius    Tendinitis of right rotator cuff     last assessed: 08/12/12    Thyroid cyst     last assessed: 08/15/13     Social History     Socioeconomic History    Marital status:       Spouse name: Not on file    Number of children: Not on file    Years of education: Not on file    Highest education level: Not on file   Occupational History    Not on file   Tobacco Use    Smoking status: Never Smoker    Smokeless tobacco: Never Used   Vaping Use    Vaping Use: Never used   Substance and Sexual Activity    Alcohol use: Not Currently    Drug use: No    Sexual activity: Not Currently   Other Topics Concern    Not on file   Social History Narrative    Not on file     Social Determinants of Health     Financial Resource Strain: Not on file   Food Insecurity: Not on file   Transportation Needs: Not on file   Physical Activity: Not on file   Stress: Not on file   Social Connections: Not on file   Intimate Partner Violence: Not on file   Housing Stability: Not on file      Family History   Problem Relation Age of Onset    No Known Problems Mother     Dementia Father     Other Brother         Prolapsing mitral valve leaflet syndrome    Testicular cancer Brother 27    Lung cancer Brother 61    Prostate cancer Brother 36        unsure of excat age   Florence Chadwickt No Known Problems Daughter     No Known Problems Daughter     No Known Problems Daughter     No Known Problems Maternal Grandmother     No Known Problems Maternal Grandfather     Breast cancer Paternal Grandmother 79    No Known Problems Paternal Grandfather     No Known Problems Maternal Aunt     No Known Problems Paternal Aunt     No Known Problems Paternal Aunt      Past Surgical History:   Procedure Laterality Date    APPENDECTOMY      BREAST BIOPSY Right     BREAST BIOPSY Left     BREAST CYST ASPIRATION      COLONOSCOPY      fiberoptic, 1998, 2001, 2006    CYSTOSCOPY      Diagnostic    EGD AND COLONOSCOPY N/A 3/11/2016    Procedure: EGD ;  Surgeon: Sandra Valerio MD;  Location: [facility];   Service:     HYSTERECTOMY  03/22/2011    Robotic-assisted, total laparoscopic approch    JOINT REPLACEMENT      KNEE SURGERY      OOPHORECTOMY Bilateral 03/22/2011    [place]    REPLACEMENT TOTAL KNEE      TONSILECTOMY AND ADNOIDECTOMY      TOTAL SHOULDER ARTHROPLASTY Right        Current Outpatient Medications:     acetaminophen (TYLENOL) 650 mg CR tablet, Take 650 mg by mouth as needed for mild pain , Disp: , Rfl:     ALPHAGAN P 0 1 %, instill 1 drop into both eyes twice a day, Disp: , Rfl: 0    ALPRAZolam (XANAX) 0 25 mg tablet, Take 1 tablet (0 25 mg total) by mouth daily at bedtime as needed for anxiety, Disp: 20 tablet, Rfl: 0    bifidobacterium infantis (ALIGN) capsule, Take by mouth, Disp: , Rfl:     carvedilol (COREG) 12 5 mg tablet, Take 0 5 tablets (6 25 mg total) by mouth 2 (two) times a day with meals 6 25 BID with meals per Basia/ cardiology 9/22/22 340pm , Disp: 180 tablet, Rfl: 3    cholecalciferol (VITAMIN D3) 1,000 units tablet, Take by mouth, Disp: , Rfl:     DENTA 5000 PLUS 1 1 % CREA, BRUSH UP TO TWICE A DAY, Disp: , Rfl: 0    famotidine (PEPCID) 20 mg tablet, Take 1 tablet (20 mg total) by mouth 2 (two) times a day, Disp: 60 tablet, Rfl: 0    levETIRAcetam (KEPPRA) 500 mg tablet, Take 1 tablet (500 mg total) by mouth every 12 (twelve) hours for 6 doses, Disp: 6 tablet, Rfl: 0    levothyroxine 25 mcg tablet, Take 1 tablet (25 mcg total) by mouth daily, Disp: 90 tablet, Rfl: 0    meclizine (ANTIVERT) 25 mg tablet, TAKE 1 TABLET EVERY 8 HOURS AS NEEDED FOR DIZZINESS, Disp: 30 tablet, Rfl: 0    Multiple Vitamins-Minerals (CENTRUM SILVER PO), Take by mouth, Disp: , Rfl:     oxybutynin (DITROPAN-XL) 5 mg 24 hr tablet, Take 1 tablet (5 mg total) by mouth daily at bedtime, Disp: 90 tablet, Rfl: 3    polyethylene glycol (GLYCOLAX) 17 GM/SCOOP powder, Take by mouth as needed  , Disp: , Rfl:     simvastatin (ZOCOR) 20 mg tablet, take 1 tablet by mouth once daily, Disp: 90 tablet, Rfl: 3  Allergies   Allergen Reactions    Amoxil [Amoxicillin] Hives    Biaxin [Clarithromycin] GI Intolerance     Reaction Date: 19Jul2011;   Pt gets nauseated    Esomeprazole GI Intolerance    Fosamax [Alendronate] GI Intolerance    Hydrochlorothiazide      Hyponatremia      Lansoprazole GI Intolerance    Norvasc [Amlodipine Besylate]     Pantoprazole GI Intolerance     Can tolerate IV- Not oral    Relafen [Nabumetone] GI Intolerance and Other (See Comments)       Labs:  Lab Requisition on 09/22/2022   Component Date Value    WBC 09/22/2022 21 09 (A)    RBC 09/22/2022 3 90     Hemoglobin 09/22/2022 11 1 (A)    Hematocrit 09/22/2022 35 1     MCV 09/22/2022 90     MCH 09/22/2022 28 5     MCHC 09/22/2022 31 6     RDW 09/22/2022 15 3 (A)    MPV 09/22/2022 9 0     Platelets 89/04/6079 372     Scan Result 09/22/2022 SEE WRITTEN REPORT     Segmented % 09/22/2022 20 (A)    Lymphocytes % 09/22/2022 67 (A)    Monocytes % 09/22/2022 10     Eosinophils, % 09/22/2022 0     Basophils % 09/22/2022 0     Atypical Lymphocytes % 09/22/2022 3 (A)    Absolute Neutrophils 09/22/2022 4 22     Lymphocytes Absolute 09/22/2022 14 13 (A)    Monocytes Absolute 09/22/2022 2 11 (A)    Eosinophils Absolute 09/22/2022 0 00     Basophils Absolute 09/22/2022 0 00     Smudge Cells 09/22/2022 Present     RBC Morphology 09/22/2022 Normal     Platelet Estimate 20/90/6711 Adequate     Differential Comment 09/22/2022 see note; Albumin Smear    No results displayed because visit has over 200 results  Imaging: XR chest portable    Result Date: 9/16/2022  Narrative: CHEST INDICATION:   follow up possible pna  COMPARISON:  9/13/2022 EXAM PERFORMED/VIEWS:  XR CHEST PORTABLE FINDINGS: Cardiomediastinal silhouette appears unremarkable  The lungs are clear  No pneumothorax or pleural effusion  Osseous structures appear within normal limits for patient age  Bilateral shoulder prostheses  Impression: No acute cardiopulmonary disease  Workstation performed: [id number]     XR chest 1 view portable    Result Date: 9/13/2022  Narrative: CHEST INDICATION:   Fall and LOC, on ASA, head injury  COMPARISON:  Chest radiograph October 17, 2013  EXAM PERFORMED/VIEWS:  XR CHEST PORTABLE FINDINGS: Heart size stable and at the upper limits of normal   Atherosclerotic calcification of aortic arch  There is mild interstitial prominence in the lung bases seen best on the left  This is present to a mild extent on the prior CT from 2016  No focal areas of consolidation   There are bilateral shoulder prostheses  Bones otherwise within normal limits for the patient's age  Impression: Minimal interstitial prominence seen best in the lung bases likely representing chronic interstitial lung disease  Mild pulmonary edema or viral illness cannot be excluded  Findings marked as significant in Epic  Workstation performed: [id number]     CT head wo contrast    Result Date: 9/15/2022  Narrative: CT BRAIN - WITHOUT CONTRAST INDICATION:   worsening headache in the setting of head bleed  COMPARISON:  9/14/2022 TECHNIQUE:  CT examination of the brain was performed  In addition to axial images, sagittal and coronal 2D reformatted images were created and submitted for interpretation  Radiation dose length product (DLP) for this visit:  863 mGy-cm   This examination, like all CT scans performed in the [facility], was performed utilizing techniques to minimize radiation dose exposure, including the use of iterative reconstruction and automated exposure control  IMAGE QUALITY:  Diagnostic  FINDINGS: PARENCHYMA: Decreased attenuation is noted in periventricular and subcortical white matter demonstrating an appearance that is statistically most likely to represent moderate microangiopathic change  No CT signs of acute infarction  No intracranial mass or midline shift  There is mild local mass effect adjacent to the hematoma  No acute parenchymal hemorrhage  VENTRICLES AND EXTRA-AXIAL SPACES:  Acute subdural hematoma along the right convexity measures 8 mm in greatest diameter, previously 8 mm  There is a stable small anterior parafalcine component  Ventricles and sulci are normal for age  VISUALIZED ORBITS AND PARANASAL SINUSES:  Unremarkable  CALVARIUM AND EXTRACRANIAL SOFT TISSUES:  Normal      Impression: Stable small subdural hematoma along the right convexity without stable local mass effect and no midline shift   Workstation performed: [id number]     CT head wo contrast    Result Date: 9/14/2022  Narrative: CT BRAIN - WITHOUT CONTRAST INDICATION:   Subdural hematoma follow up subdural hematoma  COMPARISON:  9/13/2022 TECHNIQUE:  CT examination of the brain was performed  In addition to axial images, sagittal and coronal 2D reformatted images were created and submitted for interpretation  Radiation dose length product (DLP) for this visit:  809 92 mGy-cm   This examination, like all CT scans performed in the [facility], was performed utilizing techniques to minimize radiation dose exposure, including the use of iterative  reconstruction and automated exposure control  IMAGE QUALITY:  Diagnostic  FINDINGS: PARENCHYMA:  Once again identified is a subdural hematoma within the right hemisphere involving the anterior and middle cranial fossa extending superiorly towards the vertex  There is only a minimal anterior parafalcine component and no significant component above the tentorium  This peripheral subdural hemorrhage is thickest in the region of the sylvian fissure where it measures approximately 8 mm  Mild mass effect upon the right hemisphere without midline shift  The basilar cisterns are patent  Stable chronic microangiopathic change within the underlying brain parenchyma  VENTRICLES:  No obstructive hydrocephalus or intraventricular hemorrhage  VISUALIZED ORBITS AND PARANASAL SINUSES:  No acute orbital pathology  Paranasal sinuses demonstrate minimal mucosal thickening on the right within a hypoplastic sinus  CALVARIUM AND EXTRACRANIAL SOFT TISSUES:  No acute osseous abnormality  There is a small soft tissue contusion superficial to the right zygomatic arch and zygomaticomaxillary suture  Impression: Stable acute subdural hematoma within the right hemisphere  Stable underlying brain parenchyma   Workstation performed: [id number]     CT head without contrast    Result Date: 9/13/2022  Narrative: CT BRAIN - WITHOUT CONTRAST INDICATION:   Fall and LOC, on ASA, head injury  COMPARISON:  None  TECHNIQUE:  CT examination of the brain was performed  In addition to axial images, sagittal and coronal 2D reformatted images were created and submitted for interpretation  Radiation dose length product (DLP) for this visit:  310 89 mGy-cm   This examination, like all CT scans performed in the [facility], was performed utilizing techniques to minimize radiation dose exposure, including the use of iterative  reconstruction and automated exposure control  IMAGE QUALITY:  Diagnostic  FINDINGS: PARENCHYMA: There is an acute right frontotemporoparietal subdural hematoma measuring 8 mm in maximal thickness  This causes approximate 4 mm of right-to-left midline shift  The blood products are denser within the lateral right temporal lobe with the suggestion of an elliptical shape, suggesting the possibility of epidural hematoma in this location  Questionable mild subarachnoid blood products within the inferior right temporal lobe (series 2 images 8 and 9), noting that this area is difficult to evaluate due  to artifact from the skull base  No evidence of herniation  Decreased attenuation is noted in periventricular and subcortical white matter demonstrating an appearance that is statistically most likely to represent moderate microangiopathic change  No CT signs of acute infarction  No intracranial mass  VENTRICLES AND EXTRA-AXIAL SPACES:  Normal for the patient's age  VISUALIZED ORBITS AND PARANASAL SINUSES:  Unremarkable  CALVARIUM AND EXTRACRANIAL SOFT TISSUES:  Mild preorbital and right temporal soft tissue swelling  No calvarial fracture  Impression: 1  Acute right frontotemporoparietal subdural hematoma, as described, causing mild right to left midline shift  2   Questionable mild subarachnoid blood products within the inferior right temporal lobe, noting that this location is difficult to evaluate due to artifact from the skull base    Attention on follow-up imaging is recommended  I personally discussed this study with Dr Louie Vides on 9/13/2022 at 4:25 PM  Workstation performed: [id number]     CT cervical spine without contrast    Result Date: 9/13/2022  Narrative: CT CERVICAL SPINE - WITHOUT CONTRAST INDICATION:   Fall and LOC, on ASA, head injury  COMPARISON:  None  TECHNIQUE:  CT examination of the cervical spine was performed without intravenous contrast   Contiguous axial images were obtained  Sagittal and coronal reconstructions were performed  Radiation dose length product (DLP) for this visit:  822 53 mGy-cm   This examination, like all CT scans performed in the [facility], was performed utilizing techniques to minimize radiation dose exposure, including the use of iterative  reconstruction and automated exposure control  IMAGE QUALITY:  Diagnostic  FINDINGS: ALIGNMENT:  Grade 1 degenerative anterolisthesis of C2 on C3 and C7 on T1  VERTEBRAL BODIES:  No fracture  DEGENERATIVE CHANGES:  Severe multilevel cervical degenerative changes are noted  No critical central canal stenosis  PREVERTEBRAL AND PARASPINAL SOFT TISSUES:  Unremarkable  THORACIC INLET:  Normal      Impression: No cervical spine fracture or traumatic malalignment  Workstation performed: [id number]     Echo complete w/ contrast if indicated    Result Date: 9/14/2022  Narrative: Guevara Carrera  Left Ventricle: Left ventricular cavity size is normal  Wall thickness is moderately increased  The left ventricular ejection fraction is 60%  Systolic function is normal  Wall motion is normal  Diastolic function is moderately abnormal, consistent with grade II (pseudonormal) relaxation    Right Ventricle: Right ventricular cavity size is normal  Systolic function is normal    Left Atrium: The atrium is mildly dilated    Aortic Valve: There is mild regurgitation    Mitral Valve: There is mild regurgitation    Tricuspid Valve: There is mild regurgitation   The estimated right ventricular systolic pressure is 85 82 mmHg  Review of Systems:  Review of Systems   Musculoskeletal: Positive for arthralgias, gait problem and myalgias  All other systems reviewed and are negative  Physical Exam:  Physical Exam  Vitals reviewed  Constitutional:       Appearance: Normal appearance  Cardiovascular:      Rate and Rhythm: Normal rate and regular rhythm  Pulses: Normal pulses  Heart sounds: Normal heart sounds  Pulmonary:      Effort: Pulmonary effort is normal       Breath sounds: Normal breath sounds  Abdominal:      General: Bowel sounds are normal       Palpations: Abdomen is soft  Musculoskeletal:         General: Normal range of motion  Cervical back: Normal range of motion and neck supple  Right lower leg: No edema  Left lower leg: No edema  Skin:     General: Skin is warm and dry  Capillary Refill: Capillary refill takes less than 2 seconds  Neurological:      General: No focal deficit present  Mental Status: She is alert and oriented to person, place, and time  Psychiatric:         Mood and Affect: Mood normal          Behavior: Behavior normal          Discussion/Summary:  1  Syncope no further episodes, continue to hydrate within fluid restriction, 4 week event monitor, will have second one placed on Monday  Barrera Larry is aware of possible loop implant pending event monitor results  2  Subdural Hematoma follow up CT of the head in near future, follow up with Neurosurgery  3  Orthostatic hypotension resolved, BP in office today stable and did not drop with standing  4  Hypertension- RUE sitting 130/62 continue on  Coreg 6 25mg BID with meals  5  Dyslipidemia 6/12/20 , TG 75, HDL 51, LDL 78 -  Continue on simvastatin 20 mg daily, DASH diet   6  HgbA1C 5 7 on 11/29/21 with gastroparesis possibly contributing to syncope and low albumin   7   Hyponatremia Followed by  Nephrology  9/17/22 sodium 131, 1500 cc fluid restriction

## 2022-09-29 ENCOUNTER — OFFICE VISIT (OUTPATIENT)
Dept: CARDIOLOGY CLINIC | Facility: CLINIC | Age: 87
End: 2022-09-29
Payer: MEDICARE

## 2022-09-29 VITALS
SYSTOLIC BLOOD PRESSURE: 130 MMHG | HEART RATE: 69 BPM | BODY MASS INDEX: 20.49 KG/M2 | HEIGHT: 62 IN | OXYGEN SATURATION: 99 % | DIASTOLIC BLOOD PRESSURE: 62 MMHG

## 2022-09-29 DIAGNOSIS — E78.5 DYSLIPIDEMIA: ICD-10-CM

## 2022-09-29 DIAGNOSIS — I95.1 ORTHOSTATIC HYPOTENSION: ICD-10-CM

## 2022-09-29 DIAGNOSIS — E87.1 HYPONATREMIA: ICD-10-CM

## 2022-09-29 DIAGNOSIS — R55 SYNCOPE, UNSPECIFIED SYNCOPE TYPE: Primary | ICD-10-CM

## 2022-09-29 DIAGNOSIS — I10 HYPERTENSION, UNSPECIFIED TYPE: ICD-10-CM

## 2022-09-29 PROCEDURE — 99214 OFFICE O/P EST MOD 30 MIN: CPT | Performed by: INTERNAL MEDICINE

## 2022-09-30 ENCOUNTER — HOME CARE VISIT (OUTPATIENT)
Dept: HOME HEALTH SERVICES | Facility: HOME HEALTHCARE | Age: 87
End: 2022-09-30
Payer: MEDICARE

## 2022-09-30 ENCOUNTER — HOSPITAL ENCOUNTER (OUTPATIENT)
Dept: RADIOLOGY | Facility: HOSPITAL | Age: 87
Discharge: HOME | End: 2022-09-30
Payer: MEDICARE

## 2022-09-30 VITALS
TEMPERATURE: 96.7 F | HEART RATE: 68 BPM | OXYGEN SATURATION: 98 % | SYSTOLIC BLOOD PRESSURE: 128 MMHG | DIASTOLIC BLOOD PRESSURE: 64 MMHG | RESPIRATION RATE: 14 BRPM

## 2022-09-30 DIAGNOSIS — S06.5X9A POST-TRAUMATIC SUBDURAL HEMATOMA WITH LOSS OF CONSCIOUSNESS, INITIAL ENCOUNTER (HCC): ICD-10-CM

## 2022-09-30 PROCEDURE — G1004 CDSM NDSC: HCPCS

## 2022-09-30 PROCEDURE — 70450 CT HEAD/BRAIN W/O DYE: CPT

## 2022-09-30 PROCEDURE — G0300 HHS/HOSPICE OF LPN EA 15 MIN: HCPCS

## 2022-09-30 NOTE — CASE COMMUNICATION
FYI home health SN visit pattern will be out of comliance the week of 10/3/22 due to pt having MD appts  It is out policy to notify you

## 2022-10-01 ENCOUNTER — HOME CARE VISIT (OUTPATIENT)
Dept: HOME HEALTH SERVICES | Facility: HOME HEALTHCARE | Age: 87
End: 2022-10-01
Payer: MEDICARE

## 2022-10-01 VITALS
DIASTOLIC BLOOD PRESSURE: 66 MMHG | SYSTOLIC BLOOD PRESSURE: 112 MMHG | HEART RATE: 68 BPM | OXYGEN SATURATION: 97 % | RESPIRATION RATE: 16 BRPM

## 2022-10-01 PROCEDURE — G0151 HHCP-SERV OF PT,EA 15 MIN: HCPCS

## 2022-10-03 ENCOUNTER — HOME CARE VISIT (OUTPATIENT)
Dept: HOME HEALTH SERVICES | Facility: HOME HEALTHCARE | Age: 87
End: 2022-10-03
Payer: MEDICARE

## 2022-10-03 ENCOUNTER — CLINICAL SUPPORT (OUTPATIENT)
Dept: CARDIOLOGY CLINIC | Facility: CLINIC | Age: 87
End: 2022-10-03
Payer: MEDICARE

## 2022-10-03 VITALS — SYSTOLIC BLOOD PRESSURE: 127 MMHG | DIASTOLIC BLOOD PRESSURE: 71 MMHG | OXYGEN SATURATION: 99 % | HEART RATE: 90 BPM

## 2022-10-03 DIAGNOSIS — R55 SYNCOPE, UNSPECIFIED SYNCOPE TYPE: Primary | ICD-10-CM

## 2022-10-03 PROCEDURE — 99211 OFF/OP EST MAY X REQ PHY/QHP: CPT | Performed by: INTERNAL MEDICINE

## 2022-10-03 PROCEDURE — G0152 HHCP-SERV OF OT,EA 15 MIN: HCPCS

## 2022-10-03 NOTE — PROGRESS NOTES
Patient presents to the office for a 2wk live zio monitor per Dr Lorraine Wallace  Patch was placed and all instructions given  Patient verbalized understanding  1st patch was mailed back

## 2022-10-04 ENCOUNTER — HOME CARE VISIT (OUTPATIENT)
Dept: HOME HEALTH SERVICES | Facility: HOME HEALTHCARE | Age: 87
End: 2022-10-04
Payer: MEDICARE

## 2022-10-04 VITALS
DIASTOLIC BLOOD PRESSURE: 66 MMHG | SYSTOLIC BLOOD PRESSURE: 118 MMHG | OXYGEN SATURATION: 98 % | HEART RATE: 64 BPM | RESPIRATION RATE: 16 BRPM

## 2022-10-04 PROCEDURE — G0151 HHCP-SERV OF PT,EA 15 MIN: HCPCS

## 2022-10-05 ENCOUNTER — OFFICE VISIT (OUTPATIENT)
Dept: NEUROSURGERY | Facility: CLINIC | Age: 87
End: 2022-10-05
Payer: MEDICARE

## 2022-10-05 VITALS
WEIGHT: 113 LBS | RESPIRATION RATE: 16 BRPM | HEART RATE: 70 BPM | SYSTOLIC BLOOD PRESSURE: 130 MMHG | BODY MASS INDEX: 20.8 KG/M2 | HEIGHT: 62 IN | TEMPERATURE: 98.2 F | DIASTOLIC BLOOD PRESSURE: 68 MMHG

## 2022-10-05 DIAGNOSIS — S06.5XAA: Primary | ICD-10-CM

## 2022-10-05 PROCEDURE — 99213 OFFICE O/P EST LOW 20 MIN: CPT | Performed by: PHYSICIAN ASSISTANT

## 2022-10-05 NOTE — PROGRESS NOTES
Neurosurgery Office Note  David Summers 80 y o  female MRN: [id number]      Assessment/Plan     Subdural hematoma, post-traumatic    · Patient presents for approximately 2 week follow-up for right-sided subdural hematoma status post fall on 09/13/2022  · Pt was on ASA 81 mg daily for hx of retinal vein occlusion-currently held  · Imaging reviewed personally  Final results below discussed with the patient  · CT head wo 09/30/2022:  Small residual subacute subdural hematoma within the right hemisphere slightly decreased in size and density when compared with the prior study from 2 weeks ago  Plan  · Recommend SBP goal <160mmHg  Pt advised to avoid high blood pressure  · Pain/headache management with OTC pain meds/prescribed medications as needed   Discussed with patient to avoid any blood thinning medications and to avoid alcohol  Patient to continue to hold aspirin 81 mg at this time  If SDH has further resolution by the next visit, can consider resuming it then as indicated   Recommend safety precautions to avoid trauma to head or falls  Patient to avoid strenuous activity   Discussed with patient to return to hospital Emergency Room if they experience worsening / new headache, nausea/vomiting, speech/vision change, seizure, confusion / mental status change, weakness, or other neurological changes  · No neurosurgical intervention is anticipated at this time  Pt will require continued surveillance  · Pt will have follow up with neurosurgery in 4 weeks with repeat CTH wo    · Patient verbalized understanding and was in agreement with the plan  · Patient made aware to contact neurosurgery with any questions or concerns           Diagnoses and all orders for this visit:    Subdural hematoma, post-traumatic  -     CT head without contrast; Future        I spent 30 minutes with the patient today in which >50% of the time was spent counseling/coordination of care regarding diagnosis, imaging review, symptoms and treatment plan  CHIEF COMPLAINT    Chief Complaint   Patient presents with    Consult     With [address] [place] [address]    History of Present Illness     80y o  year old female     With past medical history including anxiety, depression, history of endometrial cancer, hypercholesterolemia, hypertension, osteoporosis, hx of retinal vein occlusion for which pt was taking ASA who presents for approximately 2 week follow-up status post fall on 09/13/2022 for right-sided subdural hematoma  Patient was previously on aspirin 81 mg that she reports she continues to be held  Patient reports that she is feeling better  Patient denies any headache, dizziness, lightheadedness or visual disturbance  Patient denies any new numbness, tingling or weakness  Pt denies changes in gait or balance  Pt denies nausea or vomiting  Patient accompanied by her daughter to this visit        REVIEW OF SYSTEMS    Review of Systems   Eyes: Positive for pain (right eye , intermittent)  Negative for visual disturbance  Musculoskeletal: Positive for arthralgias and gait problem (using cane)  Neurological: Negative for weakness and headaches  Psychiatric/Behavioral: Positive for decreased concentration  All other systems reviewed and are negative  Meds/Allergies     Current Outpatient Medications   Medication Sig Dispense Refill    acetaminophen (TYLENOL) 650 mg CR tablet Take 650 mg by mouth as needed for mild pain        ALPHAGAN P 0 1 % instill 1 drop into both eyes twice a day  0    bifidobacterium infantis (ALIGN) capsule Take by mouth      carvedilol (COREG) 12 5 mg tablet Take 0 5 tablets (6 25 mg total) by mouth 2 (two) times a day with meals 6 25 BID with meals per Basia/ cardiology 9/22/22 340pm  180 tablet 3    cholecalciferol (VITAMIN D3) 1,000 units tablet Take by mouth      DENTA 5000 PLUS 1 1 % CREA BRUSH UP TO TWICE A DAY  0    famotidine (PEPCID) 20 mg tablet Take 1 tablet (20 mg total) by mouth 2 (two) times a day (Patient taking differently: Take 20 mg by mouth if needed) 60 tablet 0    levothyroxine 25 mcg tablet Take 1 tablet (25 mcg total) by mouth daily 90 tablet 0    Multiple Vitamins-Minerals (CENTRUM SILVER PO) Take by mouth      oxybutynin (DITROPAN-XL) 5 mg 24 hr tablet Take 1 tablet (5 mg total) by mouth daily at bedtime 90 tablet 3    polyethylene glycol (GLYCOLAX) 17 GM/SCOOP powder Take by mouth every other day      simvastatin (ZOCOR) 20 mg tablet take 1 tablet by mouth once daily 90 tablet 3    ALPRAZolam (XANAX) 0 25 mg tablet Take 1 tablet (0 25 mg total) by mouth daily at bedtime as needed for anxiety (Patient not taking: Reported on 10/5/2022) 20 tablet 0    meclizine (ANTIVERT) 25 mg tablet TAKE 1 TABLET EVERY 8 HOURS AS NEEDED FOR DIZZINESS (Patient not taking: No sig reported) 30 tablet 0     No current facility-administered medications for this visit         Allergies   Allergen Reactions    Amoxil [Amoxicillin] Hives    Biaxin [Clarithromycin] GI Intolerance     Reaction Date: 19Jul2011;   Pt gets nauseated    Esomeprazole GI Intolerance    Fosamax [Alendronate] GI Intolerance    Hydrochlorothiazide      Hyponatremia      Lansoprazole GI Intolerance    Norvasc [Amlodipine Besylate] Hives    Pantoprazole GI Intolerance     Can tolerate IV- Not oral    Relafen [Nabumetone] GI Intolerance and Other (See Comments)       PAST HISTORY    Past Medical History:   Diagnosis Date    Anemia     post-op, 07/07/09    Anxiety     last assessed: 01/15/14    C  difficile diarrhea     Chest wall trauma     Acute, last assessed: 10/24/12    Depression     Disease of thyroid gland     hypothyroid    Diverticula of colon     Endometrial cancer ([facility] [address] ) 03/2011    Endometrial hyperplasia     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HCC)     stage IA, Grade I endometriod adenocarcinoma with 43% myometrial invasion and no LVSI, last assessed: 91/36/20    Folliculitis     last assessed: 03/02/16    Gastroparesis     Hypercholesterolemia     Hyperlipidemia     Hypertension     Incontinence in female     Migraine     Nontoxic single thyroid nodule     last assessed: 02/16/13    Osteoporosis     last assessed: 02/22/17    Pure hypercholesterolemia     Rotator cuff (capsule) sprain     Acute, right    SDH (subdural hematoma)     Skin lesion     last assessed: 04/26/13    Stomatitis     Strain of right buttock     gluteus medius    Tendinitis of right rotator cuff     last assessed: 08/12/12    Thyroid cyst     last assessed: 08/15/13       Past Surgical History:   Procedure Laterality Date    APPENDECTOMY      BREAST BIOPSY Right     BREAST BIOPSY Left     BREAST CYST ASPIRATION      COLONOSCOPY      fiberoptic, 1998, 2001, 2006    CYSTOSCOPY      Diagnostic    EGD AND COLONOSCOPY N/A 3/11/2016    Procedure: EGD ;  Surgeon: Jesusita Carmen MD;  Location: [facility];   Service:     HYSTERECTOMY  03/22/2011    Robotic-assisted, total laparoscopic approch    JOINT REPLACEMENT      KNEE SURGERY      OOPHORECTOMY Bilateral 03/22/2011    [facility]    REPLACEMENT TOTAL KNEE      TONSILECTOMY AND ADNOIDECTOMY      TOTAL SHOULDER ARTHROPLASTY Right        Social History     Tobacco Use    Smoking status: Never Smoker    Smokeless tobacco: Never Used   Vaping Use    Vaping Use: Never used   Substance Use Topics    Alcohol use: Not Currently    Drug use: No       Family History   Problem Relation Age of Onset    No Known Problems Mother     Dementia Father     Other Brother         Prolapsing mitral valve leaflet syndrome    Testicular cancer Brother 27    Lung cancer Brother 61    Prostate cancer Brother 36        unsure of excat age   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 No Known Problems Daughter     No Known Problems Daughter     No Known Problems Daughter     No Known Problems Maternal Grandmother     No Known Problems Maternal Grandfather     Breast cancer Paternal Grandmother 79    No Known Problems Paternal Grandfather     No Known Problems Maternal Aunt     No Known Problems Paternal Aunt     No Known Problems Paternal Aunt          Above history personally reviewed  EXAM    Vitals:Blood pressure 130/68, pulse 70, temperature 98 2 °F (36 8 °C), temperature source Tympanic, resp  rate 16, height 5' 2" (1 575 m), weight 51 3 kg (113 lb), not currently breastfeeding  ,Body mass index is 20 67 kg/m²  Physical Exam  Constitutional:       General: She is not in acute distress  Appearance: She is well-developed  HENT:      Head: Normocephalic and atraumatic  Eyes:      Pupils: Pupils are equal, round, and reactive to light  Neck:      Trachea: No tracheal deviation  Cardiovascular:      Rate and Rhythm: Normal rate  Pulmonary:      Effort: Pulmonary effort is normal    Abdominal:      Palpations: Abdomen is soft  Tenderness: There is no abdominal tenderness  There is no guarding  Musculoskeletal:      Cervical back: Neck supple  Skin:     General: Skin is warm and dry  Coloration: Skin is not pale  Findings: No rash  Neurological:      Mental Status: She is alert and oriented to person, place, and time  Comments: GCS 15, Awake, Alert, Oriented x 3    Motor: GRIMES, strength 4+/5 throughout,     Sensation:  intact to LT X 4     Reflexes: 2+ and symmetric, no shah's or clonus     Coordination: no drift bilateral upper extremities, finger to nose normal bilaterally  Psychiatric:         Behavior: Behavior normal          Neurologic Exam     Mental Status   Oriented to person, place, and time  Cranial Nerves     CN III, IV, VI   Pupils are equal, round, and reactive to light  MEDICAL DECISION MAKING    Imaging Studies:     XR chest portable    Result Date: 9/16/2022  Narrative: CHEST INDICATION:   follow up possible pna   COMPARISON:  9/13/2022 EXAM PERFORMED/VIEWS:  XR CHEST PORTABLE FINDINGS: Cardiomediastinal silhouette appears unremarkable  The lungs are clear  No pneumothorax or pleural effusion  Osseous structures appear within normal limits for patient age  Bilateral shoulder prostheses  Impression: No acute cardiopulmonary disease  Workstation performed: [id number]     XR chest 1 view portable    Result Date: 9/13/2022  Narrative: CHEST INDICATION:   Fall and LOC, on ASA, head injury  COMPARISON:  Chest radiograph October 17, 2013  EXAM PERFORMED/VIEWS:  XR CHEST PORTABLE FINDINGS: Heart size stable and at the upper limits of normal   Atherosclerotic calcification of aortic arch  There is mild interstitial prominence in the lung bases seen best on the left  This is present to a mild extent on the prior CT from 2016  No focal areas of consolidation  There are bilateral shoulder prostheses  Bones otherwise within normal limits for the patient's age  Impression: Minimal interstitial prominence seen best in the lung bases likely representing chronic interstitial lung disease  Mild pulmonary edema or viral illness cannot be excluded  Findings marked as significant in Epic  Workstation performed: [id number]     CT head wo contrast    Result Date: 10/4/2022  Narrative: CT BRAIN - WITHOUT CONTRAST INDICATION:   S06  5X9A: Traumatic subdural hemorrhage with loss of consciousness of unspecified duration, initial encounter  COMPARISON:  9/15/2022 TECHNIQUE:  CT examination of the brain was performed  In addition to axial images, sagittal and coronal 2D reformatted images were created and submitted for interpretation  Radiation dose length product (DLP) for this visit:  807 mGy-cm   This examination, like all CT scans performed in the [facility], was performed utilizing techniques to minimize radiation dose exposure, including the use of iterative reconstruction and automated exposure control  IMAGE QUALITY:  Diagnostic   FINDINGS: PARENCHYMA: Decreased attenuation is noted in periventricular and subcortical white matter demonstrating an appearance that is statistically most likely to represent moderate microangiopathic change; this appearance is similar when compared to most recent prior examination  No CT signs of acute infarction  No intracranial mass, mass effect or midline shift  No acute parenchymal hemorrhage  VENTRICLES AND EXTRA-AXIAL SPACES:  Stable ventricular system  Once again identified is a subdural hematoma within the periphery of the right hemisphere  This demonstrates decreased density and slightly decreased size when compared with the prior examination from approximately 2 weeks ago  This subacute subdural now measures approximately 4 mm from the inner table of the calvarium  VISUALIZED ORBITS AND PARANASAL SINUSES:  No acute orbital pathology  Punctate calcifications at the nerve base within the posterior aspect of the globe, unchanged consistent with drusen  Clear paranasal sinuses  The right maxillary sinus is hypoplastic  CALVARIUM AND EXTRACRANIAL SOFT TISSUES:  Normal      Impression: Small residual subacute subdural hematoma within the right hemisphere slightly decreased in size and density when compared with the prior study from 2 weeks ago  Persistent chronic microangiopathic change within the cerebral hemispheres is stable  Workstation performed: [id number]     CT head wo contrast    Result Date: 9/15/2022  Narrative: CT BRAIN - WITHOUT CONTRAST INDICATION:   worsening headache in the setting of head bleed  COMPARISON:  9/14/2022 TECHNIQUE:  CT examination of the brain was performed  In addition to axial images, sagittal and coronal 2D reformatted images were created and submitted for interpretation  Radiation dose length product (DLP) for this visit:  863 mGy-cm     This examination, like all CT scans performed in the [facility], was performed utilizing techniques to minimize radiation dose exposure, including the use of iterative reconstruction and automated exposure control  IMAGE QUALITY:  Diagnostic  FINDINGS: PARENCHYMA: Decreased attenuation is noted in periventricular and subcortical white matter demonstrating an appearance that is statistically most likely to represent moderate microangiopathic change  No CT signs of acute infarction  No intracranial mass or midline shift  There is mild local mass effect adjacent to the hematoma  No acute parenchymal hemorrhage  VENTRICLES AND EXTRA-AXIAL SPACES:  Acute subdural hematoma along the right convexity measures 8 mm in greatest diameter, previously 8 mm  There is a stable small anterior parafalcine component  Ventricles and sulci are normal for age  VISUALIZED ORBITS AND PARANASAL SINUSES:  Unremarkable  CALVARIUM AND EXTRACRANIAL SOFT TISSUES:  Normal      Impression: Stable small subdural hematoma along the right convexity without stable local mass effect and no midline shift  Workstation performed: [id number]     CT head wo contrast    Result Date: 9/14/2022  Narrative: CT BRAIN - WITHOUT CONTRAST INDICATION:   Subdural hematoma follow up subdural hematoma  COMPARISON:  9/13/2022 TECHNIQUE:  CT examination of the brain was performed  In addition to axial images, sagittal and coronal 2D reformatted images were created and submitted for interpretation  Radiation dose length product (DLP) for this visit:  809 92 mGy-cm   This examination, like all CT scans performed in the [facility], was performed utilizing techniques to minimize radiation dose exposure, including the use of iterative  reconstruction and automated exposure control  IMAGE QUALITY:  Diagnostic  FINDINGS: PARENCHYMA:  Once again identified is a subdural hematoma within the right hemisphere involving the anterior and middle cranial fossa extending superiorly towards the vertex  There is only a minimal anterior parafalcine component and no significant component above the tentorium   This peripheral subdural hemorrhage is thickest in the region of the sylvian fissure where it measures approximately 8 mm  Mild mass effect upon the right hemisphere without midline shift  The basilar cisterns are patent  Stable chronic microangiopathic change within the underlying brain parenchyma  VENTRICLES:  No obstructive hydrocephalus or intraventricular hemorrhage  VISUALIZED ORBITS AND PARANASAL SINUSES:  No acute orbital pathology  Paranasal sinuses demonstrate minimal mucosal thickening on the right within a hypoplastic sinus  CALVARIUM AND EXTRACRANIAL SOFT TISSUES:  No acute osseous abnormality  There is a small soft tissue contusion superficial to the right zygomatic arch and zygomaticomaxillary suture  Impression: Stable acute subdural hematoma within the right hemisphere  Stable underlying brain parenchyma  Workstation performed: [id number]     CT head without contrast    Result Date: 9/13/2022  Narrative: CT BRAIN - WITHOUT CONTRAST INDICATION:   Fall and LOC, on ASA, head injury  COMPARISON:  None  TECHNIQUE:  CT examination of the brain was performed  In addition to axial images, sagittal and coronal 2D reformatted images were created and submitted for interpretation  Radiation dose length product (DLP) for this visit:  310 89 mGy-cm   This examination, like all CT scans performed in the [facility], was performed utilizing techniques to minimize radiation dose exposure, including the use of iterative  reconstruction and automated exposure control  IMAGE QUALITY:  Diagnostic  FINDINGS: PARENCHYMA: There is an acute right frontotemporoparietal subdural hematoma measuring 8 mm in maximal thickness  This causes approximate 4 mm of right-to-left midline shift  The blood products are denser within the lateral right temporal lobe with the suggestion of an elliptical shape, suggesting the possibility of epidural hematoma in this location    Questionable mild subarachnoid blood products within the inferior right temporal lobe (series 2 images 8 and 9), noting that this area is difficult to evaluate due  to artifact from the skull base  No evidence of herniation  Decreased attenuation is noted in periventricular and subcortical white matter demonstrating an appearance that is statistically most likely to represent moderate microangiopathic change  No CT signs of acute infarction  No intracranial mass  VENTRICLES AND EXTRA-AXIAL SPACES:  Normal for the patient's age  VISUALIZED ORBITS AND PARANASAL SINUSES:  Unremarkable  CALVARIUM AND EXTRACRANIAL SOFT TISSUES:  Mild preorbital and right temporal soft tissue swelling  No calvarial fracture  Impression: 1  Acute right frontotemporoparietal subdural hematoma, as described, causing mild right to left midline shift  2   Questionable mild subarachnoid blood products within the inferior right temporal lobe, noting that this location is difficult to evaluate due to artifact from the skull base  Attention on follow-up imaging is recommended  I personally discussed this study with Dr Isabel Lin on 9/13/2022 at 4:25 PM  Workstation performed: [id number]     CT cervical spine without contrast    Result Date: 9/13/2022  Narrative: CT CERVICAL SPINE - WITHOUT CONTRAST INDICATION:   Fall and LOC, on ASA, head injury  COMPARISON:  None  TECHNIQUE:  CT examination of the cervical spine was performed without intravenous contrast   Contiguous axial images were obtained  Sagittal and coronal reconstructions were performed  Radiation dose length product (DLP) for this visit:  822 53 mGy-cm   This examination, like all CT scans performed in the [facility], was performed utilizing techniques to minimize radiation dose exposure, including the use of iterative  reconstruction and automated exposure control  IMAGE QUALITY:  Diagnostic  FINDINGS: ALIGNMENT:  Grade 1 degenerative anterolisthesis of C2 on C3 and C7 on T1  VERTEBRAL BODIES:  No fracture   DEGENERATIVE CHANGES:  Severe multilevel cervical degenerative changes are noted  No critical central canal stenosis  PREVERTEBRAL AND PARASPINAL SOFT TISSUES:  Unremarkable  THORACIC INLET:  Normal      Impression: No cervical spine fracture or traumatic malalignment  Workstation performed: [id number]     Echo complete w/ contrast if indicated    Result Date: 9/14/2022  Narrative: Fransisca Ferrell  Left Ventricle: Left ventricular cavity size is normal  Wall thickness is moderately increased  The left ventricular ejection fraction is 60%  Systolic function is normal  Wall motion is normal  Diastolic function is moderately abnormal, consistent with grade II (pseudonormal) relaxation    Right Ventricle: Right ventricular cavity size is normal  Systolic function is normal    Left Atrium: The atrium is mildly dilated    Aortic Valve: There is mild regurgitation    Mitral Valve: There is mild regurgitation    Tricuspid Valve: There is mild regurgitation  The estimated right ventricular systolic pressure is 07 43 mmHg  I have personally reviewed pertinent reports     and I have personally reviewed pertinent films in PACS

## 2022-10-05 NOTE — PATIENT INSTRUCTIONS
Neurosurgery  instructions following a head bleed: Follow-up in 4 weeks with repeat CT head (AP)  Do not take any blood thinning medications (ie  No Advil  No motrin  No ibuprofen  No Aleve  No Aspirin  No fishoil  No heparin  No antiplatelet / no anticoagulation medication)  Refrain from activity that increases chance of trauma to head or falls  Recommend you take fall precaution  No strenuous activity or sports  Return to hospital Emergency Room if you experience worsening / new headache, nausea/vomiting, speech/vision change, seizure, confusion / mental status change, weakness, or other neurological changes  Follow-up as scheduled with a repeat CT head without contrast to be completed 2-3 days prior to visit  Prescription has been entered electronically  Please call  to schedule

## 2022-10-05 NOTE — ASSESSMENT & PLAN NOTE
· Patient presents for approximately 2 week follow-up for right-sided subdural hematoma status post fall on 09/13/2022  · Pt was on ASA 81 mg daily for hx of retinal vein occlusion-currently held  · Imaging reviewed personally  Final results below discussed with the patient  · CT head wo 09/30/2022:  Small residual subacute subdural hematoma within the right hemisphere slightly decreased in size and density when compared with the prior study from 2 weeks ago  Plan  · Recommend SBP goal <160mmHg  Pt advised to avoid high blood pressure  · Pain/headache management with OTC pain meds/prescribed medications as needed   Discussed with patient to avoid any blood thinning medications and to avoid alcohol  Patient to continue to hold aspirin 81 mg at this time  If SDH has further resolution by the next visit, can consider resuming it then as indicated   Recommend safety precautions to avoid trauma to head or falls  Patient to avoid strenuous activity   Discussed with patient to return to hospital Emergency Room if they experience worsening / new headache, nausea/vomiting, speech/vision change, seizure, confusion / mental status change, weakness, or other neurological changes  · No neurosurgical intervention is anticipated at this time  Pt will require continued surveillance  · Pt will have follow up with neurosurgery in 4 weeks with repeat CTH wo    · Patient verbalized understanding and was in agreement with the plan  · Patient made aware to contact neurosurgery with any questions or concerns

## 2022-10-06 ENCOUNTER — HOME CARE VISIT (OUTPATIENT)
Dept: HOME HEALTH SERVICES | Facility: HOME HEALTHCARE | Age: 87
End: 2022-10-06
Payer: MEDICARE

## 2022-10-06 VITALS
SYSTOLIC BLOOD PRESSURE: 136 MMHG | DIASTOLIC BLOOD PRESSURE: 80 MMHG | OXYGEN SATURATION: 97 % | RESPIRATION RATE: 20 BRPM

## 2022-10-06 PROCEDURE — G0299 HHS/HOSPICE OF RN EA 15 MIN: HCPCS

## 2022-10-06 PROCEDURE — G0151 HHCP-SERV OF PT,EA 15 MIN: HCPCS

## 2022-10-07 ENCOUNTER — HOME CARE VISIT (OUTPATIENT)
Dept: HOME HEALTH SERVICES | Facility: HOME HEALTHCARE | Age: 87
End: 2022-10-07
Payer: MEDICARE

## 2022-10-07 VITALS — HEART RATE: 82 BPM | DIASTOLIC BLOOD PRESSURE: 64 MMHG | OXYGEN SATURATION: 98 % | SYSTOLIC BLOOD PRESSURE: 108 MMHG

## 2022-10-07 PROCEDURE — G0152 HHCP-SERV OF OT,EA 15 MIN: HCPCS

## 2022-10-07 NOTE — CASE COMMUNICATION
Pt agreeable to OT dc this date, status post maximizing potential at this point  OT recommending CS for bathing and IADLs, which A is providing

## 2022-10-10 VITALS
RESPIRATION RATE: 18 BRPM | SYSTOLIC BLOOD PRESSURE: 122 MMHG | OXYGEN SATURATION: 97 % | DIASTOLIC BLOOD PRESSURE: 68 MMHG | TEMPERATURE: 97.6 F | HEART RATE: 72 BPM

## 2022-10-11 ENCOUNTER — CLINICAL SUPPORT (OUTPATIENT)
Dept: CARDIOLOGY CLINIC | Facility: CLINIC | Age: 87
End: 2022-10-11
Payer: MEDICARE

## 2022-10-11 ENCOUNTER — HOME CARE VISIT (OUTPATIENT)
Dept: HOME HEALTH SERVICES | Facility: HOME HEALTHCARE | Age: 87
End: 2022-10-11
Payer: MEDICARE

## 2022-10-11 VITALS
DIASTOLIC BLOOD PRESSURE: 60 MMHG | RESPIRATION RATE: 20 BRPM | HEART RATE: 78 BPM | OXYGEN SATURATION: 99 % | SYSTOLIC BLOOD PRESSURE: 110 MMHG

## 2022-10-11 DIAGNOSIS — R55 SYNCOPE, UNSPECIFIED SYNCOPE TYPE: ICD-10-CM

## 2022-10-11 LAB
MISCELLANEOUS LAB TEST RESULT: NORMAL
MISCELLANEOUS LAB TEST RESULT: NORMAL

## 2022-10-11 PROCEDURE — G0151 HHCP-SERV OF PT,EA 15 MIN: HCPCS

## 2022-10-11 PROCEDURE — 93248 EXT ECG>7D<15D REV&INTERPJ: CPT | Performed by: INTERNAL MEDICINE

## 2022-10-13 ENCOUNTER — HOME CARE VISIT (OUTPATIENT)
Dept: HOME HEALTH SERVICES | Facility: HOME HEALTHCARE | Age: 87
End: 2022-10-13
Payer: MEDICARE

## 2022-10-13 VITALS — SYSTOLIC BLOOD PRESSURE: 124 MMHG | RESPIRATION RATE: 16 BRPM | DIASTOLIC BLOOD PRESSURE: 72 MMHG

## 2022-10-13 PROCEDURE — G0151 HHCP-SERV OF PT,EA 15 MIN: HCPCS

## 2022-10-17 ENCOUNTER — TELEPHONE (OUTPATIENT)
Dept: FAMILY MEDICINE CLINIC | Facility: CLINIC | Age: 87
End: 2022-10-17

## 2022-10-17 NOTE — TELEPHONE ENCOUNTER
Patient ([phone]) discharged from hospital on 9/17/22 after fall  Patient did not schedule TCM within two weeks of discharged  Patient has followed up at hospital and is receiving home care  Asks if provider would recommend scheduling appointment to follow up  Patient reports no ongoing issues related to fall  Requests call back if appointment is recommended

## 2022-10-19 ENCOUNTER — HOME CARE VISIT (OUTPATIENT)
Dept: HOME HEALTH SERVICES | Facility: HOME HEALTHCARE | Age: 87
End: 2022-10-19
Payer: MEDICARE

## 2022-10-19 VITALS
OXYGEN SATURATION: 97 % | SYSTOLIC BLOOD PRESSURE: 110 MMHG | HEART RATE: 92 BPM | DIASTOLIC BLOOD PRESSURE: 70 MMHG | RESPIRATION RATE: 20 BRPM

## 2022-10-19 PROCEDURE — G0151 HHCP-SERV OF PT,EA 15 MIN: HCPCS

## 2022-10-21 ENCOUNTER — CLINICAL SUPPORT (OUTPATIENT)
Dept: CARDIOLOGY CLINIC | Facility: CLINIC | Age: 87
End: 2022-10-21
Payer: MEDICARE

## 2022-10-21 DIAGNOSIS — R55 SYNCOPE AND COLLAPSE: Primary | ICD-10-CM

## 2022-10-21 PROCEDURE — 93248 EXT ECG>7D<15D REV&INTERPJ: CPT | Performed by: INTERNAL MEDICINE

## 2022-11-04 ENCOUNTER — HOSPITAL ENCOUNTER (OUTPATIENT)
Dept: RADIOLOGY | Facility: HOSPITAL | Age: 87
Discharge: HOME | End: 2022-11-04

## 2022-11-04 DIAGNOSIS — S06.5XAA: ICD-10-CM

## 2022-11-09 NOTE — PROGRESS NOTES
Cardiology Follow-up    Yazmin Solis  [id number]  1934  HEART & VASCULAR [facility] [address] [address]      1  Benign essential hypertension     2  Dyslipidemia     3  Syncope, unspecified syncope type         Discussion/Summary:  Ms Brigette Babin is a pleasant 70-year-old female who presents to the office today for routine follow-up  Her blood pressure is elevated in the office today  She saw her oncologist earlier today and was normotensive  She checks her blood pressure at home generally with normotensive readings  No changes were made to her regimen  A low-salt diet was reinforced  Her most recent lipids were reviewed  They reveal acceptable numbers on her current statin regimen  She had an echocardiogram in the hospital revealing preserved biventricular systolic function with diastolic dysfunction and mild valvular heart disease  Regarding her syncope, she had no prodrome concerning for arrhythmogenic etiology  We discussed implantation of loop recorder to which she is agreeable  I will see her back in the office in six months for re-evaluation  History of Present Illness:  Ms Brigette Babin is a pleasant 70-year-old female who presents to the office today for the re-evaluation of hypertension  Since her last visit with me she unfortunately suffered a syncopal episode  Apparently there was no prodrome  This resulted in a subdural hematoma  No surgical intervention was necessary  An echocardiogram performed during the hospital stay revealed preserved biventricular systolic function with diastolic dysfunction and no significant valvular heart disease  She underwent a Zio patch after her hospital stay due to the concern over an arrhythmogenic etiology  This was unremarkable  She underwent a repeat CT scan of her head yesterday revealing almost resolution of her subdural hematoma    She is scheduled to see her neurosurgeon tomorrow  She also is under the care of oncologist given CLL  She underwent a scan of her chest, abdomen and pelvis which revealed a pulmonary nodule for which she is to undergo repeat imaging in a few months  She has been sedentary  With the activity she performs she denies any exertional chest pain or shortness of breath  She denies any signs or symptoms of congestive heart failure including increasing lower extremity edema, paroxysmal nocturnal dyspnea, orthopnea, acute weight gain or increasing abdominal girth  She denies lightheadedness, syncope or presyncope  She denies palpitations or symptoms of claudication  She has been checking her blood pressure at home mainly with normotensive readings        Patient Active Problem List   Diagnosis   • Benign essential hypertension   • Hyponatremia   • Hypothyroidism   • History of endometrial cancer   • Abnormal breast exam   • Arthritis   • Constipation   • Depression with anxiety   • Diverticulosis   • Duodenal diverticulum   • Esophagitis, reflux   • Dyslipidemia   • Impaired fasting glucose   • Insomnia   • Age-related osteoporosis without current pathological fracture   • Atrophic vaginitis   • Urinary incontinence   • Vertigo   • Encounter for follow-up surveillance of endometrial cancer   • Endometrial cancer ([facility] [address] )   • Arthritis of left shoulder region   • Encounter for screening mammogram for malignant neoplasm of breast   • Need for immunization against influenza   • Nonexudative age-related macular degeneration, bilateral, early dry stage   • Posterior vitreous detachment of both eyes   • 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   • Status post cataract extraction and insertion of intraocular lens   • Lymphocytosis   • Lackey's esophagus with dysplasia   • Pericardial effusion   • MGUS (monoclonal gammopathy of unknown significance)   • Hypercalcemia   • Lyme disease   • Visual field defect   • Pain, dental   • Lymphadenopathy   • Fall   • Subdural hematoma, post-traumatic   • Laceration of brow without complication   • Syncope   • Visual changes     Past Medical History:   Diagnosis Date   • Anemia     post-op, 07/07/09   • Anxiety     last assessed: 01/15/14   • C  difficile diarrhea    • Chest wall trauma     Acute, last assessed: 10/24/12   • Depression    • Disease of thyroid gland     hypothyroid   • Diverticula of colon    • Endometrial cancer ([facility] [address] ) 03/2011   • Endometrial hyperplasia    • 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HCC)     stage IA, Grade I endometriod adenocarcinoma with 43% myometrial invasion and no LVSI, last assessed: 09/74/79   • Folliculitis     last assessed: 03/02/16   • Gastroparesis    • Hypercholesterolemia    • Hyperlipidemia    • Hypertension    • Incontinence in female    • Migraine    • Nontoxic single thyroid nodule     last assessed: 02/16/13   • Osteoporosis     last assessed: 02/22/17   • Pure hypercholesterolemia    • Rotator cuff (capsule) sprain     Acute, right   • SDH (subdural hematoma)    • Skin lesion     last assessed: 04/26/13   • Stomatitis    • Strain of right buttock     gluteus medius   • Tendinitis of right rotator cuff     last assessed: 08/12/12   • Thyroid cyst     last assessed: 08/15/13     Social History     Socioeconomic History   • Marital status:       Spouse name: Not on file   • Number of children: Not on file   • Years of education: Not on file   • Highest education level: Not on file   Occupational History   • Not on file   Tobacco Use   • Smoking status: Never Smoker   • Smokeless tobacco: Never Used   Vaping Use   • Vaping Use: Never used   Substance and Sexual Activity   • Alcohol use: Not Currently   • Drug use: No   • Sexual activity: Not Currently   Other Topics Concern   • Not on file   Social History Narrative   • Not on file     Social Determinants of Health     Financial Resource Strain: Not on file   Food Insecurity: Not on file   Transportation Needs: Not on file   Physical Activity: Not on file   Stress: Not on file   Social Connections: Not on file   Intimate Partner Violence: Not on file   Housing Stability: Not on file      Family History   Problem Relation Age of Onset   • No Known Problems Mother    • Dementia Father    • Other Brother         Prolapsing mitral valve leaflet syndrome   • Testicular cancer Brother 27   • Lung cancer Brother 61   • Prostate cancer Brother 36        unsure of excat age   • No Known Problems Daughter    • No Known Problems Daughter    • No Known Problems Daughter    • No Known Problems Maternal Grandmother    • No Known Problems Maternal Grandfather    • Breast cancer Paternal Grandmother 79   • No Known Problems Paternal Grandfather    • No Known Problems Maternal Aunt    • No Known Problems Paternal Aunt    • No Known Problems Paternal Aunt      Past Surgical History:   Procedure Laterality Date   • APPENDECTOMY     • BREAST BIOPSY Right    • BREAST BIOPSY Left    • BREAST CYST ASPIRATION     • COLONOSCOPY      fiberoptic, 1998, 2001, 2006   • CYSTOSCOPY      Diagnostic   • EGD AND COLONOSCOPY N/A 3/11/2016    Procedure: EGD ;  Surgeon: Christopher Hernandez MD;  Location: [facility];   Service:    • HYSTERECTOMY  03/22/2011    Robotic-assisted, total laparoscopic approch   • JOINT REPLACEMENT     • KNEE SURGERY     • OOPHORECTOMY Bilateral 03/22/2011    Salpingo   • REPLACEMENT TOTAL KNEE     • TONSILECTOMY AND ADNOIDECTOMY     • TOTAL SHOULDER ARTHROPLASTY Right        Current Outpatient Medications:   •  acetaminophen (TYLENOL) 650 mg CR tablet, Take 650 mg by mouth as needed for mild pain , Disp: , Rfl:   •  ALPHAGAN P 0 1 %, instill 1 drop into both eyes twice a day, Disp: , Rfl: 0  •  bifidobacterium infantis (ALIGN) capsule, Take by mouth, Disp: , Rfl:   •  cholecalciferol (VITAMIN D3) 1,000 units tablet, Take by mouth, Disp: , Rfl:   •  DENTA 5000 PLUS 1 1 % CREA, BRUSH UP TO TWICE A DAY, Disp: , Rfl: 0  •  famotidine (PEPCID) 20 mg tablet, Take 1 tablet (20 mg total) by mouth 2 (two) times a day (Patient taking differently: Take 20 mg by mouth if needed), Disp: 60 tablet, Rfl: 0  •  levothyroxine 25 mcg tablet, Take 1 tablet (25 mcg total) by mouth daily, Disp: 90 tablet, Rfl: 0  •  Multiple Vitamins-Minerals (CENTRUM SILVER PO), Take by mouth, Disp: , Rfl:   •  polyethylene glycol (GLYCOLAX) 17 GM/SCOOP powder, Take by mouth every other day, Disp: , Rfl:   •  simvastatin (ZOCOR) 20 mg tablet, take 1 tablet by mouth once daily, Disp: 90 tablet, Rfl: 3  •  ALPRAZolam (XANAX) 0 25 mg tablet, Take 1 tablet (0 25 mg total) by mouth daily at bedtime as needed for anxiety (Patient not taking: No sig reported), Disp: 20 tablet, Rfl: 0  •  carvedilol (COREG) 12 5 mg tablet, Take 0 5 tablets (6 25 mg total) by mouth 2 (two) times a day with meals 6 25 BID with meals per Basia/ cardiology 9/22/22 340pm , Disp: 180 tablet, Rfl: 3  •  meclizine (ANTIVERT) 25 mg tablet, TAKE 1 TABLET EVERY 8 HOURS AS NEEDED FOR DIZZINESS (Patient not taking: No sig reported), Disp: 30 tablet, Rfl: 0  •  oxybutynin (DITROPAN-XL) 5 mg 24 hr tablet, Take 1 tablet (5 mg total) by mouth daily at bedtime, Disp: 90 tablet, Rfl: 3  Allergies   Allergen Reactions   • Amoxil [Amoxicillin] Hives   • Biaxin [Clarithromycin] GI Intolerance     Reaction Date: 19Jul2011;   Pt gets nauseated   • Esomeprazole GI Intolerance   • Fosamax [Alendronate] GI Intolerance   • Hydrochlorothiazide      Hyponatremia     • Lansoprazole GI Intolerance   • Norvasc [Amlodipine Besylate] Hives   • Pantoprazole GI Intolerance     Can tolerate IV- Not oral   • Relafen [Nabumetone] GI Intolerance and Other (See Comments)         Review of Systems:  Review of Systems   Constitutional: Positive for fatigue  Respiratory: Negative for apnea, chest tightness, shortness of breath, wheezing and stridor  All other systems reviewed and are negative          Vitals: 11/10/22 1308   BP: 152/68   BP Location: Right arm   Patient Position: Sitting   Cuff Size: Standard   Pulse: 66   Weight: 51 6 kg (113 lb 12 8 oz)   Height: 5' 2" (1 575 m)     Vitals:    11/10/22 1308   Weight: 51 6 kg (113 lb 12 8 oz)       Physical Exam:  General:  Alert and cooperative, appears stated age  HEENT:  PERRLA, EOMI, no scleral icterus, no conjunctival pallor  Neck:  No lymphadenopathy, no thyromegaly, no carotid bruits, no elevated JVP  Heart:  Regular rate and rhythm, normal S1/S2, no S3/S4, no murmur  Lungs:  Clear to auscultation bilaterally   Abdomen:  Soft, non-tender, positive bowel sounds, no rebound or guarding,   no organomegaly   Extremities:  No clubbing, cyanosis or edema   Vascular:  2+ pedal pulses  Skin:  No rashes or lesions on exposed skin  Neurologic:  Cranial nerves II-XII grossly intact without focal deficits

## 2022-11-10 ENCOUNTER — TELEPHONE (OUTPATIENT)
Dept: CARDIOLOGY CLINIC | Facility: CLINIC | Age: 87
End: 2022-11-10

## 2022-11-10 ENCOUNTER — OFFICE VISIT (OUTPATIENT)
Dept: CARDIOLOGY CLINIC | Facility: CLINIC | Age: 87
End: 2022-11-10

## 2022-11-10 VITALS
DIASTOLIC BLOOD PRESSURE: 68 MMHG | HEIGHT: 62 IN | BODY MASS INDEX: 20.94 KG/M2 | WEIGHT: 113.8 LBS | SYSTOLIC BLOOD PRESSURE: 152 MMHG | HEART RATE: 66 BPM

## 2022-11-10 DIAGNOSIS — R55 SYNCOPE, UNSPECIFIED SYNCOPE TYPE: ICD-10-CM

## 2022-11-10 DIAGNOSIS — E78.5 DYSLIPIDEMIA: ICD-10-CM

## 2022-11-10 DIAGNOSIS — I10 BENIGN ESSENTIAL HYPERTENSION: Primary | ICD-10-CM

## 2022-11-10 NOTE — TELEPHONE ENCOUNTER
----- Message from Trey Moses DO sent at 11/10/2022  1:55 PM EST -----  Hello - this patient is in need of a loop recorder  Thank you      Cristhian Solomon

## 2022-11-11 ENCOUNTER — OFFICE VISIT (OUTPATIENT)
Dept: NEUROSURGERY | Facility: CLINIC | Age: 87
End: 2022-11-11

## 2022-11-11 VITALS
BODY MASS INDEX: 20.61 KG/M2 | RESPIRATION RATE: 16 BRPM | TEMPERATURE: 98 F | DIASTOLIC BLOOD PRESSURE: 78 MMHG | SYSTOLIC BLOOD PRESSURE: 152 MMHG | WEIGHT: 112 LBS | HEIGHT: 62 IN | HEART RATE: 66 BPM

## 2022-11-11 DIAGNOSIS — S06.5XAD: Primary | ICD-10-CM

## 2022-11-11 NOTE — ASSESSMENT & PLAN NOTE
Patient presents for approximately 8 week follow-up for right-sided   SDH s/p fall at home 9/13/22  · Doing well with no new complaints  HA improved  · Was on ASA 81 mg daily for left eye BRVO per Ophthalmology -currently held  · Exam: grossly non-focal    Imaging:  · CT head wo 11/4/22:  Previous right subdural hematoma appear largely resolved with only a trace amount of subtle dural thickening along the right frontal region  No acute intracranial hemorrhage or other acute abnormalities identified  Senescent changes again seen as described above     Plan  · Reviewed imaging with patient and daughter  Her right SDH has nearly completely resolved and she is feeling well  · No need for further follow up imaging  • She may resume baby ASA from a neurosurgical standpoint if her Ophthalmologist feels it's indicated  • No contraindication to driving from a neurosurgery perspective, would follow up with PCP regarding this  · Call with any questions or concerns

## 2022-11-11 NOTE — PROGRESS NOTES
Neurosurgery Office Note  Caroline Aquino 80 y o  female MRN: [id number]      Assessment/Plan     Subdural hematoma, post-traumatic  Patient presents for approximately 8 week follow-up for right-sided   SDH s/p fall at home 9/13/22  · Doing well with no new complaints  HA improved  · Was on ASA 81 mg daily for left eye BRVO per Ophthalmology -currently held  · Exam: grossly non-focal    Imaging:  · CT head wo 11/4/22:  Previous right subdural hematoma appear largely resolved with only a trace amount of subtle dural thickening along the right frontal region  No acute intracranial hemorrhage or other acute abnormalities identified  Senescent changes again seen as described above     Plan  · Reviewed imaging with patient and daughter  Her right SDH has nearly completely resolved and she is feeling well  · No need for further follow up imaging  • She may resume baby ASA from a neurosurgical standpoint if her Ophthalmologist feels it's indicated  • No contraindication to driving from a neurosurgery perspective, would follow up with PCP regarding this  · Call with any questions or concerns  There are no diagnoses linked to this encounter  I spent 20 minutes with the patient today in which >50% of the time was spent counseling/coordination of care regarding diagnosis, imaging review, symptoms and treatment plan  CHIEF COMPLAINT    Chief Complaint   Patient presents with   • Follow-up     With [address] [place] [address]       HISTORY    History of Present Illness     80y o  year old female     60-year-old female returns today for a 4 week follow-up appointment after a SDH  She fell at home 9/13/22 and suffered a right frontotemporoparietal SDH  She is doing better since last seen  She has occasional dizziness which is unchanged prior to her fall  No nausea, vomiting, new double vision or blurry vision  She has baseline left eye vision loss since her BRVO and right eye peripheral vision loss   Follows regularly with Ophthalmology and was previously taking a baby ASA at their recommendation  This has been held since her SDH  Balance remains a bit off, but she had issues before she fell  Uses a cane if she is going outdoors  See Discussion    REVIEW OF SYSTEMS    Review of Systems   Eyes: Positive for pain (right eye , intermittent)  Negative for visual disturbance  Musculoskeletal: Positive for arthralgias (general achiness) and gait problem (using cane)  Neurological: Positive for headaches (occasional, mild, relieves with Tylenol)  Negative for weakness and numbness  Psychiatric/Behavioral: Positive for decreased concentration (improving)  Meds/Allergies     Current Outpatient Medications   Medication Sig Dispense Refill   • acetaminophen (TYLENOL) 650 mg CR tablet Take 650 mg by mouth as needed for mild pain       • ALPHAGAN P 0 1 % instill 1 drop into both eyes twice a day  0   • ALPRAZolam (XANAX) 0 25 mg tablet Take 1 tablet (0 25 mg total) by mouth daily at bedtime as needed for anxiety (Patient taking differently: Take 0 25 mg by mouth if needed for anxiety) 20 tablet 0   • bifidobacterium infantis (ALIGN) capsule Take by mouth     • carvedilol (COREG) 12 5 mg tablet Take 0 5 tablets (6 25 mg total) by mouth 2 (two) times a day with meals 6 25 BID with meals per [facility]/ cardiology 9/22/22 340pm  180 tablet 3   • cholecalciferol (VITAMIN D3) 1,000 units tablet Take by mouth     • DENTA 5000 PLUS 1 1 % CREA BRUSH UP TO TWICE A DAY  0   • DOXYCYCLINE PO Take by mouth in the morning     • famotidine (PEPCID) 20 mg tablet Take 1 tablet (20 mg total) by mouth 2 (two) times a day (Patient taking differently: Take 20 mg by mouth if needed) 60 tablet 0   • levothyroxine 25 mcg tablet Take 1 tablet (25 mcg total) by mouth daily 90 tablet 3   • Multiple Vitamins-Minerals (CENTRUM SILVER PO) Take by mouth     • polyethylene glycol (GLYCOLAX) 17 GM/SCOOP powder Take by mouth every other day     • simvastatin (ZOCOR) 20 mg tablet take 1 tablet by mouth once daily 90 tablet 3   • meclizine (ANTIVERT) 25 mg tablet TAKE 1 TABLET EVERY 8 HOURS AS NEEDED FOR DIZZINESS (Patient not taking: No sig reported) 30 tablet 0   • oxybutynin (DITROPAN-XL) 5 mg 24 hr tablet Take 1 tablet (5 mg total) by mouth daily at bedtime (Patient not taking: Reported on 11/11/2022) 90 tablet 3     No current facility-administered medications for this visit         Allergies   Allergen Reactions   • Amoxil [Amoxicillin] Hives   • Biaxin [Clarithromycin] GI Intolerance     Reaction Date: 19Jul2011;   Pt gets nauseated   • Esomeprazole GI Intolerance   • Fosamax [Alendronate] GI Intolerance   • Hydrochlorothiazide      Hyponatremia     • Lansoprazole GI Intolerance   • Norvasc [Amlodipine Besylate] Hives   • Pantoprazole GI Intolerance     Can tolerate IV- Not oral   • Relafen [Nabumetone] GI Intolerance and Other (See Comments)       PAST HISTORY    Past Medical History:   Diagnosis Date   • Anemia     post-op, 07/07/09   • Anxiety     last assessed: 01/15/14   • C  difficile diarrhea    • Chest wall trauma     Acute, last assessed: 10/24/12   • CLL (chronic lymphocytic leukemia) (HCC)    • Depression    • Disease of thyroid gland     hypothyroid   • Diverticula of colon    • Endometrial cancer ([facility] [address] ) 03/2011   • Endometrial hyperplasia    • 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HCC)     stage IA, Grade I endometriod adenocarcinoma with 43% myometrial invasion and no LVSI, last assessed: 18/98/09   • Folliculitis     last assessed: 03/02/16   • Gastroparesis    • Hypercholesterolemia    • Hyperlipidemia    • Hypertension    • Incontinence in female    • Migraine    • Nontoxic single thyroid nodule     last assessed: 02/16/13   • Osteoporosis     last assessed: 02/22/17   • Pure hypercholesterolemia    • Rotator cuff (capsule) sprain     Acute, right   • SDH (subdural hematoma)    • Skin lesion     last assessed: 04/26/13   • Stomatitis    • Strain of right buttock     gluteus medius   • Tendinitis of right rotator cuff     last assessed: 08/12/12   • Thyroid cyst     last assessed: 08/15/13       Past Surgical History:   Procedure Laterality Date   • APPENDECTOMY     • BREAST BIOPSY Right    • BREAST BIOPSY Left    • BREAST CYST ASPIRATION     • COLONOSCOPY      fiberoptic, 1998, 2001, 2006   • CYSTOSCOPY      Diagnostic   • EGD AND COLONOSCOPY N/A 3/11/2016    Procedure: EGD ;  Surgeon: Michaelle Cantu MD;  Location: [facility]; Service:    • HYSTERECTOMY  03/22/2011    Robotic-assisted, total laparoscopic approch   • JOINT REPLACEMENT     • KNEE SURGERY     • OOPHORECTOMY Bilateral 03/22/2011    Salpingo   • REPLACEMENT TOTAL KNEE     • TONSILECTOMY AND ADNOIDECTOMY     • TOTAL SHOULDER ARTHROPLASTY Right        Social History     Tobacco Use   • Smoking status: Never Smoker   • Smokeless tobacco: Never Used   Vaping Use   • Vaping Use: Never used   Substance Use Topics   • Alcohol use: Not Currently   • Drug use: No       Family History   Problem Relation Age of Onset   • No Known Problems Mother    • Dementia Father    • Other Brother         Prolapsing mitral valve leaflet syndrome   • Testicular cancer Brother 27   • Lung cancer Brother 61   • Prostate cancer Brother 36        unsure of excat age   • No Known Problems Daughter    • No Known Problems Daughter    • No Known Problems Daughter    • No Known Problems Maternal Grandmother    • No Known Problems Maternal Grandfather    • Breast cancer Paternal Grandmother 79   • No Known Problems Paternal Grandfather    • No Known Problems Maternal Aunt    • No Known Problems Paternal Aunt    • No Known Problems Paternal Aunt          Above history personally reviewed  EXAM    Vitals:Blood pressure 152/78, pulse 66, temperature 98 °F (36 7 °C), temperature source Tympanic, resp  rate 16, height 5' 2" (1 575 m), weight 50 8 kg (112 lb), not currently breastfeeding  ,Body mass index is 20 49 kg/m²  Physical Exam  Vitals reviewed  Constitutional:       General: She is awake  Appearance: Normal appearance  HENT:      Head: Normocephalic and atraumatic  Eyes:      Extraocular Movements: EOM normal       Conjunctiva/sclera: Conjunctivae normal       Pupils: Pupils are equal, round, and reactive to light  Cardiovascular:      Rate and Rhythm: Normal rate  Pulmonary:      Effort: Pulmonary effort is normal    Skin:     General: Skin is warm and dry  Neurological:      Mental Status: She is alert and oriented to person, place, and time  Coordination: Finger-Nose-Finger Test normal       Gait: Gait is intact  Deep Tendon Reflexes: Strength normal       Reflex Scores:       Bicep reflexes are 2+ on the right side and 2+ on the left side  Brachioradialis reflexes are 2+ on the right side and 2+ on the left side  Patellar reflexes are 2+ on the right side and 2+ on the left side  Psychiatric:         Attention and Perception: Attention and perception normal          Mood and Affect: Mood and affect normal          Speech: Speech normal          Behavior: Behavior normal  Behavior is cooperative  Thought Content: Thought content normal          Cognition and Memory: Cognition and memory normal          Judgment: Judgment normal          Neurologic Exam     Mental Status   Oriented to person, place, and time  Follows 2 step commands  Attention: normal  Concentration: normal    Speech: speech is normal   Level of consciousness: alert  Knowledge: good  Able to perform simple calculations  Able to name object  Able to repeat  Normal comprehension  Cranial Nerves     CN III, IV, VI   Pupils are equal, round, and reactive to light  Extraocular motions are normal    Right pupil: Shape: regular  Reactivity: brisk  Consensual response: intact  Left pupil: Shape: regular  Reactivity: brisk  Consensual response: intact     Nystagmus: none   Ophthalmoparesis: none  Upgaze: normal  Downgaze: normal  Conjugate gaze: present    CN V   Facial sensation intact  CN VII   Facial expression full, symmetric  CN VIII   Hearing: intact    CN XI   Right trapezius strength: normal  Left trapezius strength: normal    CN XII   Tongue: not atrophic  Fasciculations: absent  Tongue deviation: none    Motor Exam   Right arm pronator drift: absent  Left arm pronator drift: absent    Strength   Strength 5/5 throughout  Sensory Exam   Light touch normal      Gait, Coordination, and Reflexes     Gait  Gait: normal    Coordination   Finger to nose coordination: normal    Tremor   Resting tremor: absent  Intention tremor: absent  Action tremor: absent    Reflexes   Right brachioradialis: 2+  Left brachioradialis: 2+  Right biceps: 2+  Left biceps: 2+  Right patellar: 2+  Left patellar: 2+  Right Mckeon: absent  Left Mckeon: absent  Right ankle clonus: absent  Left ankle clonus: absent        MEDICAL DECISION MAKING    Imaging Studies:     CT head without contrast    Result Date: 11/4/2022  Narrative: CT BRAIN - WITHOUT CONTRAST INDICATION:   S06  5XAA: Traumatic subdural hemorrhage with loss of consciousness status unknown, initial encounter  COMPARISON:  9/30/2022  TECHNIQUE:  CT examination of the brain was performed  In addition to axial images, sagittal and coronal 2D reformatted images were created and submitted for interpretation  Radiation dose length product (DLP) for this visit:  862 82 mGy-cm   This examination, like all CT scans performed in the [facility], was performed utilizing techniques to minimize radiation dose exposure, including the use of iterative  reconstruction and automated exposure control  IMAGE QUALITY:  Diagnostic  FINDINGS: PARENCHYMA: Decreased attenuation is noted in periventricular and subcortical white matter demonstrating an appearance that is statistically most likely to represent moderate microangiopathic change   No CT signs of acute infarction  No intracranial mass, mass effect or midline shift  No acute parenchymal hemorrhage  Previous right subdural hematoma appear largely resolved with only a trace amount of subtle dural thickening along the right frontal  region  VENTRICLES AND EXTRA-AXIAL SPACES:  Mild central greater than cortical volume loss/atrophy is again seen  The basal cisterns are patent and unremarkable  VISUALIZED ORBITS AND PARANASAL SINUSES:  Unremarkable  CALVARIUM AND EXTRACRANIAL SOFT TISSUES:  Normal      Impression: Previous right subdural hematoma appear largely resolved with only a trace amount of subtle dural thickening along the right frontal region  No acute intracranial hemorrhage or other acute abnormalities identified  Senescent changes again seen as described above  Workstation performed: [id number]       I have personally reviewed pertinent reports     and I have personally reviewed pertinent films in PACS

## 2022-11-13 PROBLEM — S01.81XA LACERATION OF BROW WITHOUT COMPLICATION: Status: RESOLVED | Noted: 2022-09-14 | Resolved: 2022-11-13

## 2022-12-06 ENCOUNTER — PREP FOR PROCEDURE (OUTPATIENT)
Dept: CARDIOLOGY CLINIC | Facility: CLINIC | Age: 87
End: 2022-12-06

## 2022-12-06 DIAGNOSIS — I10 BENIGN ESSENTIAL HYPERTENSION: Primary | ICD-10-CM

## 2022-12-12 DIAGNOSIS — I10 PRIMARY HYPERTENSION: Primary | ICD-10-CM

## 2022-12-12 RX ORDER — CARVEDILOL 6.25 MG/1
6.25 TABLET ORAL 2 TIMES DAILY WITH MEALS
Qty: 180 TABLET | Refills: 2 | Status: SHIPPED | OUTPATIENT
Start: 2022-12-12

## 2022-12-17 ENCOUNTER — HOSPITAL ENCOUNTER (OUTPATIENT)
Facility: HOSPITAL | Age: 87
Setting detail: OUTPATIENT SURGERY
Discharge: HOME | End: 2022-12-17
Attending: INTERNAL MEDICINE | Admitting: INTERNAL MEDICINE

## 2022-12-17 VITALS
WEIGHT: 110 LBS | HEART RATE: 73 BPM | SYSTOLIC BLOOD PRESSURE: 181 MMHG | RESPIRATION RATE: 18 BRPM | OXYGEN SATURATION: 98 % | HEIGHT: 62 IN | TEMPERATURE: 97.8 F | BODY MASS INDEX: 20.24 KG/M2 | DIASTOLIC BLOOD PRESSURE: 93 MMHG

## 2022-12-17 DIAGNOSIS — I10 BENIGN ESSENTIAL HYPERTENSION: ICD-10-CM

## 2022-12-17 DEVICE — LOOP RECORDER REVEAL LINQ II SYS DEVICE ONLY: Type: IMPLANTABLE DEVICE | Site: CHEST | Status: FUNCTIONAL

## 2022-12-17 RX ORDER — LIDOCAINE HYDROCHLORIDE 10 MG/ML
INJECTION, SOLUTION EPIDURAL; INFILTRATION; INTRACAUDAL; PERINEURAL
Status: DISCONTINUED
Start: 2022-12-17 | End: 2022-12-17 | Stop reason: HOSPADM

## 2022-12-17 RX ORDER — LIDOCAINE HYDROCHLORIDE 10 MG/ML
INJECTION, SOLUTION EPIDURAL; INFILTRATION; INTRACAUDAL; PERINEURAL CODE/TRAUMA/SEDATION MEDICATION
Status: DISCONTINUED | OUTPATIENT
Start: 2022-12-17 | End: 2022-12-17 | Stop reason: HOSPADM

## 2022-12-17 NOTE — DISCHARGE INSTR - AVS FIRST PAGE
OK to shower with with the glue, glue will fall off in 1 week on its own, do not scrub the area or swim during the next 14 days  not use lotions/powders/creams on incision  Remove outer bandage on for 24 hours after procedure  Please call the office [phone] if you notice redness, swelling, bleeding, or drainage from incision or if you develop fevers  Cardiac Loop Recorder Insertion      WHAT YOU SHOULD KNOW:    A cardiac loop recorder is a device used to diagnose heart rhythm problems, such as a fast or irregular heartbeat  It is implanted in your left chest, just under the skin  The device records a pattern of your heart's rhythm, called an EKG  Your device records automatic EKGs, depending on how your caregiver programs it  You may also receive a handheld controller  You press a button on the controller when you have symptoms, such as dizziness, lightheadedness, or palpitations  The device will record an EKG at that moment  The recording can help your caregiver see if your symptoms may be caused by heart rhythm problems  Your caregiver will remove the device after it has collected enough data  You may need the device for up to 5 years  The procedure to remove the device is similar to the procedure used to implant it  AFTER YOU LEAVE:    Follow up with our loop recorder clinic: You will need to return in 1 to 2 weeks to meet the staff in our loop recorder clinic  At this appointment they will check your incision and remove your stitches  We will discuss how we retrieve data from your loop recorder at this appointment  You will be able to transmit data from your device from home as well, this will also be explained by our loop recorder clinic staff  Ask for information about this process  Write down your questions so you remember to ask them during your visits        Wound care: the glue over your loop recorder is water proof, you can shower as your normally wound, please do not pick the glue off the wound  Do not use lotions/powders/creams on incision  Remove outer bandage 24 hours after procedure, you will notice a few stitches which will be removed at your two week follow up appointment  Please call the office if you notice redness, swelling, bleeding, or drainage from incision or if you develop fevers  Keep the loop recorder area clean until it heals  Return to activity: If you received anesthesia, you will not be able to drive for 24 hours  Otherwise, most people can return to normal activities soon after the procedure  Your cardiologist may want to know if your work involves electrical current or high-voltage equipment  Ask about other electrical items that could interfere with your cardiac loop recorder  Contact your cardiologist if:   You have a fever or chills  Your wound is red, swollen, or draining pus  You have questions or concerns about your condition or care  Seek care immediately or call 911 if: You feel weak, dizzy, or faint  You lose consciousness  © 2014 [employer] is for End User's use only and may not be sold, redistributed or otherwise used for commercial purposes  All illustrations and images included in CareNotes® are the copyrighted property of [employer]  or [employer]  The above information is an  only  It is not intended as medical advice for individual conditions or treatments  Talk to your doctor, nurse or pharmacist before following any medical regimen to see if it is safe and effective for you

## 2022-12-17 NOTE — H&P
H&P Exam - Cardiology   Saul Patton 80 y o  female MRN: [id number]  Unit/Bed#: [facility] CATH LAB ROOM Encounter: [id number]    Assessment/Plan   1  Syncope   * plan for MDT ILR for syncope monitoring         Imaging: I have personally reviewed pertinent reports  Results for orders placed during the hospital encounter of 20    Echo complete with contrast if indicated    Narrative  [address]  [facility], [address] [facility]  [phone]    Transthoracic Echocardiogram  2D, M-mode, Doppler, and Color Doppler    Study date:  07-Dec-2020    Patient: Mone Valentin  MR number: [id number]  Account number: [de-identified]  : 1934  Age: 80 years  Gender: Female  Status: Outpatient  Location: [address] [place] [address] [facility]  Height: 62 in  Weight: 115 lb  BP: 110/ 64 mmHg    Indications: HTN    Diagnoses: I10  - Essential (primary) hypertension    Sonographer:  QUOC Fitzgerald  Interpreting Physician:  Aydee Ferrari MD  Primary Physician:  Garth Watkins MD  Referring Physician:  Jennifer Lucero DO  Group:  [facility]  Cardiology Fellow:  Drea Troy DO    SUMMARY    LEFT VENTRICLE:  The cavity was small  Systolic function was normal  Ejection fraction was estimated to be 65 %  There were no regional wall motion abnormalities  Wall thickness was mildly to moderately increased  There was mild concentric hypertrophy  Doppler parameters were consistent with abnormal left ventricular relaxation (grade 1 diastolic dysfunction)  MITRAL VALVE:  There was mild annular calcification  There was trace regurgitation  TRICUSPID VALVE:  There was mild to moderate regurgitation  PULMONIC VALVE:  There was mild regurgitation  AORTA:  There was borderline dilatation of the ascending aorta measuring 3 5 cm  HISTORY: PRIOR HISTORY: HTN; High chol; Pericardial effusion;  Hypothyroidism; Esophagitis/reflux; Lackey's esophagus with dysplasia    PROCEDURE: The study was performed in the [address] [facility] [place]  This was a routine study  The transthoracic approach was used  The study included complete 2D imaging, M-mode, complete spectral Doppler, and color Doppler  The  heart rate was 71 bpm, at the start of the study  Images were obtained from the parasternal, apical, subcostal, and suprasternal notch acoustic windows  Image quality was good  LEFT VENTRICLE: The cavity was small  Systolic function was normal  Ejection fraction was estimated to be 65 %  There were no regional wall motion abnormalities  Wall thickness was mildly to moderately increased  There was mild concentric  hypertrophy  DOPPLER: Doppler parameters were consistent with abnormal left ventricular relaxation (grade 1 diastolic dysfunction)  RIGHT VENTRICLE: The size was normal  Systolic function was normal  Wall thickness was normal     LEFT ATRIUM: Size was at the upper limits of normal     RIGHT ATRIUM: Size was normal     MITRAL VALVE: There was mild annular calcification  There was normal leaflet separation  DOPPLER: The transmitral velocity was within the normal range  There was no evidence for stenosis  There was trace regurgitation  AORTIC VALVE: The valve was trileaflet  Leaflets exhibited normal thickness, mild calcification, normal cuspal separation, and sclerosis  DOPPLER: Transaortic velocity was within the normal range  There was no evidence for stenosis  There  was no regurgitation  TRICUSPID VALVE: The valve structure was normal  There was normal leaflet separation  DOPPLER: The transtricuspid velocity was within the normal range  There was no evidence for stenosis  There was mild to moderate regurgitation  Estimated  peak PA pressure was 28 mmHg  PULMONIC VALVE: Leaflets exhibited normal thickness, no calcification, and normal cuspal separation  DOPPLER: The transpulmonic velocity was within the normal range  There was mild regurgitation      PERICARDIUM: There was no pericardial effusion  AORTA: The root exhibited normal size  There was borderline dilatation of the ascending aorta measuring 3 5 cm  SYSTEM MEASUREMENT TABLES    2D  %FS: 32 08 %  Ao Diam: 3 1 cm  Ao asc: 3 55 cm  EDV(Teich): 32 43 ml  EF(Teich): 61 98 %  ESV(Teich): 12 33 ml  IVSd: 1 73 cm  LA Area: 13 68 cm2  LA Diam: 3 83 cm  LVEDV MOD A4C: 47 25 ml  LVEF MOD A4C: 74 83 %  LVESV MOD A4C: 11 9 ml  LVIDd: 2 91 cm  LVIDs: 1 98 cm  LVLd A4C: 7 06 cm  LVLs A4C: 5 52 cm  LVPWd: 1 32 cm  RA Area: 8 25 cm2  RVIDd: 2 61 cm  SV MOD A4C: 35 36 ml  SV(Teich): 20 1 ml    CW  TR Vmax: 2 4 m/s  TR maxP 08 mmHg    MM  TAPSE: 1 7 cm    PW  E' Sept: 0 05 m/s  E/E' Sept: 8 75  LVOT Env  Ti: 423 41 ms  LVOT VTI: 23 03 cm  LVOT Vmax: 0 85 m/s  LVOT Vmean: 0 55 m/s  LVOT maxP 9 mmHg  LVOT meanP 43 mmHg  MV A [place]: 0 73 m/s  MV Dec [place]: 1 49 m/s2  MV DecT: 281 39 ms  MV E Luis Fernando: 0 42 m/s  MV E/A Ratio: 0 58  MV PHT: 81 6 ms  MVA By PHT: 2 7 cm2    [facility] [place] [facility] Accredited Echocardi[facility]    Prepared and electronically signed by    Ashwin Villarreal MD  Signed 07-Dec-2020 12:03:57      EKG: none    History of Present Illness   HPI:  Any Roldan is a 80y o  year old female with a history as above who presents to [facility] for ILR  Review of Systems  ROS as noted above, otherwise 12 point review of systems was performed and is negative         Historical Information   Past Medical History:   Diagnosis Date   • Anemia     post-op, 09   • Anxiety     last assessed: 01/15/14   • C  difficile diarrhea    • Chest wall trauma     Acute, last assessed: 10/24/12   • CLL (chronic lymphocytic leukemia) (HCC)    • Depression    • Disease of thyroid gland     hypothyroid   • Diverticula of colon    • Endometrial cancer ([facility] [address] ) 2011   • Endometrial hyperplasia    • 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HCC)     stage IA, Grade I endometriod adenocarcinoma with 43% myometrial invasion and no LVSI, last assessed: 32/07/96   • Folliculitis     last assessed: 03/02/16   • Gastroparesis    • Hypercholesterolemia    • Hyperlipidemia    • Hypertension    • Incontinence in female    • Migraine    • Nontoxic single thyroid nodule     last assessed: 02/16/13   • Osteoporosis     last assessed: 02/22/17   • Pure hypercholesterolemia    • Rotator cuff (capsule) sprain     Acute, right   • SDH (subdural hematoma)    • Skin lesion     last assessed: 04/26/13   • Stomatitis    • Strain of right buttock     gluteus medius   • Tendinitis of right rotator cuff     last assessed: 08/12/12   • Thyroid cyst     last assessed: 08/15/13     Past Surgical History:   Procedure Laterality Date   • APPENDECTOMY     • BREAST BIOPSY Right    • BREAST BIOPSY Left    • BREAST CYST ASPIRATION     • COLONOSCOPY      fiberoptic, 1998, 2001, 2006   • CYSTOSCOPY      Diagnostic   • EGD AND COLONOSCOPY N/A 3/11/2016    Procedure: EGD ;  Surgeon: Bridgett Valenzuela MD;  Location: [facility];   Service:    • HYSTERECTOMY  03/22/2011    Robotic-assisted, total laparoscopic approch   • JOINT REPLACEMENT     • KNEE SURGERY     • OOPHORECTOMY Bilateral 03/22/2011    Salpingo   • REPLACEMENT TOTAL KNEE     • TONSILECTOMY AND ADNOIDECTOMY     • TOTAL SHOULDER ARTHROPLASTY Right      Family History:   Family History   Problem Relation Age of Onset   • No Known Problems Mother    • Dementia Father    • Other Brother         Prolapsing mitral valve leaflet syndrome   • Testicular cancer Brother 27   • Lung cancer Brother 61   • Prostate cancer Brother 36        unsure of excat age   • No Known Problems Daughter    • No Known Problems Daughter    • No Known Problems Daughter    • No Known Problems Maternal Grandmother    • No Known Problems Maternal Grandfather    • Breast cancer Paternal Grandmother 79   • No Known Problems Paternal Grandfather    • No Known Problems Maternal Aunt    • No Known Problems Paternal Aunt    • No Known Problems Paternal Aunt Social History   Social History     Substance and Sexual Activity   Alcohol Use Not Currently     Social History     Substance and Sexual Activity   Drug Use No     Social History     Tobacco Use   Smoking Status Never   Smokeless Tobacco Never         Meds/Allergies   all medications and allergies reviewed  Home Medications:   Medications Prior to Admission   Medication   • ALPRAZolam (XANAX) 0 25 mg tablet   • bifidobacterium infantis (ALIGN) capsule   • carvedilol (COREG) 6 25 mg tablet   • cholecalciferol (VITAMIN D3) 1,000 units tablet   • levothyroxine 25 mcg tablet   • Multiple Vitamins-Minerals (CENTRUM SILVER PO)   • simvastatin (ZOCOR) 20 mg tablet   • acetaminophen (TYLENOL) 650 mg CR tablet   • ALPHAGAN P 0 1 %   • DENTA 5000 PLUS 1 1 % CREA   • DOXYCYCLINE PO   • famotidine (PEPCID) 20 mg tablet   • meclizine (ANTIVERT) 25 mg tablet   • oxybutynin (DITROPAN-XL) 5 mg 24 hr tablet   • polyethylene glycol (GLYCOLAX) 17 GM/SCOOP powder       Allergies   Allergen Reactions   • Amoxil [Amoxicillin] Hives   • Biaxin [Clarithromycin] GI Intolerance     Reaction Date: 19Jul2011;   Pt gets nauseated   • Esomeprazole GI Intolerance   • Fosamax [Alendronate] GI Intolerance   • Hydrochlorothiazide      Hyponatremia     • Lansoprazole GI Intolerance   • Norvasc [Amlodipine Besylate] Hives   • Pantoprazole GI Intolerance     Can tolerate IV- Not oral   • Relafen [Nabumetone] GI Intolerance and Other (See Comments)       Objective   Vitals: Blood pressure (!) 181/93, pulse 73, temperature 97 8 °F (36 6 °C), temperature source Oral, resp  rate 18, height 5' 2" (1 575 m), weight 49 9 kg (110 lb), SpO2 98 %, not currently breastfeeding    Orthostatic Blood Pressures    Flowsheet Row Most Recent Value   Blood Pressure 181/93 filed at 12/17/2022 0950   Patient Position - Orthostatic VS Sitting filed at 12/17/2022 0950          No intake or output data in the 24 hours ending 12/17/22 0959    Invasive Devices None                 Physical Exam  Constitutional:       Appearance: She is well-developed  HENT:      Head: Normocephalic and atraumatic  Eyes:      Pupils: Pupils are equal, round, and reactive to light  Cardiovascular:      Rate and Rhythm: Normal rate and regular rhythm  Pulmonary:      Effort: Pulmonary effort is normal       Breath sounds: Normal breath sounds  Abdominal:      General: Bowel sounds are normal       Palpations: Abdomen is soft  Musculoskeletal:         General: Normal range of motion  Cervical back: Normal range of motion and neck supple  Skin:     General: Skin is warm and dry  Neurological:      Mental Status: She is alert and oriented to person, place, and time  Lab Results: I have personally reviewed pertinent lab results                Invalid input(s): LABGLOM                Code Status: Prior

## 2022-12-28 LAB — HBA1C MFR BLD HPLC: 5.6 %

## 2022-12-29 ENCOUNTER — IN-CLINIC DEVICE VISIT (OUTPATIENT)
Dept: CARDIOLOGY CLINIC | Facility: CLINIC | Age: 87
End: 2022-12-29

## 2022-12-29 DIAGNOSIS — Z95.818 PRESENCE OF CARDIAC DEVICE: Primary | ICD-10-CM

## 2022-12-30 NOTE — PROGRESS NOTES
Results for orders placed or performed in visit on 12/29/22   Cardiac EP device report    Narrative    CARELINK TRANSMISSION: BATTERY STATUS "OK " NO PATIENT OR DEVICE ACTIVATED EPISODES  WOUND CHECK: INCISION CLEAN AND DRY WITH EDGES APPROXIMATED; SUTURES REMOVED; WOUND CARE AND RESTRICTIONS REVIEWED WITH PATIENT  ---VILLARREAL

## 2023-01-03 ENCOUNTER — OFFICE VISIT (OUTPATIENT)
Dept: FAMILY MEDICINE CLINIC | Facility: CLINIC | Age: 88
End: 2023-01-03

## 2023-01-03 VITALS
RESPIRATION RATE: 16 BRPM | TEMPERATURE: 97.6 F | HEART RATE: 76 BPM | WEIGHT: 112 LBS | SYSTOLIC BLOOD PRESSURE: 140 MMHG | HEIGHT: 63 IN | BODY MASS INDEX: 19.84 KG/M2 | OXYGEN SATURATION: 98 % | DIASTOLIC BLOOD PRESSURE: 84 MMHG

## 2023-01-03 DIAGNOSIS — Z00.00 MEDICARE ANNUAL WELLNESS VISIT, SUBSEQUENT: ICD-10-CM

## 2023-01-03 DIAGNOSIS — R73.01 IMPAIRED FASTING GLUCOSE: ICD-10-CM

## 2023-01-03 DIAGNOSIS — E78.5 DYSLIPIDEMIA: ICD-10-CM

## 2023-01-03 DIAGNOSIS — C91.10 CLL (CHRONIC LYMPHOCYTIC LEUKEMIA) (HCC): Primary | ICD-10-CM

## 2023-01-03 DIAGNOSIS — I10 BENIGN ESSENTIAL HYPERTENSION: ICD-10-CM

## 2023-01-03 DIAGNOSIS — E03.9 HYPOTHYROIDISM, UNSPECIFIED TYPE: ICD-10-CM

## 2023-01-03 PROBLEM — E44.1 MILD PROTEIN-CALORIE MALNUTRITION (HCC): Status: ACTIVE | Noted: 2023-01-03

## 2023-01-03 PROBLEM — H34.8320 BRANCH RETINAL VEIN OCCLUSION WITH MACULAR EDEMA OF LEFT EYE: Status: ACTIVE | Noted: 2020-03-17

## 2023-01-03 NOTE — PROGRESS NOTES
Chief Complaint   Patient presents with   • Medicare Wellness Visit     Subsequent  • Follow-up     6 months and review labs  Health Maintenance   Topic Date Due   • Hepatitis B Vaccine (1 of 3 - 3-dose series) Never done   • Pneumococcal Vaccine: 65+ Years (2 - PPSV23 if available, else PCV20) 04/27/2018   • DXA SCAN  06/24/2021   • COVID-19 Vaccine (5 - Booster for Moderna series) 07/12/2022   • Influenza Vaccine (1) 09/01/2022   • Medicare Annual Wellness Visit (AWV)  12/28/2022   • Breast Cancer Screening: Mammogram  07/29/2023   • Fall Risk  01/03/2024   • Urinary Incontinence Screening  01/03/2024   • BMI: Adult  01/03/2024   • HIB Vaccine  Aged Out   • IPV Vaccine  Aged Out   • Hepatitis A Vaccine  Aged Out   • Meningococcal ACWY Vaccine  Aged Out   • HPV Vaccine  Aged Out      Assessment and Plan:     Problem List Items Addressed This Visit        Endocrine    Hypothyroidism     Continue levothyroxine 25 mcg daily  Relevant Orders    Comprehensive metabolic panel    Lipid panel    TSH, 3rd generation with Free T4 reflex    Hemoglobin A1C    Impaired fasting glucose    Relevant Orders    Comprehensive metabolic panel    Lipid panel    TSH, 3rd generation with Free T4 reflex    Hemoglobin A1C       Cardiovascular and Mediastinum    Benign essential hypertension     Controlled  DASH diet  Continue carvedilol per cardiology  Relevant Orders    Comprehensive metabolic panel    Lipid panel    TSH, 3rd generation with Free T4 reflex    Hemoglobin A1C       Other    Dyslipidemia     Advised pt to hold simvastatin for 1 month  If joints pain/muscle pain improved, will stop statin            Relevant Orders    Comprehensive metabolic panel    Lipid panel    TSH, 3rd generation with Free T4 reflex    Hemoglobin A1C    CLL (chronic lymphocytic leukemia) ([facility] [address] ) - Primary   Other Visit Diagnoses     Medicare annual wellness visit, subsequent              Falls Plan of Care: balance, strength, and gait training instructions were provided  Reviewed lab in 12/2022  hgA1C 5 6 controlled  Lipid 163/101/50/93 ok  TSH 5 15, free T4 0 96 ok  CMP ok Ca 10 2 slightly elevated  CBC showed WBC 16 2 elevated    Flu shot yearly    Got covid19 vaccines and boosters  Got pneumovax at pharmacy in 2013 per pt  Got prevnar 13 2017    Dexa 7/2019 showed osteoporosis  She did not get prolia because of tooth/jaw problem    RTO in 6 months      Mini-cog 3/5 today  POA---3 daughters  Living will---DNR if end of life  Preventive health issues were discussed with patient, and age appropriate screening tests were ordered as noted in patient's After Visit Summary  Personalized health advice and appropriate referrals for health education or preventive services given if needed, as noted in patient's After Visit Summary  History of Present Illness:     Patient presents for a Medicare Wellness Visit    HPI     Pt is here by herself    Was in hospital 9/2022 after fall/subdural hematoma  No surgery needed  Pt got Zio patch which was unremarkable  Got loop recorder on 12/17/2022  CLL---[facility] hem/onc Dr Riddhi Heath  11/2022 CT showed "Left larger than right axillary lymphadenopathy  New pulmonary nodularity since January  Consider further evaluation with nuclear medicine PET   scan or image guided biopsy "  Plan to do CT tonight  Brother had lung cancer at age of 63's  Hypothyroidism---She is on levothyroxine 25mcg daily  She cannot tolerate it empty stomach, so she takes it 2 hours after lunch       Hyperlipidemia---Pt uses simvastatin 20mg qhs  Sometimes muscle pain/joints pain      HTN---BP at home good per pt  She is on carvedilol 6 25mg am and 12 5 mg pm  FU cardiology regularly  Denies headache, vision change, Sob or CP       AnxietyDepression---Stable  Took off buspar per nephrology  She uses xanax very rarely      Vertigo---Come and go   Did not use meclizine for a while       Urinary incontinence---She is off oxybutynin  Got botox by uro/gyn which helped a lot       Lackey's---follows GI  She tried nexium and prevacid but felt nausea  She is on pepcid 10mg bid which helped    Constipation---Follow GI  She is on miralax every other day  Better after stopping oxybutynin per pt       Macular degeneration/vein occlusion----Got shot monthly and she is on baby ASA  Left eye sight is not good       Lives by herself  Does all ADL's  Drive a little  Carina Jordan in 9/2022  Daughter Patricia lives 1 block away from her  Denies depression  Patient Care Team:  Apollo Sullivan MD as PCP - OZZIE Barraza MD Lemond Aspen, MD as Endoscopist  Raheem Hernández MD (Optometry)  Fahad Briscoe DPM (Podiatry)  Edison Hussein DO (Cardiology)  Alesha Licona MD (Gastroenterology)  Roshni Martins DO (Hematology and Oncology)  Lindsay Juarez DO (Internal Medicine)  Radha Saleh MD (Nephrology)  Breanne Wagoner MD (Orthopedic Surgery)  Umu Lai MD (Ophthalmology)  Madison Li PA-C (Gynecologic Oncology)  Marilyn Bruno as Nurse Practitioner (Urology)  [place]      Review of Systems:     Review of Systems   Constitutional: Negative for appetite change, chills and fever  HENT: Negative for congestion, ear pain, sinus pain and sore throat  Eyes: Negative for discharge and itching  Respiratory: Negative for apnea, cough, chest tightness, shortness of breath and wheezing  Cardiovascular: Negative for chest pain, palpitations and leg swelling  Gastrointestinal: Negative for abdominal pain, anal bleeding, constipation, diarrhea, nausea and vomiting  Endocrine: Negative for cold intolerance, heat intolerance and polyuria  Genitourinary: Negative for difficulty urinating and dysuria  Musculoskeletal: Positive for arthralgias, gait problem and myalgias  Negative for back pain  Skin: Negative for rash     Neurological: Negative for dizziness and headaches  Psychiatric/Behavioral: Negative for agitation          Problem List:     Patient Active Problem List   Diagnosis   • Benign essential hypertension   • Hypothyroidism   • History of endometrial cancer   • Abnormal breast exam   • Arthritis   • Constipation   • Anxiety   • Diverticulosis   • Duodenal diverticulum   • Esophagitis, reflux   • Dyslipidemia   • Impaired fasting glucose   • Insomnia   • Age-related osteoporosis without current pathological fracture   • Atrophic vaginitis   • Urinary incontinence   • Vertigo   • Encounter for follow-up surveillance of endometrial cancer   • Endometrial cancer ([place] [address] )   • Arthritis of left shoulder region   • Encounter for screening mammogram for malignant neoplasm of breast   • Need for immunization against influenza   • Nonexudative age-related macular degeneration, bilateral, early dry stage   • Posterior vitreous detachment of both eyes   • 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   • Status post cataract extraction and insertion of intraocular lens   • Lackey's esophagus with dysplasia   • Pericardial effusion   • MGUS (monoclonal gammopathy of unknown significance)   • Hypercalcemia   • Lyme disease   • Visual field defect   • Pain, dental   • Lymphadenopathy   • Fall   • Subdural hematoma, post-traumatic   • Syncope   • Visual changes   • CLL [facility]chronic lymphocytic leukemia) ([facility])   • Mild protein-calorie malnutrition ([place] [address] )   • Branch retinal vein occlusion with macular edema of left eye      Past Medical and Surgical History:     Past Medical History:   Diagnosis Date   • Anemia     post-op, 07/07/09   • Anxiety     last assessed: 01/15/14   • C  difficile diarrhea    • Chest wall trauma     Acute, last assessed: 10/24/12   • CLL (chronic lymphocytic leukemia) ([place] [address] )    • Depression    • Disease of thyroid gland     hypothyroid   • Diverticula of colon    • Endometrial cancer ([place] [address] ) 03/2011   • Endometrial hyperplasia    • 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[facility] [address] )     stage IA, Grade I endometriod adenocarcinoma with 43% myometrial invasion and no LVSI, last assessed: 05/51/73   • Folliculitis     last assessed: 03/02/16   • Gastroparesis    • Hypercholesterolemia    • Hyperlipidemia    • Hypertension    • Hyponatremia 3/10/2016   • Incontinence in female    • Migraine    • Nontoxic single thyroid nodule     last assessed: 02/16/13   • Osteoporosis     last assessed: 02/22/17   • Pure hypercholesterolemia    • Rotator cuff (capsule) sprain     Acute, right   • SDH (subdural hematoma)    • Skin lesion     last assessed: 04/26/13   • Stomatitis    • Strain of right buttock     gluteus medius   • Tendinitis of right rotator cuff     last assessed: 08/12/12   • Thyroid cyst     last assessed: 08/15/13     Past Surgical History:   Procedure Laterality Date   • APPENDECTOMY     • BREAST BIOPSY Right    • BREAST BIOPSY Left    • BREAST CYST ASPIRATION     • CARDIAC ELECTROPHYSIOLOGY PROCEDURE N/A 12/17/2022    Procedure: Cardiac loop recorder implant;  Surgeon: Fallon Dickey MD;  Location: [facility]; Service: Cardiology   • COLONOSCOPY      fiberoptic, 1998, 2001, 2006   • CYSTOSCOPY      Diagnostic   • EGD AND COLONOSCOPY N/A 3/11/2016    Procedure: EGD ;  Surgeon: Samra Mccoy MD;  Location: [facility];   Service:    • HYSTERECTOMY  03/22/2011    Robotic-assisted, total laparoscopic approch   • JOINT REPLACEMENT     • KNEE SURGERY     • OOPHORECTOMY Bilateral 03/22/2011    Salpingo   • REPLACEMENT TOTAL KNEE     • TONSILECTOMY AND ADNOIDECTOMY     • TOTAL SHOULDER ARTHROPLASTY Right       Family History:     Family History   Problem Relation Age of Onset   • No Known Problems Mother    • Dementia Father    • Other Brother         Prolapsing mitral valve leaflet syndrome   • Testicular cancer Brother 27   • Lung cancer Brother 61   • Prostate cancer Brother 36        unsure of excat age   • No Known Problems Daughter    • No Known Problems Daughter • No Known Problems Daughter    • No Known Problems Maternal Grandmother    • No Known Problems Maternal Grandfather    • Breast cancer Paternal Grandmother 79   • No Known Problems Paternal Grandfather    • No Known Problems Maternal Aunt    • No Known Problems Paternal Aunt    • No Known Problems Paternal Aunt       Social History:     Social History     Socioeconomic History   • Marital status:      Spouse name: None   • Number of children: None   • Years of education: None   • Highest education level: None   Occupational History   • None   Tobacco Use   • Smoking status: Never   • Smokeless tobacco: Never   Vaping Use   • Vaping Use: Never used   Substance and Sexual Activity   • Alcohol use: Not Currently   • Drug use: No   • Sexual activity: Not Currently   Other Topics Concern   • None   Social History Narrative   • None     Social Determinants of Health     Financial Resource Strain: Low Risk    • Difficulty of Paying Living Expenses: Not very hard   Food Insecurity: Not on file   Transportation Needs: No Transportation Needs   • Lack of Transportation (Medical): No   • Lack of Transportation (Non-Medical): No   Physical Activity: Not on file   Stress: Not on file   Social Connections: Not on file   Intimate Partner Violence: Not on file   Housing Stability: Not on file      Medications and Allergies:     Current Outpatient Medications   Medication Sig Dispense Refill   • acetaminophen (TYLENOL) 650 mg CR tablet Take 650 mg by mouth as needed for mild pain       • ALPHAGAN P 0 1 % instill 1 drop into both eyes twice a day  0   • ALPRAZolam (XANAX) 0 25 mg tablet Take 1 tablet (0 25 mg total) by mouth daily at bedtime as needed for anxiety (Patient taking differently: Take 0 25 mg by mouth if needed for anxiety) 20 tablet 0   • bifidobacterium infantis (ALIGN) capsule Take by mouth     • carvedilol (COREG) 6 25 mg tablet Take 1 tablet (6 25 mg total) by mouth 2 (two) times a day with meals 180 tablet 2   • cholecalciferol (VITAMIN D3) 1,000 units tablet Take by mouth     • famotidine (PEPCID) 20 mg tablet Take 1 tablet (20 mg total) by mouth 2 (two) times a day (Patient taking differently: Take 20 mg by mouth if needed) 60 tablet 0   • levothyroxine 25 mcg tablet Take 1 tablet (25 mcg total) by mouth daily 90 tablet 3   • meclizine (ANTIVERT) 25 mg tablet TAKE 1 TABLET EVERY 8 HOURS AS NEEDED FOR DIZZINESS (Patient not taking: No sig reported) 30 tablet 0   • Multiple Vitamins-Minerals (CENTRUM SILVER PO) Take by mouth     • polyethylene glycol (GLYCOLAX) 17 GM/SCOOP powder Take by mouth every other day     • simvastatin (ZOCOR) 20 mg tablet take 1 tablet by mouth once daily 90 tablet 3     No current facility-administered medications for this visit       Allergies   Allergen Reactions   • Amoxil [Amoxicillin] Hives   • Biaxin [Clarithromycin] GI Intolerance     Reaction Date: 19Jul2011;   Pt gets nauseated   • Esomeprazole GI Intolerance   • Fosamax [Alendronate] GI Intolerance   • Hydrochlorothiazide      Hyponatremia     • Lansoprazole GI Intolerance   • Norvasc [Amlodipine Besylate] Hives   • Pantoprazole GI Intolerance     Can tolerate IV- Not oral   • Relafen [Nabumetone] GI Intolerance and Other (See Comments)      Immunizations:     Immunization History   Administered Date(s) Administered   • COVID-19 MODERNA VACC 0 5 ML IM 01/11/2021, 02/08/2021, 10/29/2021, 05/17/2022   • COVID-19 Moderna Vac BIVALENT 18 Yr+ Im (BOOSTER ONLY) 11/10/2022   • H1N1, All Formulations 02/08/2010   • INFLUENZA 10/03/2018, 10/31/2020   • Influenza Split High Dose Preservative Free IM 09/18/2014, 09/18/2015, 10/06/2016, 11/02/2017   • Influenza, seasonal, injectable 11/03/2011, 10/03/2012   • Pneumococcal Conjugate 13-Valent 04/27/2017   • Zoster 06/17/2015   • influenza, trivalent, adjuvanted 10/11/2019      Health Maintenance:         Topic Date Due   • DXA SCAN  06/24/2021   • Breast Cancer Screening: Mammogram  07/29/2023 Topic Date Due   • Hepatitis B Vaccine (1 of 3 - 3-dose series) Never done   • Pneumococcal Vaccine: 65+ Years (2 - PPSV23 if available, else PCV20) 04/27/2018   • COVID-19 Vaccine (5 - Booster for Moderna series) 07/12/2022   • Influenza Vaccine (1) 09/01/2022      Medicare Screening Tests and Risk Assessments:     Nolan Villatoro is here for her Subsequent Wellness visit  Health Risk Assessment:   Patient rates overall health as good  Patient feels that their physical health rating is slightly worse  Patient is satisfied with their life  Eyesight was rated as slightly worse  Hearing was rated as slightly worse  Patient feels that their emotional and mental health rating is slightly worse  Patients states they are sometimes angry  Patient states they are sometimes unusually tired/fatigued  Pain experienced in the last 7 days has been some  Patient's pain rating has been 5/10  Patient states that she has experienced no weight loss or gain in last 6 months  Depression Screening:   PHQ-9 Score: 2      Fall Risk Screening: In the past year, patient has experienced: history of falling in past year    Number of falls: 1  Injured during fall?: Yes    Feels unsteady when standing or walking?: Yes    Worried about falling?: Yes      Urinary Incontinence Screening:   Patient has leaked urine accidently in the last six months  Home Safety:  Patient has trouble with stairs inside or outside of their home  Patient has working smoke alarms and has no working carbon monoxide detector  Home safety hazards include: unfamiliar surroundings  Nutrition:   Current diet is Other (please comment)  Gastroparesis diet    Medications:   Patient is currently taking over-the-counter supplements  OTC medications include: Align,Tylenol, multivitamin, miralax, senna, zinc oral, Pepcid, vitamin D3  Patient is able to manage medications       Activities of Daily Living (ADLs)/Instrumental Activities of Daily Living (IADLs):   Walk and transfer into and out of bed and chair?: Yes  Dress and groom yourself?: Yes    Bathe or shower yourself?: Yes    Feed yourself? Yes  Do your laundry/housekeeping?: Yes  Manage your money, pay your bills and track your expenses?: Yes  Make your own meals?: Yes    Do your own shopping?: Yes    Previous Hospitalizations:   Any hospitalizations or ED visits within the last 12 months?: Yes    How many hospitalizations have you had in the last year?: 1-2    Advance Care Planning:   Living will: Yes    Durable POA for healthcare: Yes    Advanced directive: Yes    End of Life Decisions reviewed with patient: Yes    Provider agrees with end of life decisions: Yes      Cognitive Screening:   Provider or family/friend/caregiver concerned regarding cognition?: Yes  Mini-Cog Score: 3  Interpretation: Mini-Cog Score 3-5: Negative screen for dementia     PREVENTIVE SCREENINGS      Cardiovascular Screening:    General: History Lipid Disorder and Screening Current      Diabetes Screening:     General: Screening Current      Colorectal Cancer Screening:     General: Screening Not Indicated      Breast Cancer Screening:     General: Screening Current      Cervical Cancer Screening:    General: Screening Not Indicated      Osteoporosis Screening:    General: Screening Not Indicated and History Osteoporosis      Lung Cancer Screening:     General: Screening Not Indicated    Screening, Brief Intervention, and Referral to Treatment (SBIRT)    Screening  Typical number of drinks in a day: 0  Typical number of drinks in a week: 0  Interpretation: Low risk drinking behavior      AUDIT-C Screenin) How often did you have a drink containing alcohol in the past year? never  2) How many drinks did you have on a typical day when you were drinking in the past year? 0  3) How often did you have 6 or more drinks on one occasion in the past year? never    AUDIT-C Score: 0  Interpretation: Score 0-2 (female): Negative screen for alcohol misuse    Single Item Drug Screening:  How often have you used an illegal drug (including marijuana) or a prescription medication for non-medical reasons in the past year? never    Single Item Drug Screen Score: 0  Interpretation: Negative screen for possible drug use disorder    No results found  Physical Exam:     /84 (BP Location: Left arm, Patient Position: Sitting, Cuff Size: Adult)   Pulse 76   Temp 97 6 °F (36 4 °C) (Tympanic)   Resp 16   Ht 5' 2 5" (1 588 m)   Wt 50 8 kg (112 lb)   SpO2 98%   BMI 20 16 kg/m²     Physical Exam  Vitals reviewed  Constitutional:       Appearance: Normal appearance  HENT:      Head: Normocephalic and atraumatic  Eyes:      General:         Right eye: No discharge  Left eye: No discharge  Conjunctiva/sclera: Conjunctivae normal    Neck:      Vascular: No carotid bruit  Cardiovascular:      Rate and Rhythm: Normal rate and regular rhythm  Heart sounds: Normal heart sounds  No murmur heard  No friction rub  No gallop  Pulmonary:      Effort: Pulmonary effort is normal  No respiratory distress  Breath sounds: Normal breath sounds  No wheezing or rales  Abdominal:      General: Bowel sounds are normal  There is no distension  Palpations: Abdomen is soft  Tenderness: There is no abdominal tenderness  Musculoskeletal:         General: No swelling, tenderness or deformity  Cervical back: Normal range of motion and neck supple  No muscular tenderness  Comments: Use cane   Lymphadenopathy:      Cervical: No cervical adenopathy  Neurological:      Mental Status: She is alert     Psychiatric:         Mood and Affect: Mood normal           Ifeoma Womack MD

## 2023-01-03 NOTE — PATIENT INSTRUCTIONS
Medicare Preventive Visit Patient Instructions  Thank you for completing your Welcome to Medicare Visit or Medicare Annual Wellness Visit today  Your next wellness visit will be due in one year (1/4/2024)  The screening/preventive services that you may require over the next 5-10 years are detailed below  Some tests may not apply to you based off risk factors and/or age  Screening tests ordered at today's visit but not completed yet may show as past due  Also, please note that scanned in results may not display below  Preventive Screenings:  Service Recommendations Previous Testing/Comments   Colorectal Cancer Screening  * Colonoscopy    * Fecal Occult Blood Test (FOBT)/Fecal Immunochemical Test (FIT)  * Fecal DNA/Cologuard Test  * Flexible Sigmoidoscopy Age: 39-70 years old   Colonoscopy: every 10 years (may be performed more frequently if at higher risk)  OR  FOBT/FIT: every 1 year  OR  Cologuard: every 3 years  OR  Sigmoidoscopy: every 5 years  Screening may be recommended earlier than age 39 if at higher risk for colorectal cancer  Also, an individualized decision between you and your healthcare provider will decide whether screening between the ages of 74-80 would be appropriate  Colonoscopy: Not on file  FOBT/FIT: Not on file  Cologuard: Not on file  Sigmoidoscopy: Not on file          Breast Cancer Screening Age: 36 years old  Frequency: every 1-2 years  Not required if history of left and right mastectomy Mammogram: 07/29/2022        Cervical Cancer Screening Between the ages of 21-29, pap smear recommended once every 3 years  Between the ages of 33-67, can perform pap smear with HPV co-testing every 5 years     Recommendations may differ for women with a history of total hysterectomy, cervical cancer, or abnormal pap smears in past  Pap Smear: Not on file        Hepatitis C Screening Once for adults born between [facility]  More frequently in patients at high risk for Hepatitis C Hep C Antibody: Not on file        Diabetes Screening 1-2 times per year if you're at risk for diabetes or have pre-diabetes Fasting glucose: 104 mg/dL (9/16/2020)  A1C: 5 6 % (12/28/2022)      Cholesterol Screening Once every 5 years if you don't have a lipid disorder  May order more often based on risk factors  Lipid panel: 12/28/2022          Other Preventive Screenings Covered by Medicare:  1  Abdominal Aortic Aneurysm (AAA) Screening: covered once if your at risk  You're considered to be at risk if you have a family history of AAA  2  Lung Cancer Screening: covers low dose CT scan once per year if you meet all of the following conditions: (1) Age 50-69; (2) No signs or symptoms of lung cancer; (3) Current smoker or have quit smoking within the last 15 years; (4) You have a tobacco smoking history of at least 20 pack years (packs per day multiplied by number of years you smoked); (5) You get a written order from a healthcare provider  3  Glaucoma Screening: covered annually if you're considered high risk: (1) You have diabetes OR (2) Family history of glaucoma OR (3)  aged 48 and older OR (3)  American aged 72 and older  3  Osteoporosis Screening: covered every 2 years if you meet one of the following conditions: (1) You're estrogen deficient and at risk for osteoporosis based off medical history and other findings; (2) Have a vertebral abnormality; (3) On glucocorticoid therapy for more than 3 months; (4) Have primary hyperparathyroidism; (5) On osteoporosis medications and need to assess response to drug therapy  · Last bone density test (DXA Scan): 06/24/2019   5  HIV Screening: covered annually if you're between the age of 15-65  Also covered annually if you are younger than 13 and older than 72 with risk factors for HIV infection  For pregnant patients, it is covered up to 3 times per pregnancy      Immunizations:  Immunization Recommendations   Influenza Vaccine Annual influenza vaccination during flu season is recommended for all persons aged >= 6 months who do not have contraindications   Pneumococcal Vaccine   * Pneumococcal conjugate vaccine = PCV13 (Prevnar 13), PCV15 (Vaxneuvance), PCV20 (Prevnar 20)  * Pneumococcal polysaccharide vaccine = PPSV23 (Pneumovax) Adults 25-60 years old: 1-3 doses may be recommended based on certain risk factors  Adults 72 years old: 1-2 doses may be recommended based off what pneumonia vaccine you previously received   Hepatitis B Vaccine 3 dose series if at intermediate or high risk (ex: diabetes, end stage renal disease, liver disease)   Tetanus (Td) Vaccine - COST NOT COVERED BY MEDICARE PART B Following completion of primary series, a booster dose should be given every 10 years to maintain immunity against tetanus  Td may also be given as tetanus wound prophylaxis  Tdap Vaccine - COST NOT COVERED BY MEDICARE PART B Recommended at least once for all adults  For pregnant patients, recommended with each pregnancy  Shingles Vaccine (Shingrix) - COST NOT COVERED BY MEDICARE PART B  2 shot series recommended in those aged 48 and above     Health Maintenance Due:      Topic Date Due   • DXA SCAN  06/24/2021   • Breast Cancer Screening: Mammogram  07/29/2023     Immunizations Due:      Topic Date Due   • Hepatitis B Vaccine (1 of 3 - 3-dose series) Never done   • Pneumococcal Vaccine: 65+ Years (2 - PPSV23 if available, else PCV20) 04/27/2018   • COVID-19 Vaccine (3 - Moderna risk series) 03/08/2021   • Influenza Vaccine (1) 09/01/2022     Advance Directives   What are advance directives? Advance directives are legal documents that state your wishes and plans for medical care  These plans are made ahead of time in case you lose your ability to make decisions for yourself  Advance directives can apply to any medical decision, such as the treatments you want, and if you want to donate organs  What are the types of advance directives?   There are many types of advance directives, and each state has rules about how to use them  You may choose a combination of any of the following:  · Living will: This is a written record of the treatment you want  You can also choose which treatments you do not want, which to limit, and which to stop at a certain time  This includes surgery, medicine, IV fluid, and tube feedings  · Durable power of  for healthcare [place] [facility]): This is a written record that states who you want to make healthcare choices for you when you are unable to make them for yourself  This person, called a proxy, is usually a family member or a friend  You may choose more than 1 proxy  · Do not resuscitate (DNR) order:  A DNR order is used in case your heart stops beating or you stop breathing  It is a request not to have certain forms of treatment, such as CPR  A DNR order may be included in other types of advance directives  · Medical directive: This covers the care that you want if you are in a coma, near death, or unable to make decisions for yourself  You can list the treatments you want for each condition  Treatment may include pain medicine, surgery, blood transfusions, dialysis, IV or tube feedings, and a ventilator (breathing machine)  · Values history: This document has questions about your views, beliefs, and how you feel and think about life  This information can help others choose the care that you would choose  Why are advance directives important? An advance directive helps you control your care  Although spoken wishes may be used, it is better to have your wishes written down  Spoken wishes can be misunderstood, or not followed  Treatments may be given even if you do not want them  An advance directive may make it easier for your family to make difficult choices about your care  © Copyright [address] 2018 Information is for End User's use only and may not be sold, redistributed or otherwise used for commercial purposes   All illustrations and images included in CareNotes® are the copyrighted property of A D A M , Inc  or Stanley Vitale

## 2023-03-30 ENCOUNTER — REMOTE DEVICE CLINIC VISIT (OUTPATIENT)
Dept: CARDIOLOGY CLINIC | Facility: CLINIC | Age: 88
End: 2023-03-30

## 2023-03-30 DIAGNOSIS — Z95.818 PRESENCE OF OTHER CARDIAC IMPLANTS AND GRAFTS: Primary | ICD-10-CM

## 2023-03-30 NOTE — PROGRESS NOTES
"CARELINK TRANSMISSION: LOOP RECORDER  PRESENTING RHYTHM SB @ 54 BPM  BATTERY STATUS \"OK  \" 2 PT ACTVATED EPISODES PREVIOUSLY ADDRESSED IN ALERT  NO NEW PATIENT OR DEVICE ACTIVATED EPISODES  NORMAL DEVICE FUNCTION   DL   "

## 2023-04-03 NOTE — TELEPHONE ENCOUNTER
----- Message from Jolinda Mortimer, MD sent at 12/5/2019  1:33 PM EST -----  Regarding: prolia  Pt tried fosamax but cannot tolerate  Please contact insurance for coverage of prolia  Nsaids Pregnancy And Lactation Text: These medications are considered safe up to 30 weeks gestation. It is excreted in breast milk.

## 2023-05-10 ENCOUNTER — EVALUATION (OUTPATIENT)
Dept: PHYSICAL THERAPY | Facility: CLINIC | Age: 88
End: 2023-05-10

## 2023-05-10 DIAGNOSIS — Z96.612 PRESENCE OF ARTIFICIAL SHOULDER JOINT, LEFT: ICD-10-CM

## 2023-05-10 DIAGNOSIS — Z96.611 PRESENCE OF RIGHT ARTIFICIAL SHOULDER JOINT: ICD-10-CM

## 2023-05-10 DIAGNOSIS — M25.511 CHRONIC RIGHT SHOULDER PAIN: Primary | ICD-10-CM

## 2023-05-10 DIAGNOSIS — G89.29 CHRONIC RIGHT SHOULDER PAIN: Primary | ICD-10-CM

## 2023-05-10 NOTE — PROGRESS NOTES
PT Evaluation     Today's date: 5/10/2023  Patient name: Candis Adams  : 1934  MRN: [id number]  Referring provider: Jeanne Francisco MD  Dx:   Encounter Diagnosis     ICD-10-CM    1  Chronic right shoulder pain  M25 511     G89 29       2  Presence of right artificial shoulder joint  Z96 611       3  Presence of artificial shoulder joint, left  Z96 612           Start Time: 1015  Stop Time: 1100  Total time in clinic (min): 45 minutes    Assessment/Plan     Candis Adams is a 80 y o  female who was referred to physical therapy for management of R shoulder pain  Primary impairments include decreased upper extremity strength, limited range of motion, and report of pain  Consequently, patient has difficulty completing ADLs including vacuuming, gardening, and reaching overhead  Dollene Host would benefit from skilled intervention to address all deficits and improve functional capability  Patient is a good candidate for therapy, pending compliance with HEP and 2x weekly participation  Thank you for the referral and please do not hesitate to contact me with any questions or concerns regarding Ethel’s care! Plan  Frequency: 2x per week for 4-6 weeks, decreasing to 1x per week when indicated  Duration in weeks: 6  POC start date: 5/10/23  POC end date: 23   Therapeutic exercise/activity, neuromuscular reeducation, manual therapy, and modalities  Patient understands and agrees to plan of care  Goals  Short Term--4 weeks  1  Patient will report 2 point decrease in pain levels  2  Patient will demonstrate 1/2 point increase in deficient MMT scores  3  Patient will demonstrate independence with basic HEP  Long Term--By Discharge  1  Patient will take and achieve expected FOTO score  2  Patient will be able to garden with pain not exceeding 4/10 on NPRS  3  Patient will be able to carry grocery bags into the house with minimal to no onset of shoulder pain      Patient's Goal: Be able to do all required "ADLs without pain  Subjective    History   Date of Onset: 1+ years Description: Patient has history of R TSA in 2014 and L TSA in 2017  Over the past year she states that she's been experiencing increasingly worse pain in her R arm that was further exacerbated by a fall last year  Imaging was taken to ensure that the hardware is still intact  Symptoms  Intermittent, generalized R-sided shoulder pain  Aggravating factors include gardening, vacuuming, lifting/moving anything heavy  She denies any associated numbness/paresthesia  Patient states that when sx are severely aggravated (I e  vacuuming), pain can last for 4-5 days  Pain at best: 0    Pain at worst: [address] [facility] lives independently in a single story home  Her one daughter lives a block away  Objective               AROM        MMT    Shoulder       R       L        R          L   Flex  110 91 4- P! 4   Extn  5 5   Abd  81 72 4- P! 4   IR    4+ 5   ER  78 67 4 P! 5                MMT    Elbow       R       L   Flex  5 5   Extn  4+ 4+       Posture: forward head, rounded shoulders        Precautions: Patient has extensive PMHx that should be reviewed in medical chart         Manuals 5/10            GHJ mobs for pain relief JAB            SL scap mobs w/ STM JAB                                      Neuro Re-Ed             Seated scap retract 20x5\"            TB rows             TB ext                                                                 Ther Ex             TB ER yellow 10x HEP            TB IR yellow 10x HEP                         Pulleys             SL ER             SL ABD                                       Ther Activity                                       Gait Training                                       Modalities                                          "

## 2023-05-10 NOTE — LETTER
May 11, 2023    MD CECILIA Kirby/Huber Henaonilla    Patient: Franklin Ferrer   YOB: 1934   Date of Visit: 5/10/2023     Encounter Diagnosis     ICD-10-CM    1  Chronic right shoulder pain  M25 511     G89 29       2  Presence of right artificial shoulder joint  Z96 611       3  Presence of artificial shoulder joint, left  [id number]           Dear Dr Ashkan Cordova: Thank you for your recent referral of Franklin Ferrer  Please review the attached evaluation summary from Ethel's recent visit  Please verify that you agree with the plan of care by signing the attached order  If you have any questions or concerns, please do not hesitate to call  I sincerely appreciate the opportunity to share in the care of one of your patients and hope to have another opportunity to work with you in the near future  Sincerely,    Ivan Sandoval, PT      Referring Provider:      I certify that I have read the below Plan of Care and certify the need for these services furnished under this plan of treatment while under my care  Elizabeth Chen MD  [id number]  Via Fax: [phone]          PT Evaluation     Today's date: 5/10/2023  Patient name: Franklin Ferrer  : 1934  MRN: [id number]  Referring provider: Kaylin Watts MD  Dx:   Encounter Diagnosis     ICD-10-CM    1  Chronic right shoulder pain  M25 511     G89 29       2  Presence of right artificial shoulder joint  Z96 611       3  Presence of artificial shoulder joint, left  Z96 612           Start Time: 1015  Stop Time: 1100  Total time in clinic (min): 45 minutes    Assessment/Plan     Franklin Ferrer is a 80 y o  female who was referred to physical therapy for management of R shoulder pain  Primary impairments include decreased upper extremity strength, limited range of motion, and report of pain    Consequently, patient has difficulty completing ADLs including vacuuming, gardening, and reaching overhead  Ferny Mosqueda would benefit from skilled intervention to address all deficits and improve functional capability  Patient is a good candidate for therapy, pending compliance with HEP and 2x weekly participation  Thank you for the referral and please do not hesitate to contact me with any questions or concerns regarding Ethel’s care! Plan  Frequency: 2x per week for 4-6 weeks, decreasing to 1x per week when indicated  Duration in weeks: 6  POC start date: 5/10/23  POC end date: 6/21/23   Therapeutic exercise/activity, neuromuscular reeducation, manual therapy, and modalities  Patient understands and agrees to plan of care  Goals  Short Term--4 weeks  1  Patient will report 2 point decrease in pain levels  2  Patient will demonstrate 1/2 point increase in deficient MMT scores  3  Patient will demonstrate independence with basic HEP  Long Term--By Discharge  1  Patient will take and achieve expected FOTO score  2  Patient will be able to garden with pain not exceeding 4/10 on NPRS  3  Patient will be able to carry grocery bags into the house with minimal to no onset of shoulder pain  Patient's Goal: Be able to do all required ADLs without pain  Subjective    History   Date of Onset: 1+ years Description: Patient has history of R TSA in 2014 and L TSA in 2017  Over the past year she states that she's been experiencing increasingly worse pain in her R arm that was further exacerbated by a fall last year  Imaging was taken to ensure that the hardware is still intact  Symptoms  Intermittent, generalized R-sided shoulder pain  Aggravating factors include gardening, vacuuming, lifting/moving anything heavy  She denies any associated numbness/paresthesia  Patient states that when sx are severely aggravated (I e  vacuuming), pain can last for 4-5 days         Pain at best: 0    Pain at worst: [address] [facility] lives independently in a "single story home  Her one daughter lives a block away  Objective               AROM        MMT    Shoulder       R       L        R          L   Flex  110 91 4- P! 4   Extn  5 5   Abd  81 72 4- P! 4   IR    4+ 5   ER  78 67 4 P! 5                MMT    Elbow       R       L   Flex  5 5   Extn  4+ 4+       Posture: forward head, rounded shoulders       Precautions: Patient has extensive PMHx that should be reviewed in medical chart         Manuals 5/10            GHJ mobs for pain relief JAB            SL scap mobs w/ STM JAB                                      Neuro Re-Ed             Seated scap retract 20x5\"            TB rows             TB ext                                                                 Ther Ex             TB ER yellow 10x HEP            TB IR yellow 10x HEP                         Pulleys             SL ER             SL ABD                                       Ther Activity                                       Gait Training                                       Modalities                                                       "

## 2023-05-22 ENCOUNTER — OFFICE VISIT (OUTPATIENT)
Dept: PHYSICAL THERAPY | Facility: CLINIC | Age: 88
End: 2023-05-22

## 2023-05-22 DIAGNOSIS — Z96.611 PRESENCE OF RIGHT ARTIFICIAL SHOULDER JOINT: ICD-10-CM

## 2023-05-22 DIAGNOSIS — Z96.612 PRESENCE OF ARTIFICIAL SHOULDER JOINT, LEFT: ICD-10-CM

## 2023-05-22 DIAGNOSIS — G89.29 CHRONIC RIGHT SHOULDER PAIN: Primary | ICD-10-CM

## 2023-05-22 DIAGNOSIS — M25.511 CHRONIC RIGHT SHOULDER PAIN: Primary | ICD-10-CM

## 2023-05-22 NOTE — PROGRESS NOTES
"Daily Note     Today's date: 2023  Patient name: Cristhian Knight  : 1934  MRN: [id number]  Referring provider: Manda Orozco MD  Dx:   Encounter Diagnosis     ICD-10-CM    1  Chronic right shoulder pain  M25 511     G89 29       2  Presence of right artificial shoulder joint  Z96 611       3  Presence of artificial shoulder joint, left  Z96 612                      Subjective: Pt c/o R shoulder/upper arm discomfort/soreness  Notes she had some discomfort when performing HEP, held YTP exercises yesterday  Objective: See treatment diary below    Assessment: Reviewed/performed HEP  New exercises performed w/mild discomfort  Added R shoulder mobilizations as below, and STM to R scap /shoulder/bicep/deltoid  Responded well to same  Relief after tx  Will monitor  Plan: Cont per POC  Precautions: Patient has extensive PMHx that should be reviewed in medical chart         Manuals 5/10 5/22           GHJ mobs for pain relief SCOUT PRATHER           SL scap mobs w/ STM JAJESIKA STM R scap + shoulder                                     Neuro Re-Ed             Seated scap retract 20x5\" 20x5\"           TB rows  YTB 10x           TB ext  YTB  10x                                                               Ther Ex             TB ER yellow 10x HEP yllw 10x           TB IR yellow 10x HEP yllw  10x2                        Pulleys  10x10\"           SL ER             SL ABD                                       Ther Activity                                       Gait Training                                       Modalities                                            "

## 2023-05-26 ENCOUNTER — OFFICE VISIT (OUTPATIENT)
Dept: PHYSICAL THERAPY | Facility: CLINIC | Age: 88
End: 2023-05-26

## 2023-05-26 DIAGNOSIS — M25.511 CHRONIC RIGHT SHOULDER PAIN: Primary | ICD-10-CM

## 2023-05-26 DIAGNOSIS — Z96.612 PRESENCE OF ARTIFICIAL SHOULDER JOINT, LEFT: ICD-10-CM

## 2023-05-26 DIAGNOSIS — G89.29 CHRONIC RIGHT SHOULDER PAIN: Primary | ICD-10-CM

## 2023-05-26 DIAGNOSIS — Z96.611 PRESENCE OF RIGHT ARTIFICIAL SHOULDER JOINT: ICD-10-CM

## 2023-05-26 NOTE — PROGRESS NOTES
"Daily Note     Today's date: 2023  Patient name: Jaimie Mckee  : 1934  MRN: [id number]  Referring provider: Terrence Mejias MD  Dx:   Encounter Diagnosis     ICD-10-CM    1  Chronic right shoulder pain  M25 511     G89 29       2  Presence of right artificial shoulder joint  Z96 611       3  Presence of artificial shoulder joint, left  Z96 612                      Subjective: Pt reports her R shoulder is sore, as she was hanging drapes on the wash line on Tuesday, w/the help of her cleaning lady  Objective: See treatment diary below    Assessment: Performed exercise progressions w/o increasing symptoms  Responded well to manual therapies  Discussed not over doing her activities at home  Will monitor  Plan: Cont per POC  Precautions: Patient has extensive PMHx that should be reviewed in medical chart         Manuals 5/10 5/22 5/26          GHJ mobs for pain relief SCOUT PRATHER MD          SL scap mobs w/ STM JAB STM R scap + shoulder STM R scap + shoulder                                    Neuro Re-Ed             Seated scap retract 20x5\" 20x5\" 20x5\"          TB rows  YTB 10x YTB  10/5x          TB ext  YTB  10x YTB  10/5x                                                              Ther Ex             TB ER yellow 10x HEP yllw 10x YTB  10x          TB IR yellow 10x HEP yllw  10x2 YTB  10x                       Pulleys  10x10\" 10x10\"          SL ER             SL ABD                                       Ther Activity                                       Gait Training                                       Modalities                                            "

## 2023-05-30 ENCOUNTER — APPOINTMENT (OUTPATIENT)
Dept: PHYSICAL THERAPY | Facility: CLINIC | Age: 88
End: 2023-05-30
Payer: MEDICARE

## 2023-06-01 ENCOUNTER — OFFICE VISIT (OUTPATIENT)
Dept: PHYSICAL THERAPY | Facility: CLINIC | Age: 88
End: 2023-06-01

## 2023-06-01 DIAGNOSIS — G89.29 CHRONIC RIGHT SHOULDER PAIN: Primary | ICD-10-CM

## 2023-06-01 DIAGNOSIS — M25.511 CHRONIC RIGHT SHOULDER PAIN: Primary | ICD-10-CM

## 2023-06-01 DIAGNOSIS — Z96.612 PRESENCE OF ARTIFICIAL SHOULDER JOINT, LEFT: ICD-10-CM

## 2023-06-01 DIAGNOSIS — Z96.611 PRESENCE OF RIGHT ARTIFICIAL SHOULDER JOINT: ICD-10-CM

## 2023-06-01 NOTE — PROGRESS NOTES
"Daily Note     Today's date: 2023  Patient name: Katia Quinones  : 1934  MRN: [id number]  Referring provider: Genesis Newman MD  Dx:   Encounter Diagnosis     ICD-10-CM    1  Chronic right shoulder pain  M25 511     G89 29       2  Presence of right artificial shoulder joint  Z96 611       3  Presence of artificial shoulder joint, left  Z96 612                      Subjective: Pt reports her R shoulder/scap area is feeling better  Notes the other day,\"I could reach in my back pocket  \"    Objective: See treatment diary below    Assessment: Performed new exercises and ex progressions w/o complaint  Cont to respond well to manual therapies  Relief after tx  Will monitor  Progress as hortencia  Plan: Cont per POC  Precautions: Patient has extensive PMHx that should be reviewed in medical chart         Manuals 5/10 5/22 5/26 6/1         GHJ mobs for pain relief SCOUT PRATHER MD, MD         SL scap mobs w/ STM JAB STM R scap + shoulder STM R scap + shoulder STM R scap + shoulder                                   Neuro Re-Ed             Seated scap retract 20x5\" 20x5\" 20x5\" 20x5\"         TB rows  YTB 10x YTB  10/5x YTB  2x10         TB ext  YTB  10x YTB  10/5x YTB  2x10                                                             Ther Ex             TB ER yellow 10x HEP yllw 10x YTB  10x YTB  10x         TB IR yellow 10x HEP yllw  10x2 YTB  10x YTB  2x10                      Pulleys  10x10\" 10x10\" 10x10\"         SL ER    10x         SL ABD    90* 10x                                   Ther Activity                                       Gait Training                                       Modalities                                            "

## 2023-06-06 ENCOUNTER — OFFICE VISIT (OUTPATIENT)
Dept: PHYSICAL THERAPY | Facility: CLINIC | Age: 88
End: 2023-06-06
Payer: MEDICARE

## 2023-06-06 DIAGNOSIS — Z96.612 PRESENCE OF ARTIFICIAL SHOULDER JOINT, LEFT: ICD-10-CM

## 2023-06-06 DIAGNOSIS — Z96.611 PRESENCE OF RIGHT ARTIFICIAL SHOULDER JOINT: ICD-10-CM

## 2023-06-06 DIAGNOSIS — M25.511 CHRONIC RIGHT SHOULDER PAIN: Primary | ICD-10-CM

## 2023-06-06 DIAGNOSIS — G89.29 CHRONIC RIGHT SHOULDER PAIN: Primary | ICD-10-CM

## 2023-06-06 PROCEDURE — 97140 MANUAL THERAPY 1/> REGIONS: CPT

## 2023-06-06 PROCEDURE — 97110 THERAPEUTIC EXERCISES: CPT

## 2023-06-06 NOTE — PROGRESS NOTES
"Daily Note     Today's date: 2023  Patient name: Mari Rider  : 1934  MRN: [id number]  Referring provider: Kendal Stark MD  Dx:   Encounter Diagnosis     ICD-10-CM    1  Chronic right shoulder pain  M25 511     G89 29       2  Presence of right artificial shoulder joint  Z96 611       3  Presence of artificial shoulder joint, left  Z96 612                      Subjective: \"I over did it over the weekend,\" but her shoulder is feeling better today  Objective: See treatment diary below    Assessment: Performed exercise progressions w/o complaint  Responded well to manual therapies  Good relief after tx  Will monitor  Plan: Cont per POC  Precautions: Patient has extensive PMHx that should be reviewed in medical chart         Manuals 5/10 5/22 5/26 6/1 6/6        GHJ mobs for pain relief SCOUT PRATHER MD, MD DL        SL scap mobs w/ STM JAB STM R scap + shoulder STM R scap + shoulder STM R scap + shoulder STM R scap + shoulder                                  Neuro Re-Ed             Seated scap retract 20x5\" 20x5\" 20x5\" 20x5\" 20x5\"        TB rows  YTB 10x YTB  10/5x YTB  2x10 YTB  2x10        TB ext  YTB  10x YTB  10/5x YTB  2x10 YTB  2x10                                                            Ther Ex             TB ER yellow 10x HEP yllw 10x YTB  10x YTB  10x YTB  15x        TB IR yellow 10x HEP yllw  10x2 YTB  10x YTB  2x10 YTB  2x10                     Pulleys  10x10\" 10x10\" 10x10\" 10x10\"        SL ER    10x 15x        SL ABD    90* 10x 90*  10x                                  Ther Activity                                       Gait Training                                       Modalities                                            "

## 2023-06-08 ENCOUNTER — OFFICE VISIT (OUTPATIENT)
Dept: PHYSICAL THERAPY | Facility: CLINIC | Age: 88
End: 2023-06-08
Payer: MEDICARE

## 2023-06-08 DIAGNOSIS — Z96.611 PRESENCE OF RIGHT ARTIFICIAL SHOULDER JOINT: ICD-10-CM

## 2023-06-08 DIAGNOSIS — M25.511 CHRONIC RIGHT SHOULDER PAIN: Primary | ICD-10-CM

## 2023-06-08 DIAGNOSIS — Z96.612 PRESENCE OF ARTIFICIAL SHOULDER JOINT, LEFT: ICD-10-CM

## 2023-06-08 DIAGNOSIS — G89.29 CHRONIC RIGHT SHOULDER PAIN: Primary | ICD-10-CM

## 2023-06-08 PROCEDURE — 97140 MANUAL THERAPY 1/> REGIONS: CPT | Performed by: PHYSICAL THERAPIST

## 2023-06-08 PROCEDURE — 97110 THERAPEUTIC EXERCISES: CPT | Performed by: PHYSICAL THERAPIST

## 2023-06-08 NOTE — PROGRESS NOTES
"Daily Note     Today's date: 2023  Patient name: Emile Michelle  : 1934  MRN: [id number]  Referring provider: Teresa Diez MD  Dx:   Encounter Diagnosis     ICD-10-CM    1  Chronic right shoulder pain  M25 511     G89 29       2  Presence of right artificial shoulder joint  Z96 611       3  Presence of artificial shoulder joint, left  Z96 612                      Subjective: Pt reports that her shoulder is feeling okay today  She did some light weeding yesterday and did feel soreness  Objective: See treatment diary below      Assessment: Some progressions made today in repetitions with good tolerance  Pt without significant complaints  She has some discomfort noted with s/l shoulder abduction  Pt will benefit from continued skilled PT  Plan: Continue per plan of care  Precautions: Patient has extensive PMHx that should be reviewed in medical chart         Manuals 5/10 5/22 5/26 6/1 6/6 6/8       GHJ mobs for pain relief SCOUT PRATHER MD, MD DL DL       SL scap mobs w/ STM JAB STM R scap + shoulder STM R scap + shoulder STM R scap + shoulder STM R scap + shoulder STM R scap + shoulder                                 Neuro Re-Ed             Seated scap retract 20x5\" 20x5\" 20x5\" 20x5\" 20x5\" 20x5\"       TB rows  YTB 10x YTB  10/5x YTB  2x10 YTB  2x10 YTB 2x10       TB ext  YTB  10x YTB  10/5x YTB  2x10 YTB  2x10 YTB 2x10                                                           Ther Ex             TB ER yellow 10x HEP yllw 10x YTB  10x YTB  10x YTB  15x YTB  15x       TB IR yellow 10x HEP yllw  10x2 YTB  10x YTB  2x10 YTB  2x10 YTB  2x10                    Pulleys  10x10\" 10x10\" 10x10\" 10x10\" 10x10\"       SL ER    10x 15x 20x       SL ABD    90* 10x 90*  10x 90° 10x                                 Ther Activity                                       Gait Training                                       Modalities                                            "

## 2023-06-13 ENCOUNTER — OFFICE VISIT (OUTPATIENT)
Dept: PHYSICAL THERAPY | Facility: CLINIC | Age: 88
End: 2023-06-13
Payer: MEDICARE

## 2023-06-13 DIAGNOSIS — G89.29 CHRONIC RIGHT SHOULDER PAIN: Primary | ICD-10-CM

## 2023-06-13 DIAGNOSIS — Z96.612 PRESENCE OF ARTIFICIAL SHOULDER JOINT, LEFT: ICD-10-CM

## 2023-06-13 DIAGNOSIS — Z96.611 PRESENCE OF RIGHT ARTIFICIAL SHOULDER JOINT: ICD-10-CM

## 2023-06-13 DIAGNOSIS — M25.511 CHRONIC RIGHT SHOULDER PAIN: Primary | ICD-10-CM

## 2023-06-13 PROCEDURE — 97140 MANUAL THERAPY 1/> REGIONS: CPT

## 2023-06-13 PROCEDURE — 97110 THERAPEUTIC EXERCISES: CPT

## 2023-06-13 NOTE — PROGRESS NOTES
"Daily Note     Today's date: 2023  Patient name: Jailene Rosa  : 1934  MRN: [id number]  Referring provider: Kristina Alfonso MD  Dx:   Encounter Diagnosis     ICD-10-CM    1  Chronic right shoulder pain  M25 511     G89 29       2  Presence of right artificial shoulder joint  Z96 611       3  Presence of artificial shoulder joint, left  Z96 612                      Subjective: Pt reports her R shoulder feels \"pretty good  \"    Objective: See treatment diary below    Assessment: Performed exercise progressions w/o difficulty or discomfort  Cont to respond well to manual therapies  Will monitor  Plan: Cont per POC  Precautions: Patient has extensive PMHx that should be reviewed in medical chart         Manuals 5/10 5/22 5/26 6/1 6/6 6/8 6/12      GHJ mobs for pain relief SCOUT PRATHER MD, MD DL DL DL      SL scap mobs w/ STM JAB STM R scap + shoulder STM R scap + shoulder STM R scap + shoulder STM R scap + shoulder STM R scap + shoulder STM R scap + shoulder                                Neuro Re-Ed             Seated scap retract 20x5\" 20x5\" 20x5\" 20x5\" 20x5\" 20x5\" 20x5\"      TB rows  YTB 10x YTB  10/5x YTB  2x10 YTB  2x10 YTB 2x10 RTB  15x      TB ext  YTB  10x YTB  10/5x YTB  2x10 YTB  2x10 YTB 2x10 RTB  10x                                                          Ther Ex             TB ER yellow 10x HEP yllw 10x YTB  10x YTB  10x YTB  15x YTB  15x YTB  2x10      TB IR yellow 10x HEP yllw  10x2 YTB  10x YTB  2x10 YTB  2x10 YTB  2x10 RTB  10x                   Pulleys  10x10\" 10x10\" 10x10\" 10x10\" 10x10\" 10x10\"      SL ER    10x 15x 20x 20x      SL ABD    90* 10x 90*  10x 90° 10x 90*  15x                                Ther Activity                                       Gait Training                                       Modalities                                            "

## 2023-06-15 ENCOUNTER — OFFICE VISIT (OUTPATIENT)
Dept: PHYSICAL THERAPY | Facility: CLINIC | Age: 88
End: 2023-06-15
Payer: MEDICARE

## 2023-06-15 DIAGNOSIS — M25.511 CHRONIC RIGHT SHOULDER PAIN: Primary | ICD-10-CM

## 2023-06-15 DIAGNOSIS — Z96.612 PRESENCE OF ARTIFICIAL SHOULDER JOINT, LEFT: ICD-10-CM

## 2023-06-15 DIAGNOSIS — Z96.611 PRESENCE OF RIGHT ARTIFICIAL SHOULDER JOINT: ICD-10-CM

## 2023-06-15 DIAGNOSIS — G89.29 CHRONIC RIGHT SHOULDER PAIN: Primary | ICD-10-CM

## 2023-06-15 PROCEDURE — 97110 THERAPEUTIC EXERCISES: CPT

## 2023-06-15 PROCEDURE — 97140 MANUAL THERAPY 1/> REGIONS: CPT

## 2023-06-15 NOTE — PROGRESS NOTES
"Daily Note     Today's date: 6/15/2023  Patient name: Josue Mcgarry  : 1934  MRN: [id number]  Referring provider: Craig Lutz MD  Dx:   Encounter Diagnosis     ICD-10-CM    1  Chronic right shoulder pain  M25 511     G89 29       2  Presence of right artificial shoulder joint  Z96 611       3  Presence of artificial shoulder joint, left  Z96 612                      Subjective: Pt reports she felt okay after tx progressions LV  Objective: See treatment diary below    Assessment: Performed exercise progressions w/o complaint  Relief after manual therapies  Will monitor  Plan: Cont per POC  Precautions: Patient has extensive PMHx that should be reviewed in medical chart         Manuals 5/10 5/22 5/26 6/1 6/6 6/8 6/12 6/15     GHJ mobs for pain relief SCOUT PRATHER MD, MD DL DL DL DL     SL scap mobs w/ STM JAB STM R scap + shoulder STM R scap + shoulder STM R scap + shoulder STM R scap + shoulder STM R scap + shoulder STM R scap + shoulder STM R scap + shoulder                               Neuro Re-Ed             Seated scap retract 20x5\" 20x5\" 20x5\" 20x5\" 20x5\" 20x5\" 20x5\" 20x5\"     TB rows  YTB 10x YTB  10/5x YTB  2x10 YTB  2x10 YTB 2x10 RTB  15x RTB  15x     TB ext  YTB  10x YTB  10/5x YTB  2x10 YTB  2x10 YTB 2x10 RTB  10x RTB  15x                                                         Ther Ex             TB ER yellow 10x HEP yllw 10x YTB  10x YTB  10x YTB  15x YTB  15x YTB  2x10 RTB  10x     TB IR yellow 10x HEP yllw  10x2 YTB  10x YTB  2x10 YTB  2x10 YTB  2x10 RTB  10x RTB  15x                  Pulleys  10x10\" 10x10\" 10x10\" 10x10\" 10x10\" 10x10\" 10x10\"     SL ER    10x 15x 20x 20x 20x     SL ABD    90* 10x 90*  10x 90° 10x 90*  15x 90*  15x                               Ther Activity                                       Gait Training                                       Modalities                                            "

## 2023-06-19 LAB — HBA1C MFR BLD HPLC: 5.6 %

## 2023-06-20 ENCOUNTER — OFFICE VISIT (OUTPATIENT)
Dept: PHYSICAL THERAPY | Facility: CLINIC | Age: 88
End: 2023-06-20
Payer: MEDICARE

## 2023-06-20 DIAGNOSIS — Z96.612 PRESENCE OF ARTIFICIAL SHOULDER JOINT, LEFT: ICD-10-CM

## 2023-06-20 DIAGNOSIS — G89.29 CHRONIC RIGHT SHOULDER PAIN: Primary | ICD-10-CM

## 2023-06-20 DIAGNOSIS — M25.511 CHRONIC RIGHT SHOULDER PAIN: Primary | ICD-10-CM

## 2023-06-20 DIAGNOSIS — Z96.611 PRESENCE OF RIGHT ARTIFICIAL SHOULDER JOINT: ICD-10-CM

## 2023-06-20 PROCEDURE — 97110 THERAPEUTIC EXERCISES: CPT

## 2023-06-20 PROCEDURE — 97140 MANUAL THERAPY 1/> REGIONS: CPT

## 2023-06-20 NOTE — PROGRESS NOTES
"Daily Note     Today's date: 2023  Patient name: Glenda Varma  : 1934  MRN: [id number]  Referring provider: Genesis Roman MD  Dx:   Encounter Diagnosis     ICD-10-CM    1  Chronic right shoulder pain  M25 511     G89 29       2  Presence of right artificial shoulder joint  Z96 611       3  Presence of artificial shoulder joint, left  Z96 612                      Subjective: Pt reports her R shoulder is feeling better, cont to have a lot of \"crackling\" in her shoulder  Objective: See treatment diary below    Assessment: Performed exercise progressions w/o difficulty or discomfort  Cont to respond well to manual therapies  Will monitor  Plan: Cont per POC  Precautions: Patient has extensive PMHx that should be reviewed in medical chart         Manuals 5/10 5/22 5/26 6/1 6/6 6/8 6/12 6/15 6/20    GHJ mobs for pain relief SCOUT PRATHER MD, MD DL DL DL DL DL    SL scap mobs w/ STM JAB STM R scap + shoulder STM R scap + shoulder STM R scap + shoulder STM R scap + shoulder STM R scap + shoulder STM R scap + shoulder STM R scap + shoulder STM R scap +  shoulder                              Neuro Re-Ed             Seated scap retract 20x5\" 20x5\" 20x5\" 20x5\" 20x5\" 20x5\" 20x5\" 20x5\" 20x5\"    TB rows  YTB 10x YTB  10/5x YTB  2x10 YTB  2x10 YTB 2x10 RTB  15x RTB  15x RTB  25x    TB ext  YTB  10x YTB  10/5x YTB  2x10 YTB  2x10 YTB 2x10 RTB  10x RTB  15x RTB  15x                                                        Ther Ex             TB ER yellow 10x HEP yllw 10x YTB  10x YTB  10x YTB  15x YTB  15x YTB  2x10 RTB  10x RTB   10x    TB IR yellow 10x HEP yllw  10x2 YTB  10x YTB  2x10 YTB  2x10 YTB  2x10 RTB  10x RTB  15x RTB  15x                 Pulleys  10x10\" 10x10\" 10x10\" 10x10\" 10x10\" 10x10\" 10x10\" 10x10\"    SL ER    10x 15x 20x 20x 20x 20x    SL ABD    90* 10x 90*  10x 90° 10x 90*  15x 90*  15x 90*  20x                              Ther Activity                                       Gait Training                  " Modalities

## 2023-06-22 ENCOUNTER — OFFICE VISIT (OUTPATIENT)
Dept: PHYSICAL THERAPY | Facility: CLINIC | Age: 88
End: 2023-06-22
Payer: MEDICARE

## 2023-06-22 DIAGNOSIS — Z96.612 PRESENCE OF ARTIFICIAL SHOULDER JOINT, LEFT: ICD-10-CM

## 2023-06-22 DIAGNOSIS — G89.29 CHRONIC RIGHT SHOULDER PAIN: Primary | ICD-10-CM

## 2023-06-22 DIAGNOSIS — Z96.611 PRESENCE OF RIGHT ARTIFICIAL SHOULDER JOINT: ICD-10-CM

## 2023-06-22 DIAGNOSIS — M25.511 CHRONIC RIGHT SHOULDER PAIN: Primary | ICD-10-CM

## 2023-06-22 PROCEDURE — 97110 THERAPEUTIC EXERCISES: CPT | Performed by: PHYSICAL THERAPIST

## 2023-06-22 PROCEDURE — 97140 MANUAL THERAPY 1/> REGIONS: CPT | Performed by: PHYSICAL THERAPIST

## 2023-06-22 NOTE — PROGRESS NOTES
"Daily Note     Today's date: 2023  Patient name: Deng Hassan  : 1934  MRN: [id number]  Referring provider: Sil Martines MD  Dx:   Encounter Diagnosis     ICD-10-CM    1  Chronic right shoulder pain  M25 511     G89 29       2  Presence of right artificial shoulder joint  Z96 611       3  Presence of artificial shoulder joint, left  Z96 612                      Subjective: Pt reports that exercises have not been hurting her and no soreness after her PT sessions  She reports that yellow band has been easier at home  Objective: See treatment diary below      Assessment: Pt tolerates treatment progressions with increased fatigue but no pain voiced throughout  Pt educated that with progressions may experience increase in soreness but should be short lived  PT dispensed red TB for HEP  Plan: Continue per plan of care  Precautions: Patient has extensive PMHx that should be reviewed in medical chart         Manuals 5/10 5/22 5/26 6/1 6/6 6/8 6/12 6/15 6/20 6/22   GHJ mobs for pain relief DAYSIB MD MD PRATHER DL DL DL DL DL DL   SL scap mobs w/ STM JAB STM R scap + shoulder STM R scap + shoulder STM R scap + shoulder STM R scap + shoulder STM R scap + shoulder STM R scap + shoulder STM R scap + shoulder STM R scap +  shoulder STM R scap +  shoulder                             Neuro Re-Ed             Seated scap retract 20x5\" 20x5\" 20x5\" 20x5\" 20x5\" 20x5\" 20x5\" 20x5\" 20x5\" 20x5\"   TB rows  YTB 10x YTB  10/5x YTB  2x10 YTB  2x10 YTB 2x10 RTB  15x RTB  15x RTB  25x Grn 20x   TB ext  YTB  10x YTB  10/5x YTB  2x10 YTB  2x10 YTB 2x10 RTB  10x RTB  15x RTB  15x RTB 20x                                                       Ther Ex             TB ER yellow 10x HEP yllw 10x YTB  10x YTB  10x YTB  15x YTB  15x YTB  2x10 RTB  10x RTB   10x RTB 20x   TB IR yellow 10x HEP yllw  10x2 YTB  10x YTB  2x10 YTB  2x10 YTB  2x10 RTB  10x RTB  15x RTB  15x Grn 15x                Pulleys  10x10\" 10x10\" 10x10\" 10x10\" 10x10\" " "10x10\" 10x10\" 10x10\" 10x10\"   SL ER    10x 15x 20x 20x 20x 20x 1# 15x   SL ABD    90* 10x 90*  10x 90° 10x 90*  15x 90*  15x 90*  20x    Shoulder adduction sq             Towel roll 5\"x15                Ther Activity                                       Gait Training                                       Modalities                                            "

## 2023-06-27 ENCOUNTER — OFFICE VISIT (OUTPATIENT)
Dept: PHYSICAL THERAPY | Facility: CLINIC | Age: 88
End: 2023-06-27
Payer: MEDICARE

## 2023-06-27 DIAGNOSIS — G89.29 CHRONIC RIGHT SHOULDER PAIN: Primary | ICD-10-CM

## 2023-06-27 DIAGNOSIS — Z96.611 PRESENCE OF RIGHT ARTIFICIAL SHOULDER JOINT: ICD-10-CM

## 2023-06-27 DIAGNOSIS — M25.511 CHRONIC RIGHT SHOULDER PAIN: Primary | ICD-10-CM

## 2023-06-27 DIAGNOSIS — Z96.612 PRESENCE OF ARTIFICIAL SHOULDER JOINT, LEFT: ICD-10-CM

## 2023-06-27 PROCEDURE — 97140 MANUAL THERAPY 1/> REGIONS: CPT

## 2023-06-27 PROCEDURE — 97110 THERAPEUTIC EXERCISES: CPT

## 2023-06-27 NOTE — PROGRESS NOTES
"Daily Note     Today's date: 2023  Patient name: Arthur Singh  : 1934  MRN: [id number]  Referring provider: Sagrario Nieto MD  Dx:   Encounter Diagnosis     ICD-10-CM    1  Chronic right shoulder pain  M25 511     G89 29       2  Presence of right artificial shoulder joint  Z96 611       3  Presence of artificial shoulder joint, left  Z96 612                      Subjective: \"A little crunchy, since I switched bands  \" Pt questioning if she may use OTC lidocaine patch  Objective: See treatment diary below    Assessment: Performed exercise progression and remaining ex program w/o complaint  Relief after STM and scap mobs  Андрей monitor  Recommended pt call her PCP, regarding using OTC lidocaine patches  Plan: Cont per POC  Precautions: Patient has extensive PMHx that should be reviewed in medical chart  Manuals  6/27  5/26 6/1 6/6 6/8 6/12 6/15 6/20 6/22   GHJ mobs for pain relief nv  MD PRATHER DL DL DL DL DL DL   SL scap mobs w/ STM STM R scap + shoulder    STM R scap + shoulder STM R scap + shoulder STM R scap + shoulder STM R scap + shoulder STM R scap + shoulder STM R scap + shoulder STM R scap +  shoulder STM R scap +  shoulder                             Neuro Re-Ed             Seated scap retract 20x5\"  20x5\" 20x5\" 20x5\" 20x5\" 20x5\" 20x5\" 20x5\" 20x5\"   TB rows GTB  20x  YTB  10/5x YTB  2x10 YTB  2x10 YTB 2x10 RTB  15x RTB  15x RTB  25x Grn 20x   TB ext GTB  20x  YTB  10/5x YTB  2x10 YTB  2x10 YTB 2x10 RTB  10x RTB  15x RTB  15x RTB 20x                                                       Ther Ex             TB ER RTB  20x  YTB  10x YTB  10x YTB  15x YTB  15x YTB  2x10 RTB  10x RTB   10x RTB 20x   TB IR GTB  20x  YTB  10x YTB  2x10 YTB  2x10 YTB  2x10 RTB  10x RTB  15x RTB  15x Grn 15x                Pulleys 10x10\"  10x10\" 10x10\" 10x10\" 10x10\" 10x10\" 10x10\" 10x10\" 10x10\"   SL ER 1#x15   10x 15x 20x 20x 20x 20x 1# 15x   SL ABD    90* 10x 90*  10x 90° 10x 90*  15x 90*  15x 90*  20x  " "  Shoulder adduction sq    Towel roll 5\"x15         Towel roll 5\"x15                Ther Activity                                       Gait Training                                       Modalities                                            "

## 2023-06-29 ENCOUNTER — REMOTE DEVICE CLINIC VISIT (OUTPATIENT)
Dept: CARDIOLOGY CLINIC | Facility: CLINIC | Age: 88
End: 2023-06-29
Payer: MEDICARE

## 2023-06-29 ENCOUNTER — OFFICE VISIT (OUTPATIENT)
Dept: PHYSICAL THERAPY | Facility: CLINIC | Age: 88
End: 2023-06-29
Payer: MEDICARE

## 2023-06-29 DIAGNOSIS — Z96.611 PRESENCE OF RIGHT ARTIFICIAL SHOULDER JOINT: ICD-10-CM

## 2023-06-29 DIAGNOSIS — G89.29 CHRONIC RIGHT SHOULDER PAIN: Primary | ICD-10-CM

## 2023-06-29 DIAGNOSIS — M25.511 CHRONIC RIGHT SHOULDER PAIN: Primary | ICD-10-CM

## 2023-06-29 DIAGNOSIS — Z96.612 PRESENCE OF ARTIFICIAL SHOULDER JOINT, LEFT: ICD-10-CM

## 2023-06-29 DIAGNOSIS — Z95.818 PRESENCE OF OTHER CARDIAC IMPLANTS AND GRAFTS: Primary | ICD-10-CM

## 2023-06-29 PROCEDURE — 93298 REM INTERROG DEV EVAL SCRMS: CPT | Performed by: INTERNAL MEDICINE

## 2023-06-29 PROCEDURE — 97110 THERAPEUTIC EXERCISES: CPT

## 2023-06-29 PROCEDURE — 97140 MANUAL THERAPY 1/> REGIONS: CPT

## 2023-06-29 PROCEDURE — G2066 INTER DEVC REMOTE 30D: HCPCS | Performed by: INTERNAL MEDICINE

## 2023-06-29 NOTE — PROGRESS NOTES
"Daily Note     Today's date: 2023  Patient name: Yousif Watson  : 1934  MRN: [id number]  Referring provider: Yeimi Ndiaye MD  Dx:   Encounter Diagnosis     ICD-10-CM    1  Chronic right shoulder pain  M25 511     G89 29       2  Presence of right artificial shoulder joint  Z96 611       3  Presence of artificial shoulder joint, left  Z96 612                      Subjective: Pt states she feels \"sore all over  \"    Objective: See treatment diary below    Assessment: Performed exercise program w/o increasing symptoms  Responded well to manual therapies  Relief after tx  Will monitor    Plan: Cont per POC  Precautions: Patient has extensive PMHx that should be reviewed in medical chart  Manuals  6/27 6/29  6/1 6/6 6/8 6/12 6/15 6/20 6/22   GHJ mobs for pain relief nv DL  MD DL DL DL DL DL DL   SL scap mobs w/ STM STM R scap + shoulder  STM R scap + shoulder STM R scap + shoulder STM R scap + shoulder STM R scap + shoulder STM R scap + shoulder STM R scap +  shoulder STM R scap +  shoulder                             Neuro Re-Ed             Seated scap retract 20x5\" 20x5\"  20x5\" 20x5\" 20x5\" 20x5\" 20x5\" 20x5\" 20x5\"   TB rows GTB  20x GTB  20x  YTB  2x10 YTB  2x10 YTB 2x10 RTB  15x RTB  15x RTB  25x Grn 20x   TB ext GTB  20x GTB  20x  YTB  2x10 YTB  2x10 YTB 2x10 RTB  10x RTB  15x RTB  15x RTB 20x                                                       Ther Ex             TB ER RTB  20x RTB  20x  YTB  10x YTB  15x YTB  15x YTB  2x10 RTB  10x RTB   10x RTB 20x   TB IR GTB  20x GTB  20x  YTB  2x10 YTB  2x10 YTB  2x10 RTB  10x RTB  15x RTB  15x Grn 15x                Pulleys 10x10\" 10x10\"  10x10\" 10x10\" 10x10\" 10x10\" 10x10\" 10x10\" 10x10\"   SL ER 1#x15 1#x15  10x 15x 20x 20x 20x 20x 1# 15x   SL ABD    90* 10x 90*  10x 90° 10x 90*  15x 90*  15x 90*  20x    Shoulder adduction sq    Towel roll 5\"x15 Towel roll  5\"x15        Towel roll 5\"x15                Ther Activity                                     " Gait Training                                       Modalities

## 2023-06-29 NOTE — PROGRESS NOTES
"CARELINK TRANSMISSION: LOOP RECORDER  PRESENTING RHYTHM NSR @ 60 BPM  BATTERY STATUS \"OK  \" NO PATIENT OR DEVICE ACTIVATED EPISODES  NORMAL DEVICE FUNCTION   DL   "

## 2023-07-06 ENCOUNTER — OFFICE VISIT (OUTPATIENT)
Dept: FAMILY MEDICINE CLINIC | Facility: CLINIC | Age: 88
End: 2023-07-06
Payer: MEDICARE

## 2023-07-06 VITALS
RESPIRATION RATE: 16 BRPM | OXYGEN SATURATION: 97 % | TEMPERATURE: 98 F | HEIGHT: 63 IN | DIASTOLIC BLOOD PRESSURE: 64 MMHG | HEART RATE: 74 BPM | SYSTOLIC BLOOD PRESSURE: 122 MMHG | BODY MASS INDEX: 20.52 KG/M2 | WEIGHT: 115.8 LBS

## 2023-07-06 DIAGNOSIS — R91.8 MULTIPLE PULMONARY NODULES: ICD-10-CM

## 2023-07-06 DIAGNOSIS — R73.01 IMPAIRED FASTING GLUCOSE: ICD-10-CM

## 2023-07-06 DIAGNOSIS — Z12.31 SCREENING MAMMOGRAM FOR BREAST CANCER: ICD-10-CM

## 2023-07-06 DIAGNOSIS — E78.5 DYSLIPIDEMIA: ICD-10-CM

## 2023-07-06 DIAGNOSIS — R42 VERTIGO: ICD-10-CM

## 2023-07-06 DIAGNOSIS — E03.9 HYPOTHYROIDISM, UNSPECIFIED TYPE: ICD-10-CM

## 2023-07-06 DIAGNOSIS — F41.9 ANXIETY: ICD-10-CM

## 2023-07-06 DIAGNOSIS — I10 BENIGN ESSENTIAL HYPERTENSION: ICD-10-CM

## 2023-07-06 DIAGNOSIS — C91.10 CLL (CHRONIC LYMPHOCYTIC LEUKEMIA) (HCC): Primary | ICD-10-CM

## 2023-07-06 PROBLEM — S06.5XAA SUBDURAL HEMATOMA, POST-TRAUMATIC (HCC): Status: RESOLVED | Noted: 2022-09-14 | Resolved: 2023-07-06

## 2023-07-06 PROBLEM — R55 SYNCOPE: Status: RESOLVED | Noted: 2022-09-14 | Resolved: 2023-07-06

## 2023-07-06 PROBLEM — I31.39 PERICARDIAL EFFUSION: Status: RESOLVED | Noted: 2020-10-21 | Resolved: 2023-07-06

## 2023-07-06 PROBLEM — K08.89 PAIN, DENTAL: Status: RESOLVED | Noted: 2022-09-13 | Resolved: 2023-07-06

## 2023-07-06 PROCEDURE — 99214 OFFICE O/P EST MOD 30 MIN: CPT | Performed by: FAMILY MEDICINE

## 2023-07-06 RX ORDER — TIMOLOL MALEATE 5 MG/ML
SOLUTION/ DROPS OPHTHALMIC
COMMUNITY
Start: 2023-01-12

## 2023-07-06 NOTE — PROGRESS NOTES
Name: Nissa Dia      : 1934      MRN: [id number]  Encounter Provider: Rush Dance, MD  Encounter Date: 2023   Encounter department: [address] [place] [address]    Chief Complaint   Patient presents with   • Follow-up      6 month      Health Maintenance   Topic Date Due   • Pneumococcal Vaccine: 65+ Years (2 - PPSV23 if available, else PCV20) 2017   • DXA SCAN  2021   • Breast Cancer Screening: Mammogram  2023   • Influenza Vaccine (1) 2023   • BMI: Adult  2024   • Urinary Incontinence Screening  2024   • Medicare Annual Wellness Visit (AWV)  2024   • Falls: Plan of Care  2024   • Fall Risk  05/10/2024   • Depression Screening  2024   • COVID-19 Vaccine  Completed   • HIB Vaccine  Aged Out   • IPV Vaccine  Aged Out   • Hepatitis A Vaccine  Aged Out   • Meningococcal ACWY Vaccine  Aged Out   • HPV Vaccine  Aged Out       Assessment & Plan     1. CLL (chronic lymphocytic leukemia) (HCC)  Comments:  [facility] hem/onc. 2. Hypothyroidism, unspecified type  Assessment & Plan:  2023 TSH normal. Continue levothyroxine 25mcg daily. Orders:  -     Comprehensive metabolic panel; Future; Expected date: 2023  -     Hemoglobin A1C; Future; Expected date: 2023  -     TSH, 3rd generation with Free T4 reflex; Future; Expected date: 2023    3. Impaired fasting glucose  Assessment & Plan:  2023 hgA1C 5.6 normal. Low carb diet. Orders:  -     Comprehensive metabolic panel; Future; Expected date: 2023  -     Hemoglobin A1C; Future; Expected date: 2023  -     TSH, 3rd generation with Free T4 reflex; Future; Expected date: 2023    4. Benign essential hypertension  Assessment & Plan:  DASH diet. Continue carvedilol per cardiology. Orders:  -     Comprehensive metabolic panel;  Future; Expected date: 2023  -     Hemoglobin A1C; Future; Expected date: 2023  -     TSH, 3rd generation with Free T4 reflex; Future; Expected date: 12/23/2023    5. Screening mammogram for breast cancer  -     Mammo screening bilateral w 3d & cad; Future; Expected date: 07/06/2023    6. Multiple pulmonary nodules  Assessment & Plan:  [facility] pulmonary. 7. Vertigo  Assessment & Plan:  Did not need meclizine for a while. 8. Dyslipidemia  Assessment & Plan:  6/2023 lipid good. Pt is off simvastatin for 6 months. 9. Anxiety  Assessment & Plan:  Use xanax 0.25mg 1/2 tab rarely. Reviewed lab in 6/2023  CMP ok  HgA1C 5.6 normal  Lipid 173/113/38/112 good  TSH  Normal  CBC showed WBC 21.2 stable    Flu shot yearly.   Got covid19 vaccines and boosters.   Got pneumovax at pharmacy in 2013 per pt. Got prevnar 13 2017.   Pt request mammogram.   RTO in 6 months.            Subjective      HPI     Pt is here with her older daughter.      CLL---[facility] hem/onc Dr. Vallejo. No need treatment now. Lung nodules---[facility] pulmonary Dr. Heraclio Toney. Had biopsy which did not show cancer. Plan to do CT again in 8/2023.      Hypothyroidism---She is on levothyroxine 25mcg daily.      Hyperlipidemia---Pt is off simvastatin for 6 months. Still have joints pain/neck pain, will see [facility] PMR.      HTN---BP at home good per pt. She is on carvedilol 6.25mg bid. FU cardiology regularly. Denies headache, vision change, Sob or CP.      AnxietyDepression---Stable. She uses xanax 0.125mg very rarely.     Vertigo---feels unbalanced. Come and go. Did not use meclizine for a while. Pt has vision problem which may contribute to vertigo.      Urinary incontinence---She is off oxybutynin. Got botox by uro/gyn which helped a lot.      Lackey's---follows GI. She tried nexium and prevacid but felt nausea. She is on pepcid 10mg prn.      Macular degeneration/vein occlusion----Got shot every 3 months. Left eye sight is not good.      Lives by herself. Does all ADL's. Drive a little, consider Susanne Mohamud in the future. Denies recent falls. Use cane.  Daughter Alverto Brown lives close to her. Denies depression.          Review of Systems   Constitutional: Negative for appetite change, chills and fever. HENT: Negative for congestion, ear pain, sinus pain and sore throat. Eyes: Positive for visual disturbance. Negative for discharge and itching. Respiratory: Negative for apnea, cough, chest tightness, shortness of breath and wheezing. Cardiovascular: Negative for chest pain, palpitations and leg swelling. Gastrointestinal: Negative for abdominal pain, anal bleeding, constipation, diarrhea, nausea and vomiting. Endocrine: Negative for cold intolerance, heat intolerance and polyuria. Genitourinary: Negative for difficulty urinating and dysuria. Musculoskeletal: Negative for arthralgias, back pain and myalgias. Skin: Negative for rash. Neurological: Positive for dizziness. Negative for headaches. Psychiatric/Behavioral: Negative for agitation. Current Outpatient Medications on File Prior to Visit   Medication Sig   • acetaminophen (TYLENOL) 650 mg CR tablet Take 650 mg by mouth as needed for mild pain.    • ALPHAGAN P 0.1 % instill 1 drop into both eyes twice a day   • ALPRAZolam (XANAX) 0.25 mg tablet Take 1 tablet (0.25 mg total) by mouth daily at bedtime as needed for anxiety (Patient taking differently: Take 0.25 mg by mouth if needed for anxiety)   • bifidobacterium infantis (ALIGN) capsule Take by mouth   • carvedilol (COREG) 6.25 mg tablet Take 1 tablet (6.25 mg total) by mouth 2 (two) times a day with meals   • cholecalciferol (VITAMIN D3) 1,000 units tablet Take by mouth   • famotidine (PEPCID) 20 mg tablet Take 1 tablet (20 mg total) by mouth 2 (two) times a day (Patient taking differently: Take 20 mg by mouth if needed)   • Multiple Vitamins-Minerals (CENTRUM SILVER PO) Take by mouth   • onabotulinumtoxin A (BOTOX) 100 units Inject as directed Twice a year   • polyethylene glycol (GLYCOLAX) 17 GM/SCOOP powder Take by mouth every other day   • timolol (TIMOPTIC) 0.5 % ophthalmic solution Use as directed   • levothyroxine 25 mcg tablet Take 1 tablet (25 mcg total) by mouth daily   • meclizine (ANTIVERT) 25 mg tablet TAKE 1 TABLET EVERY 8 HOURS AS NEEDED FOR DIZZINESS (Patient not taking: No sig reported)   • [DISCONTINUED] simvastatin (ZOCOR) 20 mg tablet take 1 tablet by mouth once daily       Objective     /64   Pulse 74   Temp 98 °F (36.7 °C) (Tympanic)   Resp 16   Ht 5' 2.5" (1.588 m)   Wt 52.5 kg (115 lb 12.8 oz)   SpO2 97%   BMI 20.84 kg/m²     Physical Exam  Constitutional:       General: She is not in acute distress. Appearance: She is well-developed. HENT:      Head: Normocephalic. Eyes:      General:         Right eye: No discharge. Left eye: No discharge. Conjunctiva/sclera: Conjunctivae normal.   Neck:      Thyroid: No thyromegaly. Cardiovascular:      Rate and Rhythm: Normal rate and regular rhythm. Heart sounds: Normal heart sounds. No murmur heard. No friction rub. No gallop. Pulmonary:      Effort: Pulmonary effort is normal. No respiratory distress. Breath sounds: Normal breath sounds. No wheezing or rales. Chest:      Chest wall: No tenderness. Abdominal:      General: Bowel sounds are normal. There is no distension. Palpations: Abdomen is soft. There is no mass. Tenderness: There is no abdominal tenderness. There is no guarding or rebound. Musculoskeletal:         General: No tenderness or deformity. Normal range of motion. Cervical back: Normal range of motion. Lymphadenopathy:      Cervical: No cervical adenopathy. Neurological:      Mental Status: She is alert.        Horace Nazario MD

## 2023-07-11 ENCOUNTER — OFFICE VISIT (OUTPATIENT)
Dept: PHYSICAL THERAPY | Facility: CLINIC | Age: 88
End: 2023-07-11
Payer: MEDICARE

## 2023-07-11 DIAGNOSIS — M25.511 CHRONIC RIGHT SHOULDER PAIN: Primary | ICD-10-CM

## 2023-07-11 DIAGNOSIS — G89.29 CHRONIC RIGHT SHOULDER PAIN: Primary | ICD-10-CM

## 2023-07-11 DIAGNOSIS — Z96.611 PRESENCE OF RIGHT ARTIFICIAL SHOULDER JOINT: ICD-10-CM

## 2023-07-11 DIAGNOSIS — Z96.612 PRESENCE OF ARTIFICIAL SHOULDER JOINT, LEFT: ICD-10-CM

## 2023-07-11 PROCEDURE — 97140 MANUAL THERAPY 1/> REGIONS: CPT

## 2023-07-11 PROCEDURE — 97110 THERAPEUTIC EXERCISES: CPT

## 2023-07-11 NOTE — PROGRESS NOTES
Daily Note     Today's date: 2023  Patient name: Nissa Dia  : 1934  MRN: [id number]  Referring provider: Jann Berrios MD  Dx:   Encounter Diagnosis     ICD-10-CM    1. Chronic right shoulder pain  M25.511     G89.29       2. Presence of right artificial shoulder joint  Z96.611       3. Presence of artificial shoulder joint, left  Z96.612                      Subjective: "It's a little crunchy, more then usual." Pt had a f/u appt with her Dr, on 7/3/23, refer to Epic for note, she has prescription for therapy. Objective: See treatment diary below    Assessment: Performed exercise program w/o complaint. Comfortable during mobilizations. Responded well to [facility], as below. Will monitor. Plan: Cont per POC. Precautions: Patient has extensive PMHx that should be reviewed in medical chart. Manuals  6/27 6/29 7/11  6/6 6/8 6/12 6/15 6/20 6/22   GHJ mobs for pain relief nv DL DL  DL DL DL DL DL DL   SL scap mobs w/ STM STM R scap + shoulder. STM R scap/rhomboid  STM R scap + shoulder STM R scap + shoulder STM R scap + shoulder STM R scap + shoulder STM R scap +  shoulder STM R scap +  shoulder                             Neuro Re-Ed             Seated scap retract 20x5" 20x5" 20x5"  20x5" 20x5" 20x5" 20x5" 20x5" 20x5"   TB rows GTB  20x GTB  20x GTB  20x  YTB  2x10 YTB 2x10 RTB  15x RTB  15x RTB  25x Grn 20x   TB ext GTB  20x GTB  20x GTB  20x  YTB  2x10 YTB 2x10 RTB  10x RTB  15x RTB  15x RTB 20x                                                       Ther Ex             TB ER RTB  20x RTB  20x RTB  20x  YTB  15x YTB  15x YTB  2x10 RTB  10x RTB   10x RTB 20x   TB IR GTB  20x GTB  20x GTB  20x  YTB  2x10 YTB  2x10 RTB  10x RTB  15x RTB  15x Grn 15x                Pulleys 10x10" 10x10" 10x10"  10x10" 10x10" 10x10" 10x10" 10x10" 10x10"   SL ER 1#x15 1#x15 1#x20  15x 20x 20x 20x 20x 1# 15x   SL ABD     90*  10x 90° 10x 90*  15x 90*  15x 90*  20x    Shoulder adduction sq.   Towel roll 5"x15 Towel roll  5"x15 Towel roll 5"x15       Towel roll 5"x15                Ther Activity                                       Gait Training                                       Modalities

## 2023-07-13 ENCOUNTER — OFFICE VISIT (OUTPATIENT)
Dept: PHYSICAL THERAPY | Facility: CLINIC | Age: 88
End: 2023-07-13
Payer: MEDICARE

## 2023-07-13 DIAGNOSIS — M25.511 CHRONIC RIGHT SHOULDER PAIN: Primary | ICD-10-CM

## 2023-07-13 DIAGNOSIS — G89.29 CHRONIC RIGHT SHOULDER PAIN: Primary | ICD-10-CM

## 2023-07-13 DIAGNOSIS — Z96.612 PRESENCE OF ARTIFICIAL SHOULDER JOINT, LEFT: ICD-10-CM

## 2023-07-13 DIAGNOSIS — Z96.611 PRESENCE OF RIGHT ARTIFICIAL SHOULDER JOINT: ICD-10-CM

## 2023-07-13 PROCEDURE — 97110 THERAPEUTIC EXERCISES: CPT

## 2023-07-13 PROCEDURE — 97140 MANUAL THERAPY 1/> REGIONS: CPT

## 2023-07-13 NOTE — PROGRESS NOTES
Daily Note     Today's date: 2023  Patient name: Denita Olmos  : 1934  MRN: [id number]  Referring provider: Suzy Brunner, MD  Dx:   Encounter Diagnosis     ICD-10-CM    1. Chronic right shoulder pain  M25.511     G89.29       2. Presence of right artificial shoulder joint  Z96.611       3. Presence of artificial shoulder joint, left  Z96.612                      Subjective: Pt offered no complaints upon arrival to therapy. Objective: See treatment diary below    Assessment: Performed exercise program w/o difficulty or discomfort. Responded well to manual therapies. Relief after tx. Will monitor. Plan: Cont per POC. Will D/C next week. Precautions: Patient has extensive PMHx that should be reviewed in medical chart. Manuals  6/27 6/29 7/11 7/13  6/8 6/12 6/15 6/20 6/22   GHJ mobs for pain relief nv DL DL DL  DL DL DL DL DL   SL scap mobs w/ STM STM R scap + shoulder. STM R scap/rhomboid STM R scap/rhomboid  STM R scap + shoulder STM R scap + shoulder STM R scap + shoulder STM R scap +  shoulder STM R scap +  shoulder                             Neuro Re-Ed             Seated scap retract 20x5" 20x5" 20x5" 20x5"  20x5" 20x5" 20x5" 20x5" 20x5"   TB rows GTB  20x GTB  20x GTB  20x GTB  20x  YTB 2x10 RTB  15x RTB  15x RTB  25x Grn 20x   TB ext GTB  20x GTB  20x GTB  20x GTB  20x  YTB 2x10 RTB  10x RTB  15x RTB  15x RTB 20x                                                       Ther Ex             TB ER RTB  20x RTB  20x RTB  20x RTB  20x  YTB  15x YTB  2x10 RTB  10x RTB   10x RTB 20x   TB IR GTB  20x GTB  20x GTB  20x GTB  20x  YTB  2x10 RTB  10x RTB  15x RTB  15x Grn 15x                Pulleys 10x10" 10x10" 10x10" 10x10"  10x10" 10x10" 10x10" 10x10" 10x10"   SL ER 1#x15 1#x15 1#x20 np  20x 20x 20x 20x 1# 15x   SL ABD      90° 10x 90*  15x 90*  15x 90*  20x    Shoulder adduction sq.   Towel roll 5"x15 Towel roll  5"x15 Towel roll 5"x15 Towel roll  5"x15      Towel roll 5"x15 Ther Activity                                       Gait Training                                       Modalities

## 2023-07-17 ENCOUNTER — APPOINTMENT (OUTPATIENT)
Dept: PHYSICAL THERAPY | Facility: CLINIC | Age: 88
End: 2023-07-17
Payer: MEDICARE

## 2023-07-18 ENCOUNTER — OFFICE VISIT (OUTPATIENT)
Dept: PHYSICAL THERAPY | Facility: CLINIC | Age: 88
End: 2023-07-18
Payer: MEDICARE

## 2023-07-18 DIAGNOSIS — Z96.612 PRESENCE OF ARTIFICIAL SHOULDER JOINT, LEFT: ICD-10-CM

## 2023-07-18 DIAGNOSIS — Z96.611 PRESENCE OF RIGHT ARTIFICIAL SHOULDER JOINT: ICD-10-CM

## 2023-07-18 DIAGNOSIS — G89.29 CHRONIC RIGHT SHOULDER PAIN: Primary | ICD-10-CM

## 2023-07-18 DIAGNOSIS — M25.511 CHRONIC RIGHT SHOULDER PAIN: Primary | ICD-10-CM

## 2023-07-18 PROCEDURE — 97110 THERAPEUTIC EXERCISES: CPT

## 2023-07-18 PROCEDURE — 97140 MANUAL THERAPY 1/> REGIONS: CPT

## 2023-07-18 PROCEDURE — 97112 NEUROMUSCULAR REEDUCATION: CPT

## 2023-07-18 NOTE — PROGRESS NOTES
Daily Note     Today's date: 2023  Patient name: Nette Red  : 1934  MRN: [id number]  Referring provider: Bhavana Charles MD  Dx:   Encounter Diagnosis     ICD-10-CM    1. Chronic right shoulder pain  M25.511     G89.29       2. Presence of right artificial shoulder joint  Z96.611       3. Presence of artificial shoulder joint, left  Z96.612                      Subjective: Patient states that the shoulder feels good. Her neck has bothering her lately. Objective: See treatment diary below      Assessment: Tolerated treatment well. Patient demonstrated good form with theraband exercises. Patient experienced minimal pain with sidelying external rotation. After correcting patient posture she had relief of pain. Patient finished session with PT performing joint mobilizations. Patient demonstrated fatigue post treatment and exhibited good technique with therapeutic exercises      Plan: Continue per plan of care. Precautions: Patient has extensive PMHx that should be reviewed in medical chart. Manuals  6/27 6/29 7/11 7/13 7/18 6/8 6/12 6/15 6/20 6/22   GHJ mobs for pain relief nv DL DL DL DL DL DL DL DL DL   SL scap mobs w/ STM STM R scap + shoulder.   STM R scap/rhomboid STM R scap/rhomboid STM R scap/rhomboid   DB STM R scap + shoulder STM R scap + shoulder STM R scap + shoulder STM R scap +  shoulder STM R scap +  shoulder                             Neuro Re-Ed             Seated scap retract 20x5" 20x5" 20x5" 20x5" 5" x 20  20x5" 20x5" 20x5" 20x5" 20x5"   TB rows GTB  20x GTB  20x GTB  20x GTB  20x GTB 20x YTB 2x10 RTB  15x RTB  15x RTB  25x Grn 20x   TB ext GTB  20x GTB  20x GTB  20x GTB  20x GTB 20x  YTB 2x10 RTB  10x RTB  15x RTB  15x RTB 20x                                                       Ther Ex             TB ER RTB  20x RTB  20x RTB  20x RTB  20x RTB 20x  YTB  15x YTB  2x10 RTB  10x RTB   10x RTB 20x   TB IR GTB  20x GTB  20x GTB  20x GTB  20x GTB 20x YTB  2x10 RTB  10x RTB  15x RTB  15x Grn 15x                Pulleys 10x10" 10x10" 10x10" 10x10" 10x 10" 10x10" 10x10" 10x10" 10x10" 10x10"   SL ER 1#x15 1#x15 1#x20 np 1# x 20 20x 20x 20x 20x 1# 15x   SL ABD      90° 10x 90*  15x 90*  15x 90*  20x    Shoulder adduction sq.   Towel roll 5"x15 Towel roll  5"x15 Towel roll 5"x15 Towel roll  5"x15 Towel roll 5"x15     Towel roll 5"x15                Ther Activity                                       Gait Training                                       Modalities

## 2023-07-20 ENCOUNTER — OFFICE VISIT (OUTPATIENT)
Dept: PHYSICAL THERAPY | Facility: CLINIC | Age: 88
End: 2023-07-20
Payer: MEDICARE

## 2023-07-20 DIAGNOSIS — G89.29 CHRONIC RIGHT SHOULDER PAIN: Primary | ICD-10-CM

## 2023-07-20 DIAGNOSIS — Z96.612 PRESENCE OF ARTIFICIAL SHOULDER JOINT, LEFT: ICD-10-CM

## 2023-07-20 DIAGNOSIS — Z96.611 PRESENCE OF RIGHT ARTIFICIAL SHOULDER JOINT: ICD-10-CM

## 2023-07-20 DIAGNOSIS — M25.511 CHRONIC RIGHT SHOULDER PAIN: Primary | ICD-10-CM

## 2023-07-20 PROCEDURE — 97112 NEUROMUSCULAR REEDUCATION: CPT | Performed by: PHYSICAL THERAPIST

## 2023-07-20 PROCEDURE — 97110 THERAPEUTIC EXERCISES: CPT | Performed by: PHYSICAL THERAPIST

## 2023-07-20 PROCEDURE — 97140 MANUAL THERAPY 1/> REGIONS: CPT | Performed by: PHYSICAL THERAPIST

## 2023-07-20 NOTE — PROGRESS NOTES
Discharge    Today's date: 2023  Patient name: Kianna Moreno  : 1934  MRN: [id number]  Referring provider: Shankar Lovell MD  Dx:   Encounter Diagnosis     ICD-10-CM    1. Chronic right shoulder pain  M25.511     G89.29       2. Presence of right artificial shoulder joint  Z96.611       3. Presence of artificial shoulder joint, left  Z96.612                      Subjective: Pt reports that she is feeling better overall. She states being prescribed mobic and took it last night with overall relief of joint pain. Objective: See treatment diary below      Assessment: Pt reports feeling appropriate for discharge to an independent HEP. Pt with overall gross functional and subjective improvements. Pt does have a new script for cervical spine, therefore will discharge shoulder episode of care and initiate cervical assessment NV. Plan: Discharge from skilled PT. Precautions: Patient has extensive PMHx that should be reviewed in medical chart. Manuals  6/27 6/29 7/11 7/13 7/18 7/20 6/12 6/15 6/20 6/22   GHJ mobs for pain relief nv DL DL DL DL DL DL DL DL DL   SL scap mobs w/ STM STM R scap + shoulder.   STM R scap/rhomboid STM R scap/rhomboid STM R scap/rhomboid   DB STM R scap + shoulder STM R scap + shoulder STM R scap + shoulder STM R scap +  shoulder STM R scap +  shoulder                             Neuro Re-Ed             Seated scap retract 20x5" 20x5" 20x5" 20x5" 5" x 20  20x5" 20x5" 20x5" 20x5" 20x5"   TB rows GTB  20x GTB  20x GTB  20x GTB  20x GTB 20x GTB 2x10 RTB  15x RTB  15x RTB  25x Grn 20x   TB ext GTB  20x GTB  20x GTB  20x GTB  20x GTB 20x  GTB 2x10 RTB  10x RTB  15x RTB  15x RTB 20x                                                       Ther Ex             TB ER RTB  20x RTB  20x RTB  20x RTB  20x RTB 20x  RTB  15x YTB  2x10 RTB  10x RTB   10x RTB 20x   TB IR GTB  20x GTB  20x GTB  20x GTB  20x GTB 20x RTB  2x10 RTB  10x RTB  15x RTB  15x Grn 15x                Pulleys 10x10" 10x10" 10x10" 10x10" 10x 10" 10x10" 10x10" 10x10" 10x10" 10x10"   SL ER 1#x15 1#x15 1#x20 np 1# x 20 1# 20x 20x 20x 20x 1# 15x   SL ABD       90*  15x 90*  15x 90*  20x    Shoulder adduction sq.   Towel roll 5"x15 Towel roll  5"x15 Towel roll 5"x15 Towel roll  5"x15 Towel roll 5"x15 Towel Roll 5"x15    Towel roll 5"x15                Ther Activity                                       Gait Training                                       Modalities

## 2023-07-25 ENCOUNTER — EVALUATION (OUTPATIENT)
Dept: PHYSICAL THERAPY | Facility: CLINIC | Age: 88
End: 2023-07-25
Payer: MEDICARE

## 2023-07-25 DIAGNOSIS — M47.812 CERVICAL SPONDYLOSIS: Primary | ICD-10-CM

## 2023-07-25 DIAGNOSIS — M79.18 CERVICAL MYOFASCIAL PAIN SYNDROME: ICD-10-CM

## 2023-07-25 PROCEDURE — 97162 PT EVAL MOD COMPLEX 30 MIN: CPT | Performed by: PHYSICAL THERAPIST

## 2023-07-25 NOTE — LETTER
2023    Jony Peñaloza, [address] [address]    Patient: Silverio Singleton   YOB: 1934   Date of Visit: 2023     Encounter Diagnosis     ICD-10-CM    1. Cervical spondylosis  M47.812       2. Cervical myofascial pain syndrome  M79.18           Dear Dr. Armstrong Player: Thank you for your recent referral of Silverio Singleton. Please review the attached evaluation summary from Ethel's recent visit. Please verify that you agree with the plan of care by signing the attached order. If you have any questions or concerns, please do not hesitate to call. I sincerely appreciate the opportunity to share in the care of one of your patients and hope to have another opportunity to work with you in the near future. Sincerely,    Paul Jose, PT      Referring Provider:      I certify that I have read the below Plan of Care and certify the need for these services furnished under this plan of treatment while under my care. Jony Peñaloza MD  [address] [address]  Via Fax: [phone]          PT Evaluation     Today's date: 2023  Patient name: Silverio Singleton  : 1934  MRN: [id number]  Referring provider: Jony Peñaloza MD  Dx:   Encounter Diagnosis     ICD-10-CM    1. Cervical spondylosis  M47.812       2. Cervical myofascial pain syndrome  M79.18                      Assessment  Assessment details: Pt is a 80 y.o. female presenting to PT with chronic neck pain. PT findings include: decreased cervical ROM, joint hypomobility throughout cervical spine, periscapular strength deficits, poor posture, and deep cervical neck flexor endurance deficits. Findings are consistent with mechanical type neck pain vs nerve involvement. Pt educated on PT findings, anatomy, biomechanics, POC and rehab course.  Pt will benefit from skilled PT to address above impairments to return to PLOF with less restriction and to reach functional goals. Impairments: abnormal or restricted ROM, impaired physical strength, lacks appropriate home exercise program, pain with function, weight-bearing intolerance and poor posture     Symptom irritability: lowUnderstanding of Dx/Px/POC: good   Prognosis: fair    Goals  1. Pt will demonstrate understanding of HEP and POC in order to improve compliance with course of rehab in 2 weeks. 2. Pt will demonstrate awareness of appropriate posture with seated, standing and functional dynamic reaching activities in order to decrease excessive loads/pain with ADL's in 3 weeks. 3. Pt's pain will be 2/10 or less to allow pt to return to PLOF with least restriction by d/c.   4. Pt's cervical ROM will improve to lacking 25% in order for pt to look around her environment with less restriction by d/c. Plan  Patient would benefit from: skilled physical therapy  Planned modality interventions: low level laser therapy  Planned therapy interventions: joint mobilization, manual therapy, neuromuscular re-education, patient education, postural training, strengthening, stretching, therapeutic activities, therapeutic exercise, home exercise program, functional ROM exercises and flexibility  Frequency: 2x week  Duration in weeks: 8  Treatment plan discussed with: patient        Subjective Evaluation    History of Present Illness  Mechanism of injury: Pt arrives to PT via referral from PM&R physician for chronic neck pain without radiculopathy. Pt reports that she has had this pain for at about 2 years ago but has been feeling more discomfort as of late. Pt also had been treated for R shoulder pain prior. Pt states that she is always feeling discomfort but movement of the neck is challenging.    Quality of life: good    Pain  Current pain ratin  At best pain ratin  At worst pain ratin  Location: neck  Quality: tight, throbbing, discomfort and pressure          Objective    Cervical ROM:  Flex: 40°  Ext: 50°   SB: 30°/30°    Shoulder ROM:  WNL    Joint mobility:  Cervical CPA: hypo C3-C7  Cervical UPA: hypo C2/C3    Palpation: tenderness/tension UT/levator scapulae, suboccipitals (pain recreation)    Special Tests:  Cervical compression: negative  Cervical distraction: negative    Precautions:   Patient has extensive PMHx that should be reviewed in medical chart.  FALL RISK    Manuals 7/25            Cervical CPA/UPA             Suboccipital STM             Laser                          Neuro Re-Ed                                                                                                        Ther Ex             Row HEP            Shoulder extension HEP            Chin tuck HEP            UT stretch HEP            Levator stretch             Cervical isometric Trial NV gentle            Shoulder roll                                       Ther Activity                                       Gait Training                                       Modalities

## 2023-07-25 NOTE — PROGRESS NOTES
PT Evaluation     Today's date: 2023  Patient name: Hakeem Encarancion  : 1934  MRN: [id number]  Referring provider: Oscar Betancourt MD  Dx:   Encounter Diagnosis     ICD-10-CM    1. Cervical spondylosis  M47.812       2. Cervical myofascial pain syndrome  M79.18                      Assessment  Assessment details: Pt is a 80 y.o. female presenting to PT with chronic neck pain. PT findings include: decreased cervical ROM, joint hypomobility throughout cervical spine, periscapular strength deficits, poor posture, and deep cervical neck flexor endurance deficits. Findings are consistent with mechanical type neck pain vs nerve involvement. Pt educated on PT findings, anatomy, biomechanics, POC and rehab course. Pt will benefit from skilled PT to address above impairments to return to PLOF with less restriction and to reach functional goals. Impairments: abnormal or restricted ROM, impaired physical strength, lacks appropriate home exercise program, pain with function, weight-bearing intolerance and poor posture     Symptom irritability: lowUnderstanding of Dx/Px/POC: good   Prognosis: fair    Goals  1. Pt will demonstrate understanding of HEP and POC in order to improve compliance with course of rehab in 2 weeks. 2. Pt will demonstrate awareness of appropriate posture with seated, standing and functional dynamic reaching activities in order to decrease excessive loads/pain with ADL's in 3 weeks. 3. Pt's pain will be 2/10 or less to allow pt to return to PLOF with least restriction by d/c.   4. Pt's cervical ROM will improve to lacking 25% in order for pt to look around her environment with less restriction by d/c.      Plan  Patient would benefit from: skilled physical therapy  Planned modality interventions: low level laser therapy  Planned therapy interventions: joint mobilization, manual therapy, neuromuscular re-education, patient education, postural training, strengthening, stretching, therapeutic activities, therapeutic exercise, home exercise program, functional ROM exercises and flexibility  Frequency: 2x week  Duration in weeks: 8  Treatment plan discussed with: patient        Subjective Evaluation    History of Present Illness  Mechanism of injury: Pt arrives to PT via referral from PM&R physician for chronic neck pain without radiculopathy. Pt reports that she has had this pain for at about 2 years ago but has been feeling more discomfort as of late. Pt also had been treated for R shoulder pain prior. Pt states that she is always feeling discomfort but movement of the neck is challenging. Quality of life: good    Pain  Current pain ratin  At best pain ratin  At worst pain ratin  Location: neck  Quality: tight, throbbing, discomfort and pressure          Objective    Cervical ROM:  Flex: 40°  Ext: 50°   SB: 30°/30°    Shoulder ROM:  WNL    Joint mobility:  Cervical CPA: hypo C3-C7  Cervical UPA: hypo C2/C3    Palpation: tenderness/tension UT/levator scapulae, suboccipitals (pain recreation)    Special Tests:  Cervical compression: negative  Cervical distraction: negative     Precautions:   Patient has extensive PMHx that should be reviewed in medical chart.  FALL RISK    Manuals             Cervical CPA/UPA             Suboccipital STM             Laser                          Neuro Re-Ed                                                                                                        Ther Ex             Row HEP            Shoulder extension HEP            Chin tuck HEP            UT stretch HEP            Levator stretch             Cervical isometric Trial NV gentle            Shoulder roll                                       Ther Activity                                       Gait Training                                       Modalities

## 2023-07-27 ENCOUNTER — APPOINTMENT (OUTPATIENT)
Dept: PHYSICAL THERAPY | Facility: CLINIC | Age: 88
End: 2023-07-27
Payer: MEDICARE

## 2023-07-28 ENCOUNTER — APPOINTMENT (OUTPATIENT)
Dept: PHYSICAL THERAPY | Facility: CLINIC | Age: 88
End: 2023-07-28
Payer: MEDICARE

## 2023-08-01 ENCOUNTER — OFFICE VISIT (OUTPATIENT)
Dept: PHYSICAL THERAPY | Facility: CLINIC | Age: 88
End: 2023-08-01
Payer: MEDICARE

## 2023-08-01 DIAGNOSIS — M79.18 CERVICAL MYOFASCIAL PAIN SYNDROME: ICD-10-CM

## 2023-08-01 DIAGNOSIS — M47.812 CERVICAL SPONDYLOSIS: Primary | ICD-10-CM

## 2023-08-01 PROCEDURE — 97110 THERAPEUTIC EXERCISES: CPT | Performed by: PHYSICAL THERAPIST

## 2023-08-01 PROCEDURE — 97140 MANUAL THERAPY 1/> REGIONS: CPT | Performed by: PHYSICAL THERAPIST

## 2023-08-01 NOTE — PROGRESS NOTES
Daily Note     Today's date: 2023  Patient name: Emily Kent  : 1934  MRN: [id number]  Referring provider: Leland Pride MD  Dx:   Encounter Diagnosis     ICD-10-CM    1. Cervical spondylosis  M47.812       2. Cervical myofascial pain syndrome  M79.18                      Subjective: No new changes since IE. Pt c/o some increase discomfort levator stretch. Objective: See treatment diary below      Assessment: Tolerated treatment well. Patient demonstrated fatigue post treatment and would benefit from continued PT for therex/HEP review/updates. No pain post tx verbalized. HEP was updated and reviewed. Plan: Continue per plan of care. Progress treatment as tolerated. Precautions:   Patient has extensive PMHx that should be reviewed in medical chart.  FALL RISK    Manuals            Cervical CPA/UPA             Suboccipital STM  SZ           Laser             Gentle manual C-traction  SZ           Neuro Re-Ed                                                                                                        Ther Ex             Row HEP 10x2           Shoulder extension HEP 10x2           Chin tuck HEP 10x3"           UT stretch HEP 3x30" ea           Levator stretch  Pain/NP           Cervical isometric Trial NV gentle            Shoulder roll  10x           B scap retractions in sitting/posture/L-roll use  10x                        Supine C-roll, retr  10x2           Supine C-roll R/L rotations  10x2                                                               Ther Activity                                       Gait Training                                       Modalities             MH to C-spine w ex applied

## 2023-08-03 ENCOUNTER — OFFICE VISIT (OUTPATIENT)
Dept: PHYSICAL THERAPY | Facility: CLINIC | Age: 88
End: 2023-08-03
Payer: MEDICARE

## 2023-08-03 DIAGNOSIS — G89.29 CHRONIC RIGHT SHOULDER PAIN: ICD-10-CM

## 2023-08-03 DIAGNOSIS — M79.18 CERVICAL MYOFASCIAL PAIN SYNDROME: ICD-10-CM

## 2023-08-03 DIAGNOSIS — M25.511 CHRONIC RIGHT SHOULDER PAIN: ICD-10-CM

## 2023-08-03 DIAGNOSIS — M47.812 CERVICAL SPONDYLOSIS: Primary | ICD-10-CM

## 2023-08-03 PROCEDURE — 97140 MANUAL THERAPY 1/> REGIONS: CPT | Performed by: PHYSICAL THERAPIST

## 2023-08-03 PROCEDURE — 97110 THERAPEUTIC EXERCISES: CPT | Performed by: PHYSICAL THERAPIST

## 2023-08-03 NOTE — PROGRESS NOTES
Daily Note     Today's date: 8/3/2023  Patient name: Rey Calderon  : 1934  MRN: [id number]  Referring provider: Dennis Plunkett MD  Dx:   Encounter Diagnosis     ICD-10-CM    1. Cervical spondylosis  M47.812       2. Cervical myofascial pain syndrome  M79.18       3. Chronic right shoulder pain  M25.511     G89.29                      Subjective: Pt reports that she was having some neck pain yesterday that she cannot attribute to anything. She states that exercises were okay last PT visit. Objective: See treatment diary below      Assessment: Pt able to perform exercises without significant complaints of pain. Pt with relief utilizing cervical roll and relief post manual therapy. Pt educated on use of cervical roll. Pt will benefit from continued skilled PT. Plan: Continue per plan of care. Precautions:   Patient has extensive PMHx that should be reviewed in medical chart.  FALL RISK    Manuals 7/25 8/1 8/3          Cervical CPA/UPA             Suboccipital STM  SZ DL          Laser             Gentle manual C-traction  SZ DL          Neuro Re-Ed                                                                                                        Ther Ex             Row HEP 10x2 20x Blue          Shoulder extension HEP 10x2 20x Grn          Chin tuck HEP 10x3" 15x3"          UT stretch HEP 3x30" ea 3x30" ea          Levator stretch  Pain/NP           Cervical isometric Trial NV gentle  Flex/ext 10x3"          Shoulder roll  10x 20x          B scap retractions in sitting/posture/L-roll use  10x 20x 5"                       Supine C-roll, retr  10x2 15          Supine C-roll R/L rotations  10x2 15                                                              Ther Activity                                       Gait Training                                       Modalities             MH to C-spine w ex applied

## 2023-08-08 ENCOUNTER — APPOINTMENT (OUTPATIENT)
Dept: PHYSICAL THERAPY | Facility: CLINIC | Age: 88
End: 2023-08-08
Payer: MEDICARE

## 2023-08-10 ENCOUNTER — OFFICE VISIT (OUTPATIENT)
Dept: PHYSICAL THERAPY | Facility: CLINIC | Age: 88
End: 2023-08-10
Payer: MEDICARE

## 2023-08-10 DIAGNOSIS — M25.511 CHRONIC RIGHT SHOULDER PAIN: ICD-10-CM

## 2023-08-10 DIAGNOSIS — M47.812 CERVICAL SPONDYLOSIS: Primary | ICD-10-CM

## 2023-08-10 DIAGNOSIS — M79.18 CERVICAL MYOFASCIAL PAIN SYNDROME: ICD-10-CM

## 2023-08-10 DIAGNOSIS — Z96.611 PRESENCE OF RIGHT ARTIFICIAL SHOULDER JOINT: ICD-10-CM

## 2023-08-10 DIAGNOSIS — Z96.612 PRESENCE OF ARTIFICIAL SHOULDER JOINT, LEFT: ICD-10-CM

## 2023-08-10 DIAGNOSIS — G89.29 CHRONIC RIGHT SHOULDER PAIN: ICD-10-CM

## 2023-08-10 PROCEDURE — 97140 MANUAL THERAPY 1/> REGIONS: CPT | Performed by: PHYSICAL THERAPIST

## 2023-08-10 PROCEDURE — 97110 THERAPEUTIC EXERCISES: CPT | Performed by: PHYSICAL THERAPIST

## 2023-08-10 NOTE — PROGRESS NOTES
Daily Note     Today's date: 8/10/2023  Patient name: Katelyn Gilliland  : 1934  MRN: [id number]  Referring provider: Jose G Marcelino MD  Dx:   Encounter Diagnosis     ICD-10-CM    1. Cervical spondylosis  M47.812       2. Cervical myofascial pain syndrome  M79.18       3. Chronic right shoulder pain  M25.511     G89.29       4. Presence of right artificial shoulder joint  Z96.611       5. Presence of artificial shoulder joint, left  Z96.612           Start Time: 1100  Stop Time: 1145  Total time in clinic (min): 45 minutes    Subjective: Patient reports increased tightness in her neck today (L worse than R). She states that she was away last week on vacation and was able to perform some of her HEP but not all of it. Objective: See treatment diary below      Assessment: Manuals emphasized today to address hypertonicity in cervical musculature with good relief noted by patient. She was then able to perform prescribed activity without re-aggravation of sx. Progress, as able. Plan: Continue per plan of care. Precautions:   Patient has extensive PMHx that should be reviewed in medical chart.  FALL RISK    Manuals 7/25 8/1 8/3 8/10         Cervical CPA/UPA    JAB and lat glides gentle         Suboccipital STM  KARTHIK SMALLWOOD JAJESIKA         Laser             Gentle manual C-traction  KARTHIK BUTTERFIELD         Neuro Re-Ed                                                                                                        Ther Ex             Row HEP 10x2 20x Blue 20x blue         Shoulder extension HEP 10x2 20x Grn 20x green         Chin tuck HEP 10x3" 15x3" 15x5"         UT stretch HEP 3x30" ea 3x30" ea 3x30" ea         Levator stretch  Pain/NP           Cervical isometric Trial NV gentle  Flex/ext 10x3" nv         Shoulder roll  10x 20x 20x         B scap retractions in sitting/posture/L-roll use  10x 20x 5" 20x5"                      Supine C-roll, retr  10x2 15 15         Supine C-roll R/L rotations  10x2 15 15 Ther Activity                                       Gait Training                                       Modalities             MH to C-spine w ex applied

## 2023-08-14 ENCOUNTER — OFFICE VISIT (OUTPATIENT)
Dept: PHYSICAL THERAPY | Facility: CLINIC | Age: 88
End: 2023-08-14
Payer: MEDICARE

## 2023-08-14 DIAGNOSIS — Z96.612 PRESENCE OF ARTIFICIAL SHOULDER JOINT, LEFT: ICD-10-CM

## 2023-08-14 DIAGNOSIS — Z96.611 PRESENCE OF RIGHT ARTIFICIAL SHOULDER JOINT: ICD-10-CM

## 2023-08-14 DIAGNOSIS — G89.29 CHRONIC RIGHT SHOULDER PAIN: ICD-10-CM

## 2023-08-14 DIAGNOSIS — M79.18 CERVICAL MYOFASCIAL PAIN SYNDROME: ICD-10-CM

## 2023-08-14 DIAGNOSIS — M47.812 CERVICAL SPONDYLOSIS: Primary | ICD-10-CM

## 2023-08-14 DIAGNOSIS — M25.511 CHRONIC RIGHT SHOULDER PAIN: ICD-10-CM

## 2023-08-14 PROCEDURE — 97140 MANUAL THERAPY 1/> REGIONS: CPT

## 2023-08-14 PROCEDURE — 97110 THERAPEUTIC EXERCISES: CPT

## 2023-08-14 NOTE — PROGRESS NOTES
Daily Note     Today's date: 2023  Patient name: Rey Calderon  : 1934  MRN: [id number]  Referring provider: Dennis Plunkett MD  Dx:   Encounter Diagnosis     ICD-10-CM    1. Cervical spondylosis  M47.812       2. Presence of artificial shoulder joint, left  Z96.612       3. Cervical myofascial pain syndrome  M79.18       4. Chronic right shoulder pain  M25.511     G89.29       5. Presence of right artificial shoulder joint  Z96.611           Start Time: 1400  Stop Time: 1445  Total time in clinic (min): 45 minutes    Subjective: Reports that she was a little sore after last session. Still having trouble with R shoulder issues as well. Objective: See treatment diary below      Assessment: Tolerated treatment well. Patient tolerated manuals well, moderate relief evidence throughout session. Good form with chin tucks with good isolation of deep neck flexors. Some verbal cuing to enhance form for rows and extension with purpose to increased biasing recruitment for periscapular musculature. Some R shoulder discomfort with shoulder extension. Patient educated on possibility of DOMS - patient verbalized good understanding of muscle soreness and ability to differentiate between possible pain. Patient demonstrated fatigue post treatment, exhibited good technique with therapeutic exercises and would benefit from continued PT      Plan: Continue per plan of care. Progress treatment as tolerated. Increased dosing for c/s musculature dependent on patient response to improve endurance of periscapular and pericervical musculature. Precautions:   Patient has extensive PMHx that should be reviewed in medical chart.  FALL RISK    Manuals 7/25 8/1 8/3 8/10 8/14        Cervical CPA/UPA    SCOUT and lat glides gentle MH         Suboccipital STM  KARTHIK NICHOLE         Laser             Gentle manual C-traction  KARTHIK NICHOLE         Neuro Re-Ed Ther Ex             Row HEP 10x2 20x Blue 20x blue 20x blue         Shoulder extension HEP 10x2 20x Grn 20x green 20x green        Chin tuck HEP 10x3" 15x3" 15x5" 2x10x5" supine        UT stretch HEP 3x30" ea 3x30" ea 3x30" ea 3x30" ea        Levator stretch  Pain/NP           Cervical isometric Trial NV gentle  Flex/ext 10x3" nv         Shoulder roll  10x 20x 20x 3x10         B scap retractions in sitting/posture/L-roll use  10x 20x 5" 20x5" 20x5"                     Supine C-roll, retr  10x2 15 15 15        Supine C-roll R/L rotations  10x2 15 15 15                                                            Ther Activity                                       Gait Training                                       Modalities             MH to C-spine w ex applied

## 2023-08-18 ENCOUNTER — OFFICE VISIT (OUTPATIENT)
Dept: PHYSICAL THERAPY | Facility: CLINIC | Age: 88
End: 2023-08-18
Payer: MEDICARE

## 2023-08-18 DIAGNOSIS — M47.812 CERVICAL SPONDYLOSIS: Primary | ICD-10-CM

## 2023-08-18 DIAGNOSIS — M79.18 CERVICAL MYOFASCIAL PAIN SYNDROME: ICD-10-CM

## 2023-08-18 PROCEDURE — 97140 MANUAL THERAPY 1/> REGIONS: CPT

## 2023-08-18 PROCEDURE — 97110 THERAPEUTIC EXERCISES: CPT

## 2023-08-18 NOTE — PROGRESS NOTES
Daily Note     Today's date: 2023  Patient name: Rebeca Farley  : 1934  MRN: [id number]  Referring provider: Jose Patel MD  Dx:   Encounter Diagnosis     ICD-10-CM    1. Cervical spondylosis  M47.812       2. Cervical myofascial pain syndrome  M79.18           Start Time: 0900  Stop Time: 0944  Total time in clinic (min): 44 minutes      Subjective: No significant changes to report. Objective: See treatment diary below. Assessment: Progression of therapeutic exercise program is tolerated well. Relief of neck pain and stiffness reported with manual therapy. Patient educated on possibility of DOMS. Plan: Continue treatment as per PT plan of care. Precautions: Patient has extensive PMHx that should be reviewed in medical chart.  FALL RISK    Manuals 7/25 8/1 8/3 8/10 8/14 8/18       Cervical CPA/UPA    JAB and lat glides gentle MH         Suboccipital STM  SZ DL DAYSIB   JLW       Laser             Gentle manual C-traction  SZ CL [place]   JLW                                 Neuro Re-Ed                                                                                                        Ther Ex             Row HEP 10x2 20x Blue 20x blue 20x blue  blue  2x10       Shoulder extension HEP 10x2 20x Grn 20x green 20x green green  2x10       Chin tuck HEP 10x3" 15x3" 15x5" 2x10x5" supine        UT stretch HEP 3x30" ea 3x30" ea 3x30" ea 3x30" ea 3x30" ea       Levator stretch  Pain/NP           Cervical isometric Trial NV gentle  Flex/ext 10x3" nv         Shoulder roll  10x 20x 20x 3x10         B scap retractions in sitting/posture/L-roll use  10x 20x 5" 20x5" 20x5"                     Supine C-roll, retr  10x2 15 15 15 5"  2x10       Supine C-roll R/L rotations  10x2 15 15 15 2x10 ea       scap retract w/ bicep curl      3#  2x10       ube retro      6'                                 Ther Activity                                       Gait Training Modalities             MH to C-spine w ex applied

## 2023-08-21 ENCOUNTER — OFFICE VISIT (OUTPATIENT)
Dept: PHYSICAL THERAPY | Facility: CLINIC | Age: 88
End: 2023-08-21
Payer: MEDICARE

## 2023-08-21 DIAGNOSIS — M47.812 CERVICAL SPONDYLOSIS: Primary | ICD-10-CM

## 2023-08-21 DIAGNOSIS — M79.18 CERVICAL MYOFASCIAL PAIN SYNDROME: ICD-10-CM

## 2023-08-21 PROCEDURE — 97110 THERAPEUTIC EXERCISES: CPT

## 2023-08-21 PROCEDURE — 97140 MANUAL THERAPY 1/> REGIONS: CPT

## 2023-08-21 NOTE — PROGRESS NOTES
Daily Note     Today's date: 2023  Patient name: Dale Bess  : 1934  MRN: [id number]  Referring provider: Kevin Martines MD  Dx:   Encounter Diagnosis     ICD-10-CM    1. Cervical spondylosis  M47.812       2. Cervical myofascial pain syndrome  M79.18           Start Time: 1015  Stop Time: 1100  Total time in clinic (min): 45 minutes    Subjective: Pt notes she has had improvement with the neck pain she hasn't felt in 2 years and is glad with the progress. Objective: See treatment diary below      Assessment: Tolerated treatment well. Pt educated about neck stretching to improve stretch without increasing stress to the neck. Pt presents with increased mobility and strength to the neck with good coordination during scapular retractions and shoulder extensions. Pt will benefit from continued chin tucks during functional activities. Patient would benefit from continued PT      Plan: Progress treatment as tolerated. Precautions: Patient has extensive PMHx that should be reviewed in medical chart.  FALL RISK    Manuals 7/25 8/1 8/3 8/10 8/14 8/18 8/21      Cervical CPA/UPA    JAB and lat glides gentle MH         Suboccipital STM  SZ DL JAB   JLW CB      Laser             Gentle manual C-traction   CL TAVAREZB   JLW CB                                Neuro Re-Ed                                                                                                        Ther Ex             Row HEP 10x2 20x Blue 20x blue 20x blue  blue  2x10 BTB x20       Shoulder extension HEP 10x2 20x Grn 20x green 20x green green  2x10 x20 BTB      Chin tuck HEP 10x3" 15x3" 15x5" 2x10x5" supine  2x10x5" sup      UT stretch HEP 3x30" ea 3x30" ea 3x30" ea 3x30" ea 3x30" ea 3x30"      Levator stretch  Pain/NP           Cervical isometric Trial NV gentle  Flex/ext 10x3" nv         Shoulder roll  10x 20x 20x 3x10         B scap retractions in sitting/posture/L-roll use  10x 20x 5" 20x5" 20x5"                     Supine C-roll, retr  10x2 15 15 15 5"  2x10 5" 2x10      Supine C-roll R/L rotations  10x2 15 15 15 2x10 ea       scap retract w/ bicep curl      3#  2x10       ube retro      6' 6'                                Ther Activity                                       Gait Training                                       Modalities             MH to C-spine w ex applied

## 2023-08-22 ENCOUNTER — APPOINTMENT (OUTPATIENT)
Dept: PHYSICAL THERAPY | Facility: CLINIC | Age: 88
End: 2023-08-22
Payer: MEDICARE

## 2023-08-24 ENCOUNTER — OFFICE VISIT (OUTPATIENT)
Dept: PHYSICAL THERAPY | Facility: CLINIC | Age: 88
End: 2023-08-24
Payer: MEDICARE

## 2023-08-24 DIAGNOSIS — M47.812 CERVICAL SPONDYLOSIS: Primary | ICD-10-CM

## 2023-08-24 DIAGNOSIS — M79.18 CERVICAL MYOFASCIAL PAIN SYNDROME: ICD-10-CM

## 2023-08-24 PROCEDURE — 97140 MANUAL THERAPY 1/> REGIONS: CPT | Performed by: PHYSICAL THERAPIST

## 2023-08-24 PROCEDURE — 97110 THERAPEUTIC EXERCISES: CPT | Performed by: PHYSICAL THERAPIST

## 2023-08-24 NOTE — PROGRESS NOTES
Daily Note     Today's date: 2023  Patient name: Remigio Marroquin  : 1934  MRN: [id number]  Referring provider: Maninder Lee MD  Dx:   Encounter Diagnosis     ICD-10-CM    1. Cervical spondylosis  M47.812       2. Cervical myofascial pain syndrome  M79.18                      Subjective: Pt reports that intensity and frequency of her pain has improved. She does continue with pain however but overall improvement noted. Objective: See treatment diary below      Assessment: Pt tolerates treatment well without significant complaints of pain during treatment. She continues with soft tissue tension of suboccipitals and SCM. Pt is responding to current POC, therefore will benefit from continued skilled PT. Plan: Continue per plan of care. Precautions: Patient has extensive PMHx that should be reviewed in medical chart.  FALL RISK    Manuals 7/25 8/1 8/3 8/10 8/14 8/18 8/21 8/24     Cervical CPA/UPA    JAB and lat glides gentle MH         Suboccipital STM  SZ DL JAB   JLW CB DL     Laser             Gentle manual C-traction  SZ DL JAB   JLW CB DL                               Neuro Re-Ed                                                                                                        Ther Ex             Row HEP 10x2 20x Blue 20x blue 20x blue  blue  2x10 BTB x20  BTB 2x15     Shoulder extension HEP 10x2 20x Grn 20x green 20x green green  2x10 x20 BTB BTB 2x15     Chin tuck HEP 10x3" 15x3" 15x5" 2x10x5" supine  2x10x5" sup      UT stretch HEP 3x30" ea 3x30" ea 3x30" ea 3x30" ea 3x30" ea 3x30" 3x30"     Levator stretch  Pain/NP           Cervical isometric Trial NV gentle  Flex/ext 10x3" nv         Shoulder roll  10x 20x 20x 3x10         B scap retractions in sitting/posture/L-roll use  10x 20x 5" 20x5" 20x5"                     Supine C-roll, retr  10x2 15 15 15 5"  2x10 5" 2x10 5" 2x10     Supine C-roll R/L rotations  10x2 15 15 15 2x10 ea  2x10 ea     scap retract w/ bicep curl 3#  2x10  3# 2x10     Scap Retraction w/ B shoulder ER        Red 2x10     ube retro      6' 6' 6'                               Ther Activity                                       Gait Training                                       Modalities             MH to C-spine w ex applied

## 2023-08-29 ENCOUNTER — OFFICE VISIT (OUTPATIENT)
Dept: PHYSICAL THERAPY | Facility: CLINIC | Age: 88
End: 2023-08-29
Payer: MEDICARE

## 2023-08-29 DIAGNOSIS — M25.511 CHRONIC RIGHT SHOULDER PAIN: ICD-10-CM

## 2023-08-29 DIAGNOSIS — Z96.612 PRESENCE OF ARTIFICIAL SHOULDER JOINT, LEFT: ICD-10-CM

## 2023-08-29 DIAGNOSIS — Z96.611 PRESENCE OF RIGHT ARTIFICIAL SHOULDER JOINT: ICD-10-CM

## 2023-08-29 DIAGNOSIS — M79.18 CERVICAL MYOFASCIAL PAIN SYNDROME: ICD-10-CM

## 2023-08-29 DIAGNOSIS — G89.29 CHRONIC RIGHT SHOULDER PAIN: ICD-10-CM

## 2023-08-29 DIAGNOSIS — M47.812 CERVICAL SPONDYLOSIS: Primary | ICD-10-CM

## 2023-08-29 PROCEDURE — 97110 THERAPEUTIC EXERCISES: CPT

## 2023-08-29 NOTE — PROGRESS NOTES
Daily Note     Today's date: 2023  Patient name: Ariane Hernandez  : 1934  MRN: [id number]  Referring provider: Thais Reyna MD  Dx:   Encounter Diagnosis     ICD-10-CM    1. Cervical spondylosis  M47.812       2. Cervical myofascial pain syndrome  M79.18       3. Presence of artificial shoulder joint, left  Z96.612       4. Chronic right shoulder pain  M25.511     G89.29       5. Presence of right artificial shoulder joint  Z96.611           Start Time: 1020  Stop Time: 1053  Total time in clinic (min): 33 minutes    Subjective: Patient reports increased L sided neck pain this AM that exacerbates while looking up. She notes some R shoulder pain as well but it's been improving each session. Objective: See treatment diary below      Assessment: Ethel tolerated treatment session well without reports of increased pain or discomfort in shoulder & neck. She had good recall of exercise program and demonstrated good technique. Muscular tightness noted in c-spine with manual traction, post session, patient stated neck pain and tightness remained the same. She would benefit from continued PT to improve cervical ROM and strength. Plan: Continue with stretches and strengthening exercises so Halley Urias can be discharged in the next upcoming visits. Precautions: Patient has extensive PMHx that should be reviewed in medical chart.  FALL RISK    Manuals 7/25 8/1 8/3 8/10 8/14 8/18 8/21 8/24 8/29    Cervical CPA/UPA    JAB and lat glides gentle MH         Suboccipital STM  KARTHIK SMALLWOOD MS    Laser             Gentle manual C-traction  KARTHIK SMALLWOOD MS                              Neuro Re-Ed                                                                                                        Ther Ex             Row HEP 10x2 20x Blue 20x blue 20x blue  blue  2x10 BTB x20  BTB 2x15 BTB 2x15    Shoulder extension HEP 10x2 20x Grn 20x green 20x green green  2x10 x20 BTB BTB 2x15 BTB 2x15 Chin tuck HEP 10x3" 15x3" 15x5" 2x10x5" supine  2x10x5" sup      UT stretch HEP 3x30" ea 3x30" ea 3x30" ea 3x30" ea 3x30" ea 3x30" 3x30" 3x30"    Levator stretch  Pain/NP           Cervical isometric Trial NV gentle  Flex/ext 10x3" nv         Shoulder roll  10x 20x 20x 3x10         B scap retractions in sitting/posture/L-roll use  10x 20x 5" 20x5" 20x5"                     Supine C-roll, retr  10x2 15 15 15 5"  2x10 5" 2x10 5" 2x10 5" 2x10    Supine C-roll R/L rotations  10x2 15 15 15 2x10 ea  2x10 ea 2x10 ea    scap retract w/ bicep curl      3#  2x10  3# 2x10 3# 2x10    Scap Retraction w/ B shoulder ER        Red 2x10 Red 2x10    ube retro      6' 6' 6' 6'                               Ther Activity                                       Gait Training                                       Modalities             MH to C-spine w ex applied

## 2023-08-31 ENCOUNTER — OFFICE VISIT (OUTPATIENT)
Dept: PHYSICAL THERAPY | Facility: CLINIC | Age: 88
End: 2023-08-31
Payer: MEDICARE

## 2023-08-31 DIAGNOSIS — M47.812 CERVICAL SPONDYLOSIS: Primary | ICD-10-CM

## 2023-08-31 DIAGNOSIS — M79.18 CERVICAL MYOFASCIAL PAIN SYNDROME: ICD-10-CM

## 2023-08-31 DIAGNOSIS — Z96.612 PRESENCE OF ARTIFICIAL SHOULDER JOINT, LEFT: ICD-10-CM

## 2023-08-31 DIAGNOSIS — G89.29 CHRONIC RIGHT SHOULDER PAIN: ICD-10-CM

## 2023-08-31 DIAGNOSIS — Z96.611 PRESENCE OF RIGHT ARTIFICIAL SHOULDER JOINT: ICD-10-CM

## 2023-08-31 DIAGNOSIS — M25.511 CHRONIC RIGHT SHOULDER PAIN: ICD-10-CM

## 2023-08-31 PROCEDURE — 97110 THERAPEUTIC EXERCISES: CPT | Performed by: PHYSICAL THERAPIST

## 2023-08-31 PROCEDURE — 97112 NEUROMUSCULAR REEDUCATION: CPT | Performed by: PHYSICAL THERAPIST

## 2023-08-31 PROCEDURE — 97140 MANUAL THERAPY 1/> REGIONS: CPT | Performed by: PHYSICAL THERAPIST

## 2023-08-31 NOTE — PROGRESS NOTES
Daily Note     Today's date: 2023  Patient name: Ravinder Desai  : 1934  MRN: [id number]  Referring provider: Jagjit Pickering MD  Dx:   Encounter Diagnosis     ICD-10-CM    1. Cervical spondylosis  M47.812       2. Cervical myofascial pain syndrome  M79.18       3. Presence of artificial shoulder joint, left  Z96.612       4. Chronic right shoulder pain  M25.511     G89.29       5. Presence of right artificial shoulder joint  Z96.611           Start Time: 1020  Stop Time: 1058  Total time in clinic (min): 38 minutes    Subjective: Patient reports "shoulders are bothering me today. I'm not sure how much I'm getting out of this anymore."  She also reports "stomach was not good this morning."       Objective: See treatment diary below      Assessment: Ehtel tolerated treatment session fair, reporting R shoulder discomfort with TB shoulder extensions today. Patient demonstrating some tightness in suboccipitals today, improved with manuals. She would benefit from continued PT to improve cervical ROM and strength. Plan: Consider re-evaluation next visit. Continue with stretches and strengthening exercises so Samina Carrera can be discharged in the next upcoming visits. Precautions: Patient has extensive PMHx that should be reviewed in medical chart.  FALL RISK    Manuals 7/25 8/1 8/3 8/10 8/14 8/18 8/21 8/24 8/29 8/31   Cervical CPA/UPA    JAB and lat glides gentle MH         Suboccipital STM  SZ DL JAB MH  JLW CB DL MS LX   Laser             Gentle manual C-traction  SZ CL DINGW CB DL MS LX                             Neuro Re-Ed                                                                                                        Ther Ex             Row HEP 10x2 20x Blue 20x blue 20x blue  blue  2x10 BTB x20  BTB 2x15 BTB 2x15 BTB 2x15   Shoulder extension HEP 10x2 20x Grn 20x green 20x green green  2x10 x20 BTB BTB 2x15 BTB 2x15 BTB 2x15   Chin tuck HEP 10x3" 15x3" 15x5" 2x10x5" supine  2x10x5" sup UT stretch HEP 3x30" ea 3x30" ea 3x30" ea 3x30" ea 3x30" ea 3x30" 3x30" 3x30" 3x30"   Levator stretch  Pain/NP           Cervical isometric Trial NV gentle  Flex/ext 10x3" nv         Shoulder roll  10x 20x 20x 3x10         B scap retractions in sitting/posture/L-roll use  10x 20x 5" 20x5" 20x5"                     Supine C-roll, retr  10x2 15 15 15 5"  2x10 5" 2x10 5" 2x10 5" 2x10 5" 2x10   Supine C-roll R/L rotations  10x2 15 15 15 2x10 ea  2x10 ea 2x10 ea 2x10 ea   scap retract w/ bicep curl      3#  2x10  3# 2x10 3# 2x10    Scap Retraction w/ B shoulder ER        Red 2x10 Red 2x10 Red 2x10   ube retro      6' 6' 6' 6'                               Ther Activity                                       Gait Training                                       Modalities             MH to C-spine w ex applied

## 2023-09-06 ENCOUNTER — OFFICE VISIT (OUTPATIENT)
Dept: PHYSICAL THERAPY | Facility: CLINIC | Age: 88
End: 2023-09-06
Payer: MEDICARE

## 2023-09-06 DIAGNOSIS — Z96.612 PRESENCE OF ARTIFICIAL SHOULDER JOINT, LEFT: ICD-10-CM

## 2023-09-06 DIAGNOSIS — G89.29 CHRONIC RIGHT SHOULDER PAIN: ICD-10-CM

## 2023-09-06 DIAGNOSIS — I10 PRIMARY HYPERTENSION: ICD-10-CM

## 2023-09-06 DIAGNOSIS — Z96.611 PRESENCE OF RIGHT ARTIFICIAL SHOULDER JOINT: ICD-10-CM

## 2023-09-06 DIAGNOSIS — M25.511 CHRONIC RIGHT SHOULDER PAIN: ICD-10-CM

## 2023-09-06 DIAGNOSIS — M47.812 CERVICAL SPONDYLOSIS: Primary | ICD-10-CM

## 2023-09-06 DIAGNOSIS — M79.18 CERVICAL MYOFASCIAL PAIN SYNDROME: ICD-10-CM

## 2023-09-06 PROCEDURE — 97140 MANUAL THERAPY 1/> REGIONS: CPT | Performed by: PHYSICAL THERAPIST

## 2023-09-06 PROCEDURE — 97112 NEUROMUSCULAR REEDUCATION: CPT | Performed by: PHYSICAL THERAPIST

## 2023-09-06 PROCEDURE — 97110 THERAPEUTIC EXERCISES: CPT | Performed by: PHYSICAL THERAPIST

## 2023-09-06 RX ORDER — CARVEDILOL 6.25 MG/1
TABLET ORAL
Qty: 180 TABLET | Refills: 2 | Status: SHIPPED | OUTPATIENT
Start: 2023-09-06

## 2023-09-06 NOTE — PROGRESS NOTES
Discharge    Today's date: 2023  Patient name: Pam Mc  : 1934  MRN: [id number]  Referring provider: Devika Nance MD  Dx:   Encounter Diagnosis     ICD-10-CM    1. Cervical spondylosis  M47.812       2. Cervical myofascial pain syndrome  M79.18       3. Presence of artificial shoulder joint, left  Z96.612       4. Chronic right shoulder pain  M25.511     G89.29       5. Presence of right artificial shoulder joint  Z96.611                      Subjective: Pt reports she definitely has felt improvement in pain overall however feels that she is not making much more progress. Pt states that she has a follow up with physician early October. Objective: See treatment diary below      Assessment: Pt responds well post manuals with relief but no longer as much carryover per subjective. As pt not making significant improvements, pt likely has maximized PT potential at this time. Pt to be discharged from skilled PT, pt verbalizes understanding and is in agreement. Did recommend pt to continue with home exercises to maintain functional gains. Plan: Discharge     Precautions: Patient has extensive PMHx that should be reviewed in medical chart.  FALL RISK    Manuals 9/6 8/1 8/3 8/10 8/14 8/18 8/21 8/24 8/29 8/31   Cervical CPA/UPA    JAB and lat glides gentle MH         Suboccipital STM DL SZ DL JAB MH  JLW CB DL MS LX   Laser             Gentle manual C-traction DL SZ DL JAB   JLW CB DL MS LX                             Neuro Re-Ed                                                                                                        Ther Ex             Row BTB 2x15 10x2 20x Blue 20x blue 20x blue  blue  2x10 BTB x20  BTB 2x15 BTB 2x15 BTB 2x15   Shoulder extension BTB 2x15 10x2 20x Grn 20x green 20x green green  2x10 x20 BTB BTB 2x15 BTB 2x15 BTB 2x15   Chin tuck  10x3" 15x3" 15x5" 2x10x5" supine  2x10x5" sup      UT stretch 3x30" 3x30" ea 3x30" ea 3x30" ea 3x30" ea 3x30" ea 3x30" 3x30" 3x30" 3x30"   Levator stretch  Pain/NP           Cervical isometric   Flex/ext 10x3" nv         Shoulder roll  10x 20x 20x 3x10         B scap retractions in sitting/posture/L-roll use  10x 20x 5" 20x5" 20x5"                     Supine C-roll, retr 5" 2x10 10x2 15 15 15 5"  2x10 5" 2x10 5" 2x10 5" 2x10 5" 2x10   Supine C-roll R/L rotations 2x10 ea 10x2 15 15 15 2x10 ea  2x10 ea 2x10 ea 2x10 ea   scap retract w/ bicep curl      3#  2x10  3# 2x10 3# 2x10    Scap Retraction w/ B shoulder ER Red 2x10       Red 2x10 Red 2x10 Red 2x10   ube retro      6' 6' 6' 6'                               Ther Activity                                       Gait Training                                       Modalities             MH to C-spine w ex applied

## 2023-09-28 ENCOUNTER — REMOTE DEVICE CLINIC VISIT (OUTPATIENT)
Dept: CARDIOLOGY CLINIC | Facility: CLINIC | Age: 88
End: 2023-09-28
Payer: MEDICARE

## 2023-09-28 DIAGNOSIS — Z95.818 PRESENCE OF OTHER CARDIAC IMPLANTS AND GRAFTS: Primary | ICD-10-CM

## 2023-09-28 PROCEDURE — 93298 REM INTERROG DEV EVAL SCRMS: CPT | Performed by: INTERNAL MEDICINE

## 2023-09-28 PROCEDURE — G2066 INTER DEVC REMOTE 30D: HCPCS | Performed by: INTERNAL MEDICINE

## 2023-09-28 NOTE — PROGRESS NOTES
[facility] TRANSMISSION: LOOP RECORDER. PRESENTING RHYTHM SB @ 54 BPM. BATTERY STATUS "OK." NO PATIENT OR DEVICE ACTIVATED EPISODES. NORMAL DEVICE FUNCTION.  DL

## 2023-10-23 ENCOUNTER — IMMUNIZATIONS (OUTPATIENT)
Dept: FAMILY MEDICINE CLINIC | Facility: CLINIC | Age: 88
End: 2023-10-23
Payer: MEDICARE

## 2023-10-23 DIAGNOSIS — Z23 ENCOUNTER FOR IMMUNIZATION: Primary | ICD-10-CM

## 2023-10-23 PROCEDURE — 90662 IIV NO PRSV INCREASED AG IM: CPT

## 2023-10-23 PROCEDURE — G0008 ADMIN INFLUENZA VIRUS VAC: HCPCS

## 2023-11-10 DIAGNOSIS — E03.9 HYPOTHYROIDISM, UNSPECIFIED TYPE: ICD-10-CM

## 2023-11-10 RX ORDER — LEVOTHYROXINE SODIUM 0.03 MG/1
25 TABLET ORAL DAILY
Qty: 90 TABLET | Refills: 3 | Status: SHIPPED | OUTPATIENT
Start: 2023-11-10 | End: 2024-02-08

## 2023-12-13 ENCOUNTER — REMOTE DEVICE CLINIC VISIT (OUTPATIENT)
Dept: CARDIOLOGY CLINIC | Facility: CLINIC | Age: 88
End: 2023-12-13
Payer: MEDICARE

## 2023-12-13 DIAGNOSIS — Z95.818 PRESENCE OF OTHER CARDIAC IMPLANTS AND GRAFTS: Primary | ICD-10-CM

## 2023-12-13 PROCEDURE — 93298 REM INTERROG DEV EVAL SCRMS: CPT | Performed by: INTERNAL MEDICINE

## 2023-12-13 PROCEDURE — G2066 INTER DEVC REMOTE 30D: HCPCS | Performed by: INTERNAL MEDICINE

## 2023-12-13 NOTE — PROGRESS NOTES
[facility] TRANSMISSION: LOOP RECORDER. PRESENTING RHYTHM NSR @ 60 BPM. BATTERY STATUS "OK." NO PATIENT OR DEVICE ACTIVATED EPISODES. NORMAL DEVICE FUNCTION.  DL

## 2024-01-11 NOTE — ASSESSMENT & PLAN NOTE
9/2/2020 TSH normal  Continue levothyroxine 25mcg daily 
BP was low in hospital  Pt is on coreg 12 5mg bid  Hold lisinopril if BP <120/80  FU cardiology 
Continue simvastatin 20mg qhs 
Decreased oxybutynin to 5mg QD as suggested by GI 
EGD done on 9/3/2020 at [address]  Pt is on pepcid 20mg bid, prevacid 30mg bid   Give GI referral 
Mood is ok  Consider buspar prn in the future 
Yesterday Na 130 low  Will recheck BMP next week  FU nephrology as scheduled 
Yesterday WBC 12 4 stable  Will recheck next week 
normal...

## 2024-01-18 ENCOUNTER — RA CDI HCC (OUTPATIENT)
Dept: OTHER | Facility: HOSPITAL | Age: 89
End: 2024-01-18

## 2024-01-25 ENCOUNTER — OFFICE VISIT (OUTPATIENT)
Dept: FAMILY MEDICINE CLINIC | Facility: CLINIC | Age: 89
End: 2024-01-25
Payer: MEDICARE

## 2024-01-25 VITALS
SYSTOLIC BLOOD PRESSURE: 128 MMHG | DIASTOLIC BLOOD PRESSURE: 70 MMHG | HEIGHT: 63 IN | RESPIRATION RATE: 16 BRPM | OXYGEN SATURATION: 99 % | WEIGHT: 115 LBS | TEMPERATURE: 97.6 F | HEART RATE: 72 BPM | BODY MASS INDEX: 20.38 KG/M2

## 2024-01-25 DIAGNOSIS — F41.9 ANXIETY: ICD-10-CM

## 2024-01-25 DIAGNOSIS — F41.8 DEPRESSION WITH ANXIETY: ICD-10-CM

## 2024-01-25 DIAGNOSIS — E03.9 HYPOTHYROIDISM, UNSPECIFIED TYPE: Primary | ICD-10-CM

## 2024-01-25 DIAGNOSIS — E44.1 MILD PROTEIN-CALORIE MALNUTRITION (HCC): ICD-10-CM

## 2024-01-25 DIAGNOSIS — I10 BENIGN ESSENTIAL HYPERTENSION: ICD-10-CM

## 2024-01-25 DIAGNOSIS — H34.8320 BRANCH RETINAL VEIN OCCLUSION WITH MACULAR EDEMA OF LEFT EYE: ICD-10-CM

## 2024-01-25 DIAGNOSIS — M47.812 CERVICAL SPONDYLOSIS: ICD-10-CM

## 2024-01-25 DIAGNOSIS — Z78.0 POSTMENOPAUSAL: ICD-10-CM

## 2024-01-25 DIAGNOSIS — C91.10 CLL (CHRONIC LYMPHOCYTIC LEUKEMIA) (HCC): ICD-10-CM

## 2024-01-25 DIAGNOSIS — R73.01 IMPAIRED FASTING GLUCOSE: ICD-10-CM
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
## 2024-01-25 PROCEDURE — 99214 OFFICE O/P EST MOD 30 MIN: CPT | Performed by: FAMILY MEDICINE

## 2024-01-25 RX ORDER — ALPRAZOLAM 0.25 MG/1
0.25 TABLET ORAL AS NEEDED
Qty: 20 TABLET | Refills: 0 | Status: SHIPPED | OUTPATIENT
Start: 2024-01-25

## 2024-01-25 NOTE — PROGRESS NOTES
Name: Ethel Roman      : 1934      MRN: [id number]  Encounter Provider: Miles Womack MD  Encounter Date: 2024   Encounter department: [facility]    Assessment & Plan     1. Hypothyroidism, unspecified type  Assessment & Plan:  2024 TSH normal. Continue levothyroxine 25 mcg daily.     Orders:  -     Hemoglobin A1C; Future; Expected date: 2024  -     TSH, 3rd generation with Free T4 reflex; Future; Expected date: 2024    2. Impaired fasting glucose  Assessment & Plan:  2024 hgA1C 5.6 normal. Low carb diet.     Orders:  -     Hemoglobin A1C; Future; Expected date: 2024  -     TSH, 3rd generation with Free T4 reflex; Future; Expected date: 2024    3. Depression with anxiety  Assessment & Plan:  She uses xanax 0.125mg rarely. SE educated pt. Give refill today.     Orders:  -     ALPRAZolam (XANAX) 0.25 mg tablet; Take 1 tablet (0.25 mg total) by mouth if needed for anxiety    4. Anxiety    5. CLL (chronic lymphocytic leukemia) (HCC)  Assessment & Plan:  Stable. FU hem/onc.       6. Cervical spondylosis  Assessment & Plan:  FU [facility] PMR.       7. Benign essential hypertension  Assessment & Plan:  Controlled. DASH diet. Continue carvedilol 6.25mg bid.       8. Mild protein-calorie malnutrition (HCC)  Assessment & Plan:  Malnutrition Findings:    Weight is stable.                              BMI Findings:           Body mass index is 20.7 kg/m².         9. Branch retinal vein occlusion with macular edema of left eye  Assessment & Plan:  FU ophthalmology regularly.       10. Postmenopausal  -     DXA bone density spine hip and pelvis; Future; Expected date: 2024       Reviewed lab in 2024  hgA1C 5.6 normal  TSH normal  CBC showed WBC 29.7 elevated  CMP ok    Flu shot yearly.   Got covid19 vaccines and boosters.   Got pneumovax at pharmacy in  per pt. Got prevnar 13 .   RTO in 6 months.       Subjective      HPI    Pt is here with her daughter.     "  Hypothyroidism---She is on levothyroxine 25mcg daily.     IFG---Eat healthy.     AnxietyDepression---Feels ok. She uses xanax 0.125mg very rarely. Need refills today.     CLL---FU [facility] hem/onc Dr. Vallejo. No need treatment now.      Lung nodules---FU [facility] pulmonary Dr. Turcios. Had biopsy which did not show cancer. 1/2024 CT chest abdomen pelvis showed \" Previously described nodular opacity in the left lower lobe has nearly   resolved. \"     Neck pain---FU [facility] PMR. Steroid injection helped 3-6 weeks. Consider nerve block.   Knee OA---FU [facility] orthopedics. Use tylenol/mobid prn.     Hx of osteoporosis---Had prolia before. Stopped years ago because of dental work. No recent Dexa.      HTN---BP at home good per pt. She is on carvedilol 6.25mg bid. FU cardiology regularly. Denies headache, vision change, Sob or CP.      Vertigo---feels unbalanced. Come and go. Did not use meclizine for a while. Pt uses cane or walker.      Urinary incontinence---She is off oxybutynin. Got botox by uro/gyn which helped a lot.      Lackey's---follows GI. She tried nexium and prevacid but felt nausea. She is on pepcid 10mg prn.      Macular degeneration/vein occlusion----Got shot every 1-2 months. Right eye sight is good. Left eye sight is not good.      Lives by herself. Does all ADL's. Drive a little, need 's certificate filled out today.   Denies recent falls. Daughter Patricia lives close to her. Consider moving in daughter's house.          Review of Systems   Constitutional:  Negative for appetite change, chills and fever.   HENT:  Negative for congestion, ear pain, sinus pain and sore throat.    Eyes:  Negative for discharge and itching.   Respiratory:  Negative for apnea, cough, chest tightness, shortness of breath and wheezing.    Cardiovascular:  Negative for chest pain, palpitations and leg swelling.   Gastrointestinal:  Negative for abdominal pain, anal bleeding, constipation, diarrhea, nausea and vomiting.   Endocrine: Negative " "for cold intolerance, heat intolerance and polyuria.   Genitourinary:  Negative for difficulty urinating and dysuria.   Musculoskeletal:  Positive for gait problem. Negative for arthralgias, back pain and myalgias.   Skin:  Negative for rash.   Neurological:  Negative for dizziness and headaches.   Psychiatric/Behavioral:  Negative for agitation.        Current Outpatient Medications on File Prior to Visit   Medication Sig    acetaminophen (TYLENOL) 650 mg CR tablet Take 650 mg by mouth as needed for mild pain.    ALPHAGAN P 0.1 % instill 1 drop into both eyes twice a day    bifidobacterium infantis (ALIGN) capsule Take by mouth    carvedilol (COREG) 6.25 mg tablet TAKE 1 TABLET BY MOUTH 2 TIMES A DAY WITH MEALS    cholecalciferol (VITAMIN D3) 1,000 units tablet Take by mouth    famotidine (PEPCID) 20 mg tablet Take 1 tablet (20 mg total) by mouth 2 (two) times a day (Patient taking differently: Take 20 mg by mouth if needed)    levothyroxine 25 mcg tablet Take 1 tablet (25 mcg total) by mouth daily    meclizine (ANTIVERT) 25 mg tablet TAKE 1 TABLET EVERY 8 HOURS AS NEEDED FOR DIZZINESS    Multiple Vitamins-Minerals (CENTRUM SILVER PO) Take by mouth    onabotulinumtoxin A (BOTOX) 100 units Inject as directed Twice a year    polyethylene glycol (GLYCOLAX) 17 GM/SCOOP powder Take by mouth every other day    timolol (TIMOPTIC) 0.5 % ophthalmic solution Use as directed    [DISCONTINUED] ALPRAZolam (XANAX) 0.25 mg tablet Take 1 tablet (0.25 mg total) by mouth daily at bedtime as needed for anxiety (Patient taking differently: Take 0.25 mg by mouth if needed for anxiety)       Objective     /70 (BP Location: Left arm, Patient Position: Sitting, Cuff Size: Standard)   Pulse 72   Temp 97.6 °F (36.4 °C) (Tympanic)   Resp 16   Ht 5' 2.5\" (1.588 m)   Wt 52.2 kg (115 lb)   SpO2 99%   BMI 20.70 kg/m²     Physical Exam  Constitutional:       General: She is not in acute distress.     Appearance: She is " well-developed.   HENT:      Head: Normocephalic.   Eyes:      General:         Right eye: No discharge.         Left eye: No discharge.      Conjunctiva/sclera: Conjunctivae normal.   Neck:      Thyroid: No thyromegaly.   Cardiovascular:      Rate and Rhythm: Normal rate and regular rhythm.      Heart sounds: Normal heart sounds. No murmur heard.     No friction rub. No gallop.   Pulmonary:      Effort: Pulmonary effort is normal. No respiratory distress.      Breath sounds: Normal breath sounds. No wheezing or rales.   Chest:      Chest wall: No tenderness.   Abdominal:      General: Bowel sounds are normal. There is no distension.      Palpations: Abdomen is soft. There is no mass.      Tenderness: There is no abdominal tenderness. There is no guarding or rebound.   Musculoskeletal:         General: No tenderness or deformity.      Cervical back: Normal range of motion.      Comments: Use cane   Lymphadenopathy:      Cervical: No cervical adenopathy.   Neurological:      Mental Status: She is alert.       Miles Womack MD

## 2024-01-25 NOTE — ASSESSMENT & PLAN NOTE
Malnutrition Findings:    Weight is stable.                              BMI Findings:           Body mass index is 20.7 kg/m².

## 2024-02-04 PROBLEM — Z95.818 STATUS POST PLACEMENT OF IMPLANTABLE LOOP RECORDER: Status: ACTIVE | Noted: 2024-02-04

## 2024-02-05 ENCOUNTER — OFFICE VISIT (OUTPATIENT)
Dept: CARDIOLOGY CLINIC | Facility: CLINIC | Age: 89
End: 2024-02-05
Payer: MEDICARE

## 2024-02-05 VITALS
SYSTOLIC BLOOD PRESSURE: 136 MMHG | HEART RATE: 69 BPM | HEIGHT: 63 IN | DIASTOLIC BLOOD PRESSURE: 80 MMHG | WEIGHT: 112.8 LBS | BODY MASS INDEX: 19.99 KG/M2

## 2024-02-05 DIAGNOSIS — I10 BENIGN ESSENTIAL HYPERTENSION: Primary | ICD-10-CM

## 2024-02-05 DIAGNOSIS — Z95.818 STATUS POST PLACEMENT OF IMPLANTABLE LOOP RECORDER: ICD-10-CM

## 2024-02-05 DIAGNOSIS — R55 SYNCOPE AND COLLAPSE: ICD-10-CM

## 2024-02-05 DIAGNOSIS — E78.5 DYSLIPIDEMIA: ICD-10-CM

## 2024-02-05 PROCEDURE — 93000 ELECTROCARDIOGRAM COMPLETE: CPT | Performed by: INTERNAL MEDICINE

## 2024-02-05 PROCEDURE — 99214 OFFICE O/P EST MOD 30 MIN: CPT | Performed by: INTERNAL MEDICINE

## 2024-02-05 NOTE — PROGRESS NOTES
Cardiology Follow-up    Ethel Roman  [id number]  7/5/1934  HEART & VASCULAR [facility] [place] [id number] [place] [id number]      1. Benign essential hypertension  POCT ECG      2. Dyslipidemia        3. Syncope and collapse        4. Status post placement of implantable loop recorder              Discussion/Summary:  Ms. Roman is a pleasant 89-year-old female who presents to the office today for routine follow-up.     Her blood pressure is acceptable in the office today.  She has not been checking it at home after she takes her a.m. medication which I encouraged her to do.  No changes were made to her regimen.  A low-salt diet was reinforced.    She reports her statin therapy was discontinued by her primary care provider.    She had an echocardiogram in the hospital in 2022 revealing preserved biventricular systolic function with diastolic dysfunction and mild valvular heart disease.    Regarding her syncope, she had no prodrome concerning for arrhythmogenic etiology.  Loop recorder has revealed no cardiac etiology of her syncope thus far.    I will see her back in the office in six months for re-evaluation.    History of Present Illness:  Ms. Roman is a pleasant 89-year-old female who presents to the office today for the re-evaluation of hypertension.    She has been sedentary.  With the activity she performs she denies any exertional chest pain or shortness of breath.  She denies any signs or symptoms of congestive heart failure including increasing lower extremity edema, paroxysmal nocturnal dyspnea, orthopnea, acute weight gain or increasing abdominal girth.  She denies lightheadedness, syncope or presyncope.  She denies palpitations or symptoms of claudication.    She has been checking her blood pressure at home but takes it prior to her medication with high readings.    After her last visit with me she did undergo implantation of a  loop recorder.      Patient Active Problem List   Diagnosis    Benign essential hypertension    Hypothyroidism    History of endometrial cancer    Abnormal breast exam    Arthritis    Constipation    Anxiety    Diverticulosis    Duodenal diverticulum    Esophagitis, reflux    Dyslipidemia    Impaired fasting glucose    Insomnia    Age-related osteoporosis without current pathological fracture    Atrophic vaginitis    Urinary incontinence    Vertigo    Encounter for follow-up surveillance of endometrial cancer    Endometrial cancer (HCC)    Arthritis of left shoulder region    Encounter for screening mammogram for malignant neoplasm of breast    Need for immunization against influenza    Nonexudative age-related macular degeneration, bilateral, early dry stage    Posterior vitreous detachment of both eyes    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    Status post cataract extraction and insertion of intraocular lens    Lackey's esophagus with dysplasia    MGUS (monoclonal gammopathy of unknown significance)    Hypercalcemia    Lyme disease    Visual field defect    Lymphadenopathy    Syncope and collapse    Visual changes    CLL (chronic lymphocytic leukemia) (HCC)    Mild protein-calorie malnutrition (HCC)    Branch retinal vein occlusion with macular edema of left eye    Multiple pulmonary nodules    Cervical spondylosis    Depression with anxiety    Status post placement of implantable loop recorder     Past Medical History:   Diagnosis Date    Anemia     post-op, 07/07/09    Anxiety     last assessed: 01/15/14    C. difficile diarrhea     Chest wall trauma     Acute, last assessed: 10/24/12    CLL (chronic lymphocytic leukemia) (HCC)     Depression     Disease of thyroid gland     hypothyroid    Diverticula of colon     Endometrial cancer (HCC) 03/2011    Endometrial hyperplasia     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HCC)     stage IA, Grade I endometriod adenocarcinoma with 43% myometrial invasion  and no LVSI, last assessed: 01/20/14    Fall     Folliculitis     last assessed: 03/02/16    Gastroparesis     Hypercholesterolemia     Hyperlipidemia     Hypertension     Hyponatremia 03/10/2016    Incontinence in female     Migraine     Nontoxic single thyroid nodule     last assessed: 02/16/13    Osteoporosis     last assessed: 02/22/17    Pure hypercholesterolemia     Rotator cuff (capsule) sprain     Acute, right    SDH (subdural hematoma) (HCC)     Skin lesion     last assessed: 04/26/13    Stomatitis     Strain of right buttock     gluteus medius    Tendinitis of right rotator cuff     last assessed: 08/12/12    Thyroid cyst     last assessed: 08/15/13     Social History     Socioeconomic History    Marital status:      Spouse name: Not on file    Number of children: Not on file    Years of education: Not on file    Highest education level: Not on file   Occupational History    Not on file   Tobacco Use    Smoking status: Never     Passive exposure: Never    Smokeless tobacco: Never   Vaping Use    Vaping status: Never Used   Substance and Sexual Activity    Alcohol use: Not Currently    Drug use: No    Sexual activity: Not Currently   Other Topics Concern    Not on file   Social History Narrative    Not on file     Social Determinants of Health     Financial Resource Strain: Low Risk  (12/27/2022)    Overall Financial Resource Strain (CARDIA)     Difficulty of Paying Living Expenses: Not very hard   Food Insecurity: Not on file   Transportation Needs: No Transportation Needs (12/27/2022)    PRAPARE - Transportation     Lack of Transportation (Medical): No     Lack of Transportation (Non-Medical): No   Physical Activity: Not on file   Stress: Not on file   Social Connections: Not on file   Intimate Partner Violence: Not on file   Housing Stability: Not on file      Family History   Problem Relation Age of Onset    No Known Problems Mother     Dementia Father     Other Brother         Prolapsing mitral  valve leaflet syndrome    Testicular cancer Brother 30    Lung cancer Brother 60    Prostate cancer Brother 40        unsure of excat age    No Known Problems Daughter     No Known Problems Daughter     No Known Problems Daughter     No Known Problems Maternal Grandmother     No Known Problems Maternal Grandfather     Breast cancer Paternal Grandmother 70    No Known Problems Paternal Grandfather     No Known Problems Maternal Aunt     No Known Problems Paternal Aunt     No Known Problems Paternal Aunt      Past Surgical History:   Procedure Laterality Date    APPENDECTOMY      BREAST BIOPSY Right     BREAST BIOPSY Left     BREAST CYST ASPIRATION      CARDIAC ELECTROPHYSIOLOGY PROCEDURE N/A 12/17/2022    Procedure: Cardiac loop recorder implant;  Surgeon: Jermain Kent MD;  Location: [facility];  Service: Cardiology    COLONOSCOPY      fiberoptic, 1998, 2001, 2006    CT NEEDLE BIOPSY LUNG  4/25/2023    CYSTOSCOPY      Diagnostic    EGD AND COLONOSCOPY N/A 3/11/2016    Procedure: EGD ;  Surgeon: Arpan Bonilla MD;  Location: [facility];  Service:     HYSTERECTOMY  03/22/2011    Robotic-assisted, total laparoscopic approch    JOINT REPLACEMENT      KNEE SURGERY      OOPHORECTOMY Bilateral 03/22/2011    Salpingo    REPLACEMENT TOTAL KNEE      TONSILECTOMY AND ADNOIDECTOMY      TOTAL SHOULDER ARTHROPLASTY Right        Current Outpatient Medications:     acetaminophen (TYLENOL) 650 mg CR tablet, Take 650 mg by mouth as needed for mild pain., Disp: , Rfl:     ALPHAGAN P 0.1 %, instill 1 drop into both eyes twice a day, Disp: , Rfl: 0    ALPRAZolam (XANAX) 0.25 mg tablet, Take 1 tablet (0.25 mg total) by mouth if needed for anxiety, Disp: 20 tablet, Rfl: 0    bifidobacterium infantis (ALIGN) capsule, Take by mouth, Disp: , Rfl:     carvedilol (COREG) 6.25 mg tablet, TAKE 1 TABLET BY MOUTH 2 TIMES A DAY WITH MEALS, Disp: 180 tablet, Rfl: 2    cholecalciferol (VITAMIN D3) 1,000 units tablet, Take by mouth, Disp: ,  "Rfl:     famotidine (PEPCID) 20 mg tablet, Take 1 tablet (20 mg total) by mouth 2 (two) times a day (Patient taking differently: Take 20 mg by mouth if needed), Disp: 60 tablet, Rfl: 0    levothyroxine 25 mcg tablet, Take 1 tablet (25 mcg total) by mouth daily, Disp: 90 tablet, Rfl: 3    meclizine (ANTIVERT) 25 mg tablet, TAKE 1 TABLET EVERY 8 HOURS AS NEEDED FOR DIZZINESS, Disp: 30 tablet, Rfl: 0    Multiple Vitamins-Minerals (CENTRUM SILVER PO), Take by mouth, Disp: , Rfl:     onabotulinumtoxin A (BOTOX) 100 units, Inject as directed Twice a year, Disp: , Rfl:     polyethylene glycol (GLYCOLAX) 17 GM/SCOOP powder, Take by mouth every other day, Disp: , Rfl:     timolol (TIMOPTIC) 0.5 % ophthalmic solution, Use as directed, Disp: , Rfl:   Allergies   Allergen Reactions    Amoxil [Amoxicillin] Hives    Biaxin [Clarithromycin] GI Intolerance     Reaction Date: 19Jul2011;   Pt gets nauseated    Esomeprazole GI Intolerance    Fosamax [Alendronate] GI Intolerance    Hydrochlorothiazide      Hyponatremia      Lansoprazole GI Intolerance    Norvasc [Amlodipine Besylate] Hives    Pantoprazole GI Intolerance     Can tolerate IV- Not oral    Relafen [Nabumetone] GI Intolerance and Other (See Comments)         Review of Systems:  Review of Systems   Eyes:  Negative for visual disturbance.   Respiratory:  Negative for cough, chest tightness, shortness of breath and wheezing.    Cardiovascular:  Negative for chest pain.   Musculoskeletal:  Positive for arthralgias.   All other systems reviewed and are negative.        Vitals:    02/05/24 1021   BP: 136/80   BP Location: Left arm   Patient Position: Sitting   Cuff Size: Standard   Pulse: 69   Weight: 51.2 kg (112 lb 12.8 oz)   Height: 5' 2.5\" (1.588 m)       Vitals:    02/05/24 1021   Weight: 51.2 kg (112 lb 12.8 oz)         Physical Exam:  General:  Alert and cooperative, appears stated age  HEENT:  PERRLA, EOMI, no scleral icterus, no conjunctival pallor  Neck:  No " lymphadenopathy, no thyromegaly, no carotid bruits, no elevated JVP  Heart:  Regular rate and rhythm, normal S1/S2, no S3/S4, no murmur  Lungs:  Clear to auscultation bilaterally   Abdomen:  Soft, non-tender, positive bowel sounds, no rebound or guarding,   no organomegaly   Extremities:  No clubbing, cyanosis or edema   Vascular:  2+ pedal pulses  Skin:  No rashes or lesions on exposed skin  Neurologic:  Cranial nerves II-XII grossly intact without focal deficits

## 2024-03-13 ENCOUNTER — REMOTE DEVICE CLINIC VISIT (OUTPATIENT)
Dept: CARDIOLOGY CLINIC | Facility: CLINIC | Age: 89
End: 2024-03-13
Payer: MEDICARE

## 2024-03-13 DIAGNOSIS — Z95.818 PRESENCE OF CARDIAC DEVICE: Primary | ICD-10-CM

## 2024-03-13 PROCEDURE — 93298 REM INTERROG DEV EVAL SCRMS: CPT | Performed by: INTERNAL MEDICINE

## 2024-03-13 NOTE — PROGRESS NOTES
"Results for orders placed or performed in visit on 03/13/24   Cardiac EP device report    Narrative    CARELINK TRANSMISSION: BATTERY STATUS \"OK.\" NO PATIENT OR DEVICE ACTIVATED EPISODES.--VILLARREAL        "

## 2024-05-12 DIAGNOSIS — I10 PRIMARY HYPERTENSION: ICD-10-CM

## 2024-05-14 RX ORDER — CARVEDILOL 6.25 MG/1
6.25 TABLET ORAL 2 TIMES DAILY WITH MEALS
Qty: 180 TABLET | Refills: 1 | Status: SHIPPED | OUTPATIENT
Start: 2024-05-14

## 2024-06-12 ENCOUNTER — REMOTE DEVICE CLINIC VISIT (OUTPATIENT)
Dept: CARDIOLOGY CLINIC | Facility: CLINIC | Age: 89
End: 2024-06-12
Payer: MEDICARE

## 2024-06-12 DIAGNOSIS — R55 SYNCOPE AND COLLAPSE: Primary | ICD-10-CM

## 2024-06-12 PROCEDURE — 93298 REM INTERROG DEV EVAL SCRMS: CPT | Performed by: INTERNAL MEDICINE

## 2024-07-08 NOTE — PROGRESS NOTES
Progress Note - Cardiology Team 1  Nella Shape 80 y o  female MRN: [id number]  Unit/Bed#: [facility] 832-01 Encounter: [id number]        Principal Problem:    Subdural hematoma, post-traumatic ([address] )  Active Problems:    Benign essential hypertension    Hyponatremia    Hypothyroidism    Fall    Laceration of brow without complication    Syncope    Visual changes        Assessment     1  Syncope  No prior syncope  No prodrome  Suspected brief LOC    Traumatic injury-SDH  HS troponin 6/34/38  EKG/telemetry- remains unremarkable  History normal LV function, no aortic stenosis  Current TTE - normal LVEF, no significant AS  Orthostatic +   She is maintained on a fluid restriction for her chronic hyponatremia-drinks no more than 1/2 L of fluid a day which she barely drinks     2  SDH  Neurosurgery evaluation- AP on hold 2 weeks until re evaluated by neurosurgery  No intervention at this time   Yesterday c o headache  Ongoing mild "dizziness"  Nausea  CT scan- stable small SDH no mass effect/midline shift  Today less headache ,dizziness and nausea  C/o rt sided visual changes       3  Hypertension-she presented hypertensive  Most recent regimen at home has been carvedilol 6 25 mg in the morning with 12 5 mg in the evening   According to the daughter her blood pressure runs about 120-130  Dosing was changed to 6 25mg BID  BP BP has settled out 120-160/60-70     4  Chronic hyponatremia-she is maintaining a fluid restriction 1500 cc   Follows Nephrology- Dr Fidelina Thompson  + SIADH  Na dropped 123    5  Hypokalemia K- 2 8     PLAN  1  Coreg decreased to 6 25mg BID  She is hypertensive this am  Suspect we will need to go back to original dosing - 6 25mg AM and 12 5mg PM    2  2 week live zio patch ordered- office will arrange  If unremarkable consider loop recorder  3  Maintain adequate hydration within the limits of your fluid restriction  4  Replete potassium  5   Recommend nephrology eval re: hyponatremia           Subjective/Objective Chief Complaint/Subjective  Patient without cp, sob  Headache better, as is nausea and lightheadedness        Vitals: /68   Pulse 63   Temp 98 1 °F (36 7 °C)   Resp 17   Ht 5' 2" (1 575 m)   Wt 50 8 kg (112 lb)   SpO2 96%   BMI 20 49 kg/m²     Vitals:    09/13/22 1923 09/14/22 1459   Weight: 51 kg (112 lb 7 oz) 50 8 kg (112 lb)     Orthostatic Blood Pressures    Flowsheet Row Most Recent Value   Blood Pressure 129/68 filed at 09/16/2022 0715   Patient Position - Orthostatic VS Standing - Orthostatic VS filed at 09/15/2022 1753            Intake/Output Summary (Last 24 hours) at 9/16/2022 1042  Last data filed at 9/16/2022 0525  Gross per 24 hour   Intake --   Output 1200 ml   Net -1200 ml       Invasive Devices  Report    Peripheral Intravenous Line  Duration           Peripheral IV 09/13/22 Dorsal (posterior); Left Hand 2 days                Current Facility-Administered Medications   Medication Dose Route Frequency    acetaminophen (TYLENOL) tablet 975 mg  975 mg Oral Q6H [address]    brimonidine tartrate 0 2 % ophthalmic solution 1 drop  1 drop Both Eyes BID    carvedilol (COREG) tablet 6 25 mg  6 25 mg Oral QAM    carvedilol (COREG) tablet 6 25 mg  6 25 mg Oral HS    diphenhydrAMINE (BENADRYL) tablet 25 mg  25 mg Oral Daily PRN    diphtheria-tetanus-acellular pertussis (INFANRIX) IM injection 0 5 mL  0 5 mL Intramuscular Prior to discharge    famotidine (PEPCID) tablet 20 mg  20 mg Oral BID    levETIRAcetam (KEPPRA) tablet 500 mg  500 mg Oral Q12H [address]    levothyroxine tablet 25 mcg  25 mcg Oral Daily    metoclopramide (REGLAN) injection 10 mg  10 mg Intravenous Q6H PRN    ondansetron (ZOFRAN) injection 4 mg  4 mg Intravenous Q6H PRN    oxybutynin (DITROPAN-XL) 24 hr tablet 5 mg  5 mg Oral After Dinner    pravastatin (PRAVACHOL) tablet 20 mg  20 mg Oral Daily With Dinner         Physical Exam: /68   Pulse 63   Temp 98 1 °F (36 7 °C)   Resp 17   Ht 5' 2" (1 575 m)   Wt 50 8 kg (112 lb)   SpO2 96%   BMI 20 49 kg/m²     General Appearance:    Alert, cooperative, no distress, appears stated age   Head:    Normocephalic, facial bruising and swelling       Nose:   Nares normal, septum midline, no drainage    Throat:   Lips, mucosa, and tongue normal   Neck:   Supple, symmetrical, trachea midline,            Lungs:     Clear to auscultation bilaterally, respirations unlabored        Heart:    Regular rate and rhythm, S1 and S2 normal, no murmur, rub   or gallop       Extremities:   Extremities normal, atraumatic, no cyanosis or edema   Pulses:   2+ and symmetric all extremities   Skin:   Skin warm   Neurologic:   Alert and oriented to person place and time                  Lab Results:   Recent Results (from the past 72 hour(s))   ECG 12 lead    Collection Time: 09/13/22  3:33 PM   Result Value Ref Range    Ventricular Rate 81 BPM    Atrial Rate 81 BPM    NC Interval 148 ms    QRSD Interval 94 ms    QT Interval 342 ms    QTC Interval 397 ms    P [place] 32 degrees    QRS Axis 40 degrees    T Wave Axis 39 degrees   CBC and differential    Collection Time: 09/13/22  3:35 PM   Result Value Ref Range    WBC 19 39 (H) 4 31 - 10 16 Thousand/uL    RBC 4 04 3 81 - 5 12 Million/uL    Hemoglobin 11 5 11 5 - 15 4 g/dL    Hematocrit 35 6 34 8 - 46 1 %    MCV 88 82 - 98 fL    MCH 28 5 26 8 - 34 3 pg    MCHC 32 3 31 4 - 37 4 g/dL    RDW 14 9 11 6 - 15 1 %    MPV 9 8 8 9 - 12 7 fL    Platelets 540 881 - 044 Thousands/uL   Comprehensive metabolic panel    Collection Time: 09/13/22  3:35 PM   Result Value Ref Range    Sodium 133 (L) 135 - 147 mmol/L    Potassium 3 4 (L) 3 5 - 5 3 mmol/L    Chloride 103 96 - 108 mmol/L    CO2 25 21 - 32 mmol/L    ANION GAP 5 4 - 13 mmol/L    BUN 11 5 - 25 mg/dL    Creatinine 0 51 (L) 0 60 - 1 30 mg/dL    Glucose 100 65 - 140 mg/dL    Calcium 8 6 8 3 - 10 1 mg/dL    Corrected Calcium 9 6 8 3 - 10 1 mg/dL    AST 17 5 - 45 U/L    ALT 19 12 - 78 U/L    Alkaline Phosphatase 58 46 - 116 U/L 72.6 Total Protein 6 4 6 4 - 8 4 g/dL    Albumin 2 8 (L) 3 5 - 5 0 g/dL    Total Bilirubin 0 48 0 20 - 1 00 mg/dL    eGFR 86 ml/min/1 73sq m   HS Troponin 0hr (reflex protocol)    Collection Time: 09/13/22  3:35 PM   Result Value Ref Range    hs TnI 0hr 6 "Refer to ACS Flowchart"- see link ng/L   Protime-INR    Collection Time: 09/13/22  3:35 PM   Result Value Ref Range    Protime 14 0 11 6 - 14 5 seconds    INR 1 06 0 84 - 1 19   APTT    Collection Time: 09/13/22  3:35 PM   Result Value Ref Range    PTT 26 23 - 37 seconds   Manual Differential(PHLEBS Do Not Order)    Collection Time: 09/13/22  3:35 PM   Result Value Ref Range    Segmented % 45 43 - 75 %    Lymphocytes % 50 (H) 14 - 44 %    Monocytes % 5 4 - 12 %    Eosinophils, % 0 0 - 6 %    Basophils % 0 0 - 1 %    Absolute Neutrophils 8 73 (H) 1 85 - 7 62 Thousand/uL    Lymphocytes Absolute 9 70 (H) 0 60 - 4 47 Thousand/uL    Monocytes Absolute 0 97 0 00 - 1 22 Thousand/uL    Eosinophils Absolute 0 00 0 00 - 0 40 Thousand/uL    Basophils Absolute 0 00 0 00 - 0 10 Thousand/uL    Total Counted      Platelet Estimate Adequate Adequate   UA w Reflex to Microscopic w Reflex to Culture    Collection Time: 09/13/22  3:37 PM    Specimen: Urine, Clean Catch   Result Value Ref Range    Color, UA Colorless     Clarity, UA Clear     Specific Gravity, UA 1 008 1 003 - 1 030    pH, UA 8 0 4 5, 5 0, 5 5, 6 0, 6 5, 7 0, 7 5, 8 0    Leukocytes, UA Small (A) Negative    Nitrite, UA Negative Negative    Protein, UA Negative Negative mg/dl    Glucose, UA Negative Negative mg/dl    Ketones, UA Negative Negative mg/dl    Urobilinogen, UA <2 0 <2 0 mg/dl mg/dl    Bilirubin, UA Negative Negative    Occult Blood, UA Small (A) Negative   Urine Microscopic    Collection Time: 09/13/22  3:37 PM   Result Value Ref Range    RBC, UA 4-10 (A) None Seen, 1-2 /hpf    WBC, UA 4-10 (A) None Seen, 1-2 /hpf    Epithelial Cells Occasional None Seen, Occasional /hpf    Bacteria, UA Occasional None Seen, Occasional /hpf   HS Troponin I 2hr    Collection Time: 09/13/22  5:41 PM   Result Value Ref Range    hs TnI 2hr 34 "Refer to ACS Flowchart"- see link ng/L    Delta 2hr hsTnI 28 (H) <20 ng/L   Type and Screen    Collection Time: 09/13/22  6:03 PM   Result Value Ref Range    ABO Grouping A     Rh Factor Positive     Antibody Screen Negative     Specimen Expiration Date [id number]    HS Troponin I 4hr    Collection Time: 09/13/22  8:19 PM   Result Value Ref Range    hs TnI 4hr 38 "Refer to ACS Flowchart"- see link ng/L    Delta 4hr hsTnI 32 (H) <20 ng/L   Basic metabolic panel    Collection Time: 09/14/22  5:28 AM   Result Value Ref Range    Sodium 133 (L) 135 - 147 mmol/L    Potassium 3 5 3 5 - 5 3 mmol/L    Chloride 103 96 - 108 mmol/L    CO2 27 21 - 32 mmol/L    ANION GAP 3 (L) 4 - 13 mmol/L    BUN 13 5 - 25 mg/dL    Creatinine 0 49 (L) 0 60 - 1 30 mg/dL    Glucose 99 65 - 140 mg/dL    Calcium 8 7 8 3 - 10 1 mg/dL    eGFR 87 ml/min/1 73sq m   CBC and differential    Collection Time: 09/14/22  5:28 AM   Result Value Ref Range    WBC 16 78 (H) 4 31 - 10 16 Thousand/uL    RBC 3 50 (L) 3 81 - 5 12 Million/uL    Hemoglobin 9 9 (L) 11 5 - 15 4 g/dL    Hematocrit 31 2 (L) 34 8 - 46 1 %    MCV 89 82 - 98 fL    MCH 28 3 26 8 - 34 3 pg    MCHC 31 7 31 4 - 37 4 g/dL    RDW 15 0 11 6 - 15 1 %    MPV 9 8 8 9 - 12 7 fL    Platelets 794 746 - 326 Thousands/uL   Echo complete w/ contrast if indicated    Collection Time: 09/14/22  2:59 PM   Result Value Ref Range    A4C EF 62 %    LVIDd 3 20 cm    LVIDS 2 10 cm    IVSd 1 40 cm    LVPWd 1 40 cm    FS 34 28 - 44    MV E' Tissue Velocity Septal 8 cm/s    E wave deceleration time 154 ms    MV Peak E Luis Fernando 66 cm/s    MV Peak A Luis Fernando 0 55 m/s    RVID d 3 6 cm    LA size 3 7 cm    LA length (A2C) 5 40 cm    ASHLEY A4C 16 2 cm2    RAA A4C 12 7 cm2    MV stenosis pressure 1/2 time 45 ms    MV valve area p 1/2 method 4 89     TR Peak Luis Fernando 2 6 m/s    Triscuspid Valve Regurgitation Peak Gradient 27 0 mmHg    Ao root 3 20 cm    Pulmonary regurgitation late diastolic velocity 4 22 m/s    Tricuspid valve peak regurgitation velocity 2 59 m/s    Left ventricular stroke volume (2D) 28 00 mL    IVS 1 4 cm    LEFT VENTRICLE SYSTOLIC VOLUME (MOD BIPLANE) 2D 14 mL    LV DIASTOLIC VOLUME (MOD BIPLANE) 2D 42 mL    Left Atrium Area-systolic Four Chamber 57 0 cm2    Left Atrium Area-systolic Apical Two Chamber 22 4 cm2    LVSV, 2D 28 mL    LV EF 60     Est  RA pres 5 0 mmHg    Right Ventricular Peak Systolic Pressure 98 25 mmHg   CBC    Collection Time: 09/15/22  5:01 AM   Result Value Ref Range    WBC 17 97 (H) 4 31 - 10 16 Thousand/uL    RBC 3 42 (L) 3 81 - 5 12 Million/uL    Hemoglobin 9 7 (L) 11 5 - 15 4 g/dL    Hematocrit 30 1 (L) 34 8 - 46 1 %    MCV 88 82 - 98 fL    MCH 28 4 26 8 - 34 3 pg    MCHC 32 2 31 4 - 37 4 g/dL    RDW 14 5 11 6 - 15 1 %    Platelets 893 302 - 704 Thousands/uL    MPV 10 1 8 9 - 12 7 fL   Basic metabolic panel    Collection Time: 09/15/22  5:01 AM   Result Value Ref Range    Sodium 128 (L) 135 - 147 mmol/L    Potassium 3 4 (L) 3 5 - 5 3 mmol/L    Chloride 96 96 - 108 mmol/L    CO2 26 21 - 32 mmol/L    ANION GAP 6 4 - 13 mmol/L    BUN 12 5 - 25 mg/dL    Creatinine 0 46 (L) 0 60 - 1 30 mg/dL    Glucose 97 65 - 140 mg/dL    Calcium 8 9 8 3 - 10 1 mg/dL    eGFR 89 ml/min/1 73sq m   Basic metabolic panel    Collection Time: 09/16/22  9:13 AM   Result Value Ref Range    Sodium 123 (L) 135 - 147 mmol/L    Potassium 2 8 (L) 3 5 - 5 3 mmol/L    Chloride 89 (L) 96 - 108 mmol/L    CO2 28 21 - 32 mmol/L    ANION GAP 6 4 - 13 mmol/L    BUN 10 5 - 25 mg/dL    Creatinine 0 61 0 60 - 1 30 mg/dL    Glucose 134 65 - 140 mg/dL    Calcium 9 4 8 3 - 10 1 mg/dL    eGFR 81 ml/min/1 73sq m   CBC and differential    Collection Time: 09/16/22  9:13 AM   Result Value Ref Range    WBC 22 15 (H) 4 31 - 10 16 Thousand/uL    RBC 4 23 3 81 - 5 12 Million/uL    Hemoglobin 12 0 11 5 - 15 4 g/dL    Hematocrit 35 3 34 8 - 46 1 % MCV 84 82 - 98 fL    MCH 28 4 26 8 - 34 3 pg    MCHC 34 0 31 4 - 37 4 g/dL    RDW 14 1 11 6 - 15 1 %    MPV 9 1 8 9 - 12 7 fL    Platelets 796 935 - 900 Thousands/uL     Imaging: I have personally reviewed pertinent reports  Tele- nsr      Counseling / Coordination of Care  Total time spent today 20 minutes  Greater than 50% of total time was spent with the patient and / or family counseling and / or coordination of care

## 2024-07-18 LAB
EST. AVERAGE GLUCOSE BLD GHB EST-MCNC: 111 MG/DL
HBA1C MFR BLD: 5.5 %
TSH SERPL-ACNC: 4.86 UIU/ML (ref 0.45–5.33)

## 2024-07-25 ENCOUNTER — OFFICE VISIT (OUTPATIENT)
Dept: FAMILY MEDICINE CLINIC | Facility: CLINIC | Age: 89
End: 2024-07-25
Payer: MEDICARE

## 2024-07-25 ENCOUNTER — TELEPHONE (OUTPATIENT)
Dept: FAMILY MEDICINE CLINIC | Facility: CLINIC | Age: 89
End: 2024-07-25

## 2024-07-25 VITALS
HEART RATE: 68 BPM | OXYGEN SATURATION: 97 % | WEIGHT: 112.8 LBS | RESPIRATION RATE: 18 BRPM | HEIGHT: 63 IN | DIASTOLIC BLOOD PRESSURE: 70 MMHG | SYSTOLIC BLOOD PRESSURE: 136 MMHG | BODY MASS INDEX: 19.99 KG/M2 | TEMPERATURE: 97.6 F

## 2024-07-25 DIAGNOSIS — H54.7 VISION PROBLEM: Primary | ICD-10-CM

## 2024-07-25 DIAGNOSIS — Z00.00 MEDICARE ANNUAL WELLNESS VISIT, SUBSEQUENT: ICD-10-CM

## 2024-07-25 DIAGNOSIS — F41.9 ANXIETY: ICD-10-CM

## 2024-07-25 DIAGNOSIS — R73.01 IMPAIRED FASTING GLUCOSE: ICD-10-CM

## 2024-07-25 DIAGNOSIS — R26.2 AMBULATORY DYSFUNCTION: ICD-10-CM

## 2024-07-25 DIAGNOSIS — I10 BENIGN ESSENTIAL HYPERTENSION: ICD-10-CM

## 2024-07-25 DIAGNOSIS — C91.10 CLL (CHRONIC LYMPHOCYTIC LEUKEMIA) (HCC): ICD-10-CM

## 2024-07-25 DIAGNOSIS — M47.812 CERVICAL SPONDYLOSIS: ICD-10-CM

## 2024-07-25 DIAGNOSIS — E03.9 HYPOTHYROIDISM, UNSPECIFIED TYPE: ICD-10-CM

## 2024-07-25 PROCEDURE — 99214 OFFICE O/P EST MOD 30 MIN: CPT | Performed by: FAMILY MEDICINE

## 2024-07-25 PROCEDURE — G0439 PPPS, SUBSEQ VISIT: HCPCS | Performed by: FAMILY MEDICINE

## 2024-07-25 NOTE — PROGRESS NOTES
Ambulatory Visit  Name: Ethel Roman      : 1934      MRN: [id number]  Encounter Provider: Miles Womack MD  Encounter Date: 2024   Encounter department: [facility]    Chief Complaint   Patient presents with    Medicare Wellness Visit     Subsequent visit      Health Maintenance   Topic Date Due    RSV Vaccine Age 60+ Years (1 - 1-dose 60+ series) Never done    Zoster Vaccine (1 of 2) 2015    Pneumococcal Vaccine: 65+ Years (2 of 2 - PPSV23 or PCV20) 2017    DXA SCAN  2021    Breast Cancer Screening: Mammogram  2023    COVID-19 Vaccine ( season) 2023    Medicare Annual Wellness Visit (AWV)  2024    Influenza Vaccine (1) 2024    Depression Screening  2025    Fall Risk  2025    Urinary Incontinence Screening  2025    RSV Vaccine age 0-20 Months  Aged Out    HIB Vaccine  Aged Out    IPV Vaccine  Aged Out    Hepatitis A Vaccine  Aged Out    Meningococcal ACWY Vaccine  Aged Out    HPV Vaccine  Aged Out       Assessment & Plan   1. Vision problem  -     Referral to [facility]; Future  2. Hypothyroidism, unspecified type  Assessment & Plan:  2024 TSH normal. Continue levothyroxine 25mcg daily.   3. Impaired fasting glucose  Assessment & Plan:  2024 hgA1C 5.5 normal. Low carb diet.   4. Anxiety  Assessment & Plan:  Continue xanax prn.   5. Benign essential hypertension  Assessment & Plan:  DASH diet. Continue carvedilol 6.25mg bid per cardiology.   6. CLL (chronic lymphocytic leukemia) (HCC)  Comments:  FU hem/onc.  7. Medicare annual wellness visit, subsequent    Falls Plan of Care: balance, strength, and gait training instructions were provided.     Urinary Incontinence Plan of Care: counseling topics discussed: practice Kegel (pelvic floor strengthening) exercises, use restroom every 2 hours, limit alcohol, caffeine, spicy foods, and acidic foods, keeping a bladder diary, limiting fluid intake 3-4  hours before bed and preventing constipation.       Reviewed lab in 7/2024  hgA1C 5.5 normal  TSH normal    Flu shot yearly.   Got covid19 vaccines and boosters.   Got pneumovax at pharmacy in 2013 per pt. Got prevnar 13 2017.   RTO in 6 months.      POA----3 children  Living will---DNR      Preventive health issues were discussed with patient, and age appropriate screening tests were ordered as noted in patient's After Visit Summary. Personalized health advice and appropriate referrals for health education or preventive services given if needed, as noted in patient's After Visit Summary.    History of Present Illness     HPI     Pt is here with her daughter.     Macular degeneration/vein occlusion----Got shot every 1-2 months. Left eyesight always not good.   She lost center right eye sight recently also.   She lives by herself. Would like Occupational therapy to her home to evaluate safety.      Hypothyroidism---She is on levothyroxine 25mcg daily.      IFG---Eat healthy.      AnxietyDepression---Feels ok. She uses xanax 0.125mg as needed.      CLL---FU [facility] hem/onc Dr. Vallejo. No need treatment now. 5/2024 WBC 33.7 stable.      Neck pain---FU [facility] PMR. Steroid injection helped 3-6 weeks. Consider nerve block.   Knee OA---FU [facility] orthopedics. Got injections.  Use tylenol/mobid prn.      HTN---BP at home good per pt. She is on carvedilol 6.25mg bid. FU cardiology regularly. Denies headache, vision change, Sob or CP.      Vertigo---feels unbalanced. Come and go. She uses meclizine rarely. Pt uses cane or walker.      Urinary incontinence---She is off oxybutynin. Got botox by uro/gyn which helped a lot.      [facility]---follows GI. She tried nexium and prevacid but felt nausea. She is on pepcid 10mg prn.       Lives by herself. Has home aids MWF 9am-1pm. Does all ADL's. No driving.   Fell this month. No injury. Daughter Patricia lives close to her. Consider moving in daughter's house after [place].       Patient Care  Team:  Miles Womack MD as PCP - General  OZZIE Rodriguez MD Daniel Eyvazzadeh, MD as Endoscopist  Ashwini Magana MD (Optometry)  Sanya Tidwell DPM (Podiatry)  Catarina Smith DO (Cardiology)  Ming Oh MD (Gastroenterology)  Marily Vallejo DO (Hematology and Oncology)  Tawny Almodovar DO (Internal Medicine)  Kennedy Garcia MD (Nephrology)  Joseph Key MD (Orthopedic Surgery)  Kraig Matute MD (Ophthalmology)  Neisha Mosley PA-C (Gynecologic Oncology)  NILTON Cervantes as Nurse Practitioner (Urology)  [facility]    Review of Systems   Constitutional:  Negative for appetite change, chills and fever.   HENT:  Negative for congestion, ear pain, sinus pain and sore throat.    Eyes:  Positive for visual disturbance. Negative for discharge and itching.   Respiratory:  Negative for apnea, cough, chest tightness, shortness of breath and wheezing.    Cardiovascular:  Negative for chest pain, palpitations and leg swelling.   Gastrointestinal:  Negative for abdominal pain, anal bleeding, constipation, diarrhea, nausea and vomiting.   Endocrine: Negative for cold intolerance, heat intolerance and polyuria.   Genitourinary:  Negative for difficulty urinating and dysuria.   Musculoskeletal:  Positive for gait problem. Negative for arthralgias, back pain and myalgias.   Skin:  Negative for rash.   Neurological:  Negative for dizziness and headaches.   Psychiatric/Behavioral:  Negative for agitation.      Medical History Reviewed by provider this encounter:  Problems  Med Hx  Surg Hx       Annual Wellness Visit Questionnaire   Ethel is here for her Subsequent Wellness visit.     Health Risk Assessment:   Patient rates overall health as good. Patient feels that their physical health rating is slightly worse. Patient is satisfied with their life. Eyesight was rated as slightly worse. Hearing was rated as slightly worse. Patient feels that their emotional and mental health rating is  slightly worse. Patients states they are sometimes angry. Patient states they are sometimes unusually tired/fatigued. Pain experienced in the last 7 days has been some. Patient's pain rating has been 5/10. Patient states that she has experienced no weight loss or gain in last 6 months.     Fall Risk Screening:   In the past year, patient has experienced: history of falling in past year    Number of falls: 1  Injured during fall?: Yes    Feels unsteady when standing or walking?: Yes    Worried about falling?: Yes      Urinary Incontinence Screening:   Patient has leaked urine accidently in the last six months.     Home Safety:  Patient has trouble with stairs inside or outside of their home. Patient has working smoke alarms and has no working carbon monoxide detector. Home safety hazards include: unfamiliar surroundings.     Nutrition:   Current diet is Other (please comment). Gastroparesis diet    Medications:   Patient is currently taking over-the-counter supplements. OTC medications include: Align,Tylenol, multivitamin, miralax, senna, zinc oral, Pepcid, vitamin D3. Patient is able to manage medications.     Activities of Daily Living (ADLs)/Instrumental Activities of Daily Living (IADLs):   Walk and transfer into and out of bed and chair?: Yes  Dress and groom yourself?: Yes    Bathe or shower yourself?: Yes    Feed yourself? Yes  Do your laundry/housekeeping?: Yes  Manage your money, pay your bills and track your expenses?: Yes  Make your own meals?: Yes    Do your own shopping?: Yes    Previous Hospitalizations:   Any hospitalizations or ED visits within the last 12 months?: Yes    How many hospitalizations have you had in the last year?: 1-2    Advance Care Planning:   Living will: Yes    Durable POA for healthcare: Yes    Advanced directive: Yes    End of Life Decisions reviewed with patient: Yes    Provider agrees with end of life decisions: Yes      Cognitive Screening:   Provider or family/friend/caregiver  "concerned regarding cognition?: No    PREVENTIVE SCREENINGS      Cardiovascular Screening:    General: Screening Current      Diabetes Screening:     General: Screening Current      Colorectal Cancer Screening:     General: Screening Not Indicated      Breast Cancer Screening:     General: Screening Not Indicated      Cervical Cancer Screening:    General: Screening Not Indicated      Osteoporosis Screening:    General: Screening Not Indicated and History Osteoporosis      Lung Cancer Screening:     General: Screening Not Indicated    Screening, Brief Intervention, and Referral to Treatment (SBIRT)    Screening  Typical number of drinks in a day: 0  Typical number of drinks in a week: 0  Interpretation: Low risk drinking behavior.    AUDIT-C Screenin) How often did you have a drink containing alcohol in the past year? never  2) How many drinks did you have on a typical day when you were drinking in the past year? 0  3) How often did you have 6 or more drinks on one occasion in the past year? never    AUDIT-C Score: 0  Interpretation: Score 0-2 (female): Negative screen for alcohol misuse    Single Item Drug Screening:  How often have you used an illegal drug (including marijuana) or a prescription medication for non-medical reasons in the past year? never    Single Item Drug Screen Score: 0  Interpretation: Negative screen for possible drug use disorder    Social Determinants of Health     Financial Resource Strain: Low Risk  (2022)    Overall Financial Resource Strain (CARDIA)     Difficulty of Paying Living Expenses: Not very hard   Transportation Needs: No Transportation Needs (2022)    PRAPARE - Transportation     Lack of Transportation (Medical): No     Lack of Transportation (Non-Medical): No     No results found.    Objective     /70   Pulse 68   Temp 97.6 °F (36.4 °C) (Temporal)   Resp 18   Ht 5' 2.5\" (1.588 m)   Wt 51.2 kg (112 lb 12.8 oz)   SpO2 97%   BMI 20.30 kg/m² "     Physical Exam  Constitutional:       General: She is not in acute distress.     Appearance: She is well-developed.   HENT:      Head: Normocephalic.   Eyes:      General:         Right eye: No discharge.         Left eye: No discharge.      Conjunctiva/sclera: Conjunctivae normal.   Neck:      Thyroid: No thyromegaly.   Cardiovascular:      Rate and Rhythm: Normal rate and regular rhythm.      Heart sounds: Normal heart sounds. No murmur heard.     No friction rub. No gallop.   Pulmonary:      Effort: Pulmonary effort is normal. No respiratory distress.      Breath sounds: Normal breath sounds. No wheezing or rales.   Chest:      Chest wall: No tenderness.   Abdominal:      General: Bowel sounds are normal. There is no distension.      Palpations: Abdomen is soft. There is no mass.      Tenderness: There is no abdominal tenderness. There is no guarding or rebound.   Musculoskeletal:         General: No tenderness or deformity.      Cervical back: Normal range of motion.      Comments: Use cane   Lymphadenopathy:      Cervical: No cervical adenopathy.   Neurological:      Mental Status: She is alert.

## 2024-07-25 NOTE — PATIENT INSTRUCTIONS
Medicare Preventive Visit Patient Instructions  Thank you for completing your Welcome to Medicare Visit or Medicare Annual Wellness Visit today. Your next wellness visit will be due in one year (7/26/2025).  The screening/preventive services that you may require over the next 5-10 years are detailed below. Some tests may not apply to you based off risk factors and/or age. Screening tests ordered at today's visit but not completed yet may show as past due. Also, please note that scanned in results may not display below.  Preventive Screenings:  Service Recommendations Previous Testing/Comments   Colorectal Cancer Screening  * Colonoscopy    * Fecal Occult Blood Test (FOBT)/Fecal Immunochemical Test (FIT)  * Fecal DNA/Cologuard Test  * Flexible Sigmoidoscopy Age: 45-75 years old   Colonoscopy: every 10 years (may be performed more frequently if at higher risk)  OR  FOBT/FIT: every 1 year  OR  Cologuard: every 3 years  OR  Sigmoidoscopy: every 5 years  Screening may be recommended earlier than age 45 if at higher risk for colorectal cancer. Also, an individualized decision between you and your healthcare provider will decide whether screening between the ages of 76-85 would be appropriate. Colonoscopy: Not on file  FOBT/FIT: Not on file  Cologuard: Not on file  Sigmoidoscopy: Not on file          Breast Cancer Screening Age: 40+ years old  Frequency: every 1-2 years  Not required if history of left and right mastectomy Mammogram: 07/29/2022        Cervical Cancer Screening Between the ages of 21-29, pap smear recommended once every 3 years.   Between the ages of 30-65, can perform pap smear with HPV co-testing every 5 years.   Recommendations may differ for women with a history of total hysterectomy, cervical cancer, or abnormal pap smears in past. Pap Smear: Not on file        Hepatitis C Screening Once for adults born between 1945 and 1965  More frequently in patients at high risk for Hepatitis C Hep C Antibody: Not  on file        Diabetes Screening 1-2 times per year if you're at risk for diabetes or have pre-diabetes Fasting glucose: 104 mg/dL (9/16/2020)  A1C: 5.5 % (7/18/2024)      Cholesterol Screening Once every 5 years if you don't have a lipid disorder. May order more often based on risk factors. Lipid panel: 06/19/2023          Other Preventive Screenings Covered by Medicare:  Abdominal Aortic Aneurysm (AAA) Screening: covered once if your at risk. You're considered to be at risk if you have a family history of AAA.  Lung Cancer Screening: covers low dose CT scan once per year if you meet all of the following conditions: (1) Age 55-77; (2) No signs or symptoms of lung cancer; (3) Current smoker or have quit smoking within the last 15 years; (4) You have a tobacco smoking history of at least 20 pack years (packs per day multiplied by number of years you smoked); (5) You get a written order from a healthcare provider.  Glaucoma Screening: covered annually if you're considered high risk: (1) You have diabetes OR (2) Family history of glaucoma OR (3)  aged 50 and older OR (4)  American aged 65 and older  Osteoporosis Screening: covered every 2 years if you meet one of the following conditions: (1) You're estrogen deficient and at risk for osteoporosis based off medical history and other findings; (2) Have a vertebral abnormality; (3) On glucocorticoid therapy for more than 3 months; (4) Have primary hyperparathyroidism; (5) On osteoporosis medications and need to assess response to drug therapy.   Last bone density test (DXA Scan): 06/24/2019.  HIV Screening: covered annually if you're between the age of 15-65. Also covered annually if you are younger than 15 and older than 65 with risk factors for HIV infection. For pregnant patients, it is covered up to 3 times per pregnancy.    Immunizations:  Immunization Recommendations   Influenza Vaccine Annual influenza vaccination during flu season is  recommended for all persons aged >= 6 months who do not have contraindications   Pneumococcal Vaccine   * Pneumococcal conjugate vaccine = PCV13 (Prevnar 13), PCV15 (Vaxneuvance), PCV20 (Prevnar 20)  * Pneumococcal polysaccharide vaccine = PPSV23 (Pneumovax) Adults 19-65 yo with certain risk factors or if 65+ yo  If never received any pneumonia vaccine: recommend Prevnar 20 (PCV20)  Give PCV20 if previously received 1 dose of PCV13 or PPSV23   Hepatitis B Vaccine 3 dose series if at intermediate or high risk (ex: diabetes, end stage renal disease, liver disease)   Respiratory syncytial virus (RSV) Vaccine - COVERED BY MEDICARE PART D  * RSVPreF3 (Arexvy) CDC recommends that adults 60 years of age and older may receive a single dose of RSV vaccine using shared clinical decision-making (SCDM)   Tetanus (Td) Vaccine - COST NOT COVERED BY MEDICARE PART B Following completion of primary series, a booster dose should be given every 10 years to maintain immunity against tetanus. Td may also be given as tetanus wound prophylaxis.   Tdap Vaccine - COST NOT COVERED BY MEDICARE PART B Recommended at least once for all adults. For pregnant patients, recommended with each pregnancy.   Shingles Vaccine (Shingrix) - COST NOT COVERED BY MEDICARE PART B  2 shot series recommended in those 19 years and older who have or will have weakened immune systems or those 50 years and older     Health Maintenance Due:      Topic Date Due   • DXA SCAN  06/24/2021   • Breast Cancer Screening: Mammogram  07/29/2023     Immunizations Due:      Topic Date Due   • Pneumococcal Vaccine: 65+ Years (2 of 2 - PPSV23 or PCV20) 06/22/2017   • COVID-19 Vaccine (8 - 2023-24 season) 12/06/2023   • Influenza Vaccine (1) 09/01/2024     Advance Directives   What are advance directives?  Advance directives are legal documents that state your wishes and plans for medical care. These plans are made ahead of time in case you lose your ability to make decisions for  yourself. Advance directives can apply to any medical decision, such as the treatments you want, and if you want to donate organs.   What are the types of advance directives?  There are many types of advance directives, and each state has rules about how to use them. You may choose a combination of any of the following:  Living will:  This is a written record of the treatment you want. You can also choose which treatments you do not want, which to limit, and which to stop at a certain time. This includes surgery, medicine, IV fluid, and tube feedings.   Durable power of  for healthcare (DPAHC):  This is a written record that states who you want to make healthcare choices for you when you are unable to make them for yourself. This person, called a proxy, is usually a family member or a friend. You may choose more than 1 proxy.  Do not resuscitate (DNR) order:  A DNR order is used in case your heart stops beating or you stop breathing. It is a request not to have certain forms of treatment, such as CPR. A DNR order may be included in other types of advance directives.  Medical directive:  This covers the care that you want if you are in a coma, near death, or unable to make decisions for yourself. You can list the treatments you want for each condition. Treatment may include pain medicine, surgery, blood transfusions, dialysis, IV or tube feedings, and a ventilator (breathing machine).  Values history:  This document has questions about your views, beliefs, and how you feel and think about life. This information can help others choose the care that you would choose.  Why are advance directives important?  An advance directive helps you control your care. Although spoken wishes may be used, it is better to have your wishes written down. Spoken wishes can be misunderstood, or not followed. Treatments may be given even if you do not want them. An advance directive may make it easier for your family to make  difficult choices about your care.       © Copyright [employer] 2018 Information is for End User's use only and may not be sold, redistributed or otherwise used for commercial purposes. All illustrations and images included in CareNotes® are the copyrighted property of A.D.A.M., Inc[employer] or [employer]

## 2024-07-25 NOTE — TELEPHONE ENCOUNTER
Left message patient's daughter OT order was sent to [facility] instead of [facility]. They will reach out to her to schedule time to visit patient.

## 2024-09-03 ENCOUNTER — TELEPHONE (OUTPATIENT)
Age: 89
End: 2024-09-03

## 2024-09-04 ENCOUNTER — TELEPHONE (OUTPATIENT)
Dept: FAMILY MEDICINE CLINIC | Facility: CLINIC | Age: 89
End: 2024-09-04

## 2024-09-04 NOTE — TELEPHONE ENCOUNTER
Form dropped off from [facility]. Requires provider's signature. Placed in providers bin. Asks to return via fax to number listed or contact with any further questions.

## 2024-09-06 NOTE — TELEPHONE ENCOUNTER
Jaylin called from [facility] to see if form can be refaxed to [phone].    Thank you

## 2024-09-25 ENCOUNTER — REMOTE DEVICE CLINIC VISIT (OUTPATIENT)
Dept: CARDIOLOGY CLINIC | Facility: CLINIC | Age: 89
End: 2024-09-25
Payer: MEDICARE

## 2024-09-25 DIAGNOSIS — R55 SYNCOPE AND COLLAPSE: Primary | ICD-10-CM

## 2024-09-25 PROCEDURE — 93298 REM INTERROG DEV EVAL SCRMS: CPT | Performed by: INTERNAL MEDICINE

## 2024-09-25 NOTE — PROGRESS NOTES
"[employer] TRANSMISSION: LOOP RECORDER. PRESENTING RHYTHM NSR  @ 75 BPM BATTERY STATUS \"OK.\" NO PATIENT OR DEVICE ACTIVATED EPISODES. NORMAL DEVICE FUNCTION. DL   "

## 2024-10-30 ENCOUNTER — IMMUNIZATIONS (OUTPATIENT)
Dept: FAMILY MEDICINE CLINIC | Facility: CLINIC | Age: 89
End: 2024-10-30
Payer: MEDICARE

## 2024-10-30 DIAGNOSIS — Z23 ENCOUNTER FOR IMMUNIZATION: Primary | ICD-10-CM

## 2024-10-30 PROCEDURE — G0008 ADMIN INFLUENZA VIRUS VAC: HCPCS

## 2024-10-30 PROCEDURE — 90662 IIV NO PRSV INCREASED AG IM: CPT

## 2024-11-06 ENCOUNTER — CLINICAL SUPPORT (OUTPATIENT)
Dept: FAMILY MEDICINE CLINIC | Facility: CLINIC | Age: 89
End: 2024-11-06
Payer: MEDICARE

## 2024-11-06 DIAGNOSIS — Z23 NEED FOR COVID-19 VACCINE: Primary | ICD-10-CM

## 2024-11-06 DIAGNOSIS — I10 PRIMARY HYPERTENSION: ICD-10-CM

## 2024-11-06 PROCEDURE — 91320 SARSCV2 VAC 30MCG TRS-SUC IM: CPT

## 2024-11-06 PROCEDURE — 90480 ADMN SARSCOV2 VAC 1/ONLY CMP: CPT

## 2024-11-06 RX ORDER — CARVEDILOL 6.25 MG/1
6.25 TABLET ORAL 2 TIMES DAILY WITH MEALS
Qty: 180 TABLET | Refills: 1 | Status: SHIPPED | OUTPATIENT
Start: 2024-11-06

## 2024-11-09 DIAGNOSIS — E03.9 HYPOTHYROIDISM, UNSPECIFIED TYPE: ICD-10-CM

## 2024-11-11 RX ORDER — LEVOTHYROXINE SODIUM 25 UG/1
25 TABLET ORAL DAILY
Qty: 90 TABLET | Refills: 1 | Status: SHIPPED | OUTPATIENT
Start: 2024-11-11

## 2024-12-13 ENCOUNTER — REMOTE DEVICE CLINIC VISIT (OUTPATIENT)
Dept: CARDIOLOGY CLINIC | Facility: CLINIC | Age: 89
End: 2024-12-13
Payer: MEDICARE

## 2024-12-13 DIAGNOSIS — R55 SYNCOPE AND COLLAPSE: Primary | ICD-10-CM

## 2024-12-13 PROCEDURE — 93298 REM INTERROG DEV EVAL SCRMS: CPT | Performed by: INTERNAL MEDICINE

## 2024-12-13 NOTE — PROGRESS NOTES
"[employer] TRANSMISSION: LOOP RECORDER. PRESENTING RHYTHM NSR @ 75 BPM. BATTERY STATUS \"OK.\" NO PATIENT OR DEVICE ACTIVATED EPISODES. NORMAL DEVICE FUNCTION. DL   "

## 2024-12-14 ENCOUNTER — RESULTS FOLLOW-UP (OUTPATIENT)
Dept: CARDIOLOGY CLINIC | Facility: CLINIC | Age: 89
End: 2024-12-14

## 2025-01-30 ENCOUNTER — OFFICE VISIT (OUTPATIENT)
Dept: FAMILY MEDICINE CLINIC | Facility: CLINIC | Age: OVER 89
End: 2025-01-30
Payer: MEDICARE

## 2025-01-30 VITALS
RESPIRATION RATE: 16 BRPM | BODY MASS INDEX: 19.99 KG/M2 | OXYGEN SATURATION: 98 % | SYSTOLIC BLOOD PRESSURE: 148 MMHG | WEIGHT: 112.8 LBS | HEIGHT: 63 IN | HEART RATE: 72 BPM | TEMPERATURE: 97.2 F | DIASTOLIC BLOOD PRESSURE: 96 MMHG

## 2025-01-30 DIAGNOSIS — I10 BENIGN ESSENTIAL HYPERTENSION: ICD-10-CM

## 2025-01-30 DIAGNOSIS — C91.10 CLL (CHRONIC LYMPHOCYTIC LEUKEMIA) (HCC): ICD-10-CM

## 2025-01-30 DIAGNOSIS — H34.8320 BRANCH RETINAL VEIN OCCLUSION WITH MACULAR EDEMA OF LEFT EYE: ICD-10-CM

## 2025-01-30 DIAGNOSIS — R91.8 MULTIPLE PULMONARY NODULES: ICD-10-CM

## 2025-01-30 DIAGNOSIS — F41.8 DEPRESSION WITH ANXIETY: ICD-10-CM

## 2025-01-30 DIAGNOSIS — H35.3231 EXUDATIVE AGE-RELATED MACULAR DEGENERATION, BILATERAL, WITH ACTIVE CHOROIDAL NEOVASCULARIZATION (HCC): ICD-10-CM

## 2025-01-30 DIAGNOSIS — R73.01 IMPAIRED FASTING GLUCOSE: ICD-10-CM

## 2025-01-30 DIAGNOSIS — E44.1 MILD PROTEIN-CALORIE MALNUTRITION (HCC): ICD-10-CM

## 2025-01-30 DIAGNOSIS — E03.9 HYPOTHYROIDISM, UNSPECIFIED TYPE: Primary | ICD-10-CM

## 2025-01-30 PROCEDURE — G2211 COMPLEX E/M VISIT ADD ON: HCPCS | Performed by: FAMILY MEDICINE

## 2025-01-30 PROCEDURE — 99214 OFFICE O/P EST MOD 30 MIN: CPT | Performed by: FAMILY MEDICINE

## 2025-01-30 RX ORDER — MELOXICAM 7.5 MG/1
7.5 TABLET ORAL DAILY
COMMUNITY
Start: 2024-12-12 | End: 2025-02-10

## 2025-01-30 RX ORDER — 1.1% SODIUM FLUORIDE PRESCRIPTION DENTAL CREAM 5 MG/G
CREAM DENTAL 2 TIMES DAILY
COMMUNITY
Start: 2024-11-18

## 2025-01-30 RX ORDER — ESTRADIOL 0.1 MG/G
CREAM VAGINAL
COMMUNITY

## 2025-01-30 RX ORDER — OFLOXACIN 3 MG/ML
SOLUTION/ DROPS OPHTHALMIC
COMMUNITY
Start: 2025-01-28

## 2025-01-30 RX ORDER — PREDNISOLONE ACETATE 10 MG/ML
SUSPENSION/ DROPS OPHTHALMIC
COMMUNITY
Start: 2025-01-28

## 2025-01-30 NOTE — ASSESSMENT & PLAN NOTE
Continue levothyroxine 25mcg daily.   Orders:    Hemoglobin A1C With EAG; Future    TSH, 3rd generation with Free T4 reflex; Future

## 2025-01-30 NOTE — ASSESSMENT & PLAN NOTE
Low carb diet.   Orders:    Hemoglobin A1C With EAG; Future    TSH, 3rd generation with Free T4 reflex; Future

## 2025-01-30 NOTE — ASSESSMENT & PLAN NOTE
"1/11/2024 CT chest showed \"Previously described nodular opacity in the left lower lobe has nearly resolved. There is mild residual peripheral opacity abutting the pleural surface which may reflect improving lymphomatous involvement or possibly infectious process. \"       "

## 2025-01-30 NOTE — PROGRESS NOTES
"Name: Ethel Roman      : 1934      MRN: [id number]  Encounter Provider: Miles Womack MD  Encounter Date: 2025   Encounter department: [facility]  :  Assessment & Plan  Hypothyroidism, unspecified type  Continue levothyroxine 25mcg daily.   Orders:    Hemoglobin A1C With EAG; Future    TSH, 3rd generation with Free T4 reflex; Future    Impaired fasting glucose  Low carb diet.   Orders:    Hemoglobin A1C With EAG; Future    TSH, 3rd generation with Free T4 reflex; Future    Depression with anxiety  Stable. Use xanax rarely. Pt states she will take xanax before eye surgery.          CLL (chronic lymphocytic leukemia) (HCC)  [facility] hem/onc in [facility].        Benign essential hypertension  DASH diet. Continue carvedilol 6.25mg bid.        Multiple pulmonary nodules  2024 CT chest showed \"Previously described nodular opacity in the left lower lobe has nearly resolved. There is mild residual peripheral opacity abutting the pleural surface which may reflect improving lymphomatous involvement or possibly infectious process. \"       Exudative age-related macular degeneration, bilateral, with active choroidal neovascularization (HCC)  FU eye doc.        Branch retinal vein occlusion with macular edema of left eye  FU eye doc.        Mild protein-calorie malnutrition (HCC)  BMI 20.3 today.        Pt is cleared for eye surgery on 2025.   Flu shot yearly.   Got covid19 vaccines and boosters.   Got pneumovax at pharmacy in  per pt. Got prevnar 13 .   RTO in 6 months.        History of Present Illness   HPI    Pt is here with her daughter Patricia.      Macular degeneration/vein occlusion----Got shot every 1-2 months.   Left eyesight always not good. Right eyesight got worse now.   FU corneal specialist, band keratopathy, plan to do surgery on 2025.   She lives by herself. Got Occupational therapy to her home.      Hypothyroidism---She is on levothyroxine 25mcg daily. 2024 TSH normal.    " "  IFG---Eat healthy. 7/2024 hgA1C 5.5 normal.      AnxietyDepression---Feels ok. She uses xanax 0.125mg as needed.      CLL---FU [facility] hem/onc Dr. Vallejo. No need treatment now. 9/2024 WBC 32.3 stable.      HTN---BP at home good per pt. She is on carvedilol 6.25mg bid. FU cardiology regularly. Denies headache, vision change, Sob or CP.      Vertigo---feels unbalanced. Come and go. She uses meclizine rarely. Pt uses cane or walker.      Urinary incontinence---She is off oxybutynin. Got up 5 times/night. Got botox by uro/gyn every 6 months.      Lackey's---follows GI. She tried nexium and prevacid but felt nausea. She is on pepcid 10mg prn.     Neck pain---FU [facility] PMR. Steroid injection helped 3-6 weeks. Consider nerve block.   Knee OA---FU [facility] orthopedics. Got injections.  Use tylenol/mobid prn.       Lives by herself. Has home aids MWF 9am-1pm. Does all ADL's.   Denies recent falls. Daughter Patricia lives close to her. Consider moved in with Patricia after summer.       Review of Systems   Constitutional:  Negative for appetite change, chills and fever.   HENT:  Negative for congestion, ear pain, sinus pain and sore throat.    Eyes:  Positive for visual disturbance. Negative for discharge and itching.   Respiratory:  Negative for apnea, cough, chest tightness, shortness of breath and wheezing.    Cardiovascular:  Negative for chest pain, palpitations and leg swelling.   Gastrointestinal:  Negative for abdominal pain, anal bleeding, constipation, diarrhea, nausea and vomiting.   Endocrine: Negative for cold intolerance, heat intolerance and polyuria.   Genitourinary:  Negative for difficulty urinating and dysuria.   Musculoskeletal:  Negative for arthralgias, back pain and myalgias.   Skin:  Negative for rash.   Neurological:  Negative for dizziness and headaches.   Psychiatric/Behavioral:  Negative for agitation.        Objective   /96   Pulse 72   Temp (!) 97.2 °F (36.2 °C) (Tympanic)   Resp 16   Ht 5' 2.5\" (1.588 " m)   Wt 51.2 kg (112 lb 12.8 oz)   SpO2 98%   BMI 20.30 kg/m²      Physical Exam  Constitutional:       General: She is not in acute distress.     Appearance: She is well-developed.   HENT:      Head: Normocephalic.   Eyes:      General:         Right eye: No discharge.         Left eye: No discharge.      Conjunctiva/sclera: Conjunctivae normal.   Neck:      Thyroid: No thyromegaly.   Cardiovascular:      Rate and Rhythm: Normal rate and regular rhythm.      Heart sounds: Normal heart sounds. No murmur heard.     No friction rub. No gallop.   Pulmonary:      Effort: Pulmonary effort is normal. No respiratory distress.      Breath sounds: Normal breath sounds. No wheezing or rales.   Chest:      Chest wall: No tenderness.   Abdominal:      General: Bowel sounds are normal. There is no distension.      Palpations: Abdomen is soft. There is no mass.      Tenderness: There is no abdominal tenderness. There is no guarding or rebound.   Musculoskeletal:         General: No tenderness or deformity.      Cervical back: Normal range of motion.      Comments: Use cane   Lymphadenopathy:      Cervical: No cervical adenopathy.   Neurological:      Mental Status: She is alert.

## 2025-01-31 ENCOUNTER — TELEPHONE (OUTPATIENT)
Age: OVER 89
End: 2025-01-31

## 2025-01-31 NOTE — TELEPHONE ENCOUNTER
Erica from [facility] called requesting last office notes from the patient to be faxed to her at [phone]. She mentioned the patient is having eye sx and was suppose to be cleared for it at that visit. Please advise back to further assist.

## 2025-02-03 NOTE — TELEPHONE ENCOUNTER
Judy for [facility] called to request that Pre OP visit be added to the note which was sent over previously. Visit should be marked as a pre-op visit as well as her wellness examine. Surgery is tomorrow, please advise.      Spoke with Frida in the office, new note sen over. Type of visit can not be changed. Pt has been cleared for surgery. If any further calls from [facility] come in regarding pre op clearance, forward directly to Frida in the office.

## 2025-03-14 LAB — TSH SERPL-ACNC: 5.24 UIU/ML (ref 0.45–5.33)

## 2025-03-17 ENCOUNTER — RESULTS FOLLOW-UP (OUTPATIENT)
Dept: FAMILY MEDICINE CLINIC | Facility: CLINIC | Age: OVER 89
End: 2025-03-17

## 2025-03-26 ENCOUNTER — OFFICE VISIT (OUTPATIENT)
Dept: FAMILY MEDICINE CLINIC | Facility: CLINIC | Age: OVER 89
End: 2025-03-26
Payer: MEDICARE

## 2025-03-26 ENCOUNTER — REMOTE DEVICE CLINIC VISIT (OUTPATIENT)
Dept: CARDIOLOGY CLINIC | Facility: CLINIC | Age: OVER 89
End: 2025-03-26
Payer: MEDICARE

## 2025-03-26 VITALS
HEIGHT: 63 IN | TEMPERATURE: 97.7 F | BODY MASS INDEX: 19.95 KG/M2 | WEIGHT: 112.6 LBS | HEART RATE: 74 BPM | DIASTOLIC BLOOD PRESSURE: 94 MMHG | SYSTOLIC BLOOD PRESSURE: 152 MMHG | RESPIRATION RATE: 18 BRPM | OXYGEN SATURATION: 98 %

## 2025-03-26 DIAGNOSIS — I10 BENIGN ESSENTIAL HYPERTENSION: Primary | ICD-10-CM

## 2025-03-26 DIAGNOSIS — R35.0 URINARY FREQUENCY: ICD-10-CM

## 2025-03-26 DIAGNOSIS — R55 SYNCOPE AND COLLAPSE: Primary | ICD-10-CM

## 2025-03-26 DIAGNOSIS — H92.02 LEFT EAR PAIN: ICD-10-CM

## 2025-03-26 DIAGNOSIS — H91.93 DECREASED HEARING OF BOTH EARS: ICD-10-CM

## 2025-03-26 LAB
SL AMB  POCT GLUCOSE, UA: NORMAL
SL AMB LEUKOCYTE ESTERASE,UA: NORMAL
SL AMB POCT BILIRUBIN,UA: NORMAL
SL AMB POCT BLOOD,UA: NORMAL
SL AMB POCT CLARITY,UA: CLEAR
SL AMB POCT COLOR,UA: YELLOW
SL AMB POCT KETONES,UA: NORMAL
SL AMB POCT NITRITE,UA: NORMAL
SL AMB POCT PH,UA: 6
SL AMB POCT SPECIFIC GRAVITY,UA: 1.01
SL AMB POCT URINE PROTEIN: NORMAL
SL AMB POCT UROBILINOGEN: NORMAL

## 2025-03-26 PROCEDURE — G2211 COMPLEX E/M VISIT ADD ON: HCPCS | Performed by: FAMILY MEDICINE

## 2025-03-26 PROCEDURE — 93298 REM INTERROG DEV EVAL SCRMS: CPT | Performed by: INTERNAL MEDICINE

## 2025-03-26 PROCEDURE — 81002 URINALYSIS NONAUTO W/O SCOPE: CPT | Performed by: FAMILY MEDICINE

## 2025-03-26 PROCEDURE — 99214 OFFICE O/P EST MOD 30 MIN: CPT | Performed by: FAMILY MEDICINE

## 2025-03-26 RX ORDER — HYDRALAZINE HYDROCHLORIDE 10 MG/1
10 TABLET, FILM COATED ORAL 2 TIMES DAILY
Qty: 60 TABLET | Refills: 0 | Status: SHIPPED | OUTPATIENT
Start: 2025-03-26

## 2025-03-26 NOTE — ASSESSMENT & PLAN NOTE
DASH diet. Give hydralazine 10mg bid. Continue carvedilol 6.25mg bid.  BP elevated in office today. Pt denies symptoms like headache, vision change, SOB or chest pain.   Check BP at home 2/day and call office if BP always >140/90.    Orders:    hydrALAZINE (APRESOLINE) 10 mg tablet; Take 1 tablet (10 mg total) by mouth 2 (two) times a day

## 2025-03-26 NOTE — PROGRESS NOTES
Name: Ethel Roman      : 1934      MRN: [id number]  Encounter Provider: Miles Womack MD  Encounter Date: 3/26/2025   Encounter department: [facility]  :  Assessment & Plan  Benign essential hypertension  DASH diet. Give hydralazine 10mg bid. Continue carvedilol 6.25mg bid.  BP elevated in office today. Pt denies symptoms like headache, vision change, SOB or chest pain.   Check BP at home 2/day and call office if BP always >140/90.    Orders:    hydrALAZINE (APRESOLINE) 10 mg tablet; Take 1 tablet (10 mg total) by mouth 2 (two) times a day    Urinary frequency  UA showed negative for leuk, nit or blood.   Advised pt to avoid bladder irritants.   Orders:    POCT urine dip    Decreased hearing of both ears  B/l ears no cerumen.   Orders:    Ambulatory Referral to Otolaryngology; Future    Left ear pain  Left ear no infection signs.   Orders:    Ambulatory Referral to Otolaryngology; Future           History of Present Illness   HPI    Pt is here by herself.     HTN---BP at home elevated over weekend. Her whole family visited her over weekend.   169-186/100  She had Headache at that time. NO headache today.   Today BP still elevated 152/94. Denies CP or SOB.   She is on carvedilol 6.25mg bid per cardiology.   She cannot take HCTZ because of hyponatremia.   She cannot take norvasc because of ankle swollen.     Left ear pain for several months.   [place] b/l hearing decreased.     Urine frequency---Got botox for bladder per urology before, no help.   Still urinates 5 times at night.   Constipation---She uses miralax as needed.           Review of Systems   Constitutional:  Negative for appetite change, chills and fever.   HENT:  Positive for ear pain. Negative for congestion, sinus pain and sore throat.    Eyes:  Negative for discharge and itching.   Respiratory:  Negative for apnea, cough, chest tightness, shortness of breath and wheezing.    Cardiovascular:  Negative for chest pain, palpitations  "and leg swelling.   Gastrointestinal:  Negative for abdominal pain, anal bleeding, constipation, diarrhea, nausea and vomiting.   Endocrine: Negative for cold intolerance, heat intolerance and polyuria.   Genitourinary:  Positive for frequency and urgency. Negative for difficulty urinating and dysuria.   Musculoskeletal:  Negative for arthralgias, back pain and myalgias.   Skin:  Negative for rash.   Neurological:  Negative for dizziness and headaches.   Psychiatric/Behavioral:  Negative for agitation.        Objective   /94   Pulse 74   Temp 97.7 °F (36.5 °C) (Tympanic)   Resp 18   Ht 5' 2.5\" (1.588 m)   Wt 51.1 kg (112 lb 9.6 oz)   SpO2 98%   BMI 20.27 kg/m²      Physical Exam  Constitutional:       General: She is not in acute distress.     Appearance: She is well-developed.   HENT:      Head: Normocephalic.      Right Ear: External ear normal.      Left Ear: External ear normal.      Mouth/Throat:      Pharynx: No oropharyngeal exudate or posterior oropharyngeal erythema.   Eyes:      General:         Right eye: No discharge.         Left eye: No discharge.      Conjunctiva/sclera: Conjunctivae normal.   Neck:      Thyroid: No thyromegaly.   Cardiovascular:      Rate and Rhythm: Normal rate and regular rhythm.      Heart sounds: Normal heart sounds. No murmur heard.     No friction rub. No gallop.   Pulmonary:      Effort: Pulmonary effort is normal. No respiratory distress.      Breath sounds: Normal breath sounds. No wheezing or rales.   Chest:      Chest wall: No tenderness.   Abdominal:      General: Bowel sounds are normal. There is no distension.      Palpations: Abdomen is soft. There is no mass.      Tenderness: There is no abdominal tenderness. There is no guarding or rebound.   Musculoskeletal:         General: No tenderness or deformity. Normal range of motion.      Cervical back: Normal range of motion.      Right lower leg: No edema.      Left lower leg: No edema.   Lymphadenopathy:     "  Cervical: No cervical adenopathy.   Neurological:      Mental Status: She is alert.

## 2025-03-29 ENCOUNTER — RESULTS FOLLOW-UP (OUTPATIENT)
Dept: CARDIOLOGY CLINIC | Facility: CLINIC | Age: OVER 89
End: 2025-03-29

## 2025-04-01 DIAGNOSIS — F41.8 DEPRESSION WITH ANXIETY: ICD-10-CM

## 2025-04-01 NOTE — TELEPHONE ENCOUNTER
Reason for call:   [x] Refill   [] Prior Auth  [] Other:     Office:   [x] PCP/Provider -   [] Specialty/Provider -     Medication:     ALPRAZolam (XANAX) 0.25 mg Take 1 tablet (0.25 mg total) by mouth if needed for anxiety          Pharmacy: [id number] - [address]     Local Pharmacy   Does the patient have enough for 3 days?   [x] Yes   [] No - Send as HP to POD    Mail Away Pharmacy   Does the patient have enough for 10 days?   [] Yes   [] No - Send as HP to POD

## 2025-04-02 RX ORDER — ALPRAZOLAM 0.25 MG
0.25 TABLET ORAL AS NEEDED
Qty: 20 TABLET | Refills: 0 | Status: SHIPPED | OUTPATIENT
Start: 2025-04-02

## 2025-04-09 NOTE — PROGRESS NOTES
Cardiology Follow-up    Ethel Roman  [id number]  7/5/1934  HEART & VASCULAR [facility] [place] [id number] [place] [id number]      1. Benign essential hypertension        2. Dyslipidemia        3. Syncope and collapse        4. Status post placement of implantable loop recorder                Discussion/Summary:  Ms. Roman is a pleasant 89-year-old female who presents to the office today for routine follow-up.     Her blood pressure is elevated and has been elevated when she checks it at home.  I will increase her hydralazine to 25 mg twice daily.  She will continue to monitor her blood pressure at home and update me of readings in the near future.  A low-sodium diet was reinforced.    She reports her statin therapy was discontinued by her primary care provider.    She had an echocardiogram in the hospital in 2022 revealing preserved biventricular systolic function with diastolic dysfunction and mild valvular heart disease.    Regarding her syncope, she had no prodrome concerning for arrhythmogenic etiology.  Loop recorder has revealed no cardiac etiology of her syncope thus far.    I will see her back in the office in six months for re-evaluation.    History of Present Illness:  Ms. Roman is a pleasant 89-year-old female who presents to the office today for the re-evaluation of hypertension.    She has been sedentary.  With the activity she performs she denies any exertional chest pain or shortness of breath.  She denies any signs or symptoms of congestive heart failure including increasing lower extremity edema, paroxysmal nocturnal dyspnea, orthopnea, acute weight gain or increasing abdominal girth.  She denies lightheadedness, syncope or presyncope.  She denies palpitations or symptoms of claudication.    She has had progressive problems with her vision.  She now has an aide for about four hours three days/week.  Her family also aids in  her care.  She is preparing to move into the home of her daughter.  She reports a lot of anxiety regarding this.    She saw her primary care provider in the past who recently started her on hydralazine 10 mg twice daily.      Patient Active Problem List   Diagnosis    Benign essential hypertension    Hypothyroidism    History of endometrial cancer    Abnormal breast exam    Arthritis    Constipation    Anxiety    Diverticulosis    Duodenal diverticulum    Esophagitis, reflux    Dyslipidemia    Impaired fasting glucose    Insomnia    Age-related osteoporosis without current pathological fracture    Atrophic vaginitis    Urinary incontinence    Vertigo    Encounter for follow-up surveillance of endometrial cancer    Endometrial cancer (HCC)    Arthritis of left shoulder region    Encounter for screening mammogram for malignant neoplasm of breast    Need for immunization against influenza    Nonexudative age-related macular degeneration, bilateral, early dry stage    Posterior vitreous detachment of both eyes    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    Status post cataract extraction and insertion of intraocular lens    Lackey's esophagus with dysplasia    MGUS (monoclonal gammopathy of unknown significance)    Hypercalcemia    Lyme disease    Visual field defect    Lymphadenopathy    Syncope and collapse    Visual changes    CLL (chronic lymphocytic leukemia) (HCC)    Mild protein-calorie malnutrition (HCC)    Branch retinal vein occlusion with macular edema of left eye    Multiple pulmonary nodules    Cervical spondylosis    Depression with anxiety    Status post placement of implantable loop recorder    Vision problem    Exudative age-related macular degeneration, bilateral, with active choroidal neovascularization (HCC)     Past Medical History:   Diagnosis Date    Anemia     post-op, 07/07/09    Anxiety     last assessed: 01/15/14    Arthritis     C. 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[facility])     stage IA, Grade I endometriod adenocarcinoma with 43% myometrial invasion and no LVSI, last assessed: 01/20/14    Fall 09/14/2022    Folliculitis     last assessed: 03/02/16    Gastroparesis     GERD (gastroesophageal reflux disease)     Hypercholesterolemia     Hyperlipidemia     Hypertension     Hyponatremia 03/10/2016    Incontinence in female     Migraine     Nontoxic single thyroid nodule     last assessed: 02/16/13    Osteoporosis     last assessed: 02/22/17    Pure hypercholesterolemia     Rotator cuff (capsule) sprain     Acute, right    SDH (subdural hematoma) ([facility])     Skin lesion     last assessed: 04/26/13    Stomatitis     Strain of right buttock     gluteus medius    Tendinitis of right rotator cuff     last assessed: 08/12/12    Thyroid cyst     last assessed: 08/15/13    Urinary tract infection     Visual impairment      Social History     Socioeconomic History    Marital status:      Spouse name: Not on file    Number of children: Not on file    Years of education: Not on file    Highest education level: Not on file   Occupational History    Not on file   Tobacco Use    Smoking status: Never     Passive exposure: Never    Smokeless tobacco: Never   Vaping Use    Vaping status: Never Used   Substance and Sexual Activity    Alcohol use: Not Currently    Drug use: No    Sexual activity: Not Currently     Birth control/protection: Post-menopausal, Surgical   Other Topics Concern    Not on file   Social History Narrative    Not on file     Social Drivers of Health     Financial Resource Strain: Not At Risk (3/20/2025)    Received from [facility]    Financial Insecurity      In the last 12 months did you skip medications to save money?: No     In the last 12 months was there a time when you needed to see a doctor but could not because of cost?: No   Food Insecurity: No Food Insecurity (3/20/2025)    Received from [facility]    Food Insecurity     In the last 12 months did you ever eat less than you felt you should because there wasn't enough money for food?: No   Transportation Needs: No Transportation Needs (3/20/2025)    Received from [facility]    Transportation Needs     In the last 12 months have you ever had to go without healthcare because you didn't have a way to get there?: No   Physical Activity: Not on file   Stress: Not on file   Social Connections: Socially Isolated (3/20/2025)    Received from [facility]    Social Connection     Do you often feel lonely?: Yes   Intimate Partner Violence: Not on file   Housing Stability: Not At Risk (3/20/2025)    Received from [facility]    Housing Stability     Are you worried that in the next 2 months you may not have stable housing?: No      Family History   Problem Relation Age of Onset    No Known Problems Mother     Dementia Father     Testicular cancer Brother 30    Lung cancer Brother 60    Prostate cancer Brother 40        unsure of excat age    No Known Problems Daughter     No Known Problems Daughter     No Known Problems Daughter     No Known Problems Maternal Grandmother     No Known Problems Maternal Grandfather     Breast cancer Paternal Grandmother 70    No Known Problems Paternal Grandfather     No Known Problems Maternal Aunt     No Known Problems Paternal Aunt     No Known Problems Paternal Aunt     Prostate cancer Brother         Testicular&lung     Past Surgical History:   Procedure Laterality Date    APPENDECTOMY      BREAST BIOPSY Right     BREAST BIOPSY Left     BREAST CYST ASPIRATION      CARDIAC ELECTROPHYSIOLOGY PROCEDURE N/A 12/17/2022     Procedure: Cardiac loop recorder implant;  Surgeon: Jermain Kent MD;  Location: [facility];  Service: Cardiology    COLONOSCOPY      fiberoptic, 1998, 2001, 2006    CT NEEDLE BIOPSY LUNG  04/25/2023    CYSTOSCOPY      Diagnostic    EGD AND COLONOSCOPY N/A 03/11/2016    Procedure: EGD ;  Surgeon: Arpan Bonilla MD;  Location: [facility];  Service:     EYE SURGERY      HYSTERECTOMY  03/22/2011    Robotic-assisted, total laparoscopic approch    JOINT REPLACEMENT      KNEE SURGERY      OOPHORECTOMY Bilateral 03/22/2011    Salpingo    REPLACEMENT TOTAL KNEE      TONSILECTOMY AND ADNOIDECTOMY      TOTAL SHOULDER ARTHROPLASTY Right        Current Outpatient Medications:     acetaminophen (TYLENOL) 650 mg CR tablet, Take 650 mg by mouth as needed for mild pain., Disp: , Rfl:     ALPHAGAN P 0.1 %, instill 1 drop into both eyes twice a day, Disp: , Rfl: 0    ALPRAZolam (XANAX) 0.25 mg tablet, Take 1 tablet (0.25 mg total) by mouth if needed for anxiety, Disp: 20 tablet, Rfl: 0    bifidobacterium infantis (ALIGN) capsule, Take by mouth, Disp: , Rfl:     carvedilol (COREG) 6.25 mg tablet, take 1 tablet by mouth twice a day with meals, Disp: 180 tablet, Rfl: 1    cholecalciferol (VITAMIN D3) 1,000 units tablet, Take by mouth, Disp: , Rfl:     estradiol (ESTRACE) 0.1 mg/g vaginal cream, APPLY 0.5 GRAMS VAGINALLY TWO TIMES A WEEK for 90, Disp: , Rfl:     famotidine (PEPCID) 20 mg tablet, Take 1 tablet (20 mg total) by mouth 2 (two) times a day (Patient taking differently: Take 20 mg by mouth if needed), Disp: 60 tablet, Rfl: 0    hydrALAZINE (APRESOLINE) 10 mg tablet, Take 1 tablet (10 mg total) by mouth 2 (two) times a day, Disp: 60 tablet, Rfl: 0    levothyroxine 25 mcg tablet, take 1 tablet by mouth once daily, Disp: 90 tablet, Rfl: 1    meclizine (ANTIVERT) 25 mg tablet, TAKE 1 TABLET EVERY 8 HOURS AS NEEDED FOR DIZZINESS, Disp: 30 tablet, Rfl: 0    meloxicam (MOBIC) 7.5 mg tablet, Take 7.5 mg by mouth daily, Disp:  , Rfl:     Multiple Vitamins-Minerals (CENTRUM SILVER PO), Take by mouth, Disp: , Rfl:     onabotulinumtoxin A (BOTOX) 100 units, Inject as directed Twice a year, Disp: , Rfl:     polyethylene glycol (GLYCOLAX) 17 GM/SCOOP powder, Take by mouth daily, Disp: , Rfl:     timolol (TIMOPTIC) 0.5 % ophthalmic solution, Use as directed, Disp: , Rfl:   Allergies   Allergen Reactions    Amoxil [Amoxicillin] Hives    Biaxin [Clarithromycin] GI Intolerance     Reaction Date: 19Jul2011;   Pt gets nauseated    Esomeprazole GI Intolerance    Fosamax [Alendronate] GI Intolerance    Hydrochlorothiazide      Hyponatremia      Lansoprazole GI Intolerance    Norvasc [Amlodipine Besylate] Hives    Pantoprazole GI Intolerance     Can tolerate IV- Not oral    Relafen [Nabumetone] GI Intolerance and Other (See Comments)         Review of Systems:  Review of Systems   Eyes:  Positive for visual disturbance.   Respiratory:  Negative for apnea, choking, wheezing and stridor.    Cardiovascular:  Negative for chest pain and palpitations.   Psychiatric/Behavioral:  The patient is nervous/anxious.    All other systems reviewed and are negative.        There were no vitals filed for this visit.      There were no vitals filed for this visit.        Physical Exam:  General:  Alert and cooperative, appears stated age  HEENT:  PERRLA, EOMI, no scleral icterus, no conjunctival pallor  Neck:  No lymphadenopathy, no thyromegaly, no carotid bruits, no elevated JVP  Heart:  Regular rate and rhythm, normal S1/S2, no S3/S4, no murmur  Lungs:  Clear to auscultation bilaterally   Abdomen:  Soft, non-tender, positive bowel sounds, no rebound or guarding,   no organomegaly   Extremities:  No clubbing, cyanosis or edema   Vascular:  2+ pedal pulses  Skin:  No rashes or lesions on exposed skin  Neurologic:  Cranial nerves II-XII grossly intact without focal deficits

## 2025-04-10 ENCOUNTER — OFFICE VISIT (OUTPATIENT)
Dept: CARDIOLOGY CLINIC | Facility: CLINIC | Age: OVER 89
End: 2025-04-10
Payer: MEDICARE

## 2025-04-10 VITALS
HEART RATE: 68 BPM | SYSTOLIC BLOOD PRESSURE: 178 MMHG | BODY MASS INDEX: 21.02 KG/M2 | HEIGHT: 62 IN | WEIGHT: 114.2 LBS | DIASTOLIC BLOOD PRESSURE: 86 MMHG

## 2025-04-10 DIAGNOSIS — I10 BENIGN ESSENTIAL HYPERTENSION: Primary | ICD-10-CM

## 2025-04-10 DIAGNOSIS — Z95.818 STATUS POST PLACEMENT OF IMPLANTABLE LOOP RECORDER: ICD-10-CM

## 2025-04-10 DIAGNOSIS — R55 SYNCOPE AND COLLAPSE: ICD-10-CM

## 2025-04-10 DIAGNOSIS — E78.5 DYSLIPIDEMIA: ICD-10-CM

## 2025-04-10 LAB
ATRIAL RATE: 68 BPM
P AXIS: 39 DEGREES
PR INTERVAL: 150 MS
QRS AXIS: 28 DEGREES
QRSD INTERVAL: 92 MS
QT INTERVAL: 380 MS
QTC INTERVAL: 404 MS
T WAVE AXIS: 28 DEGREES
VENTRICULAR RATE: 68 BPM

## 2025-04-10 PROCEDURE — 99214 OFFICE O/P EST MOD 30 MIN: CPT | Performed by: INTERNAL MEDICINE

## 2025-04-10 PROCEDURE — 93000 ELECTROCARDIOGRAM COMPLETE: CPT | Performed by: INTERNAL MEDICINE

## 2025-04-10 RX ORDER — HYDRALAZINE HYDROCHLORIDE 25 MG/1
25 TABLET, FILM COATED ORAL 2 TIMES DAILY
Qty: 180 TABLET | Refills: 3 | Status: SHIPPED | OUTPATIENT
Start: 2025-04-10

## 2025-05-17 DIAGNOSIS — E03.9 HYPOTHYROIDISM, UNSPECIFIED TYPE: ICD-10-CM

## 2025-05-18 RX ORDER — LEVOTHYROXINE SODIUM 25 UG/1
25 TABLET ORAL DAILY
Qty: 90 TABLET | Refills: 1 | Status: SHIPPED | OUTPATIENT
Start: 2025-05-18

## 2025-06-07 DIAGNOSIS — I10 PRIMARY HYPERTENSION: ICD-10-CM

## 2025-06-08 RX ORDER — CARVEDILOL 6.25 MG/1
6.25 TABLET ORAL 2 TIMES DAILY WITH MEALS
Qty: 180 TABLET | Refills: 1 | Status: SHIPPED | OUTPATIENT
Start: 2025-06-08

## 2025-06-11 ENCOUNTER — REMOTE DEVICE CLINIC VISIT (OUTPATIENT)
Dept: CARDIOLOGY CLINIC | Facility: CLINIC | Age: OVER 89
End: 2025-06-11
Payer: MEDICARE

## 2025-06-11 DIAGNOSIS — R55 SYNCOPE AND COLLAPSE: Primary | ICD-10-CM

## 2025-06-11 PROCEDURE — 93298 REM INTERROG DEV EVAL SCRMS: CPT | Performed by: INTERNAL MEDICINE

## 2025-06-11 NOTE — PROGRESS NOTES
"Results for orders placed or performed in visit on 06/11/25   Cardiac EP device report    Narrative    CARELINK TRANSMISSION: BATTERY STATUS \"OK.\" NO PATIENT OR DEVICE ACTIVATED EPISODES. PVC BURDEN 0.4%; TIME IN AF 0%â€”VILLARREAL        "

## 2025-06-14 ENCOUNTER — RESULTS FOLLOW-UP (OUTPATIENT)
Dept: CARDIOLOGY CLINIC | Facility: CLINIC | Age: OVER 89
End: 2025-06-14

## 2025-07-10 ENCOUNTER — OFFICE VISIT (OUTPATIENT)
Dept: FAMILY MEDICINE CLINIC | Facility: CLINIC | Age: OVER 89
End: 2025-07-10
Payer: MEDICARE

## 2025-07-10 VITALS
HEIGHT: 62 IN | BODY MASS INDEX: 20.24 KG/M2 | TEMPERATURE: 97.8 F | SYSTOLIC BLOOD PRESSURE: 130 MMHG | RESPIRATION RATE: 16 BRPM | DIASTOLIC BLOOD PRESSURE: 82 MMHG | HEART RATE: 73 BPM | WEIGHT: 110 LBS | OXYGEN SATURATION: 98 %

## 2025-07-10 DIAGNOSIS — F41.8 DEPRESSION WITH ANXIETY: ICD-10-CM

## 2025-07-10 DIAGNOSIS — R39.9 UTI SYMPTOMS: ICD-10-CM

## 2025-07-10 DIAGNOSIS — R19.7 DIARRHEA, UNSPECIFIED TYPE: Primary | ICD-10-CM

## 2025-07-10 PROCEDURE — G2211 COMPLEX E/M VISIT ADD ON: HCPCS | Performed by: FAMILY MEDICINE

## 2025-07-10 PROCEDURE — 99214 OFFICE O/P EST MOD 30 MIN: CPT | Performed by: FAMILY MEDICINE

## 2025-07-10 RX ORDER — ALPRAZOLAM 0.25 MG
0.25 TABLET ORAL AS NEEDED
Qty: 20 TABLET | Refills: 0 | Status: SHIPPED | OUTPATIENT
Start: 2025-07-10

## 2025-07-10 NOTE — PROGRESS NOTES
Name: Ethel Roman      : 1934      MRN: [id number]  Encounter Provider: Miles Womack MD  Encounter Date: 7/10/2025   Encounter department: [facility]  :  Assessment & Plan  Diarrhea, unspecified type    Orders:  •  Clostridioides difficile toxin by PCR with EIA; Future  •  Fecal leukocytes; Future  •  Ova and parasite examination; Future  •  Stool Enteric Bacterial Panel by PCR; Future  •  CBC and differential; Future  •  Basic metabolic panel; Future    UTI symptoms    Orders:  •  UA w Reflex to Microscopic w Reflex to Culture; Future    Depression with anxiety      Orders:  •  ALPRAZolam (XANAX) 0.25 mg tablet; Take 1 tablet (0.25 mg total) by mouth if needed for anxiety           History of Present Illness   HPI    Pt is here with her daughter Patricia.     Diarrhea for 2-3 weeks.   Watery, no blood in it. 4-5 times/day.   Usually in the morning.   Denies fever.   Lower abdominal pain. 7/10.   Denies n/v.   Sore taste in mouth. She is on papcid 20mg daily for GERD.     UTI symptoms start today. Urine frequency.     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 Vallejo. No need treatment now. 2024 WBC 32.3 stable.         Review of Systems   Constitutional:  Negative for appetite change, chills and fever.   HENT:  Negative for congestion, ear pain, sinus pain and sore throat.    Eyes:  Negative for discharge and itching.   Respiratory:  Negative for apnea, cough, chest tightness, shortness of breath and wheezing.    Cardiovascular:  Negative for chest pain, palpitations and leg swelling.   Gastrointestinal:  Positive for abdominal pain and diarrhea. Negative for anal bleeding, constipation, nausea and vomiting.   Endocrine: Negative for cold intolerance, heat intolerance and polyuria.   Genitourinary:  Positive for frequency. Negative for difficulty urinating and dysuria.   Musculoskeletal:  Negative for arthralgias, back pain and myalgias.   Skin:  Negative for rash.   Neurological:  Negative for dizziness and  "headaches.   Psychiatric/Behavioral:  Negative for agitation.        Objective   /82 (BP Location: Left arm, Patient Position: Sitting, Cuff Size: Standard)   Pulse 73   Temp 97.8 °F (36.6 °C) (Tympanic)   Resp 16   Ht 5' 2\" (1.575 m)   Wt 49.9 kg (110 lb)   SpO2 98%   BMI 20.12 kg/m²      Physical Exam  Constitutional:       General: She is not in acute distress.     Appearance: She is well-developed.   HENT:      Head: Normocephalic.     Eyes:      General:         Right eye: No discharge.         Left eye: No discharge.      Conjunctiva/sclera: Conjunctivae normal.     Neck:      Thyroid: No thyromegaly.     Cardiovascular:      Rate and Rhythm: Normal rate and regular rhythm.      Heart sounds: Normal heart sounds. No murmur heard.     No friction rub. No gallop.   Pulmonary:      Effort: Pulmonary effort is normal. No respiratory distress.      Breath sounds: Normal breath sounds. No wheezing or rales.   Chest:      Chest wall: No tenderness.   Abdominal:      General: Bowel sounds are normal. There is no distension.      Palpations: Abdomen is soft. There is no mass.      Tenderness: There is no abdominal tenderness. There is no guarding or rebound.     Musculoskeletal:         General: No tenderness or deformity. Normal range of motion.      Cervical back: Normal range of motion.   Lymphadenopathy:      Cervical: No cervical adenopathy.     Neurological:      Mental Status: She is alert.         "

## 2025-07-10 NOTE — ASSESSMENT & PLAN NOTE
Orders:  •  ALPRAZolam (XANAX) 0.25 mg tablet; Take 1 tablet (0.25 mg total) by mouth if needed for anxiety

## 2025-07-11 ENCOUNTER — APPOINTMENT (OUTPATIENT)
Dept: LAB | Facility: CLINIC | Age: OVER 89
End: 2025-07-11
Payer: MEDICARE

## 2025-07-11 ENCOUNTER — RESULTS FOLLOW-UP (OUTPATIENT)
Dept: FAMILY MEDICINE CLINIC | Facility: CLINIC | Age: OVER 89
End: 2025-07-11

## 2025-07-11 DIAGNOSIS — R39.9 UTI SYMPTOMS: ICD-10-CM

## 2025-07-11 DIAGNOSIS — R73.01 IMPAIRED FASTING GLUCOSE: ICD-10-CM

## 2025-07-11 DIAGNOSIS — E03.9 HYPOTHYROIDISM, UNSPECIFIED TYPE: ICD-10-CM

## 2025-07-11 LAB
ANION GAP SERPL CALCULATED.3IONS-SCNC: 8 MMOL/L (ref 4–13)
ANISOCYTOSIS BLD QL SMEAR: PRESENT
BASOPHILS # BLD MANUAL: 0 THOUSAND/UL (ref 0–0.1)
BASOPHILS NFR MAR MANUAL: 0 % (ref 0–1)
BILIRUB UR QL STRIP: NEGATIVE
BUN SERPL-MCNC: 12 MG/DL (ref 5–25)
CALCIUM SERPL-MCNC: 9.7 MG/DL (ref 8.4–10.2)
CHLORIDE SERPL-SCNC: 95 MMOL/L (ref 96–108)
CLARITY UR: CLEAR
CO2 SERPL-SCNC: 29 MMOL/L (ref 21–32)
COLOR UR: COLORLESS
CREAT SERPL-MCNC: 0.51 MG/DL (ref 0.6–1.3)
DIFFERENTIAL COMMENT: ABNORMAL
EOSINOPHIL # BLD MANUAL: 0 THOUSAND/UL (ref 0–0.4)
EOSINOPHIL NFR BLD MANUAL: 0 % (ref 0–6)
ERYTHROCYTE [DISTWIDTH] IN BLOOD BY AUTOMATED COUNT: 15.3 % (ref 11.6–15.1)
EST. AVERAGE GLUCOSE BLD GHB EST-MCNC: 120 MG/DL
GFR SERPL CREATININE-BSD FRML MDRD: 84 ML/MIN/1.73SQ M
GLUCOSE P FAST SERPL-MCNC: 90 MG/DL (ref 65–99)
GLUCOSE UR STRIP-MCNC: NEGATIVE MG/DL
HBA1C MFR BLD: 5.8 %
HCT VFR BLD AUTO: 34.4 % (ref 34.8–46.1)
HGB BLD-MCNC: 11.2 G/DL (ref 11.5–15.4)
HGB UR QL STRIP.AUTO: NEGATIVE
KETONES UR STRIP-MCNC: NEGATIVE MG/DL
LEUKOCYTE ESTERASE UR QL STRIP: NEGATIVE
LYMPHOCYTES # BLD AUTO: 35.63 THOUSAND/UL (ref 0.6–4.47)
LYMPHOCYTES # BLD AUTO: 87 % (ref 14–44)
MCH RBC QN AUTO: 28.2 PG (ref 26.8–34.3)
MCHC RBC AUTO-ENTMCNC: 32.6 G/DL (ref 31.4–37.4)
MCV RBC AUTO: 87 FL (ref 82–98)
MONOCYTES # BLD AUTO: 0.4 THOUSAND/UL (ref 0–1.22)
MONOCYTES NFR BLD: 1 % (ref 4–12)
NEUTROPHILS # BLD MANUAL: 3.56 THOUSAND/UL (ref 1.85–7.62)
NEUTS SEG NFR BLD AUTO: 9 % (ref 43–75)
NITRITE UR QL STRIP: NEGATIVE
PH UR STRIP.AUTO: 7 [PH]
PLATELET # BLD AUTO: 336 THOUSANDS/UL (ref 149–390)
PLATELET BLD QL SMEAR: ADEQUATE
PMV BLD AUTO: 9.5 FL (ref 8.9–12.7)
POIKILOCYTOSIS BLD QL SMEAR: PRESENT
POTASSIUM SERPL-SCNC: 4.1 MMOL/L (ref 3.5–5.3)
PROT UR STRIP-MCNC: NEGATIVE MG/DL
RBC # BLD AUTO: 3.97 MILLION/UL (ref 3.81–5.12)
RBC MORPH BLD: PRESENT
SMUDGE CELLS BLD QL SMEAR: PRESENT
SODIUM SERPL-SCNC: 132 MMOL/L (ref 135–147)
SP GR UR STRIP.AUTO: 1 (ref 1–1.03)
UROBILINOGEN UR STRIP-ACNC: <2 MG/DL
VARIANT LYMPHS # BLD AUTO: 3 %
WBC # BLD AUTO: 39.59 THOUSAND/UL (ref 4.31–10.16)

## 2025-07-11 PROCEDURE — 36415 COLL VENOUS BLD VENIPUNCTURE: CPT | Performed by: FAMILY MEDICINE

## 2025-07-11 PROCEDURE — 85027 COMPLETE CBC AUTOMATED: CPT | Performed by: FAMILY MEDICINE

## 2025-07-11 PROCEDURE — 87505 NFCT AGENT DETECTION GI: CPT | Performed by: FAMILY MEDICINE

## 2025-07-11 PROCEDURE — 85007 BL SMEAR W/DIFF WBC COUNT: CPT | Performed by: FAMILY MEDICINE

## 2025-07-11 PROCEDURE — 81003 URINALYSIS AUTO W/O SCOPE: CPT

## 2025-07-11 PROCEDURE — 87177 OVA AND PARASITES SMEARS: CPT | Performed by: FAMILY MEDICINE

## 2025-07-11 PROCEDURE — 89055 LEUKOCYTE ASSESSMENT FECAL: CPT | Performed by: FAMILY MEDICINE

## 2025-07-11 PROCEDURE — 80048 BASIC METABOLIC PNL TOTAL CA: CPT | Performed by: FAMILY MEDICINE

## 2025-07-11 PROCEDURE — 87209 SMEAR COMPLEX STAIN: CPT | Performed by: FAMILY MEDICINE

## 2025-07-11 PROCEDURE — 83036 HEMOGLOBIN GLYCOSYLATED A1C: CPT

## 2025-07-12 LAB
C COLI+JEJUNI TUF STL QL NAA+PROBE: NEGATIVE
C DIFF TOX GENS STL QL NAA+PROBE: NEGATIVE
EC STX1+STX2 GENES STL QL NAA+PROBE: NEGATIVE
SALMONELLA SP SPAO STL QL NAA+PROBE: NEGATIVE
SHIGELLA SP+EIEC IPAH STL QL NAA+PROBE: NEGATIVE

## 2025-07-18 LAB — WBC SPEC QL GRAM STN: NORMAL

## 2025-08-12 ENCOUNTER — TELEPHONE (OUTPATIENT)
Dept: FAMILY MEDICINE CLINIC | Facility: CLINIC | Age: OVER 89
End: 2025-08-12

## 2025-08-14 ENCOUNTER — OFFICE VISIT (OUTPATIENT)
Dept: FAMILY MEDICINE CLINIC | Facility: CLINIC | Age: OVER 89
End: 2025-08-14
Payer: MEDICARE

## 2025-08-14 PROBLEM — H53.40 VISUAL FIELD DEFECT: Status: RESOLVED | Noted: 2022-06-10 | Resolved: 2025-08-14

## 2025-08-14 PROBLEM — H43.813 POSTERIOR VITREOUS DETACHMENT OF BOTH EYES: Status: RESOLVED | Noted: 2020-03-17 | Resolved: 2025-08-14

## 2025-08-14 PROBLEM — H54.7 VISION PROBLEM: Status: RESOLVED | Noted: 2024-07-25 | Resolved: 2025-08-14

## 2025-08-14 PROBLEM — Z96.1 STATUS POST CATARACT EXTRACTION AND INSERTION OF INTRAOCULAR LENS: Status: RESOLVED | Noted: 2020-03-17 | Resolved: 2025-08-14

## 2025-08-14 PROBLEM — R55 SYNCOPE AND COLLAPSE: Status: RESOLVED | Noted: 2022-09-14 | Resolved: 2025-08-14

## 2025-08-14 PROBLEM — Z85.42 HISTORY OF ENDOMETRIAL CANCER: Status: ACTIVE | Noted: 2019-04-29

## 2025-08-14 PROBLEM — Z98.49 STATUS POST CATARACT EXTRACTION AND INSERTION OF INTRAOCULAR LENS: Status: RESOLVED | Noted: 2020-03-17 | Resolved: 2025-08-14

## 2025-08-14 PROBLEM — Z85.42 HISTORY OF ENDOMETRIAL CANCER: Status: RESOLVED | Noted: 2018-02-12 | Resolved: 2025-08-14

## 2025-08-14 PROBLEM — H53.9 VISUAL CHANGES: Status: RESOLVED | Noted: 2022-09-14 | Resolved: 2025-08-14
